# Patient Record
Sex: FEMALE | Race: WHITE | NOT HISPANIC OR LATINO | Employment: OTHER | URBAN - METROPOLITAN AREA
[De-identification: names, ages, dates, MRNs, and addresses within clinical notes are randomized per-mention and may not be internally consistent; named-entity substitution may affect disease eponyms.]

---

## 2017-03-14 ENCOUNTER — HISTORICAL (OUTPATIENT)
Dept: LAB | Facility: HOSPITAL | Age: 82
End: 2017-03-14

## 2017-05-23 ENCOUNTER — HISTORICAL (OUTPATIENT)
Dept: LAB | Facility: HOSPITAL | Age: 82
End: 2017-05-23

## 2017-05-23 LAB
ABS NEUT (OLG): 7.2 X10(3)/MCL (ref 2.1–9.2)
ALBUMIN SERPL-MCNC: 4.2 GM/DL (ref 3.4–5)
ALBUMIN/GLOB SERPL: 1.3 RATIO (ref 1.1–2)
ALP SERPL-CCNC: 60 UNIT/L (ref 46–116)
ALT SERPL-CCNC: 18 UNIT/L (ref 12–78)
APPEARANCE, UA: CLEAR
AST SERPL-CCNC: 13 UNIT/L (ref 15–37)
BACTERIA SPEC CULT: ABNORMAL
BASOPHILS # BLD AUTO: 0.1 X10(3)/MCL
BASOPHILS NFR BLD AUTO: 1 % (ref 0–2)
BILIRUB SERPL-MCNC: 0.6 MG/DL (ref 0.2–1)
BILIRUB UR QL STRIP: NEGATIVE
BILIRUBIN DIRECT+TOT PNL SERPL-MCNC: 0.15 MG/DL (ref 0–0.2)
BILIRUBIN DIRECT+TOT PNL SERPL-MCNC: 0.45 MG/DL (ref 0–0.8)
BUN SERPL-MCNC: 16 MG/DL (ref 7–18)
CALCIUM SERPL-MCNC: 10.3 MG/DL (ref 8.5–10.1)
CHLORIDE SERPL-SCNC: 103 MMOL/L (ref 98–107)
CO2 SERPL-SCNC: 30.7 MMOL/L (ref 21–32)
COLOR UR: YELLOW
CREAT SERPL-MCNC: 0.83 MG/DL (ref 0.6–1.3)
CREAT UR-MCNC: 148.1 MG/DL (ref 30–125)
EOSINOPHIL # BLD AUTO: 0.5 X10(3)/MCL
EOSINOPHIL NFR BLD AUTO: 5 %
ERYTHROCYTE [DISTWIDTH] IN BLOOD BY AUTOMATED COUNT: 13 % (ref 11.5–17)
EST. AVERAGE GLUCOSE BLD GHB EST-MCNC: 111 MG/DL
GLOBULIN SER-MCNC: 3.3 GM/DL (ref 2.4–3.5)
GLUCOSE (UA): NEGATIVE
GLUCOSE SERPL-MCNC: 85 MG/DL (ref 74–106)
HBA1C MFR BLD: 5.5 % (ref 4.5–6.2)
HCT VFR BLD AUTO: 40.6 % (ref 37–47)
HGB BLD-MCNC: 13.4 GM/DL (ref 12–16)
HGB UR QL STRIP: NEGATIVE
KETONES UR QL STRIP: ABNORMAL
LEUKOCYTE ESTERASE UR QL STRIP: NEGATIVE
LYMPHOCYTES # BLD AUTO: 1.7 X10(3)/MCL
LYMPHOCYTES NFR BLD AUTO: 17 % (ref 13–40)
MCH RBC QN AUTO: 31 PG (ref 27–31)
MCHC RBC AUTO-ENTMCNC: 32.9 GM/DL (ref 33–36)
MCV RBC AUTO: 94 FL (ref 80–94)
MICROALBUMIN UR-MCNC: 0.8 MG/DL (ref 0–3)
MICROALBUMIN/CREAT RATIO PNL UR: 5.4 MG/GM CR (ref 0–30)
MONOCYTES # BLD AUTO: 0.6 X10(3)/MCL
MONOCYTES NFR BLD AUTO: 6 % (ref 2–11)
NEUTROPHILS # BLD AUTO: 7.2 X10(3)/MCL (ref 2.1–9.2)
NEUTROPHILS NFR BLD AUTO: 71 % (ref 47–80)
NITRITE UR QL STRIP: NEGATIVE
PH UR STRIP: 7 [PH] (ref 5–9)
PLATELET # BLD AUTO: 261 X10(3)/MCL (ref 130–400)
PMV BLD AUTO: 9.5 FL (ref 7.4–10.4)
POTASSIUM SERPL-SCNC: 4.7 MMOL/L (ref 3.5–5.1)
PROT SERPL-MCNC: 7.5 GM/DL (ref 6.4–8.2)
PROT UR QL STRIP: NEGATIVE
RBC # BLD AUTO: 4.32 X10(6)/MCL (ref 4.2–5.4)
RBC #/AREA URNS HPF: ABNORMAL /[HPF]
SODIUM SERPL-SCNC: 141 MMOL/L (ref 136–145)
SP GR UR STRIP: 1.01 (ref 1–1.03)
SQUAMOUS EPITHELIAL, UA: ABNORMAL
UROBILINOGEN UR STRIP-ACNC: 0.2
WBC # SPEC AUTO: 10.1 X10(3)/MCL (ref 4.5–11.5)
WBC #/AREA URNS HPF: ABNORMAL /HPF

## 2017-05-24 LAB — PTH-INTACT SERPL-MCNC: 14.1 PG/DL (ref 14–72)

## 2017-12-14 ENCOUNTER — HISTORICAL (OUTPATIENT)
Dept: LAB | Facility: HOSPITAL | Age: 82
End: 2017-12-14

## 2017-12-14 LAB
ABS NEUT (OLG): 5.5 X10(3)/MCL (ref 2.1–9.2)
ALBUMIN SERPL-MCNC: 3.9 GM/DL (ref 3.4–5)
ALBUMIN/GLOB SERPL: 1.2 RATIO (ref 1.1–2)
ALP SERPL-CCNC: 59 UNIT/L (ref 46–116)
ALT SERPL-CCNC: 19 UNIT/L (ref 12–78)
AST SERPL-CCNC: 14 UNIT/L (ref 15–37)
BASOPHILS # BLD AUTO: 0.1 X10(3)/MCL
BASOPHILS NFR BLD AUTO: 1 % (ref 0–2)
BILIRUB SERPL-MCNC: 0.5 MG/DL (ref 0.2–1)
BILIRUBIN DIRECT+TOT PNL SERPL-MCNC: 0.14 MG/DL (ref 0–0.2)
BILIRUBIN DIRECT+TOT PNL SERPL-MCNC: 0.34 MG/DL (ref 0–0.8)
BUN SERPL-MCNC: 21.7 MG/DL (ref 7–18)
CALCIUM SERPL-MCNC: 10.4 MG/DL (ref 8.5–10.1)
CHLORIDE SERPL-SCNC: 102 MMOL/L (ref 98–107)
CO2 SERPL-SCNC: 28.9 MMOL/L (ref 21–32)
CREAT SERPL-MCNC: 0.88 MG/DL (ref 0.6–1.3)
EOSINOPHIL # BLD AUTO: 0.4 X10(3)/MCL
EOSINOPHIL NFR BLD AUTO: 4 %
ERYTHROCYTE [DISTWIDTH] IN BLOOD BY AUTOMATED COUNT: 12.8 % (ref 11.5–17)
EST. AVERAGE GLUCOSE BLD GHB EST-MCNC: 117 MG/DL
GLOBULIN SER-MCNC: 3.3 GM/DL (ref 2.4–3.5)
GLUCOSE SERPL-MCNC: 94 MG/DL (ref 74–106)
HBA1C MFR BLD: 5.7 % (ref 4.5–6.2)
HCT VFR BLD AUTO: 40.6 % (ref 37–47)
HGB BLD-MCNC: 13.7 GM/DL (ref 12–16)
LYMPHOCYTES # BLD AUTO: 2.1 X10(3)/MCL
LYMPHOCYTES NFR BLD AUTO: 24 % (ref 13–40)
MCH RBC QN AUTO: 31.7 PG (ref 27–31)
MCHC RBC AUTO-ENTMCNC: 33.8 GM/DL (ref 33–36)
MCV RBC AUTO: 93.9 FL (ref 80–94)
MONOCYTES # BLD AUTO: 0.6 X10(3)/MCL
MONOCYTES NFR BLD AUTO: 7 % (ref 2–11)
NEUTROPHILS # BLD AUTO: 5.5 X10(3)/MCL (ref 2.1–9.2)
NEUTROPHILS NFR BLD AUTO: 64 % (ref 47–80)
PLATELET # BLD AUTO: 356 X10(3)/MCL (ref 130–400)
PMV BLD AUTO: 8.3 FL (ref 7.4–10.4)
POTASSIUM SERPL-SCNC: 4.6 MMOL/L (ref 3.5–5.1)
PROT SERPL-MCNC: 7.2 GM/DL (ref 6.4–8.2)
RBC # BLD AUTO: 4.32 X10(6)/MCL (ref 4.2–5.4)
SODIUM SERPL-SCNC: 140 MMOL/L (ref 136–145)
WBC # SPEC AUTO: 8.7 X10(3)/MCL (ref 4.5–11.5)

## 2018-01-25 ENCOUNTER — HISTORICAL (OUTPATIENT)
Dept: LAB | Facility: HOSPITAL | Age: 83
End: 2018-01-25

## 2018-01-25 LAB
CALCIUM SERPL-MCNC: 10.2 MG/DL (ref 8.5–10.1)
PHOSPHATE SERPL-MCNC: 3.6 MG/DL (ref 2.5–4.9)

## 2018-05-29 ENCOUNTER — HISTORICAL (OUTPATIENT)
Dept: LAB | Facility: HOSPITAL | Age: 83
End: 2018-05-29

## 2018-05-29 LAB
ABS NEUT (OLG): 5.8 X10(3)/MCL (ref 2.1–9.2)
ALBUMIN SERPL-MCNC: 4.1 GM/DL (ref 3.4–5)
ALBUMIN/GLOB SERPL: 1.2 RATIO (ref 1.1–2)
ALP SERPL-CCNC: 72 UNIT/L (ref 46–116)
ALT SERPL-CCNC: 18 UNIT/L (ref 12–78)
AST SERPL-CCNC: 16 UNIT/L (ref 15–37)
BASOPHILS # BLD AUTO: 0.1 X10(3)/MCL
BASOPHILS NFR BLD AUTO: 1 % (ref 0–2)
BILIRUB SERPL-MCNC: 0.5 MG/DL (ref 0.2–1)
BILIRUBIN DIRECT+TOT PNL SERPL-MCNC: 0.15 MG/DL (ref 0–0.2)
BILIRUBIN DIRECT+TOT PNL SERPL-MCNC: 0.35 MG/DL (ref 0–0.8)
BUN SERPL-MCNC: 12.2 MG/DL (ref 7–18)
CALCIUM SERPL-MCNC: 10 MG/DL (ref 8.5–10.1)
CHLORIDE SERPL-SCNC: 102 MMOL/L (ref 98–107)
CHOLEST SERPL-MCNC: 171 MG/DL (ref 0–200)
CHOLEST/HDLC SERPL: 2.2 {RATIO} (ref 0–4)
CO2 SERPL-SCNC: 26 MMOL/L (ref 21–32)
CREAT SERPL-MCNC: 0.89 MG/DL (ref 0.6–1.3)
EOSINOPHIL # BLD AUTO: 0.2 X10(3)/MCL
EOSINOPHIL NFR BLD AUTO: 3 %
ERYTHROCYTE [DISTWIDTH] IN BLOOD BY AUTOMATED COUNT: 13.2 % (ref 11.5–17)
EST. AVERAGE GLUCOSE BLD GHB EST-MCNC: 105 MG/DL
GLOBULIN SER-MCNC: 3.4 GM/DL (ref 2.4–3.5)
GLUCOSE SERPL-MCNC: 116 MG/DL (ref 74–106)
HBA1C MFR BLD: 5.3 % (ref 4.5–6.2)
HCT VFR BLD AUTO: 40.8 % (ref 37–47)
HDLC SERPL-MCNC: 76 MG/DL (ref 40–60)
HGB BLD-MCNC: 13.8 GM/DL (ref 12–16)
LDLC SERPL CALC-MCNC: 79 MG/DL (ref 0–129)
LYMPHOCYTES # BLD AUTO: 2.2 X10(3)/MCL
LYMPHOCYTES NFR BLD AUTO: 25 % (ref 13–40)
MCH RBC QN AUTO: 32 PG (ref 27–31)
MCHC RBC AUTO-ENTMCNC: 33.7 GM/DL (ref 33–36)
MCV RBC AUTO: 94.9 FL (ref 80–94)
MONOCYTES # BLD AUTO: 0.6 X10(3)/MCL
MONOCYTES NFR BLD AUTO: 6 % (ref 2–11)
NEUTROPHILS # BLD AUTO: 5.8 X10(3)/MCL (ref 2.1–9.2)
NEUTROPHILS NFR BLD AUTO: 65 % (ref 47–80)
PLATELET # BLD AUTO: 306 X10(3)/MCL (ref 130–400)
PMV BLD AUTO: 9.1 FL (ref 7.4–10.4)
POTASSIUM SERPL-SCNC: 4.4 MMOL/L (ref 3.5–5.1)
PROT SERPL-MCNC: 7.5 GM/DL (ref 6.4–8.2)
RBC # BLD AUTO: 4.3 X10(6)/MCL (ref 4.2–5.4)
SODIUM SERPL-SCNC: 139 MMOL/L (ref 136–145)
TRIGL SERPL-MCNC: 78 MG/DL
TSH SERPL-ACNC: 1.08 MIU/ML (ref 0.36–3.74)
VLDLC SERPL CALC-MCNC: 16 MG/DL
WBC # SPEC AUTO: 8.9 X10(3)/MCL (ref 4.5–11.5)

## 2019-01-10 ENCOUNTER — HOSPITAL ENCOUNTER (OUTPATIENT)
Dept: MEDSURG UNIT | Facility: HOSPITAL | Age: 84
End: 2019-01-12
Attending: INTERNAL MEDICINE | Admitting: INTERNAL MEDICINE

## 2019-01-10 LAB
MAGNESIUM SERPL-MCNC: 1.8 MG/DL (ref 1.8–2.4)
PHOSPHATE SERPL-MCNC: 3.5 MG/DL (ref 2.5–4.9)

## 2019-01-11 LAB
ABS NEUT (OLG): 5.7 X10(3)/MCL (ref 2.1–9.2)
BASOPHILS # BLD AUTO: 0.05 X10(3)/MCL
BASOPHILS NFR BLD AUTO: 1 %
BUN SERPL-MCNC: 16 MG/DL (ref 7–18)
CALCIUM SERPL-MCNC: 8.2 MG/DL (ref 8.5–10.1)
CHLORIDE SERPL-SCNC: 110 MMOL/L (ref 98–107)
CO2 SERPL-SCNC: 25 MMOL/L (ref 21–32)
CREAT SERPL-MCNC: 0.7 MG/DL (ref 0.6–1.3)
CREAT/UREA NIT SERPL: 23
EOSINOPHIL # BLD AUTO: 0.32 10*3/UL
EOSINOPHIL NFR BLD AUTO: 4 %
ERYTHROCYTE [DISTWIDTH] IN BLOOD BY AUTOMATED COUNT: 12.4 % (ref 11.5–14.5)
GLUCOSE SERPL-MCNC: 74 MG/DL (ref 74–106)
HCT VFR BLD AUTO: 38.3 % (ref 35–46)
HGB BLD-MCNC: 12.5 GM/DL (ref 12–16)
IMM GRANULOCYTES # BLD AUTO: 0.04 10*3/UL
IMM GRANULOCYTES NFR BLD AUTO: 0 %
LYMPHOCYTES # BLD AUTO: 1.38 X10(3)/MCL
LYMPHOCYTES NFR BLD AUTO: 17 % (ref 13–40)
MAGNESIUM SERPL-MCNC: 2 MG/DL (ref 1.8–2.4)
MCH RBC QN AUTO: 31.5 PG (ref 26–34)
MCHC RBC AUTO-ENTMCNC: 32.6 GM/DL (ref 31–37)
MCV RBC AUTO: 96.5 FL (ref 80–100)
MONOCYTES # BLD AUTO: 0.48 X10(3)/MCL
MONOCYTES NFR BLD AUTO: 6 % (ref 4–12)
NEUTROPHILS # BLD AUTO: 5.7 X10(3)/MCL
NEUTROPHILS NFR BLD AUTO: 72 X10(3)/MCL
PHOSPHATE SERPL-MCNC: 3.1 MG/DL (ref 2.5–4.9)
PLATELET # BLD AUTO: 216 X10(3)/MCL (ref 130–400)
PMV BLD AUTO: 10.7 FL (ref 7.4–10.4)
POTASSIUM SERPL-SCNC: 3.7 MMOL/L (ref 3.5–5.1)
RBC # BLD AUTO: 3.97 X10(6)/MCL (ref 4–5.2)
SODIUM SERPL-SCNC: 140 MMOL/L (ref 136–145)
WBC # SPEC AUTO: 8 X10(3)/MCL (ref 4.5–11)

## 2019-01-12 LAB
ABS NEUT (OLG): 3.79 X10(3)/MCL (ref 2.1–9.2)
BASOPHILS # BLD AUTO: 0.05 X10(3)/MCL
BASOPHILS NFR BLD AUTO: 1 %
BUN SERPL-MCNC: 8 MG/DL (ref 7–18)
CALCIUM SERPL-MCNC: 8.2 MG/DL (ref 8.5–10.1)
CHLORIDE SERPL-SCNC: 112 MMOL/L (ref 98–107)
CO2 SERPL-SCNC: 25 MMOL/L (ref 21–32)
CREAT SERPL-MCNC: 0.7 MG/DL (ref 0.6–1.3)
CREAT/UREA NIT SERPL: 11
EOSINOPHIL # BLD AUTO: 0.4 10*3/UL
EOSINOPHIL NFR BLD AUTO: 6 %
ERYTHROCYTE [DISTWIDTH] IN BLOOD BY AUTOMATED COUNT: 12.3 % (ref 11.5–14.5)
GLUCOSE SERPL-MCNC: 85 MG/DL (ref 74–106)
HCT VFR BLD AUTO: 39.2 % (ref 35–46)
HGB BLD-MCNC: 12.9 GM/DL (ref 12–16)
IMM GRANULOCYTES # BLD AUTO: 0.01 10*3/UL
IMM GRANULOCYTES NFR BLD AUTO: 0 %
LYMPHOCYTES # BLD AUTO: 1.58 X10(3)/MCL
LYMPHOCYTES NFR BLD AUTO: 25 % (ref 13–40)
MCH RBC QN AUTO: 31.7 PG (ref 26–34)
MCHC RBC AUTO-ENTMCNC: 32.9 GM/DL (ref 31–37)
MCV RBC AUTO: 96.3 FL (ref 80–100)
MONOCYTES # BLD AUTO: 0.51 X10(3)/MCL
MONOCYTES NFR BLD AUTO: 8 % (ref 4–12)
NEUTROPHILS # BLD AUTO: 3.79 X10(3)/MCL
NEUTROPHILS NFR BLD AUTO: 60 X10(3)/MCL
PLATELET # BLD AUTO: 221 X10(3)/MCL (ref 130–400)
PMV BLD AUTO: 10.5 FL (ref 7.4–10.4)
POTASSIUM SERPL-SCNC: 3 MMOL/L (ref 3.5–5.1)
RBC # BLD AUTO: 4.07 X10(6)/MCL (ref 4–5.2)
SODIUM SERPL-SCNC: 143 MMOL/L (ref 136–145)
WBC # SPEC AUTO: 6.3 X10(3)/MCL (ref 4.5–11)

## 2019-02-12 ENCOUNTER — HISTORICAL (OUTPATIENT)
Dept: LAB | Facility: HOSPITAL | Age: 84
End: 2019-02-12

## 2019-02-12 LAB
APPEARANCE, UA: CLEAR
BACTERIA SPEC CULT: ABNORMAL
BILIRUB UR QL STRIP: NEGATIVE
BUN SERPL-MCNC: 10 MG/DL (ref 7–18)
CALCIUM SERPL-MCNC: 10.3 MG/DL (ref 8.5–10.1)
CHLORIDE SERPL-SCNC: 98 MMOL/L (ref 98–107)
CO2 SERPL-SCNC: 25.8 MMOL/L (ref 21–32)
COLOR UR: YELLOW
CREAT SERPL-MCNC: 0.89 MG/DL (ref 0.6–1.3)
CREAT UR-MCNC: 74.9 MG/DL (ref 30–125)
CREAT/UREA NIT SERPL: 11
EST. AVERAGE GLUCOSE BLD GHB EST-MCNC: 114 MG/DL
GLUCOSE (UA): NEGATIVE
GLUCOSE SERPL-MCNC: 91 MG/DL (ref 74–106)
HBA1C MFR BLD: 5.6 % (ref 4.5–6.2)
HGB UR QL STRIP: ABNORMAL
KETONES UR QL STRIP: ABNORMAL
LEUKOCYTE ESTERASE UR QL STRIP: ABNORMAL
MICROALBUMIN UR-MCNC: 1.6 MG/DL (ref 0–3)
MICROALBUMIN/CREAT RATIO PNL UR: 21.4 MG/GM CR (ref 0–30)
NITRITE UR QL STRIP: NEGATIVE
PH UR STRIP: 6.5 [PH] (ref 5–9)
POTASSIUM SERPL-SCNC: 4.6 MMOL/L (ref 3.5–5.1)
PROT UR QL STRIP: NEGATIVE
RBC #/AREA URNS HPF: ABNORMAL /HPF
SODIUM SERPL-SCNC: 138 MMOL/L (ref 136–145)
SP GR UR STRIP: 1.01 (ref 1–1.03)
SQUAMOUS EPITHELIAL, UA: ABNORMAL
TSH SERPL-ACNC: 1.51 MIU/ML (ref 0.36–3.74)
UROBILINOGEN UR STRIP-ACNC: 0.2
WBC #/AREA URNS HPF: ABNORMAL /HPF

## 2019-07-30 ENCOUNTER — HISTORICAL (OUTPATIENT)
Dept: LAB | Facility: HOSPITAL | Age: 84
End: 2019-07-30

## 2019-07-30 LAB
ALBUMIN SERPL-MCNC: 3.9 GM/DL (ref 3.4–5)
ALBUMIN/GLOB SERPL: 1.1 RATIO (ref 1.1–2)
ALP SERPL-CCNC: 71 UNIT/L (ref 46–116)
ALT SERPL-CCNC: 24 UNIT/L (ref 12–78)
AST SERPL-CCNC: 21 UNIT/L (ref 15–37)
BILIRUB SERPL-MCNC: 0.4 MG/DL (ref 0.2–1)
BILIRUBIN DIRECT+TOT PNL SERPL-MCNC: 0.13 MG/DL (ref 0–0.2)
BILIRUBIN DIRECT+TOT PNL SERPL-MCNC: 0.27 MG/DL (ref 0–0.8)
BUN SERPL-MCNC: 15.3 MG/DL (ref 7–18)
CALCIUM SERPL-MCNC: 10.1 MG/DL (ref 8.5–10.1)
CHLORIDE SERPL-SCNC: 105 MMOL/L (ref 98–107)
CHOLEST SERPL-MCNC: 168 MG/DL (ref 0–200)
CHOLEST/HDLC SERPL: 2.4 {RATIO} (ref 0–4)
CO2 SERPL-SCNC: 20.9 MMOL/L (ref 21–32)
CREAT SERPL-MCNC: 1.06 MG/DL (ref 0.6–1.3)
EST. AVERAGE GLUCOSE BLD GHB EST-MCNC: 105 MG/DL
GLOBULIN SER-MCNC: 3.4 GM/DL (ref 2.4–3.5)
GLUCOSE SERPL-MCNC: 94 MG/DL (ref 74–106)
HBA1C MFR BLD: 5.3 % (ref 4.5–6.2)
HDLC SERPL-MCNC: 70 MG/DL (ref 40–60)
LDLC SERPL CALC-MCNC: 86 MG/DL (ref 0–129)
POTASSIUM SERPL-SCNC: 4.6 MMOL/L (ref 3.5–5.1)
PROT SERPL-MCNC: 7.3 GM/DL (ref 6.4–8.2)
SODIUM SERPL-SCNC: 141 MMOL/L (ref 136–145)
TRIGL SERPL-MCNC: 60 MG/DL
VLDLC SERPL CALC-MCNC: 12 MG/DL

## 2020-03-05 ENCOUNTER — HISTORICAL (OUTPATIENT)
Dept: LAB | Facility: HOSPITAL | Age: 85
End: 2020-03-05

## 2020-03-05 LAB
APPEARANCE, UA: CLEAR
BACTERIA SPEC CULT: NORMAL
BILIRUB UR QL STRIP: NEGATIVE
COLOR UR: YELLOW
GLUCOSE (UA): NEGATIVE
HGB UR QL STRIP: NEGATIVE
KETONES UR QL STRIP: NEGATIVE
LEUKOCYTE ESTERASE UR QL STRIP: NEGATIVE
NITRITE UR QL STRIP: NEGATIVE
PH UR STRIP: 7 [PH] (ref 5–9)
PROT UR QL STRIP: NEGATIVE
RBC #/AREA URNS HPF: NORMAL /[HPF]
SP GR UR STRIP: 1.02 (ref 1–1.03)
SQUAMOUS EPITHELIAL, UA: NORMAL
UROBILINOGEN UR STRIP-ACNC: 1
WBC #/AREA URNS HPF: NORMAL /[HPF]

## 2020-07-08 ENCOUNTER — HISTORICAL (OUTPATIENT)
Dept: ADMINISTRATIVE | Facility: HOSPITAL | Age: 85
End: 2020-07-08

## 2020-07-08 LAB
ABS NEUT (OLG): 5.28 X10(3)/MCL (ref 2.1–9.2)
ALBUMIN SERPL-MCNC: 3.8 GM/DL (ref 3.4–5)
ALBUMIN/GLOB SERPL: 1.2 RATIO (ref 1.1–2)
ALP SERPL-CCNC: 63 UNIT/L (ref 46–116)
ALT SERPL-CCNC: 26 UNIT/L (ref 12–78)
AST SERPL-CCNC: 16 UNIT/L (ref 15–37)
BASOPHILS # BLD AUTO: 0.1 X10(3)/MCL (ref 0–0.2)
BASOPHILS NFR BLD AUTO: 1 %
BILIRUB SERPL-MCNC: 0.4 MG/DL (ref 0.2–1)
BILIRUBIN DIRECT+TOT PNL SERPL-MCNC: 0.17 MG/DL (ref 0–0.2)
BILIRUBIN DIRECT+TOT PNL SERPL-MCNC: 0.23 MG/DL (ref 0–0.8)
BUN SERPL-MCNC: 17.5 MG/DL (ref 7–18)
CALCIUM SERPL-MCNC: 10.2 MG/DL (ref 8.5–10.1)
CHLORIDE SERPL-SCNC: 104 MMOL/L (ref 98–107)
CHOLEST SERPL-MCNC: 162 MG/DL (ref 0–200)
CHOLEST/HDLC SERPL: 2.1 {RATIO} (ref 0–4)
CO2 SERPL-SCNC: 27.6 MMOL/L (ref 21–32)
CREAT SERPL-MCNC: 0.72 MG/DL (ref 0.6–1.3)
DEPRECATED CALCIDIOL+CALCIFEROL SERPL-MC: 26 NG/ML (ref 6.6–49.9)
EOSINOPHIL # BLD AUTO: 0.2 X10(3)/MCL (ref 0–0.9)
EOSINOPHIL NFR BLD AUTO: 3 %
ERYTHROCYTE [DISTWIDTH] IN BLOOD BY AUTOMATED COUNT: 12 % (ref 11.5–17)
EST. AVERAGE GLUCOSE BLD GHB EST-MCNC: 114 MG/DL
GLOBULIN SER-MCNC: 3.2 GM/DL (ref 2.4–3.5)
GLUCOSE SERPL-MCNC: 130 MG/DL (ref 74–106)
HBA1C MFR BLD: 5.6 % (ref 4.5–6.2)
HCT VFR BLD AUTO: 39.5 % (ref 37–47)
HDLC SERPL-MCNC: 76 MG/DL (ref 40–60)
HGB BLD-MCNC: 13.4 GM/DL (ref 12–16)
IMM GRANULOCYTES # BLD AUTO: 0.01 % (ref 0–0.02)
IMM GRANULOCYTES NFR BLD AUTO: 0.1 % (ref 0–0.43)
LDLC SERPL CALC-MCNC: 71 MG/DL (ref 0–129)
LYMPHOCYTES # BLD AUTO: 1.6 X10(3)/MCL (ref 0.6–4.6)
LYMPHOCYTES NFR BLD AUTO: 20 %
MCH RBC QN AUTO: 32.5 PG (ref 27–31)
MCHC RBC AUTO-ENTMCNC: 33.9 GM/DL (ref 33–36)
MCV RBC AUTO: 95.9 FL (ref 80–94)
MONOCYTES # BLD AUTO: 0.6 X10(3)/MCL (ref 0.1–1.3)
MONOCYTES NFR BLD AUTO: 8 %
NEUTROPHILS # BLD AUTO: 5.28 X10(3)/MCL (ref 1.4–7.9)
NEUTROPHILS NFR BLD AUTO: 68 %
PHOSPHATE SERPL-MCNC: 3.8 MG/DL (ref 2.5–4.9)
PLATELET # BLD AUTO: 270 X10(3)/MCL (ref 130–400)
PMV BLD AUTO: 11.1 FL (ref 9.4–12.4)
POTASSIUM SERPL-SCNC: 4.3 MMOL/L (ref 3.5–5.1)
PROT SERPL-MCNC: 7 GM/DL (ref 6.4–8.2)
RBC # BLD AUTO: 4.12 X10(6)/MCL (ref 4.2–5.4)
SODIUM SERPL-SCNC: 142 MMOL/L (ref 136–145)
TRIGL SERPL-MCNC: 77 MG/DL
TSH SERPL-ACNC: 0.85 MIU/ML (ref 0.36–3.74)
VLDLC SERPL CALC-MCNC: 15 MG/DL
WBC # SPEC AUTO: 7.7 X10(3)/MCL (ref 4.5–11.5)

## 2020-11-02 ENCOUNTER — HISTORICAL (OUTPATIENT)
Dept: ADMINISTRATIVE | Facility: HOSPITAL | Age: 85
End: 2020-11-02

## 2020-11-02 LAB
APPEARANCE, UA: ABNORMAL
BACTERIA SPEC CULT: ABNORMAL
BILIRUB UR QL STRIP: ABNORMAL
COLOR UR: YELLOW
CREAT UR-MCNC: 227.8 MG/DL (ref 45–106)
GLUCOSE (UA): NEGATIVE
HGB UR QL STRIP: ABNORMAL
KETONES UR QL STRIP: NEGATIVE
LEUKOCYTE ESTERASE UR QL STRIP: ABNORMAL
MICROALBUMIN UR-MCNC: 31.7 UG/ML
MICROALBUMIN/CREAT RATIO PNL UR: 13.9 MG/GM CR (ref 0–30)
NITRITE UR QL STRIP: NEGATIVE
PH UR STRIP: 5.5 [PH] (ref 5–9)
PROT UR QL STRIP: NEGATIVE
RBC #/AREA URNS HPF: ABNORMAL /[HPF]
SP GR UR STRIP: 1.02 (ref 1–1.03)
SQUAMOUS EPITHELIAL, UA: ABNORMAL
UROBILINOGEN UR STRIP-ACNC: 0.2
WBC #/AREA URNS HPF: ABNORMAL /HPF

## 2021-05-04 ENCOUNTER — HISTORICAL (OUTPATIENT)
Dept: ADMINISTRATIVE | Facility: HOSPITAL | Age: 86
End: 2021-05-04

## 2021-05-04 LAB
ABS NEUT (OLG): 4.25 X10(3)/MCL (ref 2.1–9.2)
ALBUMIN SERPL-MCNC: 4 GM/DL (ref 3.4–4.8)
ALBUMIN/GLOB SERPL: 1.5 RATIO (ref 1.1–2)
ALP SERPL-CCNC: 76 UNIT/L (ref 40–150)
ALT SERPL-CCNC: 11 UNIT/L (ref 0–55)
ANTINUCLEAR ANTIBODY SCREEN (OHS): NEGATIVE
AST SERPL-CCNC: 18 UNIT/L (ref 5–34)
BASOPHILS # BLD AUTO: 0.1 X10(3)/MCL (ref 0–0.2)
BASOPHILS NFR BLD AUTO: 1 %
BILIRUB SERPL-MCNC: 0.3 MG/DL
BILIRUBIN DIRECT+TOT PNL SERPL-MCNC: 0.1 MG/DL (ref 0–0.8)
BILIRUBIN DIRECT+TOT PNL SERPL-MCNC: 0.2 MG/DL (ref 0–0.5)
BUN SERPL-MCNC: 18 MG/DL (ref 9.8–20.1)
CALCIUM SERPL-MCNC: 9.3 MG/DL (ref 8.4–10.2)
CHLORIDE SERPL-SCNC: 103 MMOL/L (ref 98–107)
CO2 SERPL-SCNC: 26 MMOL/L (ref 23–31)
CREAT SERPL-MCNC: 0.94 MG/DL (ref 0.55–1.02)
CRP SERPL-MCNC: 0 MG/DL (ref 0–1)
DSDNA ANTIBODY (OHS): NEGATIVE
EOSINOPHIL # BLD AUTO: 0.3 X10(3)/MCL (ref 0–0.9)
EOSINOPHIL NFR BLD AUTO: 4 %
ERYTHROCYTE [DISTWIDTH] IN BLOOD BY AUTOMATED COUNT: 12.2 % (ref 11.5–17)
ERYTHROCYTE [SEDIMENTATION RATE] IN BLOOD: 16 MM/HR (ref 0–20)
GLOBULIN SER-MCNC: 2.6 GM/DL (ref 2.4–3.5)
GLUCOSE SERPL-MCNC: 83 MG/DL (ref 82–115)
HCT VFR BLD AUTO: 39.6 % (ref 37–47)
HGB BLD-MCNC: 13.1 GM/DL (ref 12–16)
IMM GRANULOCYTES # BLD AUTO: 0.01 % (ref 0–0.02)
IMM GRANULOCYTES NFR BLD AUTO: 0.2 % (ref 0–0.43)
LYMPHOCYTES # BLD AUTO: 1.6 X10(3)/MCL (ref 0.6–4.6)
LYMPHOCYTES NFR BLD AUTO: 23 %
MCH RBC QN AUTO: 32.3 PG (ref 27–31)
MCHC RBC AUTO-ENTMCNC: 33.1 GM/DL (ref 33–36)
MCV RBC AUTO: 97.5 FL (ref 80–94)
MONOCYTES # BLD AUTO: 0.5 X10(3)/MCL (ref 0.1–1.3)
MONOCYTES NFR BLD AUTO: 7 %
NEUTROPHILS # BLD AUTO: 4.25 X10(3)/MCL (ref 1.4–7.9)
NEUTROPHILS NFR BLD AUTO: 64 %
PLATELET # BLD AUTO: 282 X10(3)/MCL (ref 130–400)
PMV BLD AUTO: 10.5 FL (ref 9.4–12.4)
POTASSIUM SERPL-SCNC: 4.3 MMOL/L (ref 3.5–5.1)
PROT SERPL-MCNC: 6.6 GM/DL (ref 5.8–7.6)
RBC # BLD AUTO: 4.06 X10(6)/MCL (ref 4.2–5.4)
SODIUM SERPL-SCNC: 138 MMOL/L (ref 136–145)
TSH SERPL-ACNC: 1.87 UIU/ML (ref 0.35–4.94)
WBC # SPEC AUTO: 6.6 X10(3)/MCL (ref 4.5–11.5)

## 2022-04-10 ENCOUNTER — HISTORICAL (OUTPATIENT)
Dept: ADMINISTRATIVE | Facility: HOSPITAL | Age: 87
End: 2022-04-10
Payer: MEDICARE

## 2022-04-24 VITALS
WEIGHT: 119 LBS | OXYGEN SATURATION: 98 % | DIASTOLIC BLOOD PRESSURE: 62 MMHG | SYSTOLIC BLOOD PRESSURE: 136 MMHG | HEIGHT: 59 IN | BODY MASS INDEX: 23.99 KG/M2

## 2022-04-30 NOTE — ED PROVIDER NOTES
Patient:   Priscila Randhawa            MRN: [id number]            FIN: [id number]               Age:   85 years     Sex:  Female     :  1933   Associated Diagnoses:   Ileus; Vomiting   Author:   Apryl Eng MD      Basic Information   Time seen: Date & time 1/10/2019 14:24:00.   History source: Patient.   Arrival mode: Private vehicle.   History limitation: None.      History of Present Illness   The patient presents with abdominal pain and Abdomainal discomfort.  The onset was 1  days ago.  The course/duration of symptoms is improving.  The character of symptoms is crampy.  The degree at onset was moderate.  The Location of pain at onset was diffuse.  The degree at present is minimal.  The Location of pain at present is diffuse.  Radiating pain: none. The exacerbating factor is none.  There are relieving factors including vomiting and patient reported to have 2 episodes of non bloody vomiting.  Risk factors consist of Abdominal surgey reported it to be Hysterectomy.  Associated symptoms: vomiting, denies fever and denies chills.  Additional history: Patient reported to have abdominal discomfort and 2 episodes of vomiting this morning and presented to [facility] and is transfered here for SBO.Patient denied any constipation and last bowel movement was today. .        Review of Systems   Constitutional symptoms:  No fever, no chills, no sweats, no weakness, no fatigue.    Skin symptoms:  No jaundice, no rash, no pruritus, no abrasions, no breakdown.    Eye symptoms:  No recent vision problems, no pain, no discharge, no icterus, no diplopia, no blurred vision.    ENMT symptoms:  No ear pain, no sore throat, no nasal congestion, no sinus pain.    Respiratory symptoms:  Negative except as documented in HPI, no shortness of breath, no orthopnea, no cough, no hemoptysis, no sputum production.    Cardiovascular symptoms:  No chest pain, no palpitations, no tachycardia, no syncope.     Gastrointestinal symptoms:  Negative except as documented in HPI.   Genitourinary symptoms:  No dysuria, no hematuria, no vaginal bleeding.    Musculoskeletal symptoms:  No back pain, no Muscle pain, no Joint pain.    Neurologic symptoms:  No headache, no dizziness, no altered level of consciousness.    Psychiatric symptoms:  No anxiety, no depression, no sleeping problems.    Endocrine symptoms:  No polyuria, no polydipsia, no polyphagia.       Health Status   Allergies:    Allergic Reactions (Selected)  Severity Not Documented  Codeine- Itching..   Medications:    Medications reviewed..      Past Medical/ Family/ Social History   Medical history:    Resolved  Carotid artery stenosis (173717845):  Resolved.  Cataracts ([id number]):  Resolved.  Vitamin D deficiency ([id number]):  Resolved..   Surgical history:    Hx of total knee replacement (SNOMED CT [id number]).  Comments:  4/22/2015 13:34 - Selena Hou MD A  left  Lumbar epidural steroid injection (SNOMED CT 732246777).  Exploratory laparotomy (SNOMED CT 138394690).  Comments:  4/22/2015 13:35 - Selena Hou MD A  intestinal blockage.   Family history:    Mother  CAD (coronary artery disease)  Acute myocardial infarction.  Brother  CAD (coronary artery disease)  Acute myocardial infarction.  Sister  CAD (coronary artery disease)  Congestive heart disease.  Alzheimer's disease  Brother  CAD (coronary artery disease)  Acute myocardial infarction.  .   Social history: Not significant.      Physical Examination               Vital Signs      Vital Signs (last 24 hrs)_____  Last Charted___________  Temp Oral     36.6 DegC  (EV 10 13:53)  Heart Rate Peripheral   75 bpm  (EV 10 13:53)  Resp Rate         16 br/min  (EV 10 13:53)  SBP      133 mmHg  (EV 10 13:53)  DBP      71 mmHg  (EV 10 13:53)  SpO2      100 %  (EV 10 13:53).   General:  Alert, no acute distress.    Skin:  Warm, pink, intact, moist.     Head:  Normocephalic.   Neck:  Supple, trachea midline, no tenderness.    Eye:  Pupils are equal, round and reactive to light, extraocular movements are intact, normal conjunctiva.    Ears, nose, mouth and throat:  Tympanic membranes clear, oral mucosa moist.    Cardiovascular:  Regular rate and rhythm, No murmur, Normal peripheral perfusion, No edema.    Respiratory:  Lungs are clear to auscultation, respirations are non-labored, breath sounds are equal, Symmetrical chest wall expansion.    Chest wall:  No tenderness.   Back:  Nontender.   Musculoskeletal:  Normal ROM, normal strength, no tenderness.    Gastrointestinal:  Soft, Nontender, Non distended, Bowel sounds: decreased, not silent.    Genitourinary:  No tenderness.   Neurological:  Alert and oriented to person, place, time, and situation, No focal neurological deficit observed, CN II-XII intact, normal sensory observed, normal motor observed, normal speech observed.    Psychiatric:  Cooperative, appropriate mood & affect.       Medical Decision Making   Documents reviewed:  Prior records.   Results review:  Lab results : Lab View.   Abdominal/KUB X-ray  CT abdomen:SBO/Ileus, x ray abdomen ileus likely vs SBO.       Impression and Plan   Diagnosis   Ileus (SSY59-FB K56.7)   Vomiting (EIU76-BQ R11.10)      Calls-Consults   -  1/10/2019 14:30:00 , internal medicine, consult, recommendation to consult general surgery general surgery .    -  1/10/2019 14:30:00 , Surgery, consult.    Plan   Condition: Improved.    Disposition: Admit time  1/10/2019 14:35:00, Place in Observation Unit.    Patient was given the following educational materials: Small Bowel Obstruction, Easy-to-Read.    Follow up with: Return to Emergency Department, Primary Care Physician.    Counseled: Patient, Regarding diagnosis, Regarding diagnostic results, Regarding treatment plan, Regarding prescription, Patient indicated understanding of instructions.       Addendum       Teaching-Supervisory Addendum-Brief   I participated in the following activities of this patients care: the medical history, the physical exam, medical decision making.   I personally performed: supervision of the patient's care, the medical history, the medical decision making.   The case was discussed with: the resident.   Evaluation and management service: I agree with the evaluation and management decisions made in this patient's care.   Results interpretation: I agree with the documentation of the study interpretation.

## 2022-04-30 NOTE — H&P
Patient:   Priscila Randhawa            MRN: [id number]            FIN: [id number]               Age:   85 years     Sex:  Female     :  1933   Associated Diagnoses:   None   Author:   Isaias Chauhan DO      Chief Complaint: Nausea vomiting, abdominal pain      History of Present Illness  85-year-old female with a past medical history of hyperlipidemia, type II diabetes mellitus controlled, hypertension, small bowel obstruction , presenting to the ER today complaining of abdominal pain with persistent nausea and vomiting.  States that yesterday around 4 PM had her last meal, ham sandwich, and states that later that night she began having crampy abdominal pain in umbilical region, with persistent nausea and vomiting.  Nonradiating pain.  States that she threw up 4 times, and that was mainly food contents, the last time she threw up was around 9 AM this morning.  To having abdominal pain, and still nauseous.  Unable to tolerate even clear liquids.  She states that, she still having bowel movements and passing gas.  Denies any fevers, chills, chest pain, palpitations, melena, hematochezia.        Past Medical History  Hypertension, hyperlipidemia, type II diabetes mellitus, small bowel obstruction      Past Surgical History  Total abdominal hysterectomy 30+ years ago, history of total knee replacement    Family History  Mother: Acute MI  Brother: CAD, acute MI  sister: Alzheimer's, CHF    Social History  She is a never smoker, never drinker, denies any substance abuse    Allergies: Codeinepersistent itching    Home Medications:  No qualifying data available    Review of Systems  Constitutional: no fevers, night sweats, chills or fatigue  HEENT: no acute vision changes or photophobia, no hearing loss  CVS: no chest pain, palpitations, or orthopnea  Resp: no SOB, no cough, or wheezing  GI: + abd pain, nausea, vomiting  : no dysuria, urinary incontinence  Musculoskeletal: no joint stiffness,  pain, swelling or weakness  Skin: no rashes, lesions  Neuro: no headaches, seizures, numbness or syncope  Psych: no depression or anxiety    Physical Examination   Vital Signs (last 24 hrs)_____  Last Charted___________  Temp Oral     36.6 DegC  (EV 10 13:53)  Heart Rate Peripheral   76 bpm  (EV 10 17:38)  Resp Rate         16 br/min  (EV 10 17:38)  SBP      119 mmHg  (EV 10 17:38)  DBP      L 53mmHg  (EV 10 17:38)  SpO2      98 %  (EV 10 17:38)      General: AAOx3, NAD, alert and cooperative, pale appearing  HEENT: NG tube in place left nostril, PERRLA, EOMI, pale conjunctiva  Neck: no LAD, no JVD, supple, no masses or thyromegaly  CVS: S1/S2 nml, RRR, systolic murmur auscultated 2nd right intercostal space 3/6, rubs or gallops, no carotid bruits  Resp: CTA b/l, no wheezing  GI: Not distended, TTP umbilical region, BS+, no guarding  Skin: not jaundiced, warm, no rashes, +flaky skin  Musculoskeletal: normal ROM, no joint tenderness, normal muscular development  Extremities: trace peripheral edema, peripheral pulses intact  Neuro: CN II-XII grossly intact, strength and sensation symmetric and intact throughout, no focal neurological deficits    Labs  Labs Last 72 Hours   No qualifying data available.    Radiology  Accession: [id number]  Order: XR Abdomen Flat and Erect  Report Dt/Tm: 01/10/2019 14:42  Report:   ABDOMEN 2 VIEW radiographic series  HISTORY: Abdominal Pain          COMPARISON: 6/11/2018     FINDINGS: There is an enteric catheter distal side hole just caudal to  the gastroesophageal junction and distalmost tip at expected gastric  fundus. There is small bowel distention and small bowel air-fluid  levels. Stool noted in nondilated colon. No obvious pneumoperitoneum.  Excretory contrast noted in partially distended urinary bladder.     IMPRESSION: Small bowel ileus likely versus distal small bowel  obstruction        Assessment and Plan  Small bowel obstruction versus severe ileus  Colonic  diverticulosis  Moderate hiatal hernia  Leukocytosis  Hypertension  Hyperlipidemia,  diabetes mellitus    At this point, she is having 100% on room air and is protecting her airway.  This point, no indication for surgery at this time.  She has an NG tube in place, with minimal output, so we'll continue to follow bowel function.  continue IV fluids.  We'll be checking a magnesium level this time and continue to monitor her potassium as well for any other reversible causes.  We'll continue to monitor, an NG tube was removed tomorrow will start bowel regimen, will be helpful for discharge in the a.m.  Nothing by mouth for now.  continue home medicines.  We have a repeat KUB pending for tomorrow morning.     Nutrition: Nothing by mouth  Analgesia/sedation: Tylenol  DVT prophylaxis: Lovenox  GI prophylaxis:protonix  Oxygenation: Room air  Antibiotics: None  Lines: Peripheral IVs

## 2022-05-03 NOTE — HISTORICAL [facility] CERNER
This is a historical note converted from [employer]. Formatting and pictures may have been removed.  Please reference [employer] for original formatting and attached multimedia. Chief Complaint  transfer from [facility] for small bowel obstruction, NG tube in place, awake alert and oriented, no distress noted  Reason for Consultation  Ileus vs SBO  History of Present Illness  85F w/ PMH of DM, HTN, HLD presents with a 1 day history of nausea and emesis x 2. Denies abdominal pain. Uncertain if passing flatus. Had a bowel movement this am, normal consistency. States that she has bowel movements daily, normal consistency. No blood. States that she had a small bowel ov  ?  She has never had a colonoscopy.  ?  PMH:  HTN  HLD  DM  ?  PSurg:  Hysterectomy- Midline T incision  ?  SH:  Nonsmoker  ?  FH:  Father  of colon cancer  Review of Systems  Denies abdominal pain  Denies chest pain, palpitations, SOB  Physical Exam  Vitals & Measurements  T:?36.6? ?C (Oral)? HR:?71(Peripheral)? HR:?71(Monitored)? RR:?16? BP:?121/56? SpO2:?100%?  RRR  Breathing unlabored on room air  Abdomen soft, nontender, nondistended, normoactive bowel sounds  ?  Labs  WBC 13  BUN 21.5/ Cr 0.9  ?  Imaging  CT abdomen: Distended stomach and duodenum, No obvious transition point, thickened small bowel wall.  Assessment/Plan  85F w/ likely enteritis  - NGT placed per medicine. Minimal output. Will follow bowel function  - No indication for surgery at this time  - Patient will benefit from a bowel regimen after NGT removed  ?  WILIAN Ramirez MD (PGY3)   Problem List/Past Medical History  Ongoing  Background diabetic retinopathy  Combined hyperlipidemia  Diabetes mellitus type II, controlled  HTN - Hypertension  Osteoarthritis, generalized  Polycythemia  Historical  Carotid artery stenosis  Cataracts  Vitamin D deficiency  Procedure/Surgical History  Exploratory laparotomy  Hx of total knee replacement  Lumbar epidural steroid injection   Medications  Inpatient  IVF  Normal Saline NS Bolus 1000ml 1,000 mL, 1000 mL, IV  Home  amLODIPine 5 mg oral tablet, 5 mg= 1 tab(s), Oral, Daily, 1 refills  Fish Oil 1200 mg oral capsule, 1200 mg= 1 cap(s), Oral, Daily  garlic  hydrochlorothiazide-losartan 12.5 mg-100 mg oral tablet, 1 tab(s), Oral, Daily, 1 refills  metformin 500 mg oral tablet, 500 mg= 1 tab(s), Oral, Daily, 1 refills  pravastatin 80 mg oral tablet, 80 mg= 1 tab(s), Oral, Daily, 1 refills  Allergies  codeine?(itching)  Social History  Alcohol - Denies Alcohol Use, 04/22/2015  Never, 05/29/2018  Employment/School  Retired, Previous employment/school: sitter., 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  Alzheimers disease: Sister.  CAD (coronary artery disease): Mother, Sister, Brother and Brother.  Congestive heart disease.: Sister.  Immunizations  Vaccine Date Status   influenza virus vaccine, inactivated 11/25/2017 Given

## 2022-05-03 NOTE — HISTORICAL [facility] CERNER
This is a historical note converted from [employer]. Formatting and pictures may have been removed.  Please reference [employer] for original formatting and attached multimedia. ADMISSION DATE: 1/10/19  DISCHARGE DATE: 1/12/19  ATTENDING PHYSICIAN: Dr. Valencia Reynolds  ?  PRIMARY CARE PHYSICIAN: Dr. Selena Hou  REFERRING PHYSICIAN:  CONSULTING PHYSICIAN(S):  CONDITION ON DISCHARGE: Stable  ?  FINAL DIAGNOSIS:?Gastritis?vs.?ileus, leukocytosis, dehydration,?HTN, HLD  PROCEDURES:  ?   HISTORY OF PRESENT ILLNESS:  85-year-old female with a past medical history of hyperlipidemia, type II diabetes mellitus controlled, hypertension, small bowel obstruction 2017, presenting to the ER today complaining of abdominal pain with persistent nausea and vomiting. ?States that yesterday around 4 PM had her last meal, ham sandwich, and states that later that night she began having crampy abdominal pain in umbilical region, with persistent nausea and vomiting. ?Nonradiating pain. ?States that she threw up 4 times, and that was mainly food contents, the last time she threw up was around 9 AM this morning. ?To having abdominal pain, and still nauseous. ?Unable to tolerate even clear liquids. ?She states that, she still having bowel movements and passing gas. ?Denies any fevers, chills, chest pain, palpitations, melena, hematochezia.  ?  LABORATORY/DATA:  Sodium 143  Potassium 3.0  Chloride 112  CO2 25  Calcium 8.2  Glucose 85  BUN 8  Creatinine 0.7  WBC 6.3  Hgb 12.9  HCT 39.2  ?  HOSPITAL COURSE:  Ms. Randhawa?presented with nausea and vomiting which, with her history of?small bowel obstruction in 2017,?was concerning?for recurrent obstruction.? A CT was done in the ED?which showed?small bowel obstruction vs. severe ileus.? So, the patient was admitted?for treatment and workup of the same.? Fortunately,?the morning?prior to her admission, the patient reported having a BM.? She was kept n.p.o. and an NG tube was placed?and connected to  mild intermittent suction.? HD 1 was uncomplicated. ?On the morning of HD 2, the patient reported feeling significantly better?with no nausea?or abdominal pain.? On HD 2, we decided to clamp the NG tube?to see how the patient would tolerate.? After several hours of NG tube being clamped, she reported no abdominal pain or nausea?and wanted to eat.? Thus,?the NG tube was removed?and the patient was advanced to a clear liquid diet.? Also, her leukocytosis?resolved after IVFs.? On the morning of HD 3,?the patient reported passing flatus but no BM. ?However she was ready to advance her diet.? She tolerated a soft food diet well?then had a large formed?bowel movement?around midday.? Given that she was passing flatus and stool?without nausea or vomiting,?we determined the patient was ready to be discharged home.? She is discharged in clinically and hemodynamically stable condition.  ?   DISCHARGE MEDICATIONS:  Amlodipine 5 mg daily  HCTZ-losartan 12.5 mg - 100 mg daily  Metformin 500 mg daily  Fish oil 1200 mg daily  Pravastatin 80 mg daily  ?  DISCHARGE INSTRUCTIONS:  DIET: Regular  ACTIVITY: As tolerated  DISPOSITION:?Discharge to home  FOLLOW UP APPOINTMENTS:  PCP (Dr. Selena Hou) in 2 weeks  ?   CODE STATUS: Full   I was present with the Resident during discharge day management.  ???  [x ] I discussed the case with the Resident and agree with the discharge plan as above.  [ ] I discussed the case with the Resident and agree with the discharge plan as above except:  ???  Time spent on discharge [30 ] minutes

## 2022-05-04 ENCOUNTER — OFFICE VISIT (OUTPATIENT)
Dept: PRIMARY CARE CLINIC | Facility: CLINIC | Age: 87
End: 2022-05-04
Payer: MEDICARE

## 2022-05-04 VITALS
HEIGHT: 59 IN | RESPIRATION RATE: 16 BRPM | DIASTOLIC BLOOD PRESSURE: 69 MMHG | SYSTOLIC BLOOD PRESSURE: 140 MMHG | BODY MASS INDEX: 24.6 KG/M2 | OXYGEN SATURATION: 97 % | WEIGHT: 122 LBS | HEART RATE: 87 BPM

## 2022-05-04 DIAGNOSIS — I10 PRIMARY HYPERTENSION: ICD-10-CM

## 2022-05-04 DIAGNOSIS — E78.2 MIXED HYPERLIPIDEMIA: ICD-10-CM

## 2022-05-04 DIAGNOSIS — E08.3293: ICD-10-CM

## 2022-05-04 DIAGNOSIS — R54 FRAIL ELDERLY: ICD-10-CM

## 2022-05-04 DIAGNOSIS — Z00.00 MEDICARE ANNUAL WELLNESS VISIT, SUBSEQUENT: Primary | ICD-10-CM

## 2022-05-04 DIAGNOSIS — M15.9 PRIMARY OSTEOARTHRITIS INVOLVING MULTIPLE JOINTS: ICD-10-CM

## 2022-05-04 PROBLEM — D75.0 BENIGN POLYCYTHEMIA: Status: ACTIVE | Noted: 2022-05-04

## 2022-05-04 PROBLEM — E11.9 WELL CONTROLLED TYPE 2 DIABETES MELLITUS: Status: ACTIVE | Noted: 2022-05-04

## 2022-05-04 PROBLEM — E11.3299 NONPROLIFERATIVE DIABETIC RETINOPATHY: Status: ACTIVE | Noted: 2022-05-04

## 2022-05-04 PROCEDURE — G0439 PR MEDICARE ANNUAL WELLNESS SUBSEQUENT VISIT: ICD-10-PCS | Mod: ,,, | Performed by: INTERNAL MEDICINE

## 2022-05-04 PROCEDURE — G0439 PPPS, SUBSEQ VISIT: HCPCS | Mod: ,,, | Performed by: INTERNAL MEDICINE

## 2022-05-04 RX ORDER — METFORMIN HYDROCHLORIDE 500 MG/1
TABLET ORAL
COMMUNITY
Start: 2021-09-09 | End: 2022-05-04 | Stop reason: SDUPTHER

## 2022-05-04 RX ORDER — AMLODIPINE BESYLATE 5 MG/1
5 TABLET ORAL DAILY
Qty: 90 TABLET | Refills: 3 | Status: SHIPPED | OUTPATIENT
Start: 2022-05-04 | End: 2023-05-08 | Stop reason: ALTCHOICE

## 2022-05-04 RX ORDER — AMLODIPINE BESYLATE 5 MG/1
TABLET ORAL
COMMUNITY
Start: 2021-09-09 | End: 2022-05-04 | Stop reason: SDUPTHER

## 2022-05-04 RX ORDER — PRAVASTATIN SODIUM 80 MG/1
80 TABLET ORAL DAILY
Qty: 90 TABLET | Refills: 3 | Status: SHIPPED | OUTPATIENT
Start: 2022-05-04 | End: 2023-05-08 | Stop reason: SDUPTHER

## 2022-05-04 RX ORDER — GARLIC 1000 MG
CAPSULE ORAL
COMMUNITY
End: 2023-12-03

## 2022-05-04 RX ORDER — NAPROXEN SODIUM 220 MG/1
TABLET ORAL
COMMUNITY
End: 2023-12-03

## 2022-05-04 RX ORDER — LOSARTAN POTASSIUM AND HYDROCHLOROTHIAZIDE 12.5; 1 MG/1; MG/1
TABLET ORAL
COMMUNITY
Start: 2021-09-09 | End: 2022-05-04 | Stop reason: SDUPTHER

## 2022-05-04 RX ORDER — LOSARTAN POTASSIUM AND HYDROCHLOROTHIAZIDE 12.5; 1 MG/1; MG/1
1 TABLET ORAL DAILY
Qty: 90 TABLET | Refills: 3 | Status: SHIPPED | OUTPATIENT
Start: 2022-05-04 | End: 2023-05-08 | Stop reason: ALTCHOICE

## 2022-05-04 RX ORDER — METFORMIN HYDROCHLORIDE 500 MG/1
500 TABLET ORAL
Qty: 90 TABLET | Refills: 3 | Status: SHIPPED | OUTPATIENT
Start: 2022-05-04 | End: 2023-05-08 | Stop reason: ALTCHOICE

## 2022-05-04 RX ORDER — PRAVASTATIN SODIUM 80 MG/1
TABLET ORAL
COMMUNITY
Start: 2021-09-09 | End: 2022-05-04 | Stop reason: SDUPTHER

## 2022-05-04 RX ORDER — KETOCONAZOLE 20 MG/G
CREAM TOPICAL DAILY
Qty: 60 G | Refills: 3 | Status: SHIPPED | OUTPATIENT
Start: 2022-05-04 | End: 2023-12-03

## 2022-05-04 NOTE — PROGRESS NOTES
Selena Hou MD   [address] [place] [address]     PATIENT NAME: Priscila Randhawa  : 1933  DATE: 22  MRN: [id number]      Billing Provider: Selena Hou MD  Level of Service: [place] WELCOME MEDICARE ANNUAL WELLNESS SUBSEQUENT VISIT  Patient PCP Information     Provider PCP Type    Selena Hou MD General          Reason for Visit / Chief Complaint: No chief complaint on file.       Update PCP  Update Chief Complaint         History of Present Illness / Problem Focused Workflow     Priscila Randhawa presents to the clinic with No chief complaint on file.     88 year old CF seen for annual visit. She is noting only an issue with redness on her feet. She's picking up pecans but not as much as she used to, she couldn't find anyone to come get them, they are everywhere this year but everyone wants them out of the shell. She hasn't been to any other doctors lately that she can recall. She is doing well, her nephew is main caregiver now. She has not been to her eye doctor for diabetic check but reports she is seeing fine.   She denies changes in her family history although her brother is now passing blood from his stool.   Her home safety is unchanged from last year, she stopped allowing Zuleyma to use her car after she was mishandling things.   She denies falls and reports consistent use of the cane with 4prongs  Her hearing is poor, she does not have the money or desire to get aides.   Her nephew is managing well she does not need referrals to community resources or home health.  Depression screen PHQ2 is negative  Functional status is good, she manages all ADL's except cooking on stove (fire risk) and driving. Her nephew and neighbor help maintain.  TUG test 20 sec but stable once up  She has no living will or advanced directive, Zuleyma no longer has POA her nephew does.      Review of Systems     Review of Systems   Constitutional: Negative.    HENT: Negative.    Eyes: Negative.    Respiratory: Negative.     Cardiovascular: Negative.         Thinks it's been awhile since went to [facility]   Gastrointestinal: Negative.    Genitourinary: Positive for frequency.   Musculoskeletal: Positive for arthralgias and joint swelling.        Left knee pain at times, she notes the ankles swell, L>R they don't really hurt, hands are deformed but not hurting and she can do what she wants.    Skin: Positive for color change.        Feet red on the sides   Allergic/Immunologic: Negative for immunocompromised state.   Neurological:        Forgetful but still does everything but drive and cook on stove   Hematological: Negative.    Psychiatric/Behavioral: Negative.        Medical / Social / Family History     Past Medical History:   Diagnosis Date    Background diabetic retinopathy     Carotid artery stenosis     Cataract extraction status     Combined hyperlipidemia     HTN (hypertension)     Polycythemia     Type 2 diabetes mellitus without complications     Unspecified osteoarthritis, unspecified site     Vitamin D deficiency        Past Surgical History:   Procedure Laterality Date    EPIDURAL STEROID INJECTION INTO LUMBAR SPINE      EXPLORATORY LAPAROTOMY      hx total knee repla         Social History  Ms. Randhawa      reports that she has never smoked. She has never used smokeless tobacco.    Family History  Ms.'s Randhawa   family history includes Alzheimer's disease in her sister; Coronary artery disease in her brother, mother, and sister; Heart attack in her brother and mother; Heart failure in her sister.    Medications and Allergies     Medications  Outpatient Medications Marked as Taking for the 5/4/22 encounter (Office Visit) with Selena Hou MD   Medication Sig Dispense Refill    linaCLOtide (LINZESS) 72 mcg Cap capsule Take 72 mcg by mouth.      omega 3-dha-epa-fish oil 1,200 (144-216) mg Cap Take by mouth.      [DISCONTINUED] amLODIPine (NORVASC) 5 MG tablet   See Instructions, TAKE 1 TABLET  EVERY DAY, # 90 tab(s), 2 Refill(s), Pharmacy: [facility], 150, cm, Height/Length Dosing, 05/04/21 8:29:00 CDT, 60, kg, Weight Dosing, 06/13/21 13:02:00 CDT      [DISCONTINUED] losartan-hydrochlorothiazide 100-12.5 mg (HYZAAR) 100-12.5 mg Tab   See Instructions, TAKE 1 TABLET EVERY DAY, # 90 tab(s), 1 Refill(s), Pharmacy: [facility], 150, cm, Height/Length Dosing, 05/04/21 8:29:00 CDT, 60, kg, Weight Dosing, 06/13/21 13:02:00 CDT      [DISCONTINUED] metFORMIN (GLUCOPHAGE) 500 MG tablet   See Instructions, TAKE 1 TABLET ONE TIME DAILY WITH A MEAL, # 90 tab(s), 1 Refill(s), Pharmacy: [facility], 150, cm, Height/Length Dosing, 05/04/21 8:29:00 CDT, 60, kg, Weight Dosing, 06/13/21 13:02:00 CDT      [DISCONTINUED] pravastatin (PRAVACHOL) 80 MG tablet   See Instructions, TAKE 1 TABLET EVERY DAY, # 90 tab(s), 1 Refill(s), Pharmacy: [facility], 150, cm, Height/Length Dosing, 05/04/21 8:29:00 CDT, 60, kg, Weight Dosing, 06/13/21 13:02:00 CDT         Allergies  Review of patient's allergies indicates:   Allergen Reactions    Codeine Itching       Physical Examination     Vitals:    05/04/22 0910   BP: (!) 140/69   Pulse: 87   Resp: 16     Physical Exam  Vitals reviewed.   Constitutional:       Appearance: She is ill-appearing.   HENT:      Head: Normocephalic and atraumatic.      Right Ear: External ear normal.      Left Ear: External ear normal. There is impacted cerumen.      Nose: Nose normal.      Mouth/Throat:      Mouth: Mucous membranes are moist.   Eyes:      Extraocular Movements: Extraocular movements intact.      Pupils: Pupils are equal, round, and reactive to light.   Neck:      Comments: No thyroimegaly  Cardiovascular:      Rate and Rhythm: Normal rate.      Heart sounds: Murmur heard.     No friction rub. No gallop.   Pulmonary:      Effort: Pulmonary effort is normal.      Breath sounds: Normal breath sounds. No rales.   Chest:       Chest wall: No tenderness.   Abdominal:      General: Bowel sounds are normal. There is no distension.      Palpations: Abdomen is soft.      Tenderness: There is no right CVA tenderness, left CVA tenderness or rebound.   Musculoskeletal:         General: Swelling and deformity present.      Cervical back: No tenderness.      Right foot: Deformity, bunion and prominent metatarsal heads present.      Left foot: Deformity, bunion and prominent metatarsal heads present.      Comments: Ankles with effusions, ROM is fair, knees with crepitance again ROM is fairly good for 88, she has the muscular defect on the right knee from surgery. Her hands show marked DIP changes but they are working.    Feet:      Right foot:      Protective Sensation: 5 sites tested. 5 sites sensed.      Skin integrity: Erythema present.      Toenail Condition: Right toenails are abnormally thick. Fungal disease present.     Left foot:      Protective Sensation: 5 sites tested. 5 sites sensed.      Skin integrity: Skin integrity normal.      Toenail Condition: Left toenails are abnormally thick.   Lymphadenopathy:      Cervical: No cervical adenopathy.   Skin:     General: Skin is warm and dry.      Capillary Refill: Capillary refill takes more than 3 seconds.      Comments: Erythema to soles, and outside of feet as per foot exam   Neurological:      Mental Status: She is alert and oriented to person, place, and time. Mental status is at baseline.      Comments: More forgetful,    Psychiatric:         Mood and Affect: Mood normal.         Behavior: Behavior normal.         Judgment: Judgment normal.           Assessment and Plan (including Health Maintenance)      Problem List  Smart Sets  Document Outside HM   :    Plan:         Health Maintenance Due   Topic Date Due    COVID-19 Vaccine (1) Never done    TETANUS VACCINE  Never done    Shingles Vaccine (1 of 2) Never done    Pneumococcal Vaccines (Age 65+) (2 - PCV) 01/25/2019       Problem  List Items Addressed This Visit        Cardiac/Vascular    Hypertension    Relevant Orders    Lipid Panel    MICROALBUMIN / CREATININE RATIO URINE    Urinalysis, Reflex to Urine Culture Urine, Clean Catch    Mixed hyperlipidemia    Relevant Orders    Lipid Panel      Other Visit Diagnoses     Medicare annual wellness visit, subsequent    -  Primary    Relevant Orders    CBC Auto Differential    Comprehensive Metabolic Panel    Lipid Panel    MICROALBUMIN / CREATININE RATIO URINE    Urinalysis, Reflex to Urine Culture Urine, Clean Catch    Diabetes mellitus due to underlying condition, controlled, with both eyes affected by mild nonproliferative retinopathy without macular edema, without long-term current use of insulin        Do annual lab today, A1C is not visible to me, will handwrite. She did not bring her logs but reports the readings are good and she normally is    Relevant Medications    metFORMIN (GLUCOPHAGE) 500 MG tablet    Other Relevant Orders    Comprehensive Metabolic Panel    Lipid Panel    MICROALBUMIN / CREATININE RATIO URINE    Urinalysis, Reflex to Urine Culture Urine, Clean Catch    Primary osteoarthritis involving multiple joints        Doing well, has not needed Celebrex in some time according to refills on [employer]. I would want to see Cr before any rx again.    Relevant Orders    CBC Auto Differential    Frail elderly              Health Maintenance Topics with due status: Not Due       Topic Last Completion Date    Influenza Vaccine 01/11/2019    Lipid Panel 07/08/2020       Future Appointments   Date Time Provider Department Center   5/8/2023  8:20 AM Selena Hou MD [facility] PCP      Will monitor BP high fall risk if I drop her too much.  A1C not visible to me in Epic, will handwrite.      Signature:  Selena Hou MD  [facility]  [address] [place] [id number]    Date of encounter: 5/4/22

## 2022-05-05 DIAGNOSIS — E78.2 MIXED HYPERLIPIDEMIA: ICD-10-CM

## 2022-05-05 DIAGNOSIS — M15.9 PRIMARY OSTEOARTHRITIS INVOLVING MULTIPLE JOINTS: ICD-10-CM

## 2022-05-05 DIAGNOSIS — E08.3293: ICD-10-CM

## 2022-05-05 DIAGNOSIS — I10 PRIMARY HYPERTENSION: ICD-10-CM

## 2022-05-05 DIAGNOSIS — Z00.00 MEDICARE ANNUAL WELLNESS VISIT, SUBSEQUENT: ICD-10-CM

## 2022-05-05 LAB
ALBUMIN SERPL-MCNC: 4.1 GM/DL (ref 3.4–4.8)
ALBUMIN/GLOB SERPL: 1.4 RATIO (ref 1.1–2)
ALP SERPL-CCNC: 63 UNIT/L (ref 40–150)
ALT SERPL-CCNC: 12 UNIT/L (ref 0–55)
APPEARANCE UR: CLEAR
AST SERPL-CCNC: 16 UNIT/L (ref 5–34)
BASOPHILS # BLD AUTO: 0.09 X10(3)/MCL (ref 0–0.2)
BASOPHILS NFR BLD AUTO: 1.2 %
BILIRUB UR QL STRIP.AUTO: NEGATIVE MG/DL
BILIRUBIN DIRECT+TOT PNL SERPL-MCNC: 0.2 MG/DL (ref 0–0.5)
BILIRUBIN DIRECT+TOT PNL SERPL-MCNC: 0.3 MG/DL (ref 0–0.8)
BILIRUBIN DIRECT+TOT PNL SERPL-MCNC: 0.5 MG/DL
BUN SERPL-MCNC: 18.2 MG/DL (ref 9.8–20.1)
CALCIUM SERPL-MCNC: 10.2 MG/DL (ref 8.4–10.2)
CHLORIDE SERPL-SCNC: 107 MMOL/L (ref 98–107)
CHOLEST SERPL-MCNC: 181 MG/DL
CHOLEST/HDLC SERPL: 2 {RATIO} (ref 0–5)
CO2 SERPL-SCNC: 28 MMOL/L (ref 23–31)
COLOR UR AUTO: YELLOW
CREAT SERPL-MCNC: 0.75 MG/DL (ref 0.55–1.02)
EOSINOPHIL # BLD AUTO: 0.22 X10(3)/MCL (ref 0–0.9)
EOSINOPHIL NFR BLD AUTO: 3 %
ERYTHROCYTE [DISTWIDTH] IN BLOOD BY AUTOMATED COUNT: 12.5 % (ref 11.5–17)
EST. AVERAGE GLUCOSE BLD GHB EST-MCNC: 99.7 MG/DL
GLOBULIN SER-MCNC: 2.9 GM/DL (ref 2.4–3.5)
GLUCOSE SERPL-MCNC: 89 MG/DL (ref 82–115)
GLUCOSE UR QL STRIP.AUTO: NEGATIVE MG/DL
HBA1C MFR BLD: 5.1 %
HCT VFR BLD AUTO: 46.3 % (ref 37–47)
HDLC SERPL-MCNC: 75 MG/DL (ref 35–60)
HGB BLD-MCNC: 14.5 GM/DL (ref 12–16)
IMM GRANULOCYTES # BLD AUTO: 0.01 X10(3)/MCL (ref 0–0.02)
IMM GRANULOCYTES NFR BLD AUTO: 0.1 % (ref 0–0.43)
KETONES UR QL STRIP.AUTO: NEGATIVE MG/DL
LDLC SERPL CALC-MCNC: 90 MG/DL (ref 50–140)
LEUKOCYTE ESTERASE UR QL STRIP.AUTO: ABNORMAL UNIT/L
LYMPHOCYTES # BLD AUTO: 1.25 X10(3)/MCL (ref 0.6–4.6)
LYMPHOCYTES NFR BLD AUTO: 17.3 %
MCH RBC QN AUTO: 32.5 PG (ref 27–31)
MCHC RBC AUTO-ENTMCNC: 31.3 MG/DL (ref 33–36)
MCV RBC AUTO: 103.8 FL (ref 80–94)
MONOCYTES # BLD AUTO: 0.43 X10(3)/MCL (ref 0.1–1.3)
MONOCYTES NFR BLD AUTO: 6 %
NEUTROPHILS # BLD AUTO: 5.2 X10(3)/MCL (ref 2.1–9.2)
NEUTROPHILS NFR BLD AUTO: 72.4 %
NITRITE UR QL STRIP.AUTO: NEGATIVE
PH UR STRIP.AUTO: 6.5 [PH]
PLATELET # BLD AUTO: 255 X10(3)/MCL (ref 130–400)
PMV BLD AUTO: 11.2 FL (ref 9.4–12.4)
POTASSIUM SERPL-SCNC: 4.7 MMOL/L (ref 3.5–5.1)
PROT SERPL-MCNC: 7 GM/DL (ref 5.8–7.6)
PROT UR QL STRIP.AUTO: 30 MG/DL
RBC # BLD AUTO: 4.46 X10(6)/MCL (ref 4.2–5.4)
RBC UR QL AUTO: NEGATIVE UNIT/L
SODIUM SERPL-SCNC: 145 MMOL/L (ref 136–145)
SP GR UR STRIP.AUTO: 1.02
TRIGL SERPL-MCNC: 78 MG/DL (ref 37–140)
UROBILINOGEN UR STRIP-ACNC: 0.2 MG/DL
VLDLC SERPL CALC-MCNC: 16 MG/DL
WBC # SPEC AUTO: 7.2 X10(3)/MCL (ref 4.5–11.5)

## 2022-05-05 PROCEDURE — 85025 COMPLETE CBC W/AUTO DIFF WBC: CPT | Performed by: INTERNAL MEDICINE

## 2022-05-05 PROCEDURE — 80061 LIPID PANEL: ICD-10-PCS | Mod: QW,,, | Performed by: INTERNAL MEDICINE

## 2022-05-05 PROCEDURE — 81003 URINALYSIS AUTO W/O SCOPE: CPT | Performed by: INTERNAL MEDICINE

## 2022-05-05 PROCEDURE — 83036 HEMOGLOBIN GLYCOSYLATED A1C: CPT | Performed by: INTERNAL MEDICINE

## 2022-05-05 PROCEDURE — 36415 COLL VENOUS BLD VENIPUNCTURE: CPT

## 2022-05-05 PROCEDURE — 80061 LIPID PANEL: CPT | Mod: QW,,, | Performed by: INTERNAL MEDICINE

## 2022-05-05 NOTE — ADDENDUM NOTE
Addended by: CARLA GASTON on: 5/5/2022 04:43 PM     Modules accepted: Orders     Transition of Care Plan:  RUR: 20%   Disposition: SNF- ref[facility] to [facility], [facility], and [facility]  Follow up appointments: PCP  DME needed: Pt has a walker, may benefit from therapy evaluation to assess for other DME needs  Transportation at Discharge: Pt anticipates that her neighbor, Raymond Manriquez, will transport her home  [place] SUZETTE left a message with Emily of [facility] to check status of referral.  SUZETTE spoke with Linda Tong of [facility] to check the status of referral.  Ms. Alejandro Villa said she will review with her team and call CM back.      Hannah Conte, MSW  Care Management, [address]

## 2022-05-18 ENCOUNTER — TELEPHONE (OUTPATIENT)
Dept: PRIMARY CARE CLINIC | Facility: CLINIC | Age: 87
End: 2022-05-18
Payer: MEDICARE

## 2022-05-18 NOTE — TELEPHONE ENCOUNTER
Spoke to Shirley in lab states urine culture not done. Also spoke to patient no c/o any bladder symptoms.

## 2022-05-18 NOTE — TELEPHONE ENCOUNTER
----- Message from Selena Hou MD sent at 5/5/2022  6:40 PM CDT -----  Urine looks infected, her blood is back so hole callback for both. I faxed a note to see if the UA is reflexing, I don't see anywhere showing it.

## 2022-05-24 ENCOUNTER — TELEPHONE (OUTPATIENT)
Dept: PRIMARY CARE CLINIC | Facility: CLINIC | Age: 87
End: 2022-05-24
Payer: MEDICARE

## 2022-05-24 NOTE — TELEPHONE ENCOUNTER
----- Message from Selena Hou MD sent at 5/5/2022  6:43 PM CDT -----  See also urine results. Her lab looks good, the sugar is so good I'd be ok with her stopping her metformin. We could send her for a sugar and A1C in 6mo if she wants to try that. Just send a note back so I will order it. I did send refills though so if she's more comfortable staying on it that is ok. Her cholesterol is fine. The blood count is good, cells are large so keep some B complex in vitamins. She was almost out of medications, if she needs I'll send 2 weeks to [facility].

## 2022-05-24 NOTE — TELEPHONE ENCOUNTER
Result given to pt voices understanding.Pateint states she will have care giver call office to go over these result with her.

## 2022-06-09 ENCOUNTER — TELEPHONE (OUTPATIENT)
Dept: PRIMARY CARE CLINIC | Facility: CLINIC | Age: 87
End: 2022-06-09
Payer: MEDICARE

## 2022-06-09 NOTE — TELEPHONE ENCOUNTER
----- Message from Haylee Corbin sent at 6/9/2022  2:55 PM CDT -----  Regarding: Return Call  Type:  Patient Returning Call    Who Called:caregiver  Who Left Message for Patient: Ameya  Does the patient know what this is regarding?: yes  Would the patient rather a call back or a response via [facility]?  call  Best Call Back Number: [phone]  Additional Information:  having some concerns about aunt, would like to speak with Ameya please

## 2022-06-09 NOTE — TELEPHONE ENCOUNTER
Pt's radha called asking if you can or will be willing to write a note stating patient is not capable of making any decisions. She would like it just for protection. Pt refuses to leave her home and she gets confused a lot. She sometimes calls nisoha saying she doesn't know what day it is or what time it is & is just lost. Her family member, particularly one, goes to Mrs. Randhawa trying to convince her to sign forms & documents that she shouldn't sign- so just for protection if you can please write note saying she shoudnt make any decisions.     Abdelrahman states she is POA/Medical POA and if you need the paper work she can bring it in.

## 2022-07-27 ENCOUNTER — TELEPHONE (OUTPATIENT)
Dept: ADMINISTRATIVE | Facility: HOSPITAL | Age: 87
End: 2022-07-27
Payer: MEDICARE

## 2022-07-27 NOTE — TELEPHONE ENCOUNTER
Type:  Needs Medical Advice    Who Called: renella   Symptoms (please be specific): na   How long has patient had these symptoms:  na  Pharmacy name and phone #:  na  Would the patient rather a call back or a response via [facility]? Call back  Best Call Back Number: [phone]  Additional Information: pt niece need to  another letter today with dr. Hou signature on the letter please advise

## 2022-10-11 ENCOUNTER — TELEPHONE (OUTPATIENT)
Dept: PRIMARY CARE CLINIC | Facility: CLINIC | Age: 87
End: 2022-10-11
Payer: MEDICARE

## 2022-10-11 NOTE — TELEPHONE ENCOUNTER
Spoke to Savana informed patient would need appt to discuss with DR Hou.Working on getting her appt here.

## 2022-10-11 NOTE — TELEPHONE ENCOUNTER
----- Message from Radha Arroyo sent at 10/11/2022 12:15 PM CDT -----  Regarding: med adv  Type:  Needs Medical Advice    Who Called: Savana Mancia, patient's niece  Symptoms (please be specific): very anxious   How long has patient had these symptoms:  several weeks  Pharmacy name and phone #: [facility] [place]   Would the patient rather a call back or a response via [facility]?   Best Call Back Number: [phone]  Additional Information: Savana is asking for the doctor to maybe prescribe something to calm the patient a bit. Patient has been a bit anxious. Please call Savana back.

## 2023-05-01 ENCOUNTER — TELEPHONE (OUTPATIENT)
Dept: PRIMARY CARE CLINIC | Facility: CLINIC | Age: 88
End: 2023-05-01
Payer: MEDICARE

## 2023-05-01 NOTE — TELEPHONE ENCOUNTER
Are there any outstanding task in patient chart?  N     2. Do we have outstanding/pending referrals?  N   3. Has the patient been seen in an ER, Urgent Care, or admitted since last visit?  N     4. Has patient seen any other healthcare providers since last visit?  N     5. Has patient had any blood work or xrays done since last visit?  N   6. Is the patient's pneumonia vaccine current?  Prevnar 13: n/a  Pneumonia 20: n/a  Pneumonia 23: 1/25/18    7. When was the patient's colonoscopy?  N/a  8. When was the patient's last mamogram?  N/a  9. When was the patient's last cervical screening/PAP smear?  N/a  10. When was the patient's last bone scan?  N/a  11. When was the patient's last diabetic screening?  A1c:n/a  Micro:n/a  Eye Exam: n/a

## 2023-05-02 ENCOUNTER — TELEPHONE (OUTPATIENT)
Dept: PRIMARY CARE CLINIC | Facility: CLINIC | Age: 88
End: 2023-05-02
Payer: MEDICARE

## 2023-05-02 NOTE — TELEPHONE ENCOUNTER
----- Message from Nadine Arnett sent at 5/2/2023  4:06 PM CDT -----  Regarding: call  .Type:  Patient Returning Call    Who Called:pt  Would the patient rather a call back or a response via [facility]? eric  Best Call Back Number:[phone]  Additional Information: Pt ask for a call back

## 2023-05-04 NOTE — PROGRESS NOTES
Patient ID: [id number]     Chief Complaint: Medicare AWV      HPI:     Priscila Randhawa is a 89 y.o. female here today for a Medicare Wellness.   She is doing well although she fell yesterday, her nephew had the dunn and came and picked her up. She bruised her knees but they are working fine. She's able to walk and kick them. She has no pain. She isn't going to the heart doctor at her age, she feels fine and he's all the way in [place].     Opioid Screening: Patient medication list reviewed, patient is not taking prescription opioids. Patient is not using additional opioids than prescribed. Patient is at low risk of substance abuse based on this opioid use history.       Past Medical History:   Diagnosis Date    Background diabetic retinopathy     Carotid artery stenosis     Cataract extraction status     Combined hyperlipidemia     HTN (hypertension)     Polycythemia     Type 2 diabetes mellitus without complications     Unspecified osteoarthritis, unspecified site     Vitamin D deficiency         Past Surgical History:   Procedure Laterality Date    EPIDURAL STEROID INJECTION INTO LUMBAR SPINE      EXPLORATORY LAPAROTOMY      hx total knee repla         Review of patient's allergies indicates:   Allergen Reactions    Codeine Itching       No outpatient medications have been marked as taking for the 5/8/23 encounter (Office Visit) with Selena Hou MD.   Anthony knows she's on 3 medications, I believe it is Amlodpine 5, Losartan 100-12.5 and Pravastatin 80, he will call. Last year I stopped her metformin as she was not eating as well and her A1C was normal.     Social History     Socioeconomic History    Marital status: Single   Occupational History    Occupation: caregiver   Tobacco Use    Smoking status: Never    Smokeless tobacco: Never     Social Determinants of Health     Financial Resource Strain: Low Risk     Difficulty of Paying Living Expenses: Not hard at all   Food Insecurity: No Food Insecurity     Worried About Running Out of Food in the Last Year: Never true    Ran Out of Food in the Last Year: Never true   Transportation Needs: No Transportation Needs    Lack of Transportation (Medical): No    Lack of Transportation (Non-Medical): No   Physical Activity: Insufficiently Active    Days of Exercise per Week: 3 days    Minutes of Exercise per Session: 30 min   Stress: No Stress Concern Present    Feeling of Stress : Not at all   Social Connections: Moderately Isolated    Frequency of Communication with Friends and Family: More than three times a week    Frequency of Social Gatherings with Friends and Family: Three times a week    Attends Islam Services: More than 4 times per year    Active Member of Clubs or Organizations: No    Attends Club or Organization Meetings: Never    Marital Status: Never    Housing Stability: Low Risk     Unable to Pay for Housing in the Last Year: No    Number of Places Lived in the Last Year: 1    Unstable Housing in the Last Year: No   Still lives alone, not driving, her nephew Anthony does her shopping still.      Family History   Problem Relation Age of Onset    Heart attack Mother     Coronary artery disease Mother     Heart failure Sister     Coronary artery disease Sister     Alzheimer's disease Sister     Coronary artery disease Brother     Heart attack Brother         Patient Care Team:  Selena Hou MD as PCP - General (Internal Medicine)  Anupam Thapa MD as Consulting Physician (Cardiology)       Subjective:     Review of Systems   Constitutional:  Positive for malaise/fatigue.        She admits she's tired, she doesn't even sit out on the porch anymore.    HENT:  Positive for hearing loss.    Eyes: Negative.    Respiratory: Negative.     Cardiovascular:         No visit to cardiologist, he said her heart was fine the last time she went.   Gastrointestinal: Negative.    Genitourinary: Negative.         Denies incontinence   Musculoskeletal:  Negative for  joint pain and myalgias.        She was afraid she would have broken the artificial knee in her fall but it's working fine.    Skin:         She scraped and bruised her knees with the fall this week, she says yesterday.   Neurological:         Family notes confusion at times still.    Psychiatric/Behavioral: Negative.         Patient Reported Health Risk Assessment  What is your age?: 80 or older  Are you male or female?: Female  During the past four weeks, how much have you been bothered by emotional problems such as feeling anxious, depressed, irritable, sad, or downhearted and blue?: Not at all  During the past five weeks, has your physical and/or emotional health limited your social activities with family, friends, neighbors, or groups?: Not at all  During the past four weeks, how much bodily pain have you generally had?: Moderate pain  During the past four weeks, was someone available to help if you needed and wanted help?: Yes, as much as I wanted  During the past four weeks, what was the hardest physical activity you could do for at least two minutes?: Moderate  Can you get to places out of walking distance without help?  (For example, can you travel alone on buses or taxis, or drive your own car?): No  Can you go shopping for groceries or clothes without someone's help?: No  Can you prepare your own meals?: Yes  Can you do your own housework without help?: Yes  Because of any health problems, do you need the help of another person with your personal care needs such as eating, bathing, dressing, or getting around the house?: No  Can you handle your own money without help?: Yes  During the past four weeks, how would you rate your health in general?: Good  How have things been going for you during the past four weeks?: Pretty well  Are you having difficulties driving your car?: Not applicable, I do not use a car  Do you always fasten your seat belt when you are in a car?: No  How often in the past four weeks  "have you been bothered by falling or dizzy when standing up?: Never  How often in the past four weeks have you been bothered by sexual problems?: Never  How often in the past four weeks have you been bothered by trouble eating well?: Never  How often in the past four weeks have you been bothered by teeth or denture problems?: Never  How often in the past four weeks have you been bothered with problems using the telephone?: Never  How often in the past four weeks have you been bothered by tiredness or fatigue?: Never  Have you fallen two or more times in the past year?: Yes  Are you afraid of falling?: No  Are you a smoker?: No  During the past four weeks, how many drinks of wine, beer, or other alcoholic beverages did you have?: No alcohol at all  Do you exercise for about 20 minutes three or more days a week?: Yes, some of the time  Have you been given any information to help you with hazards in your house that might hurt you?: Yes  Have you been given any information to help you with keeping track of your medications?: Yes  How often do you have trouble taking medicines the way you've been told to take them?: I always take them as prescribed  How confident are you that you can control and manage most of your health problems?: Very confident  What is your race? (Check all that apply.):     Objective:     BP (!) 98/59 (BP Location: Left arm, Patient Position: Sitting, BP Method: Medium (Automatic))   Pulse 98   Temp 98.3 °F (36.8 °C) (Oral)   Resp 16   Ht 4' 11" (1.499 m)   Wt 55.3 kg (122 lb)   SpO2 97%   BMI 24.64 kg/m²     Physical Exam  Vitals reviewed.   Constitutional:       General: She is not in acute distress.     Comments: Stooped posture with head down   HENT:      Head: Normocephalic.      Ears:      Comments: Bilateral wax     Mouth/Throat:      Mouth: Mucous membranes are moist.      Pharynx: No posterior oropharyngeal erythema.   Eyes:      General: No scleral icterus.     Extraocular " Movements: Extraocular movements intact.      Conjunctiva/sclera: Conjunctivae normal.   Neck:      Vascular: No carotid bruit.      Comments: Head is partly flexed with kyphosis  Cardiovascular:      Rate and Rhythm: Normal rate and regular rhythm.      Pulses:           Dorsalis pedis pulses are 3+ on the right side and 3+ on the left side.        Posterior tibial pulses are 2+ on the right side and 2+ on the left side.   Pulmonary:      Effort: Pulmonary effort is normal.      Breath sounds: Normal breath sounds.   Abdominal:      General: Abdomen is flat.      Palpations: Abdomen is soft.      Tenderness: There is no abdominal tenderness.   Musculoskeletal:         General: Deformity present.      Cervical back: No rigidity or tenderness.      Right lower leg: No edema.      Left lower leg: No edema.      Right foot: Decreased range of motion.      Left foot: Decreased range of motion.      Comments: Muscle loss medial lower left thigh along scar, knees both moving fully, gait is slow but stable with her four prong cane.    Feet:      Right foot:      Protective Sensation: 10 sites tested.  0 sites sensed.      Skin integrity: Skin integrity normal.      Toenail Condition: Right toenails are long.      Left foot:      Protective Sensation: 10 sites tested.  0 sites sensed.      Skin integrity: Skin integrity normal.      Toenail Condition: Left toenails are long.   Skin:     General: Skin is warm and dry.      Findings: No erythema.      Comments: Both knees are bruised with abrasions, they are brownish in color now.    Neurological:      Mental Status: She is alert and oriented to person, place, and time. Mental status is at baseline.      Sensory: Sensory deficit present.      Gait: Gait abnormal.   Psychiatric:         Mood and Affect: Mood normal.         Behavior: Behavior normal.         Thought Content: Thought content normal.         Judgment: Judgment normal.         No flowsheet data found.  Fall Risk  Assessment - Outpatient 5/8/2023 5/4/2022   Mobility Status Ambulatory w/ assistance -   Number of falls 2+ 0   Identified as fall risk 1 1           Depression Screening  Over the past two weeks, has the patient felt down, depressed, or hopeless?: No  Over the past two weeks, has the patient felt little interest or pleasure in doing things?: No  Functional Ability/Safety Screening  Was the patient's timed Up & Go test unsteady or longer than 30 seconds?: No  Does the patient need help with phone, transportation, shopping, preparing meals, housework, laundry, meds, or managing money?: Yes  Does the patient's home have rugs in the hallway, lack grab bars in the bathroom, lack handrails on the stairs or have poor lighting?: No  Have you noticed any hearing difficulties?: Yes  Cognitive Function (Assessed through direct observation with due consideration of information obtained by way of patient reports and/or concerns raised by family, friends, caretakers, or others)    Does the patient repeat questions/statements in the same day?: No  Does the patient have trouble remembering the date, year, and time?: No  Does the patient have difficulty managing finances?: No  Does the patient have a decreased sense of direction?: No  Assessment/Plan:     1. Medicare annual wellness visit, subsequent  -     CBC Auto Differential  -     Comprehensive Metabolic Panel  -     Hemoglobin A1C  -     TSH  -     Lipid Panel    2. Essential hypertension  Comments:  Low, will get name to be certain of medication and lower to plain losartan 50mg if her meds are amlodipine and losartan-hct for BP  Orders:  -     Comprehensive Metabolic Panel  -     Lipid Panel    3. Mixed hyperlipidemia  Comments:  Good on Pravastatin will not change to high potency statin.   Orders:  -     Comprehensive Metabolic Panel    4. Benign polycythemia  Comments:  Last levels looked more like macrocytosis will resolve if this lab is the same.   Orders:  -     CBC  Auto Differential    5. Well controlled type 2 diabetes mellitus  Comments:  List shows metfromin but I believe it was stopped last year with her A1C of 5.1, her nephew will call with meds.   Orders:  -     Comprehensive Metabolic Panel  -     Hemoglobin A1C  -     Lipid Panel    6. Generalized osteoarthritis  Comments:  Stable, no pain to report    7. Nonproliferative diabetic retinopathy    8. Other fatigue  Comments:  She believes age related but low BP today suggests that could play a role, need TSH at her age as well  Orders:  -     CBC Auto Differential  -     TSH    9. Accidental fall, initial encounter  Comments:  No apparant damage beyond the knee contrusion.     10. Presbycusis of both ears           Medicare Annual Wellness and Personalized Prevention Plan:   Fall Risk + Home Safety + Hearing Impairment + Depression Screen + Opioid and Substance Abuse Screening + Cognitive Impairment Screen + Health Risk Assessment all reviewed.     Health Maintenance Topics with due status: Not Due       Topic Last Completion Date    Influenza Vaccine 01/11/2019    Lipid Panel 05/05/2022      The patient's Health Maintenance was reviewed and the following appears to be due at this time:   Health Maintenance Due   Topic Date Due    COVID-19 Vaccine (1) Never done    TETANUS VACCINE  Never done    Shingles Vaccine (1 of 2) Never done    Pneumococcal Vaccines (Age 65+) (2 - PCV) 01/25/2019       Advance Care Planning   I attest to discussing Advance Care Planning with patient and/or family member.  Education was provided including the importance of the Health Care Power of , Advance Directives, and/or [place] documentation.  The patient expressed understanding to the importance of this information and discussion.   Advance Care Planning     Date: 05/08/2023  No living will but has nephew is aware of her desires. She has a medical POA and regular POA with Abdelrahman her niece and her number is in the chart. Anthony  is her  caretaker now, she doesn't let Zuleyma in, she was taking advantage of her. Discussion 5 min.            Follow up in about 6 months (around 11/8/2023) for Hypotension. In addition to their scheduled follow up, the patient has also been instructed to follow up on as needed basis. Also schedule wellness in a year in case she can't make the six month visit, her nephew's wife is dealing with cancer so he has his hands full.

## 2023-05-08 ENCOUNTER — TELEPHONE (OUTPATIENT)
Dept: PRIMARY CARE CLINIC | Facility: CLINIC | Age: 88
End: 2023-05-08

## 2023-05-08 ENCOUNTER — OFFICE VISIT (OUTPATIENT)
Dept: PRIMARY CARE CLINIC | Facility: CLINIC | Age: 88
End: 2023-05-08
Payer: MEDICARE

## 2023-05-08 VITALS
SYSTOLIC BLOOD PRESSURE: 98 MMHG | BODY MASS INDEX: 24.6 KG/M2 | RESPIRATION RATE: 16 BRPM | OXYGEN SATURATION: 97 % | HEART RATE: 98 BPM | HEIGHT: 59 IN | WEIGHT: 122 LBS | TEMPERATURE: 98 F | DIASTOLIC BLOOD PRESSURE: 59 MMHG

## 2023-05-08 DIAGNOSIS — H91.13 PRESBYCUSIS OF BOTH EARS: ICD-10-CM
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
## 2023-05-08 DIAGNOSIS — Z00.00 MEDICARE ANNUAL WELLNESS VISIT, SUBSEQUENT: Primary | ICD-10-CM

## 2023-05-08 DIAGNOSIS — E11.3299 NONPROLIFERATIVE DIABETIC RETINOPATHY: ICD-10-CM

## 2023-05-08 DIAGNOSIS — E78.2 MIXED HYPERLIPIDEMIA: ICD-10-CM

## 2023-05-08 DIAGNOSIS — D75.0 BENIGN POLYCYTHEMIA: ICD-10-CM

## 2023-05-08 DIAGNOSIS — E11.9 WELL CONTROLLED TYPE 2 DIABETES MELLITUS: Primary | ICD-10-CM

## 2023-05-08 DIAGNOSIS — M15.9 GENERALIZED OSTEOARTHRITIS: ICD-10-CM

## 2023-05-08 DIAGNOSIS — I10 ESSENTIAL HYPERTENSION: ICD-10-CM

## 2023-05-08 DIAGNOSIS — R53.83 OTHER FATIGUE: ICD-10-CM

## 2023-05-08 DIAGNOSIS — E11.9 WELL CONTROLLED TYPE 2 DIABETES MELLITUS: ICD-10-CM

## 2023-05-08 PROBLEM — D45 POLYCYTHEMIA VERA: Status: ACTIVE | Noted: 2023-05-08

## 2023-05-08 LAB
ALBUMIN SERPL-MCNC: 4.6 G/DL (ref 3.4–4.8)
ALBUMIN/GLOB SERPL: 1.4 RATIO (ref 1.1–2)
ALP SERPL-CCNC: 75 UNIT/L (ref 40–150)
ALT SERPL-CCNC: 11 UNIT/L (ref 0–55)
AST SERPL-CCNC: 24 UNIT/L (ref 5–34)
BASOPHILS # BLD AUTO: 0.06 X10(3)/MCL
BASOPHILS NFR BLD AUTO: 0.6 %
BILIRUBIN DIRECT+TOT PNL SERPL-MCNC: 1.3 MG/DL
BUN SERPL-MCNC: 20.4 MG/DL (ref 9.8–20.1)
CALCIUM SERPL-MCNC: 10.2 MG/DL (ref 8.4–10.2)
CHLORIDE SERPL-SCNC: 102 MMOL/L (ref 98–107)
CHOLEST SERPL-MCNC: 180 MG/DL
CHOLEST/HDLC SERPL: 2 {RATIO} (ref 0–5)
CO2 SERPL-SCNC: 25 MMOL/L (ref 23–31)
CREAT SERPL-MCNC: 0.99 MG/DL (ref 0.55–1.02)
EOSINOPHIL # BLD AUTO: 0.14 X10(3)/MCL (ref 0–0.9)
EOSINOPHIL NFR BLD AUTO: 1.4 %
ERYTHROCYTE [DISTWIDTH] IN BLOOD BY AUTOMATED COUNT: 12.1 % (ref 11.5–17)
EST. AVERAGE GLUCOSE BLD GHB EST-MCNC: 96.8 MG/DL
GFR SERPLBLD CREATININE-BSD FMLA CKD-EPI: 55 MLS/MIN/1.73/M2
GLOBULIN SER-MCNC: 3.4 GM/DL (ref 2.4–3.5)
GLUCOSE SERPL-MCNC: 89 MG/DL (ref 82–115)
HBA1C MFR BLD: 5 %
HCT VFR BLD AUTO: 47.8 % (ref 37–47)
HDLC SERPL-MCNC: 73 MG/DL (ref 35–60)
HGB BLD-MCNC: 15.2 G/DL (ref 12–16)
IMM GRANULOCYTES # BLD AUTO: 0.02 X10(3)/MCL (ref 0–0.04)
IMM GRANULOCYTES NFR BLD AUTO: 0.2 %
LDLC SERPL CALC-MCNC: 87 MG/DL (ref 50–140)
LYMPHOCYTES # BLD AUTO: 1.33 X10(3)/MCL (ref 0.6–4.6)
LYMPHOCYTES NFR BLD AUTO: 13.6 %
MCH RBC QN AUTO: 31.4 PG (ref 27–31)
MCHC RBC AUTO-ENTMCNC: 31.8 G/DL (ref 33–36)
MCV RBC AUTO: 98.8 FL (ref 80–94)
MONOCYTES # BLD AUTO: 0.54 X10(3)/MCL (ref 0.1–1.3)
MONOCYTES NFR BLD AUTO: 5.5 %
NEUTROPHILS # BLD AUTO: 7.69 X10(3)/MCL (ref 2.1–9.2)
NEUTROPHILS NFR BLD AUTO: 78.7 %
PLATELET # BLD AUTO: 323 X10(3)/MCL (ref 130–400)
PMV BLD AUTO: 11.1 FL (ref 7.4–10.4)
POTASSIUM SERPL-SCNC: 3.8 MMOL/L (ref 3.5–5.1)
PROT SERPL-MCNC: 8 GM/DL (ref 5.8–7.6)
RBC # BLD AUTO: 4.84 X10(6)/MCL (ref 4.2–5.4)
SODIUM SERPL-SCNC: 140 MMOL/L (ref 136–145)
TRIGL SERPL-MCNC: 101 MG/DL (ref 37–140)
TSH SERPL-ACNC: 1.24 UIU/ML (ref 0.35–4.94)
VLDLC SERPL CALC-MCNC: 20 MG/DL
WBC # SPEC AUTO: 9.78 X10(3)/MCL (ref 4.5–11.5)

## 2023-05-08 PROCEDURE — 36415 PR COLLECTION VENOUS BLOOD,VENIPUNCTURE: ICD-10-PCS | Mod: ,,, | Performed by: INTERNAL MEDICINE

## 2023-05-08 PROCEDURE — 80061 LIPID PANEL: CPT | Performed by: INTERNAL MEDICINE

## 2023-05-08 PROCEDURE — 80053 COMPREHEN METABOLIC PANEL: CPT | Performed by: INTERNAL MEDICINE

## 2023-05-08 PROCEDURE — 83036 HEMOGLOBIN GLYCOSYLATED A1C: CPT | Performed by: INTERNAL MEDICINE

## 2023-05-08 PROCEDURE — 36415 COLL VENOUS BLD VENIPUNCTURE: CPT | Mod: ,,, | Performed by: INTERNAL MEDICINE

## 2023-05-08 PROCEDURE — G0439 PPPS, SUBSEQ VISIT: HCPCS | Mod: ,,, | Performed by: INTERNAL MEDICINE

## 2023-05-08 PROCEDURE — G0439 PR MEDICARE ANNUAL WELLNESS SUBSEQUENT VISIT: ICD-10-PCS | Mod: ,,, | Performed by: INTERNAL MEDICINE

## 2023-05-08 PROCEDURE — 85025 COMPLETE CBC W/AUTO DIFF WBC: CPT | Performed by: INTERNAL MEDICINE

## 2023-05-08 PROCEDURE — 84443 ASSAY THYROID STIM HORMONE: CPT | Performed by: INTERNAL MEDICINE

## 2023-05-08 PROCEDURE — 36415 COLL VENOUS BLD VENIPUNCTURE: CPT | Performed by: INTERNAL MEDICINE

## 2023-05-08 RX ORDER — LOSARTAN POTASSIUM 50 MG/1
50 TABLET ORAL DAILY
Qty: 90 TABLET | Refills: 3 | Status: SHIPPED | OUTPATIENT
Start: 2023-05-08 | End: 2024-05-07

## 2023-05-08 RX ORDER — PRAVASTATIN SODIUM 80 MG/1
80 TABLET ORAL DAILY
Qty: 90 TABLET | Refills: 3 | Status: SHIPPED | OUTPATIENT
Start: 2023-05-08 | End: 2024-05-07

## 2023-05-08 NOTE — TELEPHONE ENCOUNTER
Does he want refills sent to [facility] or she was on [facility] with [employer] last year (mail order)

## 2023-05-08 NOTE — TELEPHONE ENCOUNTER
Patient's nephew called back. Patient is taking Losartan-HCTS 100-12.5mg , Pravastatin 10mg, and metformin 500mg

## 2023-05-08 NOTE — TELEPHONE ENCOUNTER
Ideally if he could bring her back in a few weeks once she's started on the new strength we could do nurse visit to check her blood pressure and do her urine.

## 2023-05-09 ENCOUNTER — TELEPHONE (OUTPATIENT)
Dept: PRIMARY CARE CLINIC | Facility: CLINIC | Age: 88
End: 2023-05-09

## 2023-05-09 DIAGNOSIS — R55 SYNCOPE AND COLLAPSE: Primary | ICD-10-CM

## 2023-05-09 DIAGNOSIS — I10 ESSENTIAL HYPERTENSION: ICD-10-CM

## 2023-05-09 DIAGNOSIS — R29.6 RECURRENT FALLS: ICD-10-CM

## 2023-05-09 NOTE — TELEPHONE ENCOUNTER
Spoke with the patient's nephew and he stated that Ms. Francois  has fallen several times, recently being this past Sunday. He wanted to know if we are able to help assist them in getting her a hover round chair? She wants to be more mobile and independent but she is falling too much. He was also interested in getting her an emergency chair to wear because when she falls she is not able to reach her phone. The last time she fell one of the cousins were nearby and was able to help her. He also wanted to know if we can schedule home health to come in to help her with bathing and hygiene assistance.

## 2023-05-10 NOTE — TELEPHONE ENCOUNTER
Spoke with the patient's power of  Ms. Quick and she has a few concerns for Ms. Francois. She stated the first time she fell she said she was going throw something away in the dumpster and fell. Ms Quick said there wasn't any trash can in sight and she think she blacked which may have caused her to fall. She also fell this past Sunday and Ms Francois said she was going get a bottle of water and fell flat on the floor. She may think she may be having mini strokes when blacking out because she is not remembering falling. She wanted to know can she be tested?  Ms. Francois has also been thinking and imagining that her family is breaking in and stealing out of her home, Ms. Quick wanted to know if you can prescribe her something for her anxiety?  She also got told by her cousin that her BP medicine got changed. She wanted to know why it got changed?

## 2023-05-10 NOTE — TELEPHONE ENCOUNTER
Spoke with Ms. Quick and she wanted you to call her when you have some free time to discuss Ms. Priscila.

## 2023-05-16 NOTE — TELEPHONE ENCOUNTER
Spoke with Ms. Quick and she said she will call the office back when she makes a decision. The other facility she was looking at was [facility].

## 2023-05-17 ENCOUNTER — TELEPHONE (OUTPATIENT)
Dept: PRIMARY CARE CLINIC | Facility: CLINIC | Age: 88
End: 2023-05-17
Payer: MEDICARE

## 2023-05-17 DIAGNOSIS — E11.9 DIET-CONTROLLED DIABETES MELLITUS: ICD-10-CM

## 2023-05-17 DIAGNOSIS — R29.6 RECURRENT FALLS: Primary | ICD-10-CM

## 2023-05-17 DIAGNOSIS — I95.2 HYPOTENSION DUE TO DRUGS: ICD-10-CM

## 2023-05-17 NOTE — TELEPHONE ENCOUNTER
Spoke with Ms. Quick and let her know a referral will be sent to [facility]. Ms. Quick verbalized understanding.

## 2023-05-17 NOTE — TELEPHONE ENCOUNTER
Spoke with the  and she stated they do home health . They have PCA's(personal care assistants)  go out and help in the home.

## 2023-05-17 NOTE — TELEPHONE ENCOUNTER
Spoke with the patient's power of  MsAlcides Abdelrahman and she stated she chose [facility]. It is located off of [facility] in [place].   She also wanted us to let [facility] know that when they are calling to set everything up, they need to call her and not Ms Francois because she does not know what is going on.

## 2023-05-25 ENCOUNTER — TELEPHONE (OUTPATIENT)
Dept: PRIMARY CARE CLINIC | Facility: CLINIC | Age: 88
End: 2023-05-25
Payer: MEDICARE

## 2023-06-05 ENCOUNTER — CLINICAL SUPPORT (OUTPATIENT)
Dept: PRIMARY CARE CLINIC | Facility: CLINIC | Age: 88
End: 2023-06-05
Payer: MEDICARE

## 2023-06-05 VITALS — HEART RATE: 72 BPM | DIASTOLIC BLOOD PRESSURE: 78 MMHG | SYSTOLIC BLOOD PRESSURE: 136 MMHG

## 2023-06-05 PROCEDURE — 81001 URINALYSIS AUTO W/SCOPE: CPT | Performed by: INTERNAL MEDICINE

## 2023-06-05 PROCEDURE — 82043 UR ALBUMIN QUANTITATIVE: CPT | Performed by: INTERNAL MEDICINE

## 2023-06-05 NOTE — PROGRESS NOTES
Patient ID: [id number]     Chief Complaint: BP Check      HPI:     Priscila Randhawa is a 89 y.o. female here today for a nurse visit BP check. Compliant with current medication regimen. Tolerating all medications well without adverse effects.         ----------------------------  Background diabetic retinopathy  Carotid artery stenosis  Cataract extraction status  Combined hyperlipidemia  HTN (hypertension)  Polycythemia  Type 2 diabetes mellitus without complications  Unspecified osteoarthritis, unspecified site  Vitamin D deficiency     Past Surgical History:   Procedure Laterality Date    EPIDURAL STEROID INJECTION INTO LUMBAR SPINE      EXPLORATORY LAPAROTOMY      hx total knee repla         Review of patient's allergies indicates:   Allergen Reactions    Codeine Itching       Outpatient Medications Marked as Taking for the 6/5/23 encounter (Clinical Support) with NURSE, [facility]   Medication Sig Dispense Refill    losartan (COZAAR) 50 MG tablet Take 1 tablet (50 mg total) by mouth once daily. 90 tablet 3    pravastatin (PRAVACHOL) 80 MG tablet Take 1 tablet (80 mg total) by mouth once daily. 90 tablet 3       Social History     Socioeconomic History    Marital status: Single   Occupational History    Occupation: caregiver   Tobacco Use    Smoking status: Never    Smokeless tobacco: Never     Social Determinants of Health     Financial Resource Strain: Low Risk     Difficulty of Paying Living Expenses: Not hard at all   Food Insecurity: No Food Insecurity    Worried About Running Out of Food in the Last Year: Never true    Ran Out of Food in the Last Year: Never true   Transportation Needs: No Transportation Needs    Lack of Transportation (Medical): No    Lack of Transportation (Non-Medical): No   Physical Activity: Insufficiently Active    Days of Exercise per Week: 3 days    Minutes of Exercise per Session: 30 min   Stress: No Stress Concern Present    Feeling of Stress : Not at all   Social  Connections: Moderately Isolated    Frequency of Communication with Friends and Family: More than three times a week    Frequency of Social Gatherings with Friends and Family: Three times a week    Attends Druze Services: More than 4 times per year    Active Member of Clubs or Organizations: No    Attends Club or Organization Meetings: Never    Marital Status: Never    Housing Stability: Low Risk     Unable to Pay for Housing in the Last Year: No    Number of Places Lived in the Last Year: 1    Unstable Housing in the Last Year: No        Family History   Problem Relation Age of Onset    Heart attack Mother     Coronary artery disease Mother     Heart failure Sister     Coronary artery disease Sister     Alzheimer's disease Sister     Coronary artery disease Brother     Heart attack Brother         Patient Care Team:  Selena Hou MD as PCP - General (Internal Medicine)  Anupam Thapa MD as Consulting Physician (Cardiology)       Subjective:       See HPI for details    Constitutional: Denies Change in appetite. Denies Chills. Denies Fever. Denies Night sweats.  Eye: Denies Blurred vision. Denies Eye pain.  Respiratory: Denies Cough. Denies Shortness of breath. Denies Shortness of breath with exertion. Denies Wheezing.  Cardiovascular: Denies Chest pain.  Denies Irregular heartbeat. Denies Palpitations. Denies Edema.   Genitourinary: Denies Dysuria. Denies Urinary frequency. Denies Urinary urgency.   Integumentary: Denies Rash. Denies Itching.   Neurologic: Denies Dizziness. Denies Fainting. Denies Headache.  Psychiatric: Denies Depression. Denies Anxiety. Denies Suicidal/Homicidal ideations.    All Other ROS: Negative except as stated in HPI.       Objective:     /78 (BP Location: Left arm, Patient Position: Sitting, BP Method: Medium (Manual))   Pulse 72         Assessment:     No diagnosis found.     Plan:     There are no diagnoses linked to this encounter.         Medication List with  Changes/Refills   Current Medications    GARLIC 1,000 MG CAP    Take by mouth.       Start Date: --        End Date: --    KETOCONAZOLE (NIZORAL) 2 % CREAM    Apply topically once daily. To both feet       Start Date: 5/4/2022  End Date: --    LOSARTAN (COZAAR) 50 MG TABLET    Take 1 tablet (50 mg total) by mouth once daily.       Start Date: 5/8/2023  End Date: 5/7/2024    OMEGA 3-DHA-EPA-FISH OIL 1,200 (144-216) MG CAP    Take by mouth.       Start Date: --        End Date: --    PRAVASTATIN (PRAVACHOL) 80 MG TABLET    Take 1 tablet (80 mg total) by mouth once daily.       Start Date: 5/8/2023  End Date: 5/7/2024          No follow-ups on file. In addition to their scheduled follow up, the patient has also been instructed to follow up on as needed basis.

## 2023-08-10 ENCOUNTER — TELEPHONE (OUTPATIENT)
Dept: PRIMARY CARE CLINIC | Facility: CLINIC | Age: 88
End: 2023-08-10
Payer: MEDICARE

## 2023-08-10 NOTE — TELEPHONE ENCOUNTER
----- Message from Zohreh Cervantes sent at 8/10/2023 11:59 AM CDT -----  .Type:  Needs Medical Advice    Who Called: pt niece   Symptoms (please be specific):  congestion and runny nose redness in face   How long has patient had these symptoms:    Pharmacy name and phone #:    Would the patient rather a call back or a response via [facility]? Call back   Best Call Back Number: [phone]  Additional Information: pt's niece poa states that the pt isn't feeling well and wants to know what to do.

## 2023-11-01 ENCOUNTER — TELEPHONE (OUTPATIENT)
Dept: PRIMARY CARE CLINIC | Facility: CLINIC | Age: 88
End: 2023-11-01
Payer: MEDICARE

## 2023-11-01 NOTE — TELEPHONE ENCOUNTER
PT CONFIRMED APPT     1. Are there any outstanding tasks in the patient's chart? (Ex. Labs, MMG)?-    2. Do we have any outstanding/Pending referrals?-    3. Has the patient been seen in an ER, Urgent Care or been admitted to the hospital since last office visit with our office?-    4. Has the patient seen any other health care provider (doctors) since last visit?-    5. Has the patient had any blood work or x-rays done since last visit?-    QUALITY-DO NOT ASK PATIENT    -PNEUMONIA  PREVANR 13:-  PREVNAR 20:-  PNEUMOVAX 23:1/25/18    -COLONOSCOPY:-    -MAMMOGRAM:-    -CERVICAL SCREEN/PAP SMEAR:-    -BONE DENSITY:-    -DIABETES SCREEN  A1C:5/8/23  MICROALBUMIN UA:6/5/23  EYE EXAM:-  FOOT EXAM:-

## 2023-12-03 ENCOUNTER — HOSPITAL ENCOUNTER (INPATIENT)
Facility: HOSPITAL | Age: 88
LOS: 3 days | Discharge: NURSING, REHAB OR LONG-TERM CARE | DRG: 690 | End: 2023-12-07
Attending: STUDENT IN AN ORGANIZED HEALTH CARE EDUCATION/TRAINING PROGRAM | Admitting: STUDENT IN AN ORGANIZED HEALTH CARE EDUCATION/TRAINING PROGRAM
Payer: MEDICARE

## 2023-12-03 DIAGNOSIS — N30.00 ACUTE CYSTITIS WITHOUT HEMATURIA: Primary | ICD-10-CM

## 2023-12-03 DIAGNOSIS — D72.829 LEUKOCYTOSIS, UNSPECIFIED TYPE: ICD-10-CM
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
## 2023-12-03 DIAGNOSIS — S20.229A CONTUSION OF BACK, UNSPECIFIED LATERALITY, INITIAL ENCOUNTER: ICD-10-CM

## 2023-12-03 DIAGNOSIS — S50.02XA CONTUSION OF LEFT ELBOW, INITIAL ENCOUNTER: ICD-10-CM

## 2023-12-03 LAB
ANION GAP SERPL CALC-SCNC: 12 MEQ/L
APPEARANCE UR: ABNORMAL
BACTERIA #/AREA URNS AUTO: ABNORMAL /HPF
BASOPHILS # BLD AUTO: 0.02 X10(3)/MCL
BASOPHILS NFR BLD AUTO: 0.1 %
BILIRUB UR QL STRIP.AUTO: NEGATIVE
BUN SERPL-MCNC: 15 MG/DL (ref 9.8–20.1)
CALCIUM SERPL-MCNC: 10 MG/DL (ref 8.4–10.2)
CHLORIDE SERPL-SCNC: 104 MMOL/L (ref 98–111)
CO2 SERPL-SCNC: 22 MMOL/L (ref 23–31)
COLOR UR AUTO: YELLOW
CREAT SERPL-MCNC: 0.76 MG/DL (ref 0.55–1.02)
CREAT/UREA NIT SERPL: 20
EOSINOPHIL # BLD AUTO: 0 X10(3)/MCL (ref 0–0.9)
EOSINOPHIL NFR BLD AUTO: 0 %
ERYTHROCYTE [DISTWIDTH] IN BLOOD BY AUTOMATED COUNT: 13.1 % (ref 11.5–17)
GFR SERPLBLD CREATININE-BSD FMLA CKD-EPI: >60 MLS/MIN/1.73/M2
GLUCOSE SERPL-MCNC: 108 MG/DL (ref 75–121)
GLUCOSE UR QL STRIP.AUTO: NEGATIVE
HCT VFR BLD AUTO: 48.1 % (ref 37–47)
HGB BLD-MCNC: 15.3 G/DL (ref 12–16)
IMM GRANULOCYTES # BLD AUTO: 0.07 X10(3)/MCL (ref 0–0.04)
IMM GRANULOCYTES NFR BLD AUTO: 0.4 %
KETONES UR QL STRIP.AUTO: 40
LEUKOCYTE ESTERASE UR QL STRIP.AUTO: ABNORMAL
LYMPHOCYTES # BLD AUTO: 0.93 X10(3)/MCL (ref 0.6–4.6)
LYMPHOCYTES NFR BLD AUTO: 5.2 %
MCH RBC QN AUTO: 30.7 PG (ref 27–31)
MCHC RBC AUTO-ENTMCNC: 31.8 G/DL (ref 33–36)
MCV RBC AUTO: 96.4 FL (ref 80–94)
MONOCYTES # BLD AUTO: 0.81 X10(3)/MCL (ref 0.1–1.3)
MONOCYTES NFR BLD AUTO: 4.6 %
NEUTROPHILS # BLD AUTO: 15.96 X10(3)/MCL (ref 2.1–9.2)
NEUTROPHILS NFR BLD AUTO: 89.7 %
NITRITE UR QL STRIP.AUTO: POSITIVE
PH UR STRIP.AUTO: 5.5 [PH]
PLATELET # BLD AUTO: 212 X10(3)/MCL (ref 130–400)
PMV BLD AUTO: 9.5 FL (ref 7.4–10.4)
POTASSIUM SERPL-SCNC: 3.9 MMOL/L (ref 3.5–5.1)
PROT UR QL STRIP.AUTO: 100
RBC # BLD AUTO: 4.99 X10(6)/MCL (ref 4.2–5.4)
RBC #/AREA URNS AUTO: ABNORMAL /HPF
RBC UR QL AUTO: ABNORMAL
SODIUM SERPL-SCNC: 138 MMOL/L (ref 132–146)
SP GR UR STRIP.AUTO: 1.02 (ref 1–1.03)
SQUAMOUS #/AREA URNS AUTO: ABNORMAL /HPF
UROBILINOGEN UR STRIP-ACNC: 0.2
WBC # SPEC AUTO: 17.79 X10(3)/MCL (ref 4.5–11.5)
WBC #/AREA URNS AUTO: ABNORMAL /HPF

## 2023-12-03 PROCEDURE — 87077 CULTURE AEROBIC IDENTIFY: CPT | Performed by: STUDENT IN AN ORGANIZED HEALTH CARE EDUCATION/TRAINING PROGRAM

## 2023-12-03 PROCEDURE — 63600175 PHARM REV CODE 636 W HCPCS: Performed by: STUDENT IN AN ORGANIZED HEALTH CARE EDUCATION/TRAINING PROGRAM

## 2023-12-03 PROCEDURE — G0378 HOSPITAL OBSERVATION PER HR: HCPCS

## 2023-12-03 PROCEDURE — 81001 URINALYSIS AUTO W/SCOPE: CPT | Performed by: STUDENT IN AN ORGANIZED HEALTH CARE EDUCATION/TRAINING PROGRAM

## 2023-12-03 PROCEDURE — 99285 EMERGENCY DEPT VISIT HI MDM: CPT | Mod: 25

## 2023-12-03 PROCEDURE — 25000003 PHARM REV CODE 250: Performed by: STUDENT IN AN ORGANIZED HEALTH CARE EDUCATION/TRAINING PROGRAM

## 2023-12-03 PROCEDURE — 85025 COMPLETE CBC W/AUTO DIFF WBC: CPT | Performed by: STUDENT IN AN ORGANIZED HEALTH CARE EDUCATION/TRAINING PROGRAM

## 2023-12-03 PROCEDURE — 80048 BASIC METABOLIC PNL TOTAL CA: CPT | Performed by: STUDENT IN AN ORGANIZED HEALTH CARE EDUCATION/TRAINING PROGRAM

## 2023-12-03 PROCEDURE — 87086 URINE CULTURE/COLONY COUNT: CPT | Performed by: STUDENT IN AN ORGANIZED HEALTH CARE EDUCATION/TRAINING PROGRAM

## 2023-12-03 PROCEDURE — 96374 THER/PROPH/DIAG INJ IV PUSH: CPT

## 2023-12-03 PROCEDURE — 87040 BLOOD CULTURE FOR BACTERIA: CPT | Performed by: STUDENT IN AN ORGANIZED HEALTH CARE EDUCATION/TRAINING PROGRAM

## 2023-12-03 RX ORDER — ENOXAPARIN SODIUM 100 MG/ML
40 INJECTION SUBCUTANEOUS EVERY 24 HOURS
Status: DISCONTINUED | OUTPATIENT
Start: 2023-12-04 | End: 2023-12-07 | Stop reason: HOSPADM

## 2023-12-03 RX ORDER — LOSARTAN POTASSIUM 50 MG/1
50 TABLET ORAL DAILY
Status: DISCONTINUED | OUTPATIENT
Start: 2023-12-04 | End: 2023-12-07 | Stop reason: HOSPADM

## 2023-12-03 RX ORDER — PRAVASTATIN SODIUM 40 MG/1
80 TABLET ORAL DAILY
Status: DISCONTINUED | OUTPATIENT
Start: 2023-12-04 | End: 2023-12-07 | Stop reason: HOSPADM

## 2023-12-03 RX ORDER — ACETAMINOPHEN 500 MG
1000 TABLET ORAL EVERY 8 HOURS PRN
Status: DISCONTINUED | OUTPATIENT
Start: 2023-12-04 | End: 2023-12-07 | Stop reason: HOSPADM

## 2023-12-03 RX ORDER — SODIUM CHLORIDE 0.9 % (FLUSH) 0.9 %
10 SYRINGE (ML) INJECTION
Status: DISCONTINUED | OUTPATIENT
Start: 2023-12-03 | End: 2023-12-07 | Stop reason: HOSPADM

## 2023-12-03 RX ORDER — HYDRALAZINE HYDROCHLORIDE 20 MG/ML
10 INJECTION INTRAMUSCULAR; INTRAVENOUS EVERY 6 HOURS PRN
Status: DISCONTINUED | OUTPATIENT
Start: 2023-12-04 | End: 2023-12-07 | Stop reason: HOSPADM

## 2023-12-03 RX ADMIN — CEFTRIAXONE SODIUM 1 G: 1 INJECTION, POWDER, FOR SOLUTION INTRAMUSCULAR; INTRAVENOUS at 10:12

## 2023-12-04 ENCOUNTER — TELEPHONE (OUTPATIENT)
Dept: PRIMARY CARE CLINIC | Facility: CLINIC | Age: 88
End: 2023-12-04
Payer: MEDICARE

## 2023-12-04 PROBLEM — N30.00 ACUTE CYSTITIS WITHOUT HEMATURIA: Status: ACTIVE | Noted: 2023-12-04

## 2023-12-04 LAB
ANION GAP SERPL CALC-SCNC: 11 MEQ/L
BASOPHILS # BLD AUTO: 0.02 X10(3)/MCL
BASOPHILS NFR BLD AUTO: 0.2 %
BUN SERPL-MCNC: 14 MG/DL (ref 9.8–20.1)
CALCIUM SERPL-MCNC: 9.2 MG/DL (ref 8.4–10.2)
CHLORIDE SERPL-SCNC: 104 MMOL/L (ref 98–111)
CO2 SERPL-SCNC: 24 MMOL/L (ref 23–31)
CREAT SERPL-MCNC: 0.67 MG/DL (ref 0.55–1.02)
CREAT/UREA NIT SERPL: 21
EOSINOPHIL # BLD AUTO: 0.04 X10(3)/MCL (ref 0–0.9)
EOSINOPHIL NFR BLD AUTO: 0.4 %
ERYTHROCYTE [DISTWIDTH] IN BLOOD BY AUTOMATED COUNT: 13 % (ref 11.5–17)
GFR SERPLBLD CREATININE-BSD FMLA CKD-EPI: >60 MLS/MIN/1.73/M2
GLUCOSE SERPL-MCNC: 82 MG/DL (ref 75–121)
HCT VFR BLD AUTO: 41.2 % (ref 37–47)
HGB BLD-MCNC: 13.5 G/DL (ref 12–16)
IMM GRANULOCYTES # BLD AUTO: 0.02 X10(3)/MCL (ref 0–0.04)
IMM GRANULOCYTES NFR BLD AUTO: 0.2 %
LYMPHOCYTES # BLD AUTO: 1.26 X10(3)/MCL (ref 0.6–4.6)
LYMPHOCYTES NFR BLD AUTO: 12.5 %
MCH RBC QN AUTO: 31.2 PG (ref 27–31)
MCHC RBC AUTO-ENTMCNC: 32.8 G/DL (ref 33–36)
MCV RBC AUTO: 95.2 FL (ref 80–94)
MONOCYTES # BLD AUTO: 0.81 X10(3)/MCL (ref 0.1–1.3)
MONOCYTES NFR BLD AUTO: 8.1 %
NEUTROPHILS # BLD AUTO: 7.91 X10(3)/MCL (ref 2.1–9.2)
NEUTROPHILS NFR BLD AUTO: 78.6 %
PLATELET # BLD AUTO: 200 X10(3)/MCL (ref 130–400)
PMV BLD AUTO: 10.4 FL (ref 7.4–10.4)
POTASSIUM SERPL-SCNC: 3.6 MMOL/L (ref 3.5–5.1)
RBC # BLD AUTO: 4.33 X10(6)/MCL (ref 4.2–5.4)
SODIUM SERPL-SCNC: 139 MMOL/L (ref 132–146)
WBC # SPEC AUTO: 10.06 X10(3)/MCL (ref 4.5–11.5)

## 2023-12-04 PROCEDURE — 11000001 HC ACUTE MED/SURG PRIVATE ROOM

## 2023-12-04 PROCEDURE — 63600175 PHARM REV CODE 636 W HCPCS: Performed by: INTERNAL MEDICINE

## 2023-12-04 PROCEDURE — 25000003 PHARM REV CODE 250: Performed by: INTERNAL MEDICINE

## 2023-12-04 PROCEDURE — 80048 BASIC METABOLIC PNL TOTAL CA: CPT | Performed by: STUDENT IN AN ORGANIZED HEALTH CARE EDUCATION/TRAINING PROGRAM

## 2023-12-04 PROCEDURE — 97165 OT EVAL LOW COMPLEX 30 MIN: CPT

## 2023-12-04 PROCEDURE — 97161 PT EVAL LOW COMPLEX 20 MIN: CPT

## 2023-12-04 PROCEDURE — 85025 COMPLETE CBC W/AUTO DIFF WBC: CPT | Performed by: STUDENT IN AN ORGANIZED HEALTH CARE EDUCATION/TRAINING PROGRAM

## 2023-12-04 RX ORDER — SODIUM CHLORIDE 9 MG/ML
INJECTION, SOLUTION INTRAVENOUS
Status: DISCONTINUED | OUTPATIENT
Start: 2023-12-04 | End: 2023-12-07 | Stop reason: HOSPADM

## 2023-12-04 RX ADMIN — CEFTRIAXONE SODIUM 1 G: 1 INJECTION, POWDER, FOR SOLUTION INTRAMUSCULAR; INTRAVENOUS at 11:12

## 2023-12-04 RX ADMIN — LOSARTAN POTASSIUM 50 MG: 50 TABLET, FILM COATED ORAL at 09:12

## 2023-12-04 RX ADMIN — PRAVASTATIN SODIUM 80 MG: 40 TABLET ORAL at 09:12

## 2023-12-04 NOTE — PROGRESS NOTES
Name: Priscila Randhawa    : 1933 (89 y.o.)  MRN: [id number]           Patient Unavailable      Patient unable to be seen at this time secondary to: Pt reports fatigue and wanting to rest this afternoon despite encouragement. Will continue POC tomorrow as able.

## 2023-12-04 NOTE — PLAN OF CARE
Problem: Physical Therapy  Goal: Physical Therapy Goal  Description: Goals to be met by: Discharge     Patient will increase functional independence with mobility by performin. Sit to stand transfer with Modified Hall  2. Bed to chair transfer with Modified Hall using Single-point Cane   3. Gait  x 50 feet with Modified Hall using Single-point Cane .     Outcome: Ongoing, Progressing

## 2023-12-04 NOTE — TELEPHONE ENCOUNTER
----- Message from Kendra Senegal sent at 12/4/2023 10:29 AM CST -----  .Type:  Needs Medical Advice    Who Called:  Abdelrahman (pt's niece)    Symptoms (please be specific):  no     How long has patient had these symptoms:   no    Pharmacy name and phone #:   no    Would the patient rather a call back? Yes    Best Call Back Number:  [phone]    Additional Information:  pt is in the hospital they want Dr. Walters to know and help her be placed in some type of nursing home to better assist patient she keeps falling please advise thanks

## 2023-12-04 NOTE — PT/OT/SLP EVAL
Physical Therapy Obs Evaluation and Treatment    Patient Name: Priscila Randhawa   MRN: [id number]  Recent Surgery: * No surgery found *      Recommendations:     Discharge Recommendations: Low Intensity Therapy   Discharge Equipment Recommendations: none   Barriers to discharge: Increased level of assist and Decreased caregiver support    Assessment:     Priscila Randhawa is a 89 y.o. female admitted with a medical diagnosis of Acute cystitis without hematuria. She presents with the following impairments/functional limitations: weakness, gait instability, pain. Fall at home, leading to back pain.     Rehab Prognosis: Good; patient would benefit from acute PT services to address these deficits and reach maximum level of function.    Plan:     During this hospitalization, patient to be seen 6 x/week to address the above listed problems via gait training, therapeutic activities, therapeutic exercises    Plan of Care Expires: 12/18/23    Subjective     Chief Complaint: Soreness from fall  Patient Comments/Goals: go back home alone  Pain/Comfort:  Location 1:  (Soreness at mid back, L scapula and L elbow)    Social History:  Living Environment: Patient lives alone in a single story home w  Prior Level of Function: Prior to admission, patient was modified independent, family would help with cooking and groceries.   Equipment Used at Home: cane, straight, walker, rolling  DME owned (not currently used): none  Assistance Upon Discharge:  TBD    Objective:     Communicated with Nursing and OT prior to session. Patient found HOB elevated with peripheral IV upon PT entry to room.    General Precautions: Standard,     Orthopedic Precautions:     Braces:      Respiratory Status: Room air    Exams:  Cognition: Patient is oriented to Person, Place, Time, Situation  RLE ROM: WFL  RLE Strength: WFL  LLE ROM: WFL  LLE Strength: WFL      Functional Mobility:  Gait belt applied - Yes  Bed Mobility  Rolling Left: contact guard  assistance  Rolling Right: contact guard assistance  Supine to Sit: contact guard assistance and minimum assistance for trunk management  Sit to Supine: contact guard assistance and minimum assistance for LE management  Transfers  Sit to Stand: contact guard assistance with no AD  Bed to Chair: contact guard assistance with straight cane using Stand Pivot  Gait  Patient ambulated 40 feet in room with straight cane and contact guard assistance. Patient demonstrates occasional unsteady gait, flexed posture, and antalgic gait. .  Balance  Sitting: stand by assistance  Standing: contact guard assistance      Therapeutic Activities and Exercises:   Patient educated on role of acute care PT and PT POC, safety while in hospital including calling nurse for mobility, and call light usage  Nursing educated on functional mobility and ability to ambulate to bathroom with staff.     AM-PAC 6 CLICK MOBILITY  Total Score:16    Patient left up in chair with all lines intact, call button in reach, RN notified, and chair alarm on.    GOALS:   Multidisciplinary Problems       Physical Therapy Goals          Problem: Physical Therapy    Goal Priority Disciplines Outcome Goal Variances Interventions   Physical Therapy Goal     PT, PT/OT Ongoing, Progressing     Description: Goals to be met by: Discharge     Patient will increase functional independence with mobility by performin. Sit to stand transfer with Modified [place]  2. Bed to chair transfer with Modified [place] using Single-point Cane   3. Gait  x 50 feet with Modified [place] using Single-point Cane .                          History:     Past Medical History:   Diagnosis Date    Background diabetic retinopathy     Carotid artery stenosis     Cataract extraction status     Combined hyperlipidemia     HTN (hypertension)     Polycythemia     Type 2 diabetes mellitus without complications     Unspecified osteoarthritis, unspecified site     Vitamin D  deficiency        Past Surgical History:   Procedure Laterality Date    EPIDURAL STEROID INJECTION INTO LUMBAR SPINE      EXPLORATORY LAPAROTOMY      hx total knee repla         Time Tracking:     PT Received On: 12/04/23  PT Start Time: 0920  PT Stop Time: 0939  PT Total Time (min): 19 min     Billable Minutes: Evaluation low complexity     12/4/2023

## 2023-12-04 NOTE — H&P
[facility]  [facility] MEDICINE - H&P ADMISSION NOTE      Patient Name: Priscila Randhawa  MRN: [id number]  Patient Class: OP- Observation   Admission Date: 12/3/2023   Admitting Physician: VINCENT Service   Attending Physician: Hoa Zamora MD  Primary Care Provider: Selena Hou MD  Face-to-Face encounter date: 12/04/2023        CHIEF COMPLAINT     Chief Complaint   Patient presents with    Fall     Pt fell today at home. She states her lower back hurts a little bit.  She stated she didn't really want to come. Per ems she was ambulatory on scene with cane. She does have a history of dementia and frequent falls. Per EMS family wanted to have her evaluated.        HISTORY OF PRESENTING ILLNESS     This is a 9-year-old white female with a history of dementia hypertension diabetes who presented to the ER yesterday because she fell.  She lives alone and has her niece at the bedside while she was in the ER however today she is alone.  She does have frequent falls.  Her PCP did recommend her to be placed in assisted living versus nursing home.  Patient was has been reluctant.  She does have a UTI on admission.    Will have admitting her for UTI, PT OT for the weakness and possible nursing home placement if patient and family are amenable.    She does co of left shoulder pain today.       PAST MEDICAL HISTORY     Past Medical History:   Diagnosis Date    Background diabetic retinopathy     Carotid artery stenosis     Cataract extraction status     Combined hyperlipidemia     HTN (hypertension)     Polycythemia     Type 2 diabetes mellitus without complications     Unspecified osteoarthritis, unspecified site     Vitamin D deficiency        PAST SURGICAL HISTORY     Past Surgical History:   Procedure Laterality Date    EPIDURAL STEROID INJECTION INTO LUMBAR SPINE      EXPLORATORY LAPAROTOMY      hx total knee repla         FAMILY HISTORY   Reviewed and noncontributory to this case    SOCIAL  HISTORY     Social History     Socioeconomic History    Marital status: Single   Occupational History    Occupation: caregiver   Tobacco Use    Smoking status: Never    Smokeless tobacco: Never     Social Determinants of Health     Financial Resource Strain: Low Risk  (5/8/2023)    Overall Financial Resource Strain (CARDIA)     Difficulty of Paying Living Expenses: Not hard at all   Food Insecurity: No Food Insecurity (5/8/2023)    Hunger Vital Sign     Worried About Running Out of Food in the Last Year: Never true     Ran Out of Food in the Last Year: Never true   Transportation Needs: No Transportation Needs (5/8/2023)    PRAPARE - Transportation     Lack of Transportation (Medical): No     Lack of Transportation (Non-Medical): No   Physical Activity: Insufficiently Active (5/8/2023)    Exercise Vital Sign     Days of Exercise per Week: 3 days     Minutes of Exercise per Session: 30 min   Stress: No Stress Concern Present (5/8/2023)    [employer] [place] [employer] - Occupational Stress Questionnaire     Feeling of Stress : Not at all   Social Connections: Moderately Isolated (5/8/2023)    Social Connection and Isolation Panel [NHANES]     Frequency of Communication with Friends and Family: More than three times a week     Frequency of Social Gatherings with Friends and Family: Three times a week     Attends Confucianist Services: More than 4 times per year     Active Member of Clubs or Organizations: No     Attends Club or Organization Meetings: Never     Marital Status: Never    Housing Stability: Low Risk  (5/8/2023)    Housing Stability Vital Sign     Unable to Pay for Housing in the Last Year: No     Number of Places Lived in the Last Year: 1     Unstable Housing in the Last Year: No       HOME MEDICATIONS     Prior to Admission medications    Medication Sig Start Date End Date Taking? Authorizing Provider   losartan (COZAAR) 50 MG tablet Take 1 tablet (50 mg total) by mouth once daily. 5/8/23  5/7/24 Yes Selena Hou MD   pravastatin (PRAVACHOL) 80 MG tablet Take 1 tablet (80 mg total) by mouth once daily. 5/8/23 5/7/24 Yes Selena Hou MD       ALLERGIES   Codeine          REVIEW OF SYSTEMS   Except as documented above, all other systems reviewed and negative    PHYSICAL EXAM     Vitals:    12/04/23 0830   BP: 119/71   Pulse: 89   Resp:    Temp: 98.4 °F (36.9 °C)      General:  In no acute distress, resting comfortably  Head and neck:  Atraumatic, normocephalic, moist mucous membranes, supple neck  Chest:  Clear to auscultation bilaterally  Heart:  S1, S2, no appreciable murmur  Abdomen:  Soft, nontender, BS +  MSK:  Warm, no lower extremity edema, no clubbing or cyanosis  Neuro:  weak  Integumentary:  No obvious skin rash  Psychiatry:  Appropriate mood and affect          ASSESSMENT AND PLAN     Active Hospital Problems    Diagnosis  POA    *Acute cystitis without hematuria [N30.00]  Yes      Resolved Hospital Problems   No resolved problems to display.      UTI  -Rocephin 12/3 started    HTN  -cont meds    Left shoulder pain  -xray left shoulder    Weakness and frequent falls  -PT/O    DVT prophylaxis:  Lovenox  __________________________________________________________________  LABS/MICRO/MEDS/DIAGNOSTICS       LABS  Recent Labs     12/04/23  0310      K 3.6   CHLORIDE 104   CO2 24   BUN 14.0   CREATININE 0.67   GLUCOSE 82   CALCIUM 9.2     Recent Labs     12/04/23  0310   WBC 10.06   RBC 4.33   HCT 41.2   MCV 95.2*          MICROBIOLOGY  Microbiology Results (last 7 days)       Procedure Component Value Units Date/Time    Blood Culture [[id number]] Collected: 12/03/23 2229    Order Status: Sent Specimen: Blood Updated: 12/03/23 2234    Blood Culture [[id number]] Collected: 12/03/23 2229    Order Status: Sent Specimen: Blood Updated: 12/03/23 2234    Urine culture [[id number]] Collected: 12/03/23 2005    Order Status: Sent Specimen: Urine Updated: 12/03/23 2046             MEDICATIONS   cefTRIAXone (ROCEPHIN) IVPB  1 g Intravenous Q24H    enoxparin  40 mg Subcutaneous Daily    losartan  50 mg Oral Daily    pravastatin  80 mg Oral Daily      INFUSIONS      DIAGNOSTIC TESTS  CT Lumbar Spine Without Contrast   Final Result      No CT evidence of acute lumbar spine injury.      Findings are compatible with the preliminary report.         Electronically signed by: Dejon Zimmerman MD   Date:    12/04/2023   Time:    07:55      CT Thoracic Spine Without Contrast   Final Result      No CT evidence of acute thoracic spine injury.      Findings are compatible with the preliminary report.         Electronically signed by: Dejon Zimmerman MD   Date:    12/04/2023   Time:    07:56      CT Head Without Contrast   Final Result      No acute intracranial findings.      Findings are compatible with the preliminary report.         Electronically signed by: Dejon Zimmerman MD   Date:    12/04/2023   Time:    07:37      X-Ray Elbow Complete Left   Final Result      No acute abnormalities are visualized.         Electronically signed by: Adithya Nguyen MD   Date:    12/03/2023   Time:    21:34      RADIOLOGY REPORT   Final Result      X-Ray Shoulder Left 1 View    (Results Pending)          Patient information was obtained from patient, patient's family, past medical records and ER records.   All diagnosis and differential diagnosis have been reviewed; assessment and plan has been documented. I have personally reviewed the labs and test results that are presently available; I have reviewed the patients medication list. I have reviewed the consulting providers response and recommendations. I have reviewed or attempted to review medical records based upon their availability.  All of the patient's questions have been addressed and answered. Patient's is agreeable to the above stated plan. I will continue to monitor closely and make adjustments to medical management as needed.  This note was created using  MModal voice recognition software that occasionally misinterpreted phrases or words.  Please contact me if any questions may rise regarding documentation to clarify verbiage.        Hoa Zamora MD   Internal Medicine  Department of Hospital Medicine [facility]

## 2023-12-04 NOTE — PLAN OF CARE
Problem: Adult Inpatient Plan of Care  Goal: Plan of Care Review  Outcome: Ongoing, Progressing  Flowsheets (Taken 12/4/2023 1026)  Plan of Care Reviewed With: patient  Goal: Patient-Specific Goal (Individualized)  Outcome: Ongoing, Progressing  Flowsheets (Taken 12/4/2023 1026)  Anxieties, Fears or Concerns: None  Individualized Care Needs: IV antibiotics, safety  Goal: Absence of Hospital-Acquired Illness or Injury  Outcome: Ongoing, Progressing  Intervention: Identify and Manage Fall Risk  Flowsheets (Taken 12/4/2023 1026)  Safety Promotion/Fall Prevention:   assistive device/personal item within reach   bed alarm set   instructed to call staff for mobility   side rails raised x 3   medications reviewed   muscle strengthening facilitated   room near unit station   Fall Risk reviewed with patient/family  Intervention: Prevent Skin Injury  Flowsheets (Taken 12/4/2023 1026)  Body Position:   position changed independently   supine   side-lying   sitting up in bed   weight shifting   log-rolled  Skin Protection:   adhesive use limited   incontinence pads utilized   transparent dressing maintained   tubing/devices free from skin contact  Intervention: Prevent and Manage VTE (Venous Thromboembolism) Risk  Flowsheets (Taken 12/4/2023 1026)  Activity Management:   Ambulated to bathroom - L4   Ambulated in room - L4   Rolling - L1  VTE Prevention/Management:   ambulation promoted   fluids promoted  Intervention: Prevent Infection  Flowsheets (Taken 12/4/2023 1026)  Infection Prevention:   cohorting utilized   environmental surveillance performed   equipment surfaces disinfected   hand hygiene promoted   personal protective equipment utilized   rest/sleep promoted   single patient room provided  Goal: Optimal Comfort and Wellbeing  Outcome: Ongoing, Progressing  Intervention: Monitor Pain and Promote Comfort  Flowsheets (Taken 12/4/2023 1026)  Pain Management Interventions:   care clustered   pillow support  provided  Intervention: Provide Person-Centered Care  Flowsheets (Taken 12/4/2023 1026)  Trust Relationship/Rapport:   care explained   questions answered   questions encouraged  Goal: Readiness for Transition of Care  Outcome: Ongoing, Progressing     Problem: Fall Injury Risk  Goal: Absence of Fall and Fall-Related Injury  Outcome: Ongoing, Progressing  Intervention: Identify and Manage Contributors  Flowsheets (Taken 12/4/2023 1026)  Self-Care Promotion:   independence encouraged   BADL personal objects within reach   safe use of adaptive equipment encouraged   meal set-up provided     Problem: UTI (Urinary Tract Infection)  Goal: Improved Infection Symptoms  Outcome: Ongoing, Progressing  Intervention: Prevent Infection Progression  Flowsheets (Taken 12/4/2023 1026)  Infection Management: aseptic technique maintained     Problem: Mobility Impairment  Goal: Optimal Mobility  Outcome: Ongoing, Progressing  Intervention: Optimize Mobility  Flowsheets (Taken 12/4/2023 1026)  Activity Management:   Ambulated to bathroom - L4   Ambulated in room - L4   Rolling - L1  Positioning/Transfer Devices:   in use   pillows     Problem: Infection  Goal: Absence of Infection Signs and Symptoms  Outcome: Ongoing, Progressing  Intervention: Prevent or Manage Infection  Flowsheets (Taken 12/4/2023 1026)  Infection Management: aseptic technique maintained

## 2023-12-04 NOTE — ED NOTES
Discovered bruising to mid back and left elbow when assisting patient to bedside commode and placing her in a gown. Informed Dr Guaman, and he came do visual inspection

## 2023-12-04 NOTE — PLAN OF CARE
Problem: Adult Inpatient Plan of Care  Goal: Plan of Care Review  Outcome: Ongoing, Progressing  Flowsheets (Taken 12/4/2023 0007)  Plan of Care Reviewed With:   patient   family  Goal: Patient-Specific Goal (Individualized)  Outcome: Ongoing, Progressing  Flowsheets (Taken 12/4/2023 0007)  Individualized Care Needs: IV abx, safety  Goal: Absence of Hospital-Acquired Illness or Injury  Outcome: Ongoing, Progressing  Intervention: Identify and Manage Fall Risk  Flowsheets (Taken 12/4/2023 0007)  Safety Promotion/Fall Prevention:   assistive device/personal item within reach   bed alarm set   Fall Risk reviewed with patient/family   gait belt with ambulation   muscle strengthening facilitated   nonskid shoes/socks when out of bed   side rails raised x 3   supervised activity   Supervised toileting - stay within arms reach   instructed to call staff for mobility  Intervention: Prevent Skin Injury  Flowsheets (Taken 12/4/2023 0007)  Body Position: position changed independently  Skin Protection: incontinence pads utilized  Intervention: Prevent and Manage VTE (Venous Thromboembolism) Risk  Flowsheets (Taken 12/4/2023 0007)  Activity Management: Walk with assistive devise and /or staff member - L3  VTE Prevention/Management: ambulation promoted  Intervention: Prevent Infection  Flowsheets (Taken 12/4/2023 0007)  Infection Prevention:   cohorting utilized   environmental surveillance performed   hand hygiene promoted   single patient room provided   rest/sleep promoted  Goal: Optimal Comfort and Wellbeing  Outcome: Ongoing, Progressing  Intervention: Monitor Pain and Promote Comfort  Flowsheets (Taken 12/4/2023 0007)  Pain Management Interventions:   care clustered   medication offered   pain management plan reviewed with patient/caregiver   position adjusted   pillow support provided   quiet environment facilitated  Intervention: Provide Person-Centered Care  Flowsheets (Taken 12/4/2023 0007)  Trust  Relationship/Rapport:   care explained   emotional support provided   empathic listening provided   questions answered   reassurance provided   questions encouraged   thoughts/feelings acknowledged     Problem: Fall Injury Risk  Goal: Absence of Fall and Fall-Related Injury  Outcome: Ongoing, Progressing  Intervention: Identify and Manage Contributors  Flowsheets (Taken 12/4/2023 0007)  Self-Care Promotion:   independence encouraged   BADL personal objects within reach   BADL personal routines maintained   safe use of adaptive equipment encouraged   meal set-up provided  Intervention: Promote Injury-Free Environment  Flowsheets (Taken 12/4/2023 0007)  Safety Promotion/Fall Prevention:   assistive device/personal item within reach   bed alarm set   Fall Risk reviewed with patient/family   gait belt with ambulation   muscle strengthening facilitated   nonskid shoes/socks when out of bed   side rails raised x 3   supervised activity   Supervised toileting - stay within arms reach   instructed to call staff for mobility     Problem: UTI (Urinary Tract Infection)  Goal: Improved Infection Symptoms  Outcome: Ongoing, Progressing  Intervention: Prevent Infection Progression  Flowsheets (Taken 12/4/2023 0007)  Fever Reduction/Comfort Measures:   lightweight bedding   lightweight clothing  Infection Management: aseptic technique maintained  Intervention: Facilitate Optimal Urinary Elimination  Flowsheets (Taken 12/4/2023 0007)  Urinary Elimination Promotion:   frequent voiding encouraged   toileting offered     Problem: Mobility Impairment  Goal: Optimal Mobility  Outcome: Ongoing, Progressing  Intervention: Optimize Mobility  Flowsheets (Taken 12/4/2023 0007)  Assistive Device Utilized:   gait belt   walker  Activity Management: Walk with assistive devise and /or staff member - L3     Problem: Infection  Goal: Absence of Infection Signs and Symptoms  Outcome: Ongoing, Progressing  Intervention: Prevent or Manage  Infection  Flowsheets (Taken 12/4/2023 0007)  Fever Reduction/Comfort Measures:   lightweight bedding   lightweight clothing  Infection Management: aseptic technique maintained

## 2023-12-04 NOTE — ED PROVIDER NOTES
Encounter Date: 12/3/2023       History     Chief Complaint   Patient presents with    Fall     Pt fell today at home. She states her lower back hurts a little bit.  She stated she didn't really want to come. Per ems she was ambulatory on scene with cane. She does have a history of dementia and frequent falls. Per EMS family wanted to have her evaluated.      Patient is a 89-year-old white female with a history of dementia, hypertension, diabetes who presented to the ER today due to a fall this morning.  Patient lives alone and has her niece at bedside assisting with most of the history.  Patient was often ambulatory but does have frequent falls.  It has been recommended by the PCP for patient to be placed likely in a assisted living/nursing home.  Patient has been reluctant to do so.  Patient had a mechanical fall that is reported by the patient this morning.  She was seen at 8:00 a.m. and was found to be normal but was found down in her home at 4:00 p.m..  Patient was able to rise from a seated position and ambulate after the injury.  She voiced no complaints.  Patient denies any head trauma or loss of consciousness.  When niece arrived to see patient she had concerned and wanted her evaluated.  Patient denies any confusion, headaches, neck pain, chest pain, abdominal pain, recent dysuria, urgency or frequency.  Patient denies any back pain at all.      Review of patient's allergies indicates:   Allergen Reactions    Codeine Itching     Past Medical History:   Diagnosis Date    Background diabetic retinopathy     Carotid artery stenosis     Cataract extraction status     Combined hyperlipidemia     HTN (hypertension)     Polycythemia     Type 2 diabetes mellitus without complications     Unspecified osteoarthritis, unspecified site     Vitamin D deficiency      Past Surgical History:   Procedure Laterality Date    EPIDURAL STEROID INJECTION INTO LUMBAR SPINE      EXPLORATORY LAPAROTOMY      hx total knee repla        Family History   Problem Relation Age of Onset    Heart attack Mother     Coronary artery disease Mother     Heart failure Sister     Coronary artery disease Sister     Alzheimer's disease Sister     Coronary artery disease Brother     Heart attack Brother      Social History     Tobacco Use    Smoking status: Never    Smokeless tobacco: Never     Review of Systems   Constitutional:  Negative for chills, fatigue and fever.   HENT:  Negative for congestion, sore throat and trouble swallowing.    Eyes:  Negative for pain and visual disturbance.   Respiratory:  Negative for cough, shortness of breath and wheezing.    Cardiovascular:  Negative for chest pain and palpitations.   Gastrointestinal:  Negative for abdominal pain, blood in stool, constipation, diarrhea, nausea and vomiting.   Genitourinary:  Negative for dysuria and hematuria.   Musculoskeletal:  Negative for back pain and myalgias.   Skin:  Negative for rash and wound.   Neurological:  Negative for dizziness, tremors, seizures, syncope, facial asymmetry, speech difficulty, weakness, light-headedness, numbness and headaches.   Psychiatric/Behavioral:  Negative for confusion. The patient is not nervous/anxious.        Physical Exam     Initial Vitals [12/03/23 1912]   BP Pulse Resp Temp SpO2   (!) 170/81 99 18 98.2 °F (36.8 °C) 98 %      MAP       --         Physical Exam    Nursing note and vitals reviewed.  Constitutional: She appears well-developed and well-nourished. She is not diaphoretic. No distress.   HENT:   Head: Normocephalic.   Right Ear: External ear normal.   Left Ear: External ear normal.   Nose: Nose normal.   Eyes: Conjunctivae and EOM are normal. Right eye exhibits no discharge. Left eye exhibits no discharge. No scleral icterus.   Neck:   Normal range of motion.  Cardiovascular:  Normal rate, regular rhythm and normal heart sounds.     Exam reveals no gallop and no friction rub.       No murmur heard.  Pulmonary/Chest: Breath sounds  normal. No stridor. No respiratory distress. She has no wheezes. She has no rhonchi. She has no rales.   Abdominal: Abdomen is soft. She exhibits no distension. There is no abdominal tenderness. There is no rebound and no guarding.   Musculoskeletal:         General: Normal range of motion.      Cervical back: Normal range of motion.      Comments: There is no C, T spine tenderness or step-off lesions.  Head normocephalic and atraumatic.  PERRLA.  Extraocular muscles intact.  There is some L-spine tenderness at approximately L2-L3 with no signs of trauma on exam.  There is no tenderness to palpation no leg-length discrepancy noted to the lower extremities.  There is no tenderness to palpation decreased range motion of the upper extremities.     Neurological: She is alert and oriented to person, place, and time. She has normal strength. No cranial nerve deficit or sensory deficit. GCS score is 15. GCS eye subscore is 4. GCS verbal subscore is 5. GCS motor subscore is 6.   CN II-XII intact bilaterally, PERRLA, EOMI, AO, no facial asymmetry noted, no abnormalities of vision loss or peripheral vision loss, strength 5/5 x 4 extremities, sensation intact throughout, no focal neurological deficits noted on exam.  Gait not assessed due to fall risk. Negative Pronator drift bilaterally.       Skin: Skin is warm. No rash noted. No erythema.   Psychiatric: She has a normal mood and affect. Her behavior is normal.         ED Course   Procedures  Labs Reviewed   BASIC METABOLIC PANEL - Abnormal; Notable for the following components:       Result Value    Carbon Dioxide 22 (*)     All other components within normal limits   URINALYSIS, REFLEX TO URINE CULTURE - Abnormal; Notable for the following components:    Appearance, UA Slightly Cloudy (*)     Protein,  (*)     Ketones, UA 40 (*)     Blood, UA Trace-Intact (*)     Nitrites, UA Positive (*)     Leukocyte Esterase, UA Trace (*)     All other components within normal  limits   CBC WITH DIFFERENTIAL - Abnormal; Notable for the following components:    WBC 17.79 (*)     Hct 48.1 (*)     MCV 96.4 (*)     MCHC 31.8 (*)     Neut # 15.96 (*)     IG# 0.07 (*)     All other components within normal limits   URINALYSIS, MICROSCOPIC - Abnormal; Notable for the following components:    Bacteria, UA Many (*)     WBC, UA 11-20 (*)     Squamous Epithelial Cells, UA Few (*)     All other components within normal limits   CULTURE, URINE   BLOOD CULTURE [facility]   BLOOD CULTURE [facility]   CBC W/ AUTO DIFFERENTIAL    Narrative:     The following orders were created for panel order CBC Auto Differential.  Procedure                               Abnormality         Status                     ---------                               -----------         ------                     CBC with Differential[[id number]]        Abnormal            Final result                 Please view results for these tests on the individual orders.          Imaging Results              CT Lumbar Spine Without Contrast (Preliminary result)  Result time 12/03/23 21:26:57      Preliminary result by Hiram Bolanos MD (12/03/23 21:26:57)                   Narrative:    START OF REPORT:  Technique: CT of the lumbar spine was performed without intravenous contrast with direct axial as well as sagittal and coronal reconstruction images.    Comparison: None.    Clinical history: Fall.    Findings:  Anatomy: Unremarkable.  Mineralization: Moderate osteopenia is seen of the visualized bony structures.  Congenital: None.  Bone alignment: Unremarkable with no listhesis.  Curvature: Mild levoscoliosis of the lumbar spine is seen, centered at L3.  Bone and bone marrow: The vertebral body heights are maintained.  Intervertebral disc spaces: Multilevel loss of intervertebral disc height is seen.  Osteophytes: Multilevel mild anterior and posterior marginal osteophytes are seen.  Endplate Sclerosis: Mild multilevel endplate sclerosis is  seen.  Facet degenerative changes: Moderate multilevel facet degenerative changes are seen.  Spinal canal: Multilevel mild spinal canal stenosis is seen secondary to disc pathology and bony degenerative changes.  Fractures: No acute lumbar spine fracture dislocation or subluxation is seen.  Visualized sacrum: Normal.      Impression:  1. No acute lumbar spine fracture dislocation or subluxation is seen.  2. Degenerative changes and other findings as above.                                         CT Thoracic Spine Without Contrast (Preliminary result)  Result time 12/03/23 21:23:47      Preliminary result by Hiram Bolanos MD (12/03/23 21:23:47)                   Narrative:    START OF REPORT:  Technique: CT of the thoracic spine without contrast with direct axial as well as sagittal and coronal reconstruction images.    Comparison: None.    Dosage Information: Automated Exposure Control was utilized.    Clinical History: No priors - fall ecchymosis t spine noted.    Findings:  Mineralization of the bony structures: Moderate osteopenia is seen in the visualized bony structures.  Bone marrow:  Vertebral Fusion: None.  Curvature: Normal thoracic kyphosis.  Fractures: No acute thoracic spine fracture dislocation or subluxation is identified.  Degenerative changes:  Intervertebral disc spaces: Multilevel loss of disc height is seen.  Osteophytes: Mild to moderate multilevel anterior and posterior osteophytes are seen.  Endplates: Mild endplate sclerosis is seen at T5-T6 and T7-T8.  Facet degenerative changes: No significant facet degenerative changes are seen.  Spinal canal: Multiple moderate-to-severe spinal canal stenosis is seen secondary to disc pathology and bony degenerative changes.    Miscellaneous: Pleuroparenchymal scarring is seen is seen at the left lung apex. There is a calcified nodule in the right upper lobe (series 2 image 46).      Impression:  1. No acute thoracic spine fracture dislocation or  subluxation is identified.  2. Degenerative changes and other findings as above.                                         CT Head Without Contrast (Preliminary result)  Result time 12/03/23 20:59:06      Preliminary result by Hiram Bolanos MD (12/03/23 20:59:06)                   Narrative:    START OF REPORT:  Technique: CT of the head was performed without intravenous contrast with axial as well as coronal and sagittal images.    Comparison: None.    Dosage Information: Automated Exposure Control was utilized 1243.24 mGy.cm.    Clinical history: Dimentia.    Findings:  Hemorrhage: No acute intracranial hemorrhage is seen.  CSF spaces: The ventricles, sulci and basal cisterns all appear moderately prominent consistent with global cerebral atrophy.  Brain parenchyma: No acute infarct. Mild to moderate microvascular change is seen in portions of the periventricular and deep white matter tracts.  Cerebellum: Unremarkable.  Vascular: Unremarkable venous sinuses. Mild to moderate atheromatous calcification of the intracranial arteries is seen.  Sella and skull base: The sella appears to be within normal limits for age.  Cerebellopontine angles: Within normal limits.  Intracranial calcifications: Incidental note is made of bilateral choroid plexus calcification. Incidental note is made of some pineal region calcification. Incidental note is made of some calcification of the falx.  Calvarium: No acute linear or depressed skull fracture is seen.    Maxillofacial Structures:  Paranasal sinuses: The visualized paranasal sinuses appear clear with no mucoperiosteal thickening or air fluid levels identified.  Orbits: The orbits appear unremarkable.  Temporal bones and mastoids: The temporal bones and mastoids appear unremarkable.  TMJ: The mandibular condyles appear normally placed with respect to the mandibular fossa.  Nasal Bones: The nasal septum is midline.    Visualized upper cervical spine: Mild cervical spondylosis is  seen in the the visualized spine.      Impression:  1. No acute intracranial process identified. Details and other findings as noted above.                                         X-Ray Elbow Complete Left (Final result)  Result time 12/03/23 21:34:59      Final result by Adithya Nguyen MD (12/03/23 21:34:59)                   Impression:      No acute abnormalities are visualized.      Electronically signed by: Adithya Nguyen MD  Date:    12/03/2023  Time:    21:34               Narrative:    EXAMINATION:  XR ELBOW COMPLETE 3 VIEW LEFT    CLINICAL HISTORY:  Unspecified fall, initial encounter    TECHNIQUE:  AP, lateral, and oblique views of the left elbow were performed.    COMPARISON:  None    FINDINGS:  There are no fractures seen.  There is no dislocation.  There is no intra-articular effusion.                                       Medications   cefTRIAXone (ROCEPHIN) 1 g in dextrose 5 % in water (D5W) 100 mL IVPB (MB+) (1 g Intravenous New Bag 12/3/23 2233)     Medical Decision Making  Differentials:  Intracranial bleed, skull fracture, C-spine injury, contusion, secondary cause of falls   89-year-old well-appearing female GCS of 15 with a normal neurological exam presents to the ER today due to 2 falls this week.  Patient lives alone and family lives out of town.  Patient reports they were mechanical falls.  Patient able to bear weight.  CT head, T-spine and L-spine showed no acute osseous abnormalities.  X-ray left elbow showed no acute osseous abnormalities either.  Basic lab work significant for an 18,000 white count and a dirty UA.  I discussed and had shared decision-making with both the patient and the niece who report that there is no one to take care of patient and they have a high concern for repeat falls.  Patient does have dementia but is otherwise able to perform ADLs, ambulate on her own and live on her own.  Discussed the case with Dr. Zamora who accepted for further management.    Amount  and/or Complexity of Data Reviewed  Labs: ordered. Decision-making details documented in ED Course.  Radiology: ordered. Decision-making details documented in ED Course.    Risk  Decision regarding hospitalization.                                      Clinical Impression:  Final diagnoses:  [W19.XXXA] Fall  [W19.XXXA] Fall - ecchymosis noted to left elbow olecranon region  [N30.00] Acute cystitis without hematuria (Primary)  [D72.829] Leukocytosis, unspecified type  [S50.02XA] Contusion of left elbow, initial encounter  [S20.229A] Contusion of back, unspecified laterality, initial encounter          ED Disposition Condition    Observation Stable                Moises Guaman MD  12/03/23 6110

## 2023-12-04 NOTE — PLAN OF CARE
Problem: Occupational Therapy  Goal: Occupational Therapy Goal  Description: Goals to be met by: 12/11/23     Patient will increase functional independence with ADLs by performing:    LE Dressing with Stand-by Assistance.  Grooming while standing at sink with Stand-by Assistance.  Toileting from toilet with Stand-by Assistance for hygiene and clothing management.   Toilet transfer to toilet with Stand-by Assistance.    Outcome: Ongoing, Progressing

## 2023-12-04 NOTE — PT/OT/SLP PROGRESS
Name: Priscila Randhawa    : 1933 (89 y.o.)  MRN: [id number]           Patient Unavailable      Patient unable to be seen at this time secondary to: Pt reporting she is 89, wanted to rest and did not want to get back up. Pt would like coffee and reports she will go with therapy in the morning.

## 2023-12-04 NOTE — NURSING
Nurses Note -- 4 Eyes      12/4/2023   1:38 AM      Skin assessed during: Admit      [x] No Altered Skin Integrity Present    []Prevention Measures Documented      [] Yes- Altered Skin Integrity Present or Discovered   [] LDA Added if Not in Epic (Describe Wound)   [] New Altered Skin Integrity was Present on Admit and Documented in LDA   [] Wound Image Taken    Wound Care Consulted? No    Attending Nurse:  Clarisa Dickey RN/Staff Member: Ana Louie RN

## 2023-12-04 NOTE — PT/OT/SLP EVAL
Occupational Therapy   Evaluation    Name: Priscila Randhawa  MRN: [id number]  Admitting Diagnosis: Acute cystitis without hematuria  Recent Surgery: * No surgery found *      Recommendations:     Discharge Recommendations: Low Intensity Therapy  Discharge Equipment Recommendations:  none  Barriers to discharge:  Decreased caregiver support    Assessment:     Priscila Randhawa is a 89 y.o. female with a medical diagnosis of Acute cystitis without hematuria.  She presents with decreased strength and endurance required for ADL tasks and safe functional mobility. Performance deficits affecting function: weakness, decreased safety awareness, impaired endurance, pain, impaired self care skills, impaired functional mobility.      Rehab Prognosis: Good; patient would benefit from acute skilled OT services to address these deficits and reach maximum level of function.       Plan:     Patient to be seen 6 x/week (QD/BID as appropriate) to address the above listed problems via self-care/home management, therapeutic activities, therapeutic exercises  Plan of Care Expires: 12/11/23  Plan of Care Reviewed with: patient    Subjective     Chief Complaint: pain in low back from fall  Patient/Family Comments/goals: Pt is adamant about returning home despite safety concerns     Occupational Profile:  Living Environment: lives alone in SS home  Previous level of function: Pt reports she is able to perform ADL tasks but with increased time and difficulty; family performs IADLs and gets groceries for her. Has a nephew that lives next door.   Equipment Used at Home: cane, quad  Assistance upon Discharge: family as needed    Pain/Comfort:  Pain Rating 1: 5/10  Location 1: back  Pain Addressed 1: Pre-medicate for activity, Distraction    Patients cultural, spiritual, Rastafarian conflicts given the current situation: no    Objective:     Communicated with: RN prior to session.  Patient found supine with bed alarm upon OT entry to  room.    General Precautions: Standard, fall  Orthopedic Precautions: N/A  Braces: N/A  Respiratory Status: Room air    Occupational Performance:    Bed Mobility:    Patient completed Supine to Sit with minimum assistance    Functional Mobility/Transfers:  Patient completed Sit <> Stand Transfer with supervision  with  quad cane   Patient completed Bed <> Chair Transfer using Stand Pivot technique with contact guard assistance with quad cane  Functional Mobility: x20 feet with hurry cane in room    Activities of Daily Living:  Feeding:  modified independence    Grooming: supervision seated at sink   Bathing: minimum assistance    Upper Body Dressing: supervision    Lower Body Dressing: minimum assistance    Toileting: contact guard assistance      Cognitive/Visual Perceptual:  Cognitive/Psychosocial Skills:     -       Oriented to: Person, Place, Time, and Situation     Physical Exam:  Upper Extremity Range of Motion:     -       Right Upper Extremity: WFL  -       Left Upper Extremity: WFL  Upper Extremity Strength: -       Right Upper Extremity: WFL  -       Left Upper Extremity: WFL    AMPAC 6 Click ADL:  AMPAC Total Score: 21    Treatment & Education:  Discussed safety in home environment; pt reports she knows it is not safe being home alone but she does not want placement and states sitters only sit and do not assist. Pt is not safe to live home alone d/t increased falls and safety concerns with cognition.     Patient left up in chair with call button in reach and chair alarm on    GOALS:   Multidisciplinary Problems       Occupational Therapy Goals          Problem: Occupational Therapy    Goal Priority Disciplines Outcome Interventions   Occupational Therapy Goal     OT, PT/OT Ongoing, Progressing    Description: Goals to be met by: 12/11/23     Patient will increase functional independence with ADLs by performing:    LE Dressing with Stand-by Assistance.  Grooming while standing at sink with Stand-by  Assistance.  Toileting from toilet with Stand-by Assistance for hygiene and clothing management.   Toilet transfer to toilet with Stand-by Assistance.                         History:     Past Medical History:   Diagnosis Date    Background diabetic retinopathy     Carotid artery stenosis     Cataract extraction status     Combined hyperlipidemia     HTN (hypertension)     Polycythemia     Type 2 diabetes mellitus without complications     Unspecified osteoarthritis, unspecified site     Vitamin D deficiency          Past Surgical History:   Procedure Laterality Date    EPIDURAL STEROID INJECTION INTO LUMBAR SPINE      EXPLORATORY LAPAROTOMY      hx total knee repla         Time Tracking:     OT Date of Treatment: 12/04/23  OT Start Time: 0920  OT Stop Time: 0940  OT Total Time (min): 20 min    Billable Minutes:Evaluation 20 min    12/4/2023

## 2023-12-05 LAB
ALBUMIN SERPL-MCNC: 3 G/DL (ref 3.4–4.8)
ALBUMIN/GLOB SERPL: 1.1 RATIO (ref 1.1–2)
ALP SERPL-CCNC: 60 UNIT/L (ref 40–150)
ALT SERPL-CCNC: 9 UNIT/L (ref 0–55)
AST SERPL-CCNC: 19 UNIT/L (ref 5–34)
BASOPHILS # BLD AUTO: 0.04 X10(3)/MCL
BASOPHILS NFR BLD AUTO: 0.5 %
BILIRUB SERPL-MCNC: 0.5 MG/DL
BUN SERPL-MCNC: 24.9 MG/DL (ref 9.8–20.1)
CALCIUM SERPL-MCNC: 8.7 MG/DL (ref 8.4–10.2)
CHLORIDE SERPL-SCNC: 103 MMOL/L (ref 98–111)
CO2 SERPL-SCNC: 24 MMOL/L (ref 23–31)
CREAT SERPL-MCNC: 0.9 MG/DL (ref 0.55–1.02)
EOSINOPHIL # BLD AUTO: 0.29 X10(3)/MCL (ref 0–0.9)
EOSINOPHIL NFR BLD AUTO: 3.3 %
ERYTHROCYTE [DISTWIDTH] IN BLOOD BY AUTOMATED COUNT: 13.2 % (ref 11.5–17)
GFR SERPLBLD CREATININE-BSD FMLA CKD-EPI: >60 MLS/MIN/1.73/M2
GLOBULIN SER-MCNC: 2.8 GM/DL (ref 2.4–3.5)
GLUCOSE SERPL-MCNC: 95 MG/DL (ref 75–121)
HCT VFR BLD AUTO: 40.7 % (ref 37–47)
HGB BLD-MCNC: 13.2 G/DL (ref 12–16)
IMM GRANULOCYTES # BLD AUTO: 0.01 X10(3)/MCL (ref 0–0.04)
IMM GRANULOCYTES NFR BLD AUTO: 0.1 %
LYMPHOCYTES # BLD AUTO: 1.52 X10(3)/MCL (ref 0.6–4.6)
LYMPHOCYTES NFR BLD AUTO: 17.3 %
MAGNESIUM SERPL-MCNC: 1.9 MG/DL (ref 1.6–2.6)
MCH RBC QN AUTO: 31.4 PG (ref 27–31)
MCHC RBC AUTO-ENTMCNC: 32.4 G/DL (ref 33–36)
MCV RBC AUTO: 96.7 FL (ref 80–94)
MONOCYTES # BLD AUTO: 0.81 X10(3)/MCL (ref 0.1–1.3)
MONOCYTES NFR BLD AUTO: 9.2 %
NEUTROPHILS # BLD AUTO: 6.13 X10(3)/MCL (ref 2.1–9.2)
NEUTROPHILS NFR BLD AUTO: 69.6 %
PHOSPHATE SERPL-MCNC: 3.7 MG/DL (ref 2.3–4.7)
PLATELET # BLD AUTO: 199 X10(3)/MCL (ref 130–400)
PMV BLD AUTO: 10.1 FL (ref 7.4–10.4)
POTASSIUM SERPL-SCNC: 3.7 MMOL/L (ref 3.5–5.1)
PROT SERPL-MCNC: 5.8 GM/DL (ref 5.8–7.6)
RBC # BLD AUTO: 4.21 X10(6)/MCL (ref 4.2–5.4)
SODIUM SERPL-SCNC: 136 MMOL/L (ref 132–146)
WBC # SPEC AUTO: 8.8 X10(3)/MCL (ref 4.5–11.5)

## 2023-12-05 PROCEDURE — 85025 COMPLETE CBC W/AUTO DIFF WBC: CPT | Performed by: INTERNAL MEDICINE

## 2023-12-05 PROCEDURE — 83735 ASSAY OF MAGNESIUM: CPT | Performed by: INTERNAL MEDICINE

## 2023-12-05 PROCEDURE — 63600175 PHARM REV CODE 636 W HCPCS: Performed by: INTERNAL MEDICINE

## 2023-12-05 PROCEDURE — 97110 THERAPEUTIC EXERCISES: CPT

## 2023-12-05 PROCEDURE — 97530 THERAPEUTIC ACTIVITIES: CPT

## 2023-12-05 PROCEDURE — 84100 ASSAY OF PHOSPHORUS: CPT | Performed by: INTERNAL MEDICINE

## 2023-12-05 PROCEDURE — 25000003 PHARM REV CODE 250: Performed by: INTERNAL MEDICINE

## 2023-12-05 PROCEDURE — 80053 COMPREHEN METABOLIC PANEL: CPT | Performed by: INTERNAL MEDICINE

## 2023-12-05 PROCEDURE — 11000001 HC ACUTE MED/SURG PRIVATE ROOM

## 2023-12-05 PROCEDURE — 97116 GAIT TRAINING THERAPY: CPT

## 2023-12-05 RX ORDER — TRAMADOL HYDROCHLORIDE 50 MG/1
50 TABLET ORAL EVERY 6 HOURS PRN
Status: DISCONTINUED | OUTPATIENT
Start: 2023-12-05 | End: 2023-12-07 | Stop reason: HOSPADM

## 2023-12-05 RX ADMIN — CEFTRIAXONE SODIUM 1 G: 1 INJECTION, POWDER, FOR SOLUTION INTRAMUSCULAR; INTRAVENOUS at 10:12

## 2023-12-05 RX ADMIN — ENOXAPARIN SODIUM 40 MG: 40 INJECTION SUBCUTANEOUS at 09:12

## 2023-12-05 RX ADMIN — TRAMADOL HYDROCHLORIDE 50 MG: 50 TABLET, COATED ORAL at 05:12

## 2023-12-05 RX ADMIN — LOSARTAN POTASSIUM 50 MG: 50 TABLET, FILM COATED ORAL at 09:12

## 2023-12-05 RX ADMIN — PRAVASTATIN SODIUM 80 MG: 40 TABLET ORAL at 09:12

## 2023-12-05 RX ADMIN — SODIUM CHLORIDE: 9 INJECTION, SOLUTION INTRAVENOUS at 10:12

## 2023-12-05 NOTE — PT/OT/SLP PROGRESS
Physical Therapy Treatment Note           Name: Priscila Randhawa    : 1933 (89 y.o.)  MRN: [id number]           TREATMENT SUMMARY AND RECOMMENDATIONS:    PT Received On: 23  PT Start Time: 945     PT Stop Time: 1008  PT Total Time (min): 23 min     Subjective Assessment:   No complaints  Lethargic   x Awake, alert, cooperative  Uncooperative    Agitated  c/o pain    Appropriate  c/o fatigue    Confused x Treated at bedside     Emotionally labile  Treated in gym/dept.    Impulsive  Other:    Flat affect       Therapy Precautions:    Cognitive deficits  Spinal precautions    Collar - hard  Sternal precautions    Collar - soft   TLSO   x Fall risk  LSO    Hip precautions - posterior  Knee immobilizer    Hip precautions - anterior  WBAT    Impaired communication  Partial weightbearing    Oxygen  TTWB    PEG tube  NWB    Visual deficits  Other:    Hearing deficits          Treatment Objectives:     Mobility Training:   Assist level Comments    Bed mobility MOD A Supine>Sit   Transfer CGA Stand pivot using SC   Gait CGA X 100 feet with WC following   Sit to stand transitions CGA From EOB and WC   Sitting balance     Standing balance      Wheelchair mobility     Car transfer     Other:          Therapeutic Exercise:   Exercise Sets Reps Comments   Seated B LE exercises  20 All functional planes                         Additional Comments:  Fatigue demonstrated, pt still needing CGA for safety. Progressing as able.     Assessment: Patient tolerated session well.    PT Plan: continue per POC  Revisions made to plan of care: No    GOALS:   Multidisciplinary Problems       Physical Therapy Goals          Problem: Physical Therapy    Goal Priority Disciplines Outcome Goal Variances Interventions   Physical Therapy Goal     PT, PT/OT Ongoing, Progressing     Description: Goals to be met by: Discharge     Patient will increase functional independence with mobility by performin. Sit to stand transfer  with Modified [place]  2. Bed to chair transfer with Modified [place] using Single-point Cane   3. Gait  x 50 feet with Modified Traill using Single-point Cane .                          Skilled PT Minutes Provided: 23   Communication with Treatment Team:     Equipment recommendations:       At end of treatment, patient remained:  x Up in chair     Up in wheelchair in room    In bed   x With alarm activated    Bed rails up   x Call bell in reach    x Family/friends present    Restraints secured properly    In bathroom with CNA/RN notified    Nurse aware    In gym with therapist/tech    Other:

## 2023-12-05 NOTE — PT/OT/SLP PROGRESS
Physical Therapy Treatment Note           Name: Priscila Randhawa    : 1933 (89 y.o.)  MRN: [id number]           TREATMENT SUMMARY AND RECOMMENDATIONS:    PT Received On: 23  PT Start Time: 1401     PT Stop Time: 1410  PT Total Time (min): 9 min     Subjective Assessment:   No complaints  Lethargic   x Awake, alert, cooperative  Uncooperative    Agitated  c/o pain    Appropriate  c/o fatigue    Confused  Treated at bedside     Emotionally labile  Treated in gym/dept.    Impulsive  Other:    Flat affect       Therapy Precautions:    Cognitive deficits  Spinal precautions    Collar - hard  Sternal precautions    Collar - soft   TLSO   x Fall risk  LSO    Hip precautions - posterior  Knee immobilizer    Hip precautions - anterior  WBAT    Impaired communication  Partial weightbearing    Oxygen  TTWB    PEG tube  NWB    Visual deficits  Other:    Hearing deficits          Treatment Objectives:     Mobility Training:   Assist level Comments    Bed mobility MOD A Sit>supine   Transfer CGA Stand pivot using RW   Gait CGA X 125 feet using RW with WC following   Sit to stand transitions CGA From standard chair   Sitting balance     Standing balance      Wheelchair mobility     Car transfer     Other:          Therapeutic Exercise:   Exercise Sets Reps Comments                               Additional Comments:  Fatigue following OT session, asking PT to please walk back to her room and get in bed.     Assessment: Patient tolerated session fair.    PT Plan: continue per POC  Revisions made to plan of care: No    GOALS:   Multidisciplinary Problems       Physical Therapy Goals          Problem: Physical Therapy    Goal Priority Disciplines Outcome Goal Variances Interventions   Physical Therapy Goal     PT, PT/OT Ongoing, Progressing     Description: Goals to be met by: Discharge     Patient will increase functional independence with mobility by performin. Sit to stand transfer with Modified  McGregor  2. Bed to chair transfer with Modified McGregor using Single-point Cane   3. Gait  x 50 feet with Modified McGregor using Single-point Cane .                          Skilled PT Minutes Provided: 9   Communication with Treatment Team:     Equipment recommendations:       At end of treatment, patient remained:   Up in chair     Up in wheelchair in room   x In bed    With alarm activated   x Bed rails up   x Call bell in reach    x Family/friends present    Restraints secured properly    In bathroom with CNA/RN notified    Nurse aware    In gym with therapist/tech    Other:

## 2023-12-05 NOTE — PROGRESS NOTES
Name: Priscila Randhawa    : 1933 (89 y.o.)  MRN: [id number]           Patient Unavailable      Patient unable to be seen at this time secondary to: Pt refused AM session d/t fatigue. Participated in PT session and completed shower with CNA. Will attempt PM session as able.

## 2023-12-05 NOTE — PT/OT/SLP PROGRESS
Occupational Therapy  Treatment    Name: Priscila Randhawa    : 1933 (89 y.o.)  MRN: [id number]           TREATMENT SUMMARY AND RECOMMENDATIONS:      OT Date of Treatment: 23  OT Start Time: 1330  OT Stop Time: 1400  OT Total Time (min): 30 min      Subjective Assessment:   No complaints  Lethargic   x Awake, alert, cooperative  Impulsive    Uncooperative   Flat affect    Agitated  c/o pain    Appropriate  c/o fatigue    Confused  Treated at bedside     Emotionally labile x Treated in gym/dept.      Other:        Therapy Precautions:    Cognitive deficits  Spinal precautions    Collar - hard  Sternal precautions    Collar - soft   TLSO   x Fall risk  LSO    Hip precautions - posterior  Knee immobilizer    Hip precautions - anterior  WBAT    Impaired communication  Partial weightbearing    Oxygen  TTWB    PEG tube  NWB    Visual deficits      Hearing deficits   Other:        Treatment Objectives:     Mobility Training:    Mobility task Assist level Comments    Bed mobility Mod A Supine>EOB   Transfer CGA Stand/pivot t/f with hurry cane to w/c    Sit to stands transitions CGA/SBA  From w/c level with cues for hand placement    Functional mobility CGA/Min A X40 feet with hurry cane in R hand; HHA with L hand for safety min A  X10 feet with RW CGA - pt with better balance and stability with RW use    Sitting balance     Standing balance  CGA/SBA Pt stood with RW anterior with and without UE support; participated in functional reaching ax to toss/place squigz onto mirror anterior. Pt able to tolerate standing x2-3 min per round with seated rest break between sets.    Other:          Therapeutic Exercise:   Exercise Sets Reps Comments   1# 1 15 chest press, straight arm raises, bicep curls                         Additional Comments:  Rest breaks as needed d/t age and decreased endurance.    Assessment: Patient tolerated session well.    GOALS:   Multidisciplinary Problems       Occupational Therapy Goals           Problem: Occupational Therapy    Goal Priority Disciplines Outcome Interventions   Occupational Therapy Goal     OT, PT/OT Ongoing, Progressing    Description: Goals to be met by: 12/11/23     Patient will increase functional independence with ADLs by performing:    LE Dressing with Stand-by Assistance.  Grooming while standing at sink with Stand-by Assistance.  Toileting from toilet with Stand-by Assistance for hygiene and clothing management.   Toilet transfer to toilet with Stand-by Assistance.                         Recommendations:     Discharge Recommendations: home with HH; 24 hr sitters  Discharge Equipment Recommendations:  none  Barriers to discharge:  Decreased caregiver support     Plan:     Patient to be seen 6 x/week (QD/BID as appropriate) to address the above listed problems via self-care/home management, therapeutic activities, therapeutic exercises  Plan of Care Expires: 12/11/23  Plan of Care Reviewed with: patient  Revisions made to plan of care: No      Skilled OT Minutes Provided: 30  Communication with Treatment Team:     Equipment recommendations:       At end of treatment, patient remained:   Up in chair     Up in wheelchair in room    In bed    With alarm activated    Bed rails up    Call bell in reach     Family/friends present    Restraints secured properly    In bathroom with CNA/RN notified   x In gym with PT/PTA/tech    Nurse aware    Other:

## 2023-12-05 NOTE — PLAN OF CARE
[facility]  Initial Discharge Assessment       Primary Care Provider: Selena Hou MD    Admission Diagnosis: Fall [W19.XXXA]  Acute cystitis without hematuria [N30.00]  Contusion of left elbow, initial encounter [S50.02XA]  Contusion of back, unspecified laterality, initial encounter [S20.229A]  Leukocytosis, unspecified type [D72.829]    Admission Date: 12/3/2023  Expected Discharge Date:     Transition of Care Barriers: None    Payor: MEDICARE / Plan: MEDICARE PART A & B / Product Type: Government /     Extended Emergency Contact Information  Primary Emergency Contact: Michel Randhawa  Mobile Phone: [phone]  Relation: Other  Secondary Emergency Contact: ALYSSA KEEN  Mobile Phone: [phone]  Relation: Other   needed? No    Discharge Plan A: New Nursing Home placement - senior care care facility  Discharge Plan B: Home with family, Home Health      [id number] [place]note 2[facility] [address]  AT [facility] [address]  [address] [facility] [address]  Phone: [phone] Fax: [phone]    [facility] - [facility] [address]  [address] [facility] [address]  Phone: [phone] Fax: [phone]      Initial Assessment (most recent)       Adult Discharge Assessment - 12/04/23 1910          Discharge Assessment    Assessment Type Discharge Planning Assessment     Confirmed/corrected address, phone number and insurance Yes     Confirmed Demographics Correct on Facesheet     Source of Information family     If unable to respond/provide information was family/caregiver contacted? Yes     Contact Name/Number Michel      Communicated HALI with patient/caregiver Date not available/Unable to determine     Reason For Admission UTI     People in Home alone     Facility Arrived From: home     Do you expect to return to your current living situation? Yes     Do you have help at home or  someone to help you manage your care at home? Yes     Who are your caregiver(s) and their phone number(s)? Michel Li      Prior to hospitilization cognitive status: Not Oriented to Place     Current cognitive status: Not Oriented to Place     Walking or Climbing Stairs ambulation difficulty, requires equipment     Mobility Management walker     Home Accessibility wheelchair accessible     Home Layout Able to live on 1st floor     Equipment Currently Used at Home bedside commode;walker, standard;wheelchair     Readmission within 30 days? No     Patient currently being followed by outpatient case management? No     Do you currently have service(s) that help you manage your care at home? No     Do you take prescription medications? Yes     Do you have prescription coverage? Yes     Do you have any problems affording any of your prescribed medications? TBD     Is the patient taking medications as prescribed? yes     Who is going to help you get home at discharge? Michel Li      How do you get to doctors appointments? family or friend will provide     Are you on dialysis? No     Do you take coumadin? No     DME Needed Upon Discharge  none     Discharge Plan discussed with: Adult children     Transition of Care Barriers None     Discharge Plan A New Nursing Home placement - longterm care facility     Discharge Plan B Home with family;Home Health        Physical Activity    On average, how many days per week do you engage in moderate to strenuous exercise (like a brisk walk)? 0 days     On average, how many minutes do you engage in exercise at this level? 0 min        Financial Resource Strain    How hard is it for you to pay for the very basics like food, housing, medical care, and heating? Not hard at all        Housing Stability    In the last 12 months, was there a time when you were not able to pay the mortgage or rent on time? No     In the last 12 months, how many places have you lived? 1     In the  last 12 months, was there a time when you did not have a steady place to sleep or slept in a shelter (including now)? No        Transportation Needs    In the past 12 months, has lack of transportation kept you from medical appointments or from getting medications? No     In the past 12 months, has lack of transportation kept you from meetings, work, or from getting things needed for daily living? No        Food Insecurity    Within the past 12 months, you worried that your food would run out before you got the money to buy more. Never true     Within the past 12 months, the food you bought just didn't last and you didn't have money to get more. Never true        Stress    Do you feel stress - tense, restless, nervous, or anxious, or unable to sleep at night because your mind is troubled all the time - these days? Only a little        Social Connections    In a typical week, how many times do you talk on the phone with family, friends, or neighbors? More than three times a week     How often do you get together with friends or relatives? More than three times a week     How often do you attend Alevism or Christian services? 1 to 4 times per year     Do you belong to any clubs or organizations such as Alevism groups, unions, fraternal or athletic groups, or school groups? No     How often do you attend meetings of the clubs or organizations you belong to? Never     Are you , , , , never , or living with a partner?         Alcohol Use    Q1: How often do you have a drink containing alcohol? Never     Q2: How many drinks containing alcohol do you have on a typical day when you are drinking? Patient does not drink     Q3: How often do you have six or more drinks on one occasion? Never

## 2023-12-05 NOTE — PLAN OF CARE
Problem: Adult Inpatient Plan of Care  Goal: Plan of Care Review  Outcome: Ongoing, Progressing  Flowsheets (Taken 12/4/2023 2231)  Plan of Care Reviewed With:   patient   family  Goal: Patient-Specific Goal (Individualized)  Outcome: Ongoing, Progressing  Flowsheets (Taken 12/4/2023 2231)  Individualized Care Needs: IV abx, safety  Goal: Absence of Hospital-Acquired Illness or Injury  Outcome: Ongoing, Progressing  Intervention: Identify and Manage Fall Risk  Flowsheets (Taken 12/4/2023 2231)  Safety Promotion/Fall Prevention:   assistive device/personal item within reach   bed alarm set   Fall Risk reviewed with patient/family   gait belt with ambulation   muscle strengthening facilitated   nonskid shoes/socks when out of bed   side rails raised x 3   supervised activity   Supervised toileting - stay within arms reach   instructed to call staff for mobility  Intervention: Prevent Skin Injury  Flowsheets (Taken 12/4/2023 2231)  Body Position: position changed independently  Skin Protection: incontinence pads utilized  Intervention: Prevent and Manage VTE (Venous Thromboembolism) Risk  Flowsheets (Taken 12/4/2023 2231)  Activity Management: Walk with assistive devise and /or staff member - L3  VTE Prevention/Management: ambulation promoted  Intervention: Prevent Infection  Flowsheets (Taken 12/4/2023 2231)  Infection Prevention:   cohorting utilized   environmental surveillance performed   hand hygiene promoted   single patient room provided   rest/sleep promoted  Goal: Optimal Comfort and Wellbeing  Outcome: Ongoing, Progressing  Intervention: Monitor Pain and Promote Comfort  Flowsheets (Taken 12/4/2023 2231)  Pain Management Interventions:   care clustered   medication offered   pain management plan reviewed with patient/caregiver   position adjusted   pillow support provided   quiet environment facilitated  Intervention: Provide Person-Centered Care  Flowsheets (Taken 12/4/2023 2231)  Trust  Relationship/Rapport:   care explained   emotional support provided   empathic listening provided   questions answered   reassurance provided   questions encouraged   thoughts/feelings acknowledged     Problem: Fall Injury Risk  Goal: Absence of Fall and Fall-Related Injury  Outcome: Ongoing, Progressing  Intervention: Identify and Manage Contributors  Flowsheets (Taken 12/4/2023 2231)  Self-Care Promotion:   independence encouraged   BADL personal objects within reach   BADL personal routines maintained   safe use of adaptive equipment encouraged   meal set-up provided  Intervention: Promote Injury-Free Environment  Flowsheets (Taken 12/4/2023 2231)  Safety Promotion/Fall Prevention:   assistive device/personal item within reach   bed alarm set   Fall Risk reviewed with patient/family   gait belt with ambulation   muscle strengthening facilitated   nonskid shoes/socks when out of bed   side rails raised x 3   supervised activity   Supervised toileting - stay within arms reach   instructed to call staff for mobility     Problem: UTI (Urinary Tract Infection)  Goal: Improved Infection Symptoms  Outcome: Ongoing, Progressing  Intervention: Prevent Infection Progression  Flowsheets (Taken 12/4/2023 2231)  Fever Reduction/Comfort Measures:   lightweight bedding   lightweight clothing  Infection Management: aseptic technique maintained  Intervention: Facilitate Optimal Urinary Elimination  Flowsheets (Taken 12/4/2023 2231)  Urinary Elimination Promotion:   frequent voiding encouraged   toileting offered     Problem: Mobility Impairment  Goal: Optimal Mobility  Outcome: Ongoing, Progressing  Intervention: Optimize Mobility  Flowsheets (Taken 12/4/2023 2231)  Assistive Device Utilized:   gait belt   walker  Activity Management: Walk with assistive devise and /or staff member - L3     Problem: Infection  Goal: Absence of Infection Signs and Symptoms  Outcome: Ongoing, Progressing  Intervention: Prevent or Manage  Infection  Flowsheets (Taken 12/4/2023 2231)  Fever Reduction/Comfort Measures:   lightweight bedding   lightweight clothing  Infection Management: aseptic technique maintained

## 2023-12-05 NOTE — PLAN OF CARE
Problem: Adult Inpatient Plan of Care  Goal: Plan of Care Review  Outcome: Ongoing, Progressing  Flowsheets (Taken 12/5/2023 1157)  Plan of Care Reviewed With: patient  Goal: Patient-Specific Goal (Individualized)  Outcome: Ongoing, Progressing  Flowsheets (Taken 12/5/2023 1157)  Anxieties, Fears or Concerns: None  Individualized Care Needs: safety, DTE precaution  Goal: Absence of Hospital-Acquired Illness or Injury  Outcome: Ongoing, Progressing  Intervention: Identify and Manage Fall Risk  Flowsheets (Taken 12/5/2023 1157)  Safety Promotion/Fall Prevention:   assistive device/personal item within reach   bed alarm set   nonskid shoes/socks when out of bed  Intervention: Prevent Skin Injury  Flowsheets (Taken 12/5/2023 1157)  Body Position: sitting up in bed  Skin Protection: adhesive use limited  Intervention: Prevent and Manage VTE (Venous Thromboembolism) Risk  Flowsheets (Taken 12/5/2023 1157)  Activity Management: Rolling - L1  VTE Prevention/Management: ambulation promoted  Range of Motion: active ROM (range of motion) encouraged  Intervention: Prevent Infection  Flowsheets (Taken 12/5/2023 1157)  Infection Prevention:   cohorting utilized   environmental surveillance performed  Goal: Optimal Comfort and Wellbeing  Outcome: Ongoing, Progressing  Intervention: Monitor Pain and Promote Comfort  Flowsheets (Taken 12/5/2023 1157)  Pain Management Interventions:   care clustered   pillow support provided  Intervention: Provide Person-Centered Care  Flowsheets (Taken 12/5/2023 1157)  Trust Relationship/Rapport:   care explained   choices provided   emotional support provided  Goal: Readiness for Transition of Care  Outcome: Ongoing, Progressing  Intervention: Mutually Develop Transition Plan  Flowsheets (Taken 12/5/2023 1157)  Equipment Currently Used at Home:   bedside commode   walker, standard  Transportation Anticipated: family or friend will provide  Who are your caregiver(s) and their phone number(s)?:  Michel [phone]  Communicated HALI with patient/caregiver: Date not available/Unable to determine  Do you expect to return to your current living situation?: Yes  Do you have help at home or someone to help you manage your care at home?: Yes  Readmission within 30 days?: No  Do you currently have service(s) that help you manage your care at home?: No  Is the pt/caregiver preference to resume services with current agency: No     Problem: Fall Injury Risk  Goal: Absence of Fall and Fall-Related Injury  Outcome: Ongoing, Progressing  Intervention: Identify and Manage Contributors  Flowsheets (Taken 12/5/2023 1157)  Self-Care Promotion:   independence encouraged   BADL personal routines maintained   safe use of adaptive equipment encouraged  Medication Review/Management: medications reviewed  Intervention: Promote Injury-Free Environment  Flowsheets (Taken 12/5/2023 1157)  Safety Promotion/Fall Prevention:   assistive device/personal item within reach   bed alarm set   nonskid shoes/socks when out of bed     Problem: UTI (Urinary Tract Infection)  Goal: Improved Infection Symptoms  Outcome: Ongoing, Progressing  Intervention: Prevent Infection Progression  Flowsheets (Taken 12/5/2023 1157)  Fever Reduction/Comfort Measures:   lightweight bedding   lightweight clothing  Infection Management: aseptic technique maintained  Intervention: Facilitate Optimal Urinary Elimination  Flowsheets (Taken 12/5/2023 1157)  Urinary Elimination Promotion: frequent voiding encouraged     Problem: Mobility Impairment  Goal: Optimal Mobility  Outcome: Ongoing, Progressing  Intervention: Optimize Mobility  Flowsheets (Taken 12/5/2023 1157)  Assistive Device Utilized:   gait belt   walker  Activity Management: Rolling - L1  Positioning/Transfer Devices:   pillows   in use     Problem: Infection  Goal: Absence of Infection Signs and Symptoms  Outcome: Ongoing, Progressing  Intervention: Prevent or Manage Infection  Flowsheets (Taken 12/5/2023  1157)  Fever Reduction/Comfort Measures:   lightweight bedding   lightweight clothing  Infection Management: aseptic technique maintained  Isolation Precautions: precautions maintained

## 2023-12-05 NOTE — PROGRESS NOTES
[facility] - Medical Surgical Unit  [facility] MEDICINE - Progress Note      Patient Name: Priscila Randhawa  MRN: [id number]  Patient Class: IP- Inpatient   Admission Date: 12/3/2023   Admitting Physician:  Service   Attending Physician: Hoa Zamora MD  Primary Care Provider: Selena Hou MD  Face-to-Face encounter date: 12/05/2023        CHIEF COMPLAINT     Chief Complaint   Patient presents with    Fall     Pt fell today at home. She states her lower back hurts a little bit.  She stated she didn't really want to come. Per ems she was ambulatory on scene with cane. She does have a history of dementia and frequent falls. Per EMS family wanted to have her evaluated.        HISTORY OF PRESENTING ILLNESS     This is a 9-year-old white female with a history of dementia hypertension diabetes who presented to the ER yesterday because she fell.  She lives alone and has her niece at the bedside while she was in the ER however today she is alone.  She does have frequent falls.  Her PCP did recommend her to be placed in assisted living versus nursing home.  Patient was has been reluctant.  She does have a UTI on admission.    Will have admitting her for UTI, PT OT for the weakness and possible nursing home placement if patient and family are amenable.    12/5  C/O aches and pains. We discussed tramadol       PAST MEDICAL HISTORY     Past Medical History:   Diagnosis Date    Background diabetic retinopathy     Carotid artery stenosis     Cataract extraction status     Combined hyperlipidemia     HTN (hypertension)     Polycythemia     Type 2 diabetes mellitus without complications     Unspecified osteoarthritis, unspecified site     Vitamin D deficiency        PAST SURGICAL HISTORY     Past Surgical History:   Procedure Laterality Date    EPIDURAL STEROID INJECTION INTO LUMBAR SPINE      EXPLORATORY LAPAROTOMY      hx total knee repla         FAMILY HISTORY   Reviewed and noncontributory to this case    SOCIAL  HISTORY     Social History     Socioeconomic History    Marital status: Single   Occupational History    Occupation: caregiver   Tobacco Use    Smoking status: Never    Smokeless tobacco: Never     Social Determinants of Health     Financial Resource Strain: Low Risk  (12/4/2023)    Overall Financial Resource Strain (CARDIA)     Difficulty of Paying Living Expenses: Not hard at all   Food Insecurity: No Food Insecurity (12/4/2023)    Hunger Vital Sign     Worried About Running Out of Food in the Last Year: Never true     Ran Out of Food in the Last Year: Never true   Transportation Needs: No Transportation Needs (12/4/2023)    PRAPARE - Transportation     Lack of Transportation (Medical): No     Lack of Transportation (Non-Medical): No   Physical Activity: Inactive (12/4/2023)    Exercise Vital Sign     Days of Exercise per Week: 0 days     Minutes of Exercise per Session: 0 min   Stress: No Stress Concern Present (12/4/2023)    [employer] [place] [employer] - Occupational Stress Questionnaire     Feeling of Stress : Only a little   Social Connections: Moderately Isolated (12/4/2023)    Social Connection and Isolation Panel [NHANES]     Frequency of Communication with Friends and Family: More than three times a week     Frequency of Social Gatherings with Friends and Family: More than three times a week     Attends Zoroastrian Services: 1 to 4 times per year     Active Member of Clubs or Organizations: No     Attends Club or Organization Meetings: Never     Marital Status:    Housing Stability: Low Risk  (12/4/2023)    Housing Stability Vital Sign     Unable to Pay for Housing in the Last Year: No     Number of Places Lived in the Last Year: 1     Unstable Housing in the Last Year: No       HOME MEDICATIONS     Prior to Admission medications    Medication Sig Start Date End Date Taking? Authorizing Provider   losartan (COZAAR) 50 MG tablet Take 1 tablet (50 mg total) by mouth once daily. 5/8/23  5/7/24 Yes Selena Hou MD   pravastatin (PRAVACHOL) 80 MG tablet Take 1 tablet (80 mg total) by mouth once daily. 5/8/23 5/7/24 Yes Selena Hou MD       ALLERGIES   Codeine          REVIEW OF SYSTEMS   Except as documented above, all other systems reviewed and negative    PHYSICAL EXAM     Vitals:    12/05/23 0743   BP: 120/63   Pulse: 87   Resp:    Temp: 97.5 °F (36.4 °C)      General:  In no acute distress, resting comfortably  Head and neck:  Atraumatic, normocephalic, moist mucous membranes, supple neck  Chest:  Clear to auscultation bilaterally  Heart:  S1, S2, no appreciable murmur  Abdomen:  Soft, nontender, BS +  MSK:  Warm, no lower extremity edema, no clubbing or cyanosis  Neuro:  weak  Integumentary:  No obvious skin rash  Psychiatry:  Appropriate mood and affect          ASSESSMENT AND PLAN     Active Hospital Problems    Diagnosis  POA    *Acute cystitis without hematuria [N30.00]  Yes      Resolved Hospital Problems   No resolved problems to display.      UTI  -Rocephin 12/3 started    HTN  -cont meds    Left shoulder pain  -xray left shoulder    Weakness and frequent falls  -PT/O    DVT prophylaxis:  Lovenox  __________________________________________________________________  LABS/MICRO/MEDS/DIAGNOSTICS       LABS  Recent Labs     12/05/23  0331      K 3.7   CHLORIDE 103   CO2 24   BUN 24.9*   CREATININE 0.90   GLUCOSE 95   CALCIUM 8.7   ALKPHOS 60   AST 19   ALT 9   ALBUMIN 3.0*     Recent Labs     12/05/23  0331   WBC 8.80   RBC 4.21   HCT 40.7   MCV 96.7*          MICROBIOLOGY  Microbiology Results (last 7 days)       Procedure Component Value Units Date/Time    Urine culture [[id number]]  (Abnormal) Collected: 12/03/23 2005    Order Status: Completed Specimen: Urine Updated: 12/05/23 0714     Urine Culture >/= 100,000 colonies/ml Gram-negative Rods    Blood Culture [[id number]] Collected: 12/03/23 2229    Order Status: Resulted Specimen: Blood Updated: 12/03/23 2234    Blood  Culture [[id number]] Collected: 12/03/23 2229    Order Status: Resulted Specimen: Blood Updated: 12/03/23 2234            MEDICATIONS   cefTRIAXone (ROCEPHIN) IVPB  1 g Intravenous Q24H    enoxparin  40 mg Subcutaneous Daily    losartan  50 mg Oral Daily    pravastatin  80 mg Oral Daily      INFUSIONS      DIAGNOSTIC TESTS  X-Ray Shoulder 2 or More Views Left   Final Result      An acute abnormality is not seen, please see the above comments         Electronically signed by: Adithya Nguyen MD   Date:    12/04/2023   Time:    09:56      CT Lumbar Spine Without Contrast   Final Result      No CT evidence of acute lumbar spine injury.      Findings are compatible with the preliminary report.         Electronically signed by: Dejon Zimmerman MD   Date:    12/04/2023   Time:    07:55      CT Thoracic Spine Without Contrast   Final Result      No CT evidence of acute thoracic spine injury.      Findings are compatible with the preliminary report.         Electronically signed by: Dejon Zimmerman MD   Date:    12/04/2023   Time:    07:56      CT Head Without Contrast   Final Result      No acute intracranial findings.      Findings are compatible with the preliminary report.         Electronically signed by: Dejon Zimmerman MD   Date:    12/04/2023   Time:    07:37      X-Ray Elbow Complete Left   Final Result      No acute abnormalities are visualized.         Electronically signed by: Adithya Nguyen MD   Date:    12/03/2023   Time:    21:34      RADIOLOGY REPORT   Final Result             Patient information was obtained from patient, patient's family, past medical records and ER records.   All diagnosis and differential diagnosis have been reviewed; assessment and plan has been documented. I have personally reviewed the labs and test results that are presently available; I have reviewed the patients medication list. I have reviewed the consulting providers response and recommendations. I have reviewed or attempted to review  medical records based upon their availability.  All of the patient's questions have been addressed and answered. Patient's is agreeable to the above stated plan. I will continue to monitor closely and make adjustments to medical management as needed.  This note was created using [email or web address] voice recognition software that occasionally misinterpreted phrases or words.  Please contact me if any questions may rise regarding documentation to clarify verbiage.        Hoa Zamora MD   Internal Medicine  Department of Hospital Medicine [facility] - [facility]

## 2023-12-06 LAB — BACTERIA UR CULT: ABNORMAL

## 2023-12-06 PROCEDURE — 25000003 PHARM REV CODE 250: Performed by: INTERNAL MEDICINE

## 2023-12-06 PROCEDURE — 97535 SELF CARE MNGMENT TRAINING: CPT

## 2023-12-06 PROCEDURE — 97110 THERAPEUTIC EXERCISES: CPT

## 2023-12-06 PROCEDURE — 11000001 HC ACUTE MED/SURG PRIVATE ROOM

## 2023-12-06 PROCEDURE — 97530 THERAPEUTIC ACTIVITIES: CPT

## 2023-12-06 PROCEDURE — 63600175 PHARM REV CODE 636 W HCPCS: Performed by: INTERNAL MEDICINE

## 2023-12-06 PROCEDURE — 97116 GAIT TRAINING THERAPY: CPT

## 2023-12-06 RX ADMIN — SODIUM CHLORIDE: 9 INJECTION, SOLUTION INTRAVENOUS at 09:12

## 2023-12-06 RX ADMIN — CEFTRIAXONE SODIUM 1 G: 1 INJECTION, POWDER, FOR SOLUTION INTRAMUSCULAR; INTRAVENOUS at 09:12

## 2023-12-06 RX ADMIN — PRAVASTATIN SODIUM 80 MG: 40 TABLET ORAL at 09:12

## 2023-12-06 RX ADMIN — LOSARTAN POTASSIUM 50 MG: 50 TABLET, FILM COATED ORAL at 09:12

## 2023-12-06 RX ADMIN — ENOXAPARIN SODIUM 40 MG: 40 INJECTION SUBCUTANEOUS at 09:12

## 2023-12-06 RX ADMIN — TRAMADOL HYDROCHLORIDE 50 MG: 50 TABLET, COATED ORAL at 09:12

## 2023-12-06 NOTE — PT/OT/SLP PROGRESS
Physical Therapy Treatment Note           Name: Priscila Randhawa    : 1933 (89 y.o.)  MRN: [id number]           TREATMENT SUMMARY AND RECOMMENDATIONS:    PT Received On: 23  PT Start Time: 1000     PT Stop Time: 1015  PT Total Time (min): 15 min     Subjective Assessment:   No complaints  Lethargic   x Awake, alert, cooperative  Uncooperative    Agitated  c/o pain    Appropriate  c/o fatigue    Confused  Treated at bedside     Emotionally labile x Treated in gym/dept.    Impulsive  Other:    Flat affect       Therapy Precautions:    Cognitive deficits  Spinal precautions    Collar - hard  Sternal precautions    Collar - soft   TLSO   x Fall risk  LSO    Hip precautions - posterior  Knee immobilizer    Hip precautions - anterior  WBAT    Impaired communication  Partial weightbearing    Oxygen  TTWB    PEG tube  NWB    Visual deficits  Other:    Hearing deficits          Treatment Objectives:     Mobility Training:   Assist level Comments    Bed mobility     Transfer CGA Stand pivot without AD WC>Chair   Gait CGA X 100 feet using RW with WC following   Sit to stand transitions CGA From standard chair and WC   Sitting balance     Standing balance      Wheelchair mobility     Car transfer     Other:          Therapeutic Exercise:   Exercise Sets Reps Comments   Seated B LE exercises  2 10 2# ankle wts, hip flexion, knee ext, hip abd/add and ankle PF/DF                         Additional Comments:  Fatigue following OT session, asking PT to get back to her room. Overall good tolerance being demonstrated, still needing CGA for safety.     Assessment: Patient tolerated session well.     PT Plan: continue per POC  Revisions made to plan of care: No    GOALS:   Multidisciplinary Problems       Physical Therapy Goals          Problem: Physical Therapy    Goal Priority Disciplines Outcome Goal Variances Interventions   Physical Therapy Goal     PT, PT/OT Ongoing, Progressing     Description: Goals to be  met by: Discharge     Patient will increase functional independence with mobility by performin. Sit to stand transfer with Modified [place]  2. Bed to chair transfer with Modified [place] using Single-point Cane   3. Gait  x 50 feet with Modified [place] using Single-point Cane .                          Skilled PT Minutes Provided: 15   Communication with Treatment Team:     Equipment recommendations:       At end of treatment, patient remained:  x Up in chair     Up in wheelchair in room    In bed   x With alarm activated    Bed rails up   x Call bell in reach     Family/friends present    Restraints secured properly    In bathroom with CNA/RN notified   x Nurse aware    In gym with therapist/tech    Other:

## 2023-12-06 NOTE — CONSULTS
Inpatient Nutrition Evaluation    Admit Date: 12/3/2023   Total duration of encounter: 3 days    Nutrition Recommendation/Prescription     Continue cardiac diet.     Nutrition Assessment     Chart Review    Reason Seen: continuous nutrition monitoring and length of stay    Malnutrition Screening Tool Results   Have you recently lost weight without trying?: No  Have you been eating poorly because of a decreased appetite?: No   MST Score: 0     Diagnosis:  Acute cystitis without hematuria, UTI, HTN, left shoulder pain, Weakness and frequent falls.     Relevant Medical History:    Background diabetic retinopathy      Carotid artery stenosis      Cataract extraction status      Combined hyperlipidemia      HTN (hypertension)      Polycythemia      Type 2 diabetes mellitus without complications      Unspecified osteoarthritis, unspecified site      Vitamin D deficiency        Nutrition-Related Medications: ceftriaxone, pravastatin,     Nutrition-Related Labs:   Latest Reference Range & Units 12/05/23 03:31   WBC 4.50 - 11.50 x10(3)/mcL 8.80   RBC 4.20 - 5.40 x10(6)/mcL 4.21   Hemoglobin 12.0 - 16.0 g/dL 13.2   Hematocrit 37.0 - 47.0 % 40.7   MCV 80.0 - 94.0 fL 96.7 (H)   MCH 27.0 - 31.0 pg 31.4 (H)   MCHC 33.0 - 36.0 g/dL 32.4 (L)   RDW 11.5 - 17.0 % 13.2   Platelet Count 130 - 400 x10(3)/mcL 199   MPV 7.4 - 10.4 fL 10.1   Neut % % 69.6   LYMPH % % 17.3   Mono % % 9.2   Eosinophil % % 3.3   Basophil % % 0.5   Immature Granulocytes % 0.1   Neut # 2.1 - 9.2 x10(3)/mcL 6.13   Lymph # 0.6 - 4.6 x10(3)/mcL 1.52   Mono # 0.1 - 1.3 x10(3)/mcL 0.81   Eos # 0 - 0.9 x10(3)/mcL 0.29   Baso # <=0.2 x10(3)/mcL 0.04   Immature Grans (Abs) 0 - 0.04 x10(3)/mcL 0.01   Sodium 132 - 146 mmol/L 136   Potassium 3.5 - 5.1 mmol/L 3.7   Chloride 98 - 111 mmol/L 103   CO2 23 - 31 mmol/L 24   BUN 9.8 - 20.1 mg/dL 24.9 (H)   Creatinine 0.55 - 1.02 mg/dL 0.90   eGFR mls/min/1.73/m2 >60   Glucose 75 - 121 mg/dL 95   Calcium 8.4 - 10.2 mg/dL 8.7  "  Phosphorus Level 2.3 - 4.7 mg/dL 3.7   Magnesium  1.60 - 2.60 mg/dL 1.90   ALP 40 - 150 unit/L 60   PROTEIN TOTAL 5.8 - 7.6 gm/dL 5.8   Albumin 3.4 - 4.8 g/dL 3.0 (L)   Albumin/Globulin Ratio 1.1 - 2.0 ratio 1.1   BILIRUBIN TOTAL <=1.5 mg/dL 0.5   AST 5 - 34 unit/L 19   ALT 0 - 55 unit/L 9   Globulin, Total 2.4 - 3.5 gm/dL 2.8   (H): Data is abnormally high  (L): Data is abnormally low  Diet Order: Diet Cardiac  Oral Supplement Order: none  Appetite/Oral Intake: good/% of meals  Factors Affecting Nutritional Intake: none identified  Food/[employer]aritan/Cultural Preferences: none reported  Food Allergies: none reported    Skin Integrity: bruised (ecchymotic)  Wound(s):       Comments    Pt intake is good. Pt eating lunch during rounds. Family present. Pt's family reports pt having some difficulty with meal. Pt feeding herself during rounds. Pt family reports will be here for lunch and supper, difficulty with rice. Pt's family stated would try using the spoon, instead of fork.     Anthropometrics    Height: 4' 11" (149.9 cm)    Last Weight: 54.1 kg (119 lb 4.3 oz) (12/04/23 0500) Weight Method: Bed Scale  BMI (Calculated): 24.1  BMI Classification: normal (BMI 18.5-24.9)     Ideal Body Weight (IBW), Female: 95 lb     % Ideal Body Weight, Female (lb): 125.55 %                             Usual Weight Provided By: unable to obtain usual weight    Wt Readings from Last 5 Encounters:   12/04/23 54.1 kg (119 lb 4.3 oz)   05/08/23 55.3 kg (122 lb)   05/04/22 55.3 kg (122 lb)   05/04/21 54 kg (118 lb 15.7 oz)     Weight Change(s) Since Admission:  Admit Weight: 49.9 kg (110 lb) (12/03/23 1912)      Patient Education    Not applicable.    Monitoring & Evaluation     Dietitian will monitor food and beverage intake, weight, weight change, electrolyte/renal panel, glucose/endocrine profile, and gastrointestinal profile.  Nutrition Risk/Follow-Up: low (follow-up in 5-7 days)  Patients assigned 'low nutrition risk' status do " not qualify for a full nutritional assessment but will be monitored and re-evaluated in a 5-7 day time period. Please consult if re-evaluation needed sooner.

## 2023-12-06 NOTE — PLAN OF CARE
Problem: Adult Inpatient Plan of Care  Goal: Plan of Care Review  Outcome: Ongoing, Progressing  Flowsheets (Taken 12/5/2023 1935)  Plan of Care Reviewed With:   patient   family  Goal: Patient-Specific Goal (Individualized)  Outcome: Ongoing, Progressing  Flowsheets (Taken 12/5/2023 1935)  Individualized Care Needs: IV abx, fall prevention, pain management  Goal: Absence of Hospital-Acquired Illness or Injury  Outcome: Ongoing, Progressing  Intervention: Identify and Manage Fall Risk  Flowsheets (Taken 12/5/2023 1935)  Safety Promotion/Fall Prevention:   assistive device/personal item within reach   bed alarm set   Fall Risk reviewed with patient/family   gait belt with ambulation   muscle strengthening facilitated   nonskid shoes/socks when out of bed   side rails raised x 3   supervised activity   Supervised toileting - stay within arms reach   instructed to call staff for mobility  Intervention: Prevent Skin Injury  Flowsheets (Taken 12/5/2023 1935)  Body Position: position changed independently  Skin Protection: incontinence pads utilized  Intervention: Prevent and Manage VTE (Venous Thromboembolism) Risk  Flowsheets (Taken 12/5/2023 1935)  Activity Management: Walk with assistive devise and /or staff member - L3  VTE Prevention/Management: ambulation promoted  Intervention: Prevent Infection  Flowsheets (Taken 12/5/2023 1935)  Infection Prevention:   cohorting utilized   environmental surveillance performed   hand hygiene promoted   single patient room provided   rest/sleep promoted  Goal: Optimal Comfort and Wellbeing  Outcome: Ongoing, Progressing  Intervention: Monitor Pain and Promote Comfort  Flowsheets (Taken 12/5/2023 1935)  Pain Management Interventions:   care clustered   medication offered   pain management plan reviewed with patient/caregiver   position adjusted   pillow support provided   quiet environment facilitated  Intervention: Provide Person-Centered Care  Flowsheets (Taken 12/5/2023  1935)  Trust Relationship/Rapport:   care explained   emotional support provided   empathic listening provided   questions answered   reassurance provided   questions encouraged   thoughts/feelings acknowledged     Problem: Fall Injury Risk  Goal: Absence of Fall and Fall-Related Injury  Outcome: Ongoing, Progressing  Intervention: Identify and Manage Contributors  Flowsheets (Taken 12/5/2023 1935)  Self-Care Promotion:   independence encouraged   BADL personal objects within reach   BADL personal routines maintained   safe use of adaptive equipment encouraged   meal set-up provided  Intervention: Promote Injury-Free Environment  Flowsheets (Taken 12/5/2023 1935)  Safety Promotion/Fall Prevention:   assistive device/personal item within reach   bed alarm set   Fall Risk reviewed with patient/family   gait belt with ambulation   muscle strengthening facilitated   nonskid shoes/socks when out of bed   side rails raised x 3   supervised activity   Supervised toileting - stay within arms reach   instructed to call staff for mobility     Problem: UTI (Urinary Tract Infection)  Goal: Improved Infection Symptoms  Outcome: Ongoing, Progressing  Intervention: Prevent Infection Progression  Flowsheets (Taken 12/5/2023 1935)  Fever Reduction/Comfort Measures:   lightweight bedding   lightweight clothing  Infection Management: aseptic technique maintained  Intervention: Facilitate Optimal Urinary Elimination  Flowsheets (Taken 12/5/2023 1935)  Urinary Elimination Promotion:   frequent voiding encouraged   toileting offered     Problem: Mobility Impairment  Goal: Optimal Mobility  Outcome: Ongoing, Progressing  Intervention: Optimize Mobility  Flowsheets (Taken 12/5/2023 1935)  Assistive Device Utilized:   gait belt   walker  Activity Management: Walk with assistive devise and /or staff member - L3     Problem: Infection  Goal: Absence of Infection Signs and Symptoms  Outcome: Ongoing, Progressing  Intervention: Prevent or  Manage Infection  Flowsheets (Taken 12/5/1935)  Fever Reduction/Comfort Measures:   lightweight bedding   lightweight clothing  Infection Management: aseptic technique maintained

## 2023-12-06 NOTE — PT/OT/SLP PROGRESS
Physical Therapy Treatment Note           Name: Priscila Randhawa    : 1933 (89 y.o.)  MRN: [id number]           TREATMENT SUMMARY AND RECOMMENDATIONS:    PT Received On: 23  PT Start Time: 1330     PT Stop Time: 1348  PT Total Time (min): 18 min     Subjective Assessment:   No complaints  Lethargic   x Awake, alert, cooperative  Uncooperative    Agitated  c/o pain    Appropriate  c/o fatigue    Confused  Treated at bedside     Emotionally labile x Treated in gym/dept.    Impulsive  Other:    Flat affect       Therapy Precautions:    Cognitive deficits  Spinal precautions    Collar - hard  Sternal precautions    Collar - soft   TLSO   x Fall risk  LSO    Hip precautions - posterior  Knee immobilizer    Hip precautions - anterior  WBAT    Impaired communication  Partial weightbearing    Oxygen  TTWB    PEG tube  NWB    Visual deficits  Other:    Hearing deficits          Treatment Objectives:     Mobility Training:   Assist level Comments    Bed mobility     Transfer CGA Stand pivot without AD WC>Chair   Gait CGA X 150 feet using RW with WC following   Sit to stand transitions CGA From standard chair and WC   Sitting balance     Standing balance      Wheelchair mobility     Car transfer     Other:          Therapeutic Exercise:   Exercise Sets Reps Comments   Seated B LE bike using UBE   5' 2.5' F/2.5'B                         Additional Comments:  Fatigue following OT session, but improved gait distances demonstrated. Pt progressing well.     Assessment: Patient tolerated session well.     PT Plan: continue per POC  Revisions made to plan of care: No    GOALS:   Multidisciplinary Problems       Physical Therapy Goals          Problem: Physical Therapy    Goal Priority Disciplines Outcome Goal Variances Interventions   Physical Therapy Goal     PT, PT/OT Ongoing, Progressing     Description: Goals to be met by: Discharge     Patient will increase functional independence with mobility by  performin. Sit to stand transfer with Modified [place]  2. Bed to chair transfer with Modified [place] using Single-point Cane   3. Gait  x 50 feet with Modified [place] using Single-point Cane .                          Skilled PT Minutes Provided: 15   Communication with Treatment Team:     Equipment recommendations:       At end of treatment, patient remained:  x Up in chair     Up in wheelchair in room    In bed   x With alarm activated    Bed rails up   x Call bell in reach    x Family/friends present    Restraints secured properly    In bathroom with CNA/RN notified   x Nurse aware    In gym with therapist/tech    Other:

## 2023-12-06 NOTE — PT/OT/SLP PROGRESS
Occupational Therapy  Treatment    Name: Priscila Randhawa    : 1933 (89 y.o.)  MRN: [id number]           TREATMENT SUMMARY AND RECOMMENDATIONS:      OT Date of Treatment: 23  OT Start Time: 926  OT Stop Time: 956  OT Total Time (min): 30 min      Subjective Assessment:   No complaints  Lethargic   x Awake, alert, cooperative  Impulsive    Uncooperative   Flat affect    Agitated x c/o pain    Appropriate  c/o fatigue    Confused x Treated at bedside     Emotionally labile x Treated in gym/dept.      Other:        Therapy Precautions:    Cognitive deficits  Spinal precautions    Collar - hard  Sternal precautions    Collar - soft   TLSO   x Fall risk  LSO    Hip precautions - posterior  Knee immobilizer    Hip precautions - anterior  WBAT    Impaired communication  Partial weightbearing    Oxygen  TTWB    PEG tube  NWB    Visual deficits      Hearing deficits   Other:        Treatment Objectives:     Mobility Training:    Mobility task Assist level Comments    Bed mobility Min A Supine>EOB   Transfer CGA Stand/pivot t/f with RW EOB>w/c   Sit to stands transitions     Functional mobility CGA X50 feet with RW   Sitting balance     Standing balance  CGA Pt stood with RW and 1UE supported; participated in [employer] volleyball ax hitting back and forth with tech. No LOB noted; able to tolerate standing for 1-2 min before rest break.    Other:        ADL Training:    ADL Assist level Comments    Feeding     Grooming/hygiene SBA Pt sat EOB and brushed teeth    Bathing     Upper body dressing Min A Pt required assist to maryse house gown; assist for buttons    Lower body dressing [place] A Pt able to thread LLE; required assist to thread RLE into shorts. Able to manage over hips in standing with CGA   Toileting     Toilet transfer     Adaptive equipment training     Other:         Additional Comments:      Assessment: Patient tolerated session well.    GOALS:   Multidisciplinary Problems       Occupational Therapy  Goals          Problem: Occupational Therapy    Goal Priority Disciplines Outcome Interventions   Occupational Therapy Goal     OT, PT/OT Ongoing, Progressing    Description: Goals to be met by: 12/11/23     Patient will increase functional independence with ADLs by performing:    LE Dressing with Stand-by Assistance.  Grooming while standing at sink with Stand-by Assistance.  Toileting from toilet with Stand-by Assistance for hygiene and clothing management.   Toilet transfer to toilet with Stand-by Assistance.                         Recommendations:     Discharge Recommendations: home with  and 24 hour supervision   Discharge Equipment Recommendations:  none  Barriers to discharge:  Decreased caregiver support     Plan:     Patient to be seen 6 x/week (QD/BID as appropriate) to address the above listed problems via self-care/home management, therapeutic activities, therapeutic exercises  Plan of Care Expires: 12/11/23  Plan of Care Reviewed with: patient  Revisions made to plan of care: No      Skilled OT Minutes Provided: 30  Communication with Treatment Team:     Equipment recommendations:       At end of treatment, patient remained:   Up in chair     Up in wheelchair in room    In bed    With alarm activated    Bed rails up    Call bell in reach     Family/friends present    Restraints secured properly    In bathroom with CNA/RN notified   x In gym with PT/PTA/tech    Nurse aware    Other:

## 2023-12-06 NOTE — PLAN OF CARE
Problem: Adult Inpatient Plan of Care  Goal: Plan of Care Review  Outcome: Ongoing, Progressing  Flowsheets (Taken 12/6/2023 1016)  Plan of Care Reviewed With: patient  Goal: Patient-Specific Goal (Individualized)  Outcome: Ongoing, Progressing  Flowsheets (Taken 12/6/2023 1016)  Anxieties, Fears or Concerns: Nne  Individualized Care Needs: safety, DTE precaution  Goal: Absence of Hospital-Acquired Illness or Injury  Outcome: Ongoing, Progressing  Intervention: Identify and Manage Fall Risk  Flowsheets (Taken 12/6/2023 1016)  Safety Promotion/Fall Prevention:   assistive device/personal item within reach   side rails raised x 3   nonskid shoes/socks when out of bed  Intervention: Prevent Skin Injury  Flowsheets (Taken 12/6/2023 1016)  Body Position: position changed independently  Skin Protection: adhesive use limited  Intervention: Prevent and Manage VTE (Venous Thromboembolism) Risk  Flowsheets (Taken 12/6/2023 1016)  Activity Management:   Ambulated to bathroom - L4   Rolling - L1  VTE Prevention/Management:   ambulation promoted   fluids promoted   bleeding precations maintained  Range of Motion: active ROM (range of motion) encouraged  Intervention: Prevent Infection  Flowsheets (Taken 12/6/2023 1016)  Infection Prevention:   cohorting utilized   single patient room provided  Goal: Optimal Comfort and Wellbeing  Outcome: Ongoing, Progressing  Intervention: Monitor Pain and Promote Comfort  Flowsheets (Taken 12/6/2023 1016)  Pain Management Interventions: care clustered  Intervention: Provide Person-Centered Care  Flowsheets (Taken 12/6/2023 1016)  Trust Relationship/Rapport:   care explained   choices provided   emotional support provided   reassurance provided   questions encouraged  Goal: Readiness for Transition of Care  Outcome: Ongoing, Progressing  Intervention: Mutually Develop Transition Plan  Flowsheets (Taken 12/6/2023 1016)  Equipment Currently Used at Home:   bedside commode   walker,  standard  Transportation Anticipated: family or friend will provide  Who are your caregiver(s) and their phone number(s)?: Michel [phone]  Communicated HALI with patient/caregiver: Date not available/Unable to determine  Do you expect to return to your current living situation?: Yes  Do you have help at home or someone to help you manage your care at home?: Yes  Readmission within 30 days?: No  Do you currently have service(s) that help you manage your care at home?: No  Is the pt/caregiver preference to resume services with current agency: No     Problem: Fall Injury Risk  Goal: Absence of Fall and Fall-Related Injury  Outcome: Ongoing, Progressing  Intervention: Identify and Manage Contributors  Flowsheets (Taken 12/6/2023 1016)  Self-Care Promotion: independence encouraged  Medication Review/Management: medications reviewed  Intervention: Promote Injury-Free Environment  Flowsheets (Taken 12/6/2023 1016)  Safety Promotion/Fall Prevention:   assistive device/personal item within reach   side rails raised x 3   nonskid shoes/socks when out of bed     Problem: UTI (Urinary Tract Infection)  Goal: Improved Infection Symptoms  Outcome: Ongoing, Progressing  Intervention: Prevent Infection Progression  Flowsheets (Taken 12/6/2023 1016)  Fever Reduction/Comfort Measures:   lightweight bedding   lightweight clothing  Infection Management: aseptic technique maintained  Intervention: Facilitate Optimal Urinary Elimination  Flowsheets (Taken 12/6/2023 1016)  Urinary Elimination Promotion:   toileting offered   frequent voiding encouraged     Problem: Mobility Impairment  Goal: Optimal Mobility  Outcome: Ongoing, Progressing  Intervention: Optimize Mobility  Flowsheets (Taken 12/6/2023 1016)  Assistive Device Utilized:   gait belt   walker  Activity Management:   Ambulated to bathroom - L4   Rolling - L1  Positioning/Transfer Devices:   pillows   in use     Problem: Infection  Goal: Absence of Infection Signs and  Symptoms  Outcome: Ongoing, Progressing  Intervention: Prevent or Manage Infection  Flowsheets (Taken 12/6/2023 1016)  Fever Reduction/Comfort Measures:   lightweight bedding   lightweight clothing  Infection Management: aseptic technique maintained  Isolation Precautions:   precautions maintained   protective

## 2023-12-06 NOTE — PROGRESS NOTES
[facility]  [facility] MEDICINE - Progress Note      Patient Name: Priscila Randhawa  MRN: [id number]  Patient Class: IP- Inpatient   Admission Date: 12/3/2023   Admitting Physician:  Service   Attending Physician: Hoa Zamora MD  Primary Care Provider: Selena Hou MD  Face-to-Face encounter date: 12/06/2023        CHIEF COMPLAINT     Chief Complaint   Patient presents with    Fall     Pt fell today at home. She states her lower back hurts a little bit.  She stated she didn't really want to come. Per ems she was ambulatory on scene with cane. She does have a history of dementia and frequent falls. Per EMS family wanted to have her evaluated.        HISTORY OF PRESENTING ILLNESS     This is a 9-year-old white female with a history of dementia hypertension diabetes who presented to the ER yesterday because she fell.  She lives alone and has her niece at the bedside while she was in the ER however today she is alone.  She does have frequent falls.  Her PCP did recommend her to be placed in assisted living versus nursing home.  Patient was has been reluctant.  She does have a UTI on admission.    Will have admitting her for UTI, PT OT for the weakness and possible nursing home placement if patient and family are amenable.    12/5  C/O aches and pains. We discussed tramadol     12/6  Doing well      PAST MEDICAL HISTORY     Past Medical History:   Diagnosis Date    Background diabetic retinopathy     Carotid artery stenosis     Cataract extraction status     Combined hyperlipidemia     HTN (hypertension)     Polycythemia     Type 2 diabetes mellitus without complications     Unspecified osteoarthritis, unspecified site     Vitamin D deficiency        PAST SURGICAL HISTORY     Past Surgical History:   Procedure Laterality Date    EPIDURAL STEROID INJECTION INTO LUMBAR SPINE      EXPLORATORY LAPAROTOMY      hx total knee repla         FAMILY HISTORY   Reviewed and noncontributory to  this case    SOCIAL HISTORY     Social History     Socioeconomic History    Marital status: Single   Occupational History    Occupation: caregiver   Tobacco Use    Smoking status: Never    Smokeless tobacco: Never     Social Determinants of Health     Financial Resource Strain: Low Risk  (12/4/2023)    Overall Financial Resource Strain (CARDIA)     Difficulty of Paying Living Expenses: Not hard at all   Food Insecurity: No Food Insecurity (12/4/2023)    Hunger Vital Sign     Worried About Running Out of Food in the Last Year: Never true     Ran Out of Food in the Last Year: Never true   Transportation Needs: No Transportation Needs (12/4/2023)    PRAPARE - Transportation     Lack of Transportation (Medical): No     Lack of Transportation (Non-Medical): No   Physical Activity: Inactive (12/4/2023)    Exercise Vital Sign     Days of Exercise per Week: 0 days     Minutes of Exercise per Session: 0 min   Stress: No Stress Concern Present (12/4/2023)    [employer] [employer] - Occupational Stress Questionnaire     Feeling of Stress : Only a little   Social Connections: Moderately Isolated (12/4/2023)    Social Connection and Isolation Panel [NHANES]     Frequency of Communication with Friends and Family: More than three times a week     Frequency of Social Gatherings with Friends and Family: More than three times a week     Attends Judaism Services: 1 to 4 times per year     Active Member of Clubs or Organizations: No     Attends Club or Organization Meetings: Never     Marital Status:    Housing Stability: Low Risk  (12/4/2023)    Housing Stability Vital Sign     Unable to Pay for Housing in the Last Year: No     Number of Places Lived in the Last Year: 1     Unstable Housing in the Last Year: No       HOME MEDICATIONS     Prior to Admission medications    Medication Sig Start Date End Date Taking? Authorizing Provider   losartan (COZAAR) 50 MG tablet Take 1 tablet (50 mg total) by mouth once  daily. 5/8/23 5/7/24 Yes Selena Hou MD   pravastatin (PRAVACHOL) 80 MG tablet Take 1 tablet (80 mg total) by mouth once daily. 5/8/23 5/7/24 Yes Selena Hou MD       ALLERGIES   Codeine          REVIEW OF SYSTEMS   Except as documented above, all other systems reviewed and negative    PHYSICAL EXAM     Vitals:    12/06/23 0921   BP: (!) 120/57   Pulse:    Resp:    Temp:       General:  In no acute distress, resting comfortably  Head and neck:  Atraumatic, normocephalic, moist mucous membranes, supple neck  Chest:  Clear to auscultation bilaterally  Heart:  S1, S2, no appreciable murmur  Abdomen:  Soft, nontender, BS +  MSK:  Warm, no lower extremity edema, no clubbing or cyanosis  Neuro:  weak  Integumentary:  No obvious skin rash  Psychiatry:  Appropriate mood and affect          ASSESSMENT AND PLAN     Active Hospital Problems    Diagnosis  POA    *Acute cystitis without hematuria [N30.00]  Yes      Resolved Hospital Problems   No resolved problems to display.      UTI  -Rocephin 12/3 started    HTN  -cont meds    Left shoulder pain  -xray left shoulder    Weakness and frequent falls  -PT/OT    DVT prophylaxis:  Lovenox  __________________________________________________________________  LABS/MICRO/MEDS/DIAGNOSTICS       LABS  Recent Labs     12/05/23  0331      K 3.7   CHLORIDE 103   CO2 24   BUN 24.9*   CREATININE 0.90   GLUCOSE 95   CALCIUM 8.7   ALKPHOS 60   AST 19   ALT 9   ALBUMIN 3.0*     Recent Labs     12/05/23  0331   WBC 8.80   RBC 4.21   HCT 40.7   MCV 96.7*          MICROBIOLOGY  Microbiology Results (last 7 days)       Procedure Component Value Units Date/Time    Blood Culture [[id number]]  (Normal) Collected: 12/03/23 2229    Order Status: Completed Specimen: Blood Updated: 12/05/23 1301     CULTURE, BLOOD ([facility]) No Growth At 24 Hours    Blood Culture [[id number]]  (Normal) Collected: 12/03/23 2229    Order Status: Completed Specimen: Blood Updated: 12/05/23 1301     CULTURE,  BLOOD ([facility]) No Growth At 24 Hours    Urine culture [[id number]]  (Abnormal) Collected: 12/03/23 2005    Order Status: Completed Specimen: Urine Updated: 12/05/23 0714     Urine Culture >/= 100,000 colonies/ml Gram-negative Rods            MEDICATIONS   cefTRIAXone (ROCEPHIN) IVPB  1 g Intravenous Q24H    enoxparin  40 mg Subcutaneous Daily    losartan  50 mg Oral Daily    pravastatin  80 mg Oral Daily      INFUSIONS      DIAGNOSTIC TESTS  X-Ray Shoulder 2 or More Views Left   Final Result      An acute abnormality is not seen, please see the above comments         Electronically signed by: Adithya Nguyen MD   Date:    12/04/2023   Time:    09:56      CT Lumbar Spine Without Contrast   Final Result      No CT evidence of acute lumbar spine injury.      Findings are compatible with the preliminary report.         Electronically signed by: Dejon Zimmerman MD   Date:    12/04/2023   Time:    07:55      CT Thoracic Spine Without Contrast   Final Result      No CT evidence of acute thoracic spine injury.      Findings are compatible with the preliminary report.         Electronically signed by: Dejon Zimmerman MD   Date:    12/04/2023   Time:    07:56      CT Head Without Contrast   Final Result      No acute intracranial findings.      Findings are compatible with the preliminary report.         Electronically signed by: Dejon Zimmerman MD   Date:    12/04/2023   Time:    07:37      X-Ray Elbow Complete Left   Final Result      No acute abnormalities are visualized.         Electronically signed by: Adithya Nguyen MD   Date:    12/03/2023   Time:    21:34      RADIOLOGY REPORT   Final Result             Patient information was obtained from patient, patient's family, past medical records and ER records.   All diagnosis and differential diagnosis have been reviewed; assessment and plan has been documented. I have personally reviewed the labs and test results that are presently available; I have reviewed the patients medication  list. I have reviewed the consulting providers response and recommendations. I have reviewed or attempted to review medical records based upon their availability.  All of the patient's questions have been addressed and answered. Patient's is agreeable to the above stated plan. I will continue to monitor closely and make adjustments to medical management as needed.  This note was created using [employer] voice recognition software that occasionally misinterpreted phrases or words.  Please contact me if any questions may rise regarding documentation to clarify verbiage.        Hoa Zamora MD   Internal Medicine  Department of Hospital Medicine [facility]

## 2023-12-06 NOTE — PT/OT/SLP PROGRESS
Occupational Therapy  Treatment    Name: Priscila Randhawa    : 1933 (89 y.o.)  MRN: [id number]           TREATMENT SUMMARY AND RECOMMENDATIONS:      OT Date of Treatment: 23  OT Start Time: 1300  OT Stop Time: 1329  OT Total Time (min): 29 min      Subjective Assessment:   No complaints  Lethargic   x Awake, alert, cooperative  Impulsive    Uncooperative   Flat affect    Agitated  c/o pain    Appropriate  c/o fatigue    Confused  Treated at bedside     Emotionally labile x Treated in gym/dept.      Other:        Therapy Precautions:    Cognitive deficits  Spinal precautions    Collar - hard  Sternal precautions    Collar - soft   TLSO   x Fall risk  LSO    Hip precautions - posterior  Knee immobilizer    Hip precautions - anterior  WBAT    Impaired communication  Partial weightbearing    Oxygen  TTWB    PEG tube  NWB    Visual deficits      Hearing deficits   Other:        Treatment Objectives:     Mobility Training:    Mobility task Assist level Comments    Bed mobility     Transfer CGA Stand/pivot t/f with RW to chair   Sit to stands transitions     Functional mobility CGA X150 feet with RW    Sitting balance     Standing balance      Other:          Therapeutic Exercise:   Exercise Sets Reps Comments   Arm bike 2 2 min Level 1; forwards/backwards                         Additional Comments:  Long discussion with pt radha regarding DC plans, safety concerns, and options for DC/home environment. Will discuss with CM.     Assessment: Patient tolerated session well.    GOALS:   Multidisciplinary Problems       Occupational Therapy Goals          Problem: Occupational Therapy    Goal Priority Disciplines Outcome Interventions   Occupational Therapy Goal     OT, PT/OT Ongoing, Progressing    Description: Goals to be met by: 23     Patient will increase functional independence with ADLs by performing:    LE Dressing with Stand-by Assistance.  Grooming while standing at sink with Stand-by  Assistance.  Toileting from toilet with Stand-by Assistance for hygiene and clothing management.   Toilet transfer to toilet with Stand-by Assistance.                         Recommendations:     Discharge Recommendations: home with HH or placement   Discharge Equipment Recommendations:  none  Barriers to discharge:  Decreased caregiver support     Plan:     Patient to be seen 6 x/week (QD/BID as appropriate) to address the above listed problems via self-care/home management, therapeutic activities, therapeutic exercises  Plan of Care Expires: 12/11/23  Plan of Care Reviewed with: patient  Revisions made to plan of care: No      Skilled OT Minutes Provided: 29  Communication with Treatment Team:     Equipment recommendations:       At end of treatment, patient remained:   Up in chair     Up in wheelchair in room    In bed    With alarm activated    Bed rails up    Call bell in reach     Family/friends present    Restraints secured properly    In bathroom with CNA/RN notified   x In gym with PT/PTA/tech    Nurse aware    Other:

## 2023-12-07 ENCOUNTER — HOSPITAL ENCOUNTER (INPATIENT)
Facility: HOSPITAL | Age: 88
LOS: 14 days | Discharge: OTHER INSTITUTION | DRG: 690 | End: 2023-12-21
Attending: STUDENT IN AN ORGANIZED HEALTH CARE EDUCATION/TRAINING PROGRAM | Admitting: STUDENT IN AN ORGANIZED HEALTH CARE EDUCATION/TRAINING PROGRAM
Payer: MEDICARE

## 2023-12-07 VITALS
TEMPERATURE: 99 F | OXYGEN SATURATION: 97 % | BODY MASS INDEX: 24.04 KG/M2 | HEIGHT: 59 IN | RESPIRATION RATE: 16 BRPM | DIASTOLIC BLOOD PRESSURE: 64 MMHG | SYSTOLIC BLOOD PRESSURE: 132 MMHG | WEIGHT: 119.25 LBS | HEART RATE: 69 BPM

## 2023-12-07 DIAGNOSIS — R53.81 DEBILITY: ICD-10-CM

## 2023-12-07 PROCEDURE — 97110 THERAPEUTIC EXERCISES: CPT

## 2023-12-07 PROCEDURE — 63600175 PHARM REV CODE 636 W HCPCS: Performed by: INTERNAL MEDICINE

## 2023-12-07 PROCEDURE — 63600175 PHARM REV CODE 636 W HCPCS: Performed by: STUDENT IN AN ORGANIZED HEALTH CARE EDUCATION/TRAINING PROGRAM

## 2023-12-07 PROCEDURE — 97535 SELF CARE MNGMENT TRAINING: CPT

## 2023-12-07 PROCEDURE — 25000003 PHARM REV CODE 250: Performed by: STUDENT IN AN ORGANIZED HEALTH CARE EDUCATION/TRAINING PROGRAM

## 2023-12-07 PROCEDURE — 97530 THERAPEUTIC ACTIVITIES: CPT

## 2023-12-07 PROCEDURE — 25000003 PHARM REV CODE 250: Performed by: INTERNAL MEDICINE

## 2023-12-07 PROCEDURE — 97116 GAIT TRAINING THERAPY: CPT

## 2023-12-07 PROCEDURE — 11000004 HC SNF PRIVATE

## 2023-12-07 RX ORDER — PRAVASTATIN SODIUM 40 MG/1
80 TABLET ORAL DAILY
Status: CANCELLED | OUTPATIENT
Start: 2023-12-08

## 2023-12-07 RX ORDER — TRAMADOL HYDROCHLORIDE 50 MG/1
50 TABLET ORAL EVERY 6 HOURS PRN
Status: DISCONTINUED | OUTPATIENT
Start: 2023-12-07 | End: 2023-12-21 | Stop reason: HOSPADM

## 2023-12-07 RX ORDER — TRAMADOL HYDROCHLORIDE 50 MG/1
50 TABLET ORAL EVERY 6 HOURS PRN
Status: CANCELLED | OUTPATIENT
Start: 2023-12-07

## 2023-12-07 RX ORDER — HYDRALAZINE HYDROCHLORIDE 20 MG/ML
10 INJECTION INTRAMUSCULAR; INTRAVENOUS EVERY 6 HOURS PRN
Status: DISCONTINUED | OUTPATIENT
Start: 2023-12-07 | End: 2023-12-21 | Stop reason: HOSPADM

## 2023-12-07 RX ORDER — SODIUM CHLORIDE 0.9 % (FLUSH) 0.9 %
10 SYRINGE (ML) INJECTION
Status: DISCONTINUED | OUTPATIENT
Start: 2023-12-07 | End: 2023-12-21 | Stop reason: HOSPADM

## 2023-12-07 RX ORDER — ACETAMINOPHEN 500 MG
1000 TABLET ORAL EVERY 8 HOURS PRN
Status: CANCELLED | OUTPATIENT
Start: 2023-12-07

## 2023-12-07 RX ORDER — SODIUM CHLORIDE 9 MG/ML
INJECTION, SOLUTION INTRAVENOUS
Status: DISCONTINUED | OUTPATIENT
Start: 2023-12-07 | End: 2023-12-21 | Stop reason: HOSPADM

## 2023-12-07 RX ORDER — SODIUM CHLORIDE 9 MG/ML
INJECTION, SOLUTION INTRAVENOUS
Status: CANCELLED | OUTPATIENT
Start: 2023-12-07

## 2023-12-07 RX ORDER — LOSARTAN POTASSIUM 50 MG/1
50 TABLET ORAL DAILY
Status: DISCONTINUED | OUTPATIENT
Start: 2023-12-08 | End: 2023-12-21 | Stop reason: HOSPADM

## 2023-12-07 RX ORDER — LOSARTAN POTASSIUM 50 MG/1
50 TABLET ORAL DAILY
Status: CANCELLED | OUTPATIENT
Start: 2023-12-08

## 2023-12-07 RX ORDER — PRAVASTATIN SODIUM 40 MG/1
80 TABLET ORAL DAILY
Status: DISCONTINUED | OUTPATIENT
Start: 2023-12-08 | End: 2023-12-21 | Stop reason: HOSPADM

## 2023-12-07 RX ORDER — ACETAMINOPHEN 500 MG
1000 TABLET ORAL EVERY 8 HOURS PRN
Status: DISCONTINUED | OUTPATIENT
Start: 2023-12-07 | End: 2023-12-21 | Stop reason: HOSPADM

## 2023-12-07 RX ORDER — ENOXAPARIN SODIUM 100 MG/ML
40 INJECTION SUBCUTANEOUS EVERY 24 HOURS
Status: DISCONTINUED | OUTPATIENT
Start: 2023-12-08 | End: 2023-12-21 | Stop reason: HOSPADM

## 2023-12-07 RX ORDER — ENOXAPARIN SODIUM 100 MG/ML
40 INJECTION SUBCUTANEOUS EVERY 24 HOURS
Status: CANCELLED | OUTPATIENT
Start: 2023-12-08

## 2023-12-07 RX ORDER — SODIUM CHLORIDE 0.9 % (FLUSH) 0.9 %
10 SYRINGE (ML) INJECTION
Status: CANCELLED | OUTPATIENT
Start: 2023-12-07

## 2023-12-07 RX ORDER — HYDRALAZINE HYDROCHLORIDE 20 MG/ML
10 INJECTION INTRAMUSCULAR; INTRAVENOUS EVERY 6 HOURS PRN
Status: CANCELLED | OUTPATIENT
Start: 2023-12-07

## 2023-12-07 RX ADMIN — SODIUM CHLORIDE: 9 INJECTION, SOLUTION INTRAVENOUS at 09:12

## 2023-12-07 RX ADMIN — PRAVASTATIN SODIUM 80 MG: 40 TABLET ORAL at 08:12

## 2023-12-07 RX ADMIN — CEFTRIAXONE 1 G: 1 INJECTION, POWDER, FOR SOLUTION INTRAMUSCULAR; INTRAVENOUS at 09:12

## 2023-12-07 RX ADMIN — LOSARTAN POTASSIUM 50 MG: 50 TABLET, FILM COATED ORAL at 08:12

## 2023-12-07 RX ADMIN — ENOXAPARIN SODIUM 40 MG: 40 INJECTION SUBCUTANEOUS at 08:12

## 2023-12-07 NOTE — DISCHARGE SUMMARY
[facility]  HOSPITAL MEDICINE - DISCHARGE SUMMARY    Patient Name: Priscila Randhawa  MRN: [id number]  Admission Date: 12/3/2023  Discharge Date: 12/07/2023  Hospital Length of Stay: 3 days  Discharge Provider: Thairy G Reyes, MD  Primary Care Provider: Selena Hou MD    HOSPITAL COURSE   89 year-old white female with a history of dementia, hypertension, diabetes who presented to the ER after a fall at home. She lives alone and has her niece at the bedside while she was in the ER however today she is alone. She does have frequent falls. Her PCP did recommend her to be placed in assisted living versus nursing home.  Patient was has been reluctant. Admitted for management of UTI, PT OT for the weakness and possible nursing home placement if patient and family are amenable. Admitted to swing bed today for continued PT  PHYSICAL EXAM     Most Recent Vital Signs:  Temp: 98.4 °F (36.9 °C) (12/07/23 1502)  Pulse: 74 (12/07/23 1502)  Resp: 16 (12/07/23 0800)  BP: 121/60 (12/07/23 1502)  SpO2: (!) 93 % (12/07/23 1502)   General:  In no acute distress, resting comfortably  Head and neck:  Atraumatic, normocephalic, moist mucous membranes, supple neck  Chest:  Clear to auscultation bilaterally  Heart:  S1, S2, no appreciable murmur  Abdomen:  Soft, nontender, BS +  MSK:  Warm, no lower extremity edema, no clubbing or cyanosis  Neuro:  weak  Integumentary:  No obvious skin rash  Psychiatry:  Appropriate mood and affect    DISCHARGE DIAGNOSIS   E. Coli UTI  -Rocephin 12/4-12/8   -Cultures show sensitivity      Essential HTN  -losartan, well controlled      Left shoulder pain  -xray left shoulder showed no acute abnormality      Weakness and frequent falls  -PT/OT  -possible placement      DVT prophylaxis:  Lovenox  _____________________________________________________________________________      DISCHARGE MED REC     Current Discharge Medication List        CONTINUE these medications which  have NOT CHANGED    Details   losartan (COZAAR) 50 MG tablet Take 1 tablet (50 mg total) by mouth once daily.  Qty: 90 tablet, Refills: 3    Comments: .Medication change, also D/C metformin      pravastatin (PRAVACHOL) 80 MG tablet Take 1 tablet (80 mg total) by mouth once daily.  Qty: 90 tablet, Refills: 3                CONSULTS     Consults (From admission, onward)          Status Ordering Provider     Inpatient consult to Registered Dietitian/Nutritionist  Once        Provider:  (Not yet assigned)    Completed JUANA SAUER     Inpatient consult to Social Work/Case Management  Once        Provider:  (Not yet assigned)    Acknowledged JUANA SAUER          FOLLOW UP     DISCHARGE INSTRUCTIONS     Explained in detail to the patient about the discharge plan, medications, and follow-up visits. Pt understands and agrees with the treatment plan.  Discharged Condition: stable  Diet as tolerated  Activities as tolerated  Discharge to: Skilled Nursing Facility    TIME SPENT ON DISCHARGE   35 minutes        Thairy G Reyes, MD  Internal Medicine  Department of Hospital Medicine [facility]      This document was created using electronic dictation services.  Please excuse any errors that may have been made.  Contact me if any questions regarding documentation to clarify verbiage.

## 2023-12-07 NOTE — PT/OT/SLP PROGRESS
Physical Therapy Treatment Note           Name: Priscila Randhawa    : 1933 (89 y.o.)  MRN: [id number]           TREATMENT SUMMARY AND RECOMMENDATIONS:    PT Received On: 23  PT Start Time: 935     PT Stop Time: 1000  PT Total Time (min): 25 min     Subjective Assessment:   No complaints  Lethargic   x Awake, alert, cooperative  Uncooperative    Agitated  c/o pain    Appropriate  c/o fatigue    Confused x Treated at bedside     Emotionally labile  Treated in gym/dept.    Impulsive  Other:    Flat affect       Therapy Precautions:    Cognitive deficits  Spinal precautions    Collar - hard  Sternal precautions    Collar - soft   TLSO   x Fall risk  LSO    Hip precautions - posterior  Knee immobilizer    Hip precautions - anterior  WBAT    Impaired communication  Partial weightbearing    Oxygen  TTWB    PEG tube  NWB    Visual deficits  Other:    Hearing deficits          Treatment Objectives:     Mobility Training:   Assist level Comments    Bed mobility     Transfer SBA Stand pivot using RW, WC<>Chair   Gait CGA./SBA X 100 feet using RW with WC following   Sit to stand transitions CGA/SBA From recliner   Sitting balance     Standing balance      Wheelchair mobility     Car transfer     Other:          Therapeutic Exercise:   Exercise Sets Reps Comments   STM to L knee with inclusion of PROM  10' Most pain medially, reduced pain following completion allowing for ambulation.                          Additional Comments:  Pt did not want to go to the gym today. Tx completed in room. PT to progress as able.     Assessment: Patient tolerated session well.     PT Plan: continue per POC  Revisions made to plan of care: No    GOALS:   Multidisciplinary Problems       Physical Therapy Goals          Problem: Physical Therapy    Goal Priority Disciplines Outcome Goal Variances Interventions   Physical Therapy Goal     PT, PT/OT Ongoing, Progressing     Description: Goals to be met by: Discharge      Patient will increase functional independence with mobility by performin. Sit to stand transfer with Modified [place]  2. Bed to chair transfer with Modified [place] using Single-point Cane   3. Gait  x 50 feet with Modified [place] using Single-point Cane .                          Skilled PT Minutes Provided: 25   Communication with Treatment Team:     Equipment recommendations:       At end of treatment, patient remained:  x Up in chair     Up in wheelchair in room    In bed   x With alarm activated    Bed rails up   x Call bell in reach    x Family/friends present    Restraints secured properly    In bathroom with CNA/RN notified   x Nurse aware    In gym with therapist/tech    Other:

## 2023-12-07 NOTE — PROGRESS NOTES
[facility]  \[facility] [place] [facility]\"" MEDICINE - Progress Note    Patient Name: Priscila Randhawa  MRN: [id number]  Patient Class: IP- Inpatient   Admission Date: 12/3/2023   Admitting Physician: VINCENT Service   Attending Physician: Thairy G Reyes, DO  Primary Care Provider: Selena Huo MD  Face-to-Face encounter date: 12/07/2023      CHIEF COMPLAINT     Chief Complaint   Patient presents with    Fall     Pt fell today at home. She states her lower back hurts a little bit.  She stated she didn't really want to come. Per ems she was ambulatory on scene with cane. She does have a history of dementia and frequent falls. Per EMS family wanted to have her evaluated.        HISTORY OF PRESENTING ILLNESS   89 year-old white female with a history of dementia, hypertension, diabetes who presented to the ER after a fall at home. She lives alone and has her niece at the bedside while she was in the ER however today she is alone. She does have frequent falls. Her PCP did recommend her to be placed in assisted living versus nursing home.  Patient was has been reluctant. Admitted for management of UTI, PT OT for the weakness and possible nursing home placement if patient and family are amenable.    Today  No complaints.  PAST MEDICAL HISTORY     Past Medical History:   Diagnosis Date    Background diabetic retinopathy     Carotid artery stenosis     Cataract extraction status     Combined hyperlipidemia     HTN (hypertension)     Polycythemia     Type 2 diabetes mellitus without complications     Unspecified osteoarthritis, unspecified site     Vitamin D deficiency        PAST SURGICAL HISTORY     Past Surgical History:   Procedure Laterality Date    EPIDURAL STEROID INJECTION INTO LUMBAR SPINE      EXPLORATORY LAPAROTOMY      hx total knee repla         FAMILY HISTORY   Reviewed and noncontributory to this case    SOCIAL HISTORY     Social History     Socioeconomic History    Marital status: Single   Occupational  History    Occupation: caregiver   Tobacco Use    Smoking status: Never    Smokeless tobacco: Never     Social Determinants of Health     Financial Resource Strain: Low Risk  (12/4/2023)    Overall Financial Resource Strain (CARDIA)     Difficulty of Paying Living Expenses: Not hard at all   Food Insecurity: No Food Insecurity (12/4/2023)    Hunger Vital Sign     Worried About Running Out of Food in the Last Year: Never true     Ran Out of Food in the Last Year: Never true   Transportation Needs: No Transportation Needs (12/4/2023)    PRAPARE - Transportation     Lack of Transportation (Medical): No     Lack of Transportation (Non-Medical): No   Physical Activity: Inactive (12/4/2023)    Exercise Vital Sign     Days of Exercise per Week: 0 days     Minutes of Exercise per Session: 0 min   Stress: No Stress Concern Present (12/4/2023)    [employer] - Occupational Stress Questionnaire     Feeling of Stress : Only a little   Social Connections: Moderately Isolated (12/4/2023)    Social Connection and Isolation Panel [NHANES]     Frequency of Communication with Friends and Family: More than three times a week     Frequency of Social Gatherings with Friends and Family: More than three times a week     Attends [employer] Services: 1 to 4 times per year     Active Member of Clubs or Organizations: No     Attends Club or Organization Meetings: Never     Marital Status:    Housing Stability: Low Risk  (12/4/2023)    Housing Stability Vital Sign     Unable to Pay for Housing in the Last Year: No     Number of Places Lived in the Last Year: 1     Unstable Housing in the Last Year: No       HOME MEDICATIONS     Prior to Admission medications    Medication Sig Start Date End Date Taking? Authorizing Provider   losartan (COZAAR) 50 MG tablet Take 1 tablet (50 mg total) by mouth once daily. 5/8/23 5/7/24 Yes Selena Hou MD   pravastatin (PRAVACHOL) 80 MG tablet Take 1 tablet (80 mg total)  by mouth once daily. 5/8/23 5/7/24 Yes Selena Hou MD       ALLERGIES   Codeine    REVIEW OF SYSTEMS   Except as documented above, all other systems reviewed and negative    PHYSICAL EXAM     Vitals:    12/07/23 1502   BP: 121/60   Pulse: 74   Resp:    Temp: 98.4 °F (36.9 °C)      General:  In no acute distress, resting comfortably  Head and neck:  Atraumatic, normocephalic, moist mucous membranes, supple neck  Chest:  Clear to auscultation bilaterally  Heart:  S1, S2, no appreciable murmur  Abdomen:  Soft, nontender, BS +  MSK:  Warm, no lower extremity edema, no clubbing or cyanosis  Neuro:  weak  Integumentary:  No obvious skin rash  Psychiatry:  Appropriate mood and affect    ASSESSMENT AND PLAN   E. Coli UTI  -Rocephin 12/4-12/8   -Cultures show sensitivity     Essential HTN  -losartan, well controlled     Left shoulder pain  -xray left shoulder showed no acute abnormality     Weakness and frequent falls  -PT/OT  -possible placement     DVT prophylaxis:  Lovenox  __________________________________________________________________  LABS/MICRO/MEDS/DIAGNOSTICS       LABS  Recent Labs     12/05/23  0331      K 3.7   CHLORIDE 103   CO2 24   BUN 24.9*   CREATININE 0.90   GLUCOSE 95   CALCIUM 8.7   ALKPHOS 60   AST 19   ALT 9   ALBUMIN 3.0*     Recent Labs     12/05/23  0331   WBC 8.80   RBC 4.21   HCT 40.7   MCV 96.7*          MICROBIOLOGY  Microbiology Results (last 7 days)       Procedure Component Value Units Date/Time    Blood Culture [[id number]]  (Normal) Collected: 12/03/23 2229    Order Status: Completed Specimen: Blood Updated: 12/07/23 1300     CULTURE, BLOOD ([facility]) No Growth At 72 Hours    Blood Culture [[id number]]  (Normal) Collected: 12/03/23 2229    Order Status: Completed Specimen: Blood Updated: 12/07/23 1300     CULTURE, BLOOD ([facility]) No Growth At 72 Hours    Urine culture [[id number]]  (Abnormal)  (Susceptibility) Collected: 12/03/23 2005    Order Status: Completed Specimen: Urine  Updated: 12/06/23 1112     Urine Culture >/= 100,000 colonies/ml Escherichia coli            MEDICATIONS   cefTRIAXone (ROCEPHIN) IVPB  1 g Intravenous Q24H    enoxparin  40 mg Subcutaneous Daily    losartan  50 mg Oral Daily    pravastatin  80 mg Oral Daily      INFUSIONS      DIAGNOSTIC TESTS  X-Ray Shoulder 2 or More Views Left   Final Result      An acute abnormality is not seen, please see the above comments         Electronically signed by: Adithya Nguyen MD   Date:    12/04/2023   Time:    09:56      CT Lumbar Spine Without Contrast   Final Result      No CT evidence of acute lumbar spine injury.      Findings are compatible with the preliminary report.         Electronically signed by: Dejon Zimmerman MD   Date:    12/04/2023   Time:    07:55      CT Thoracic Spine Without Contrast   Final Result      No CT evidence of acute thoracic spine injury.      Findings are compatible with the preliminary report.         Electronically signed by: Dejon Zimmerman MD   Date:    12/04/2023   Time:    07:56      CT Head Without Contrast   Final Result      No acute intracranial findings.      Findings are compatible with the preliminary report.         Electronically signed by: Dejon Zimmerman MD   Date:    12/04/2023   Time:    07:37      X-Ray Elbow Complete Left   Final Result      No acute abnormalities are visualized.         Electronically signed by: Adithya Nguyen MD   Date:    12/03/2023   Time:    21:34      RADIOLOGY REPORT   Final Result      RADIOLOGY REPORT   Final Result             Patient information was obtained from patient, patient's family, past medical records and ER records.   All diagnosis and differential diagnosis have been reviewed; assessment and plan has been documented. I have personally reviewed the labs and test results that are presently available; I have reviewed the patients medication list. I have reviewed the consulting providers response and recommendations. I have reviewed or attempted to  review medical records based upon their availability.  All of the patient's questions have been addressed and answered. Patient's is agreeable to the above stated plan. I will continue to monitor closely and make adjustments to medical management as needed.  This note was created using [employer] voice recognition software that occasionally misinterpreted phrases or words.  Please contact me if any questions may rise regarding documentation to clarify verbiage.        Thairy G Reyes, DO   Internal Medicine  Department of Hospital Medicine [facility]

## 2023-12-07 NOTE — H&P
[facility]  [facility] MEDICINE - H&P ADMISSION NOTE      Patient Name: Priscila Randhawa  MRN: [id number]  Patient Class: IP- Swing   Admission Date: 12/7/2023   Admitting Physician: VINCENT Service   Attending Physician: Thairy G Reyes, DO  Primary Care Provider: Selena Hou MD  Face-to-Face encounter date: 12/07/2023      CHIEF COMPLAINT   Freq falls    HISTORY OF PRESENTING ILLNESS   89 year-old white female with a history of dementia, hypertension, diabetes who presented to the ER after a fall at home. She lives alone and has her niece at the bedside while she was in the ER however today she is alone. She does have frequent falls. Her PCP did recommend her to be placed in assisted living versus nursing home.  Patient was has been reluctant. Admitted for management of UTI, PT OT for the weakness and possible nursing home placement if patient and family are amenable. Admitted to swing bed today for continued PT     PAST MEDICAL HISTORY     Past Medical History:   Diagnosis Date    Background diabetic retinopathy     Carotid artery stenosis     Cataract extraction status     Combined hyperlipidemia     HTN (hypertension)     Polycythemia     Type 2 diabetes mellitus without complications     Unspecified osteoarthritis, unspecified site     Vitamin D deficiency        PAST SURGICAL HISTORY     Past Surgical History:   Procedure Laterality Date    EPIDURAL STEROID INJECTION INTO LUMBAR SPINE      EXPLORATORY LAPAROTOMY      hx total knee repla         FAMILY HISTORY   Reviewed and noncontributory to this case    SOCIAL HISTORY     Social History     Socioeconomic History    Marital status: Single   Occupational History    Occupation: caregiver   Tobacco Use    Smoking status: Never    Smokeless tobacco: Never     Social Determinants of Health     Financial Resource Strain: Low Risk  (12/4/2023)    Overall Financial Resource Strain (CARDIA)     Difficulty of Paying Living Expenses: Not  hard at all   Food Insecurity: No Food Insecurity (12/4/2023)    Hunger Vital Sign     Worried About Running Out of Food in the Last Year: Never true     Ran Out of Food in the Last Year: Never true   Transportation Needs: No Transportation Needs (12/4/2023)    PRAPARE - Transportation     Lack of Transportation (Medical): No     Lack of Transportation (Non-Medical): No   Physical Activity: Inactive (12/4/2023)    Exercise Vital Sign     Days of Exercise per Week: 0 days     Minutes of Exercise per Session: 0 min   Stress: No Stress Concern Present (12/4/2023)    [employer] - Occupational Stress Questionnaire     Feeling of Stress : Only a little   Social Connections: Moderately Isolated (12/4/2023)    Social Connection and Isolation Panel [NHANES]     Frequency of Communication with Friends and Family: More than three times a week     Frequency of Social Gatherings with Friends and Family: More than three times a week     Attends [employer] Services: 1 to 4 times per year     Active Member of Clubs or Organizations: No     Attends Club or Organization Meetings: Never     Marital Status:    Housing Stability: Low Risk  (12/4/2023)    Housing Stability Vital Sign     Unable to Pay for Housing in the Last Year: No     Number of Places Lived in the Last Year: 1     Unstable Housing in the Last Year: No       HOME MEDICATIONS     Prior to Admission medications    Medication Sig Start Date End Date Taking? Authorizing Provider   losartan (COZAAR) 50 MG tablet Take 1 tablet (50 mg total) by mouth once daily. 5/8/23 5/7/24  Selena Hou MD   pravastatin (PRAVACHOL) 80 MG tablet Take 1 tablet (80 mg total) by mouth once daily. 5/8/23 5/7/24  Selena Hou MD       ALLERGIES   Codeine  REVIEW OF SYSTEMS   Except as documented above, all other systems reviewed and negative    PHYSICAL EXAM     Vitals:    12/07/23 1540   BP: 121/60   Pulse: 74   Resp: 18   Temp: 98.4 °F (36.9 °C)       General:  In no acute distress, resting comfortably  Head and neck:  Atraumatic, normocephalic, moist mucous membranes, supple neck  Chest:  Clear to auscultation bilaterally  Heart:  S1, S2, no appreciable murmur  Abdomen:  Soft, nontender, BS +  MSK:  Warm, no lower extremity edema, no clubbing or cyanosis  Neuro:  Alert and oriented x4, moving all extremities with good strength  Integumentary:  No obvious skin rash  Psychiatry:  Appropriate mood and affect    ASSESSMENT AND PLAN   E. Coli UTI  -Rocephin 12/4-12/8   -Cultures show sensitivity      Essential HTN  -losartan, well controlled      Left shoulder pain  -xray left shoulder showed no acute abnormality      Weakness and frequent falls  -PT/OT  -possible placement      DVT prophylaxis:  Lovenox  __________________________________________________________________  LABS/MICRO/MEDS/DIAGNOSTICS       LABS  Recent Labs     12/05/23  0331      K 3.7   CHLORIDE 103   CO2 24   BUN 24.9*   CREATININE 0.90   GLUCOSE 95   CALCIUM 8.7   ALKPHOS 60   AST 19   ALT 9   ALBUMIN 3.0*     Recent Labs     12/05/23  0331   WBC 8.80   RBC 4.21   HCT 40.7   MCV 96.7*          MICROBIOLOGY  Microbiology Results (last 7 days)       ** No results found for the last 168 hours. **            MEDICATIONS   cefTRIAXone (ROCEPHIN) IVPB  1 g Intravenous Q24H    [START ON 12/8/2023] enoxparin  40 mg Subcutaneous Daily    [START ON 12/8/2023] losartan  50 mg Oral Daily    [START ON 12/8/2023] pravastatin  80 mg Oral Daily      INFUSIONS      DIAGNOSTIC TESTS  No orders to display          Patient information was obtained from patient, patient's family, past medical records and ER records.   All diagnosis and differential diagnosis have been reviewed; assessment and plan has been documented. I have personally reviewed the labs and test results that are presently available; I have reviewed the patients medication list. I have reviewed the consulting providers response and  recommendations. I have reviewed or attempted to review medical records based upon their availability.  All of the patient's questions have been addressed and answered. Patient's is agreeable to the above stated plan. I will continue to monitor closely and make adjustments to medical management as needed.  This note was created using [employer] voice recognition software that occasionally misinterpreted phrases or words.  Please contact me if any questions may rise regarding documentation to clarify verbiage.        Thairy G Reyes, DO   Internal Medicine  Department of Hospital Medicine [facility]

## 2023-12-07 NOTE — PLAN OF CARE
Problem: Adult Inpatient Plan of Care  Goal: Plan of Care Review  Outcome: Ongoing, Progressing  Flowsheets (Taken 12/7/2023 0502)  Plan of Care Reviewed With: patient  Goal: Patient-Specific Goal (Individualized)  Outcome: Ongoing, Progressing  Flowsheets (Taken 12/7/2023 0502)  Anxieties, Fears or Concerns: none at this time  Individualized Care Needs: Monitor for safety, IV Abx, Pain management  Goal: Absence of Hospital-Acquired Illness or Injury  Outcome: Ongoing, Progressing  Goal: Optimal Comfort and Wellbeing  Outcome: Ongoing, Progressing  Intervention: Monitor Pain and Promote Comfort  Flowsheets (Taken 12/6/2023 2000)  Pain Management Interventions:   care clustered   diversional activity provided   pain management plan reviewed with patient/caregiver   pillow support provided   position adjusted   quiet environment facilitated   relaxation techniques promoted     Problem: Fall Injury Risk  Goal: Absence of Fall and Fall-Related Injury  Outcome: Ongoing, Progressing

## 2023-12-07 NOTE — PROGRESS NOTES
Name: Priscila Randhawa    : 1933 (89 y.o.)  MRN: [id number]           Patient Unavailable      Patient unable to be seen at this time secondary to: Attempted session twice; pt not ready to get OOB at 9am and when checked again at 11:15 she reported fatigue and was comfortable in the chair. Will attempt PM session as able.

## 2023-12-07 NOTE — PLAN OF CARE
Problem: Adult Inpatient Plan of Care  Goal: Plan of Care Review  Outcome: Ongoing, Progressing  Flowsheets (Taken 12/7/2023 1051)  Plan of Care Reviewed With: patient  Goal: Patient-Specific Goal (Individualized)  Outcome: Ongoing, Progressing  Flowsheets (Taken 12/7/2023 1051)  Anxieties, Fears or Concerns: none  Individualized Care Needs: Safety, Pain management. DTV precaution  Goal: Absence of Hospital-Acquired Illness or Injury  Outcome: Ongoing, Progressing  Intervention: Identify and Manage Fall Risk  Flowsheets (Taken 12/7/2023 1051)  Safety Promotion/Fall Prevention:   assistive device/personal item within reach   family to remain at bedside   lighting adjusted   medications reviewed   gait belt with ambulation  Intervention: Prevent Skin Injury  Flowsheets (Taken 12/7/2023 1051)  Body Position:   other (see comments)   heels elevated  Skin Protection: adhesive use limited  Intervention: Prevent and Manage VTE (Venous Thromboembolism) Risk  Flowsheets (Taken 12/7/2023 1051)  Activity Management:   Ambulated to bathroom - L4   Ambulated in room - L4   Up in stretcher chair - L1  VTE Prevention/Management:   ambulation promoted   fluids promoted  Range of Motion: active ROM (range of motion) encouraged  Intervention: Prevent Infection  Flowsheets (Taken 12/7/2023 1051)  Infection Prevention:   cohorting utilized   single patient room provided   rest/sleep promoted  Goal: Optimal Comfort and Wellbeing  Outcome: Ongoing, Progressing  Intervention: Monitor Pain and Promote Comfort  Flowsheets (Taken 12/7/2023 1051)  Pain Management Interventions:   care clustered   diversional activity provided   quiet environment facilitated   position adjusted  Intervention: Provide Person-Centered Care  Flowsheets (Taken 12/7/2023 1051)  Trust Relationship/Rapport:   care explained   choices provided   emotional support provided   empathic listening provided   reassurance provided   questions answered  Goal: Readiness for  Transition of Care  Outcome: Ongoing, Progressing  Intervention: Mutually Develop Transition Plan  Flowsheets (Taken 12/7/2023 1051)  Equipment Currently Used at Home:   bedside commode   walker, standard  Transportation Anticipated: family or friend will provide  Who are your caregiver(s) and their phone number(s)?: Michel Li   Communicated HALI with patient/caregiver: Date not available/Unable to determine  Do you expect to return to your current living situation?: Yes  Do you have help at home or someone to help you manage your care at home?: Yes  Readmission within 30 days?: No  Do you currently have service(s) that help you manage your care at home?: No  Is the pt/caregiver preference to resume services with current agency: No     Problem: Fall Injury Risk  Goal: Absence of Fall and Fall-Related Injury  Outcome: Ongoing, Progressing  Intervention: Identify and Manage Contributors  Flowsheets (Taken 12/7/2023 1051)  Self-Care Promotion:   independence encouraged   safe use of adaptive equipment encouraged   BADL personal routines maintained  Medication Review/Management: medications reviewed  Intervention: Promote Injury-Free Environment  Flowsheets (Taken 12/7/2023 1051)  Safety Promotion/Fall Prevention:   assistive device/personal item within reach   family to remain at bedside   lighting adjusted   medications reviewed   gait belt with ambulation     Problem: UTI (Urinary Tract Infection)  Goal: Improved Infection Symptoms  Outcome: Ongoing, Progressing  Intervention: Prevent Infection Progression  Flowsheets (Taken 12/7/2023 1051)  Fever Reduction/Comfort Measures:   lightweight bedding   lightweight clothing  Infection Management: aseptic technique maintained  Intervention: Facilitate Optimal Urinary Elimination  Flowsheets (Taken 12/7/2023 1051)  Urinary Elimination Promotion:   frequent voiding encouraged   toileting offered     Problem: Mobility Impairment  Goal: Optimal Mobility  Outcome:  Ongoing, Progressing  Intervention: Optimize Mobility  Flowsheets (Taken 12/7/2023 1051)  Assistive Device Utilized:   gait belt   walker  Activity Management:   Ambulated to bathroom - L4   Ambulated in room - L4   Up in stretcher chair - L1  Positioning/Transfer Devices:   pillows   applied     Problem: Infection  Goal: Absence of Infection Signs and Symptoms  Outcome: Ongoing, Progressing  Intervention: Prevent or Manage Infection  Flowsheets (Taken 12/7/2023 1051)  Fever Reduction/Comfort Measures:   lightweight bedding   lightweight clothing  Infection Management: aseptic technique maintained  Isolation Precautions: precautions maintained

## 2023-12-07 NOTE — PT/OT/SLP PROGRESS
Physical Therapy Treatment Note           Name: Priscila Randhawa    : 1933 (89 y.o.)  MRN: [id number]           TREATMENT SUMMARY AND RECOMMENDATIONS:    PT Received On: 23  PT Start Time: 1300     PT Stop Time: 1330  PT Total Time (min): 30 min     Subjective Assessment:   No complaints  Lethargic   x Awake, alert, cooperative  Uncooperative    Agitated  c/o pain    Appropriate  c/o fatigue    Confused x Treated at bedside     Emotionally labile  Treated in gym/dept.    Impulsive  Other:    Flat affect       Therapy Precautions:    Cognitive deficits  Spinal precautions    Collar - hard  Sternal precautions    Collar - soft   TLSO   x Fall risk  LSO    Hip precautions - posterior  Knee immobilizer    Hip precautions - anterior  WBAT    Impaired communication  Partial weightbearing    Oxygen  TTWB    PEG tube  NWB    Visual deficits  Other:    Hearing deficits          Treatment Objectives:     Mobility Training:   Assist level Comments    Bed mobility     Transfer SBA Stand pivot using RW, WC<>Chair   Gait CGA./SBA X 100 feet using RW with WC following   Sit to stand transitions CGA/SBA From recliner   Sitting balance SBA Unsupported sitting challenges for improved wt shifting.    Standing balance  CGA One UE support on RW with functional reaching.    Wheelchair mobility     Car transfer     Other:          Therapeutic Exercise:   Exercise Sets Reps Comments                               Additional Comments:  Pt more confused today, family reporting she was hallucinating in her room. Nursing made aware and PT brought pt outside.      Assessment: Patient tolerated session well.     PT Plan: continue per POC  Revisions made to plan of care: No    GOALS:   Multidisciplinary Problems       Physical Therapy Goals          Problem: Physical Therapy    Goal Priority Disciplines Outcome Goal Variances Interventions   Physical Therapy Goal     PT, PT/OT Ongoing, Progressing     Description: Goals to  be met by: Discharge     Patient will increase functional independence with mobility by performin. Sit to stand transfer with Modified [place]  2. Bed to chair transfer with Modified [place] using Single-point Cane   3. Gait  x 50 feet with Modified [place] using Single-point Cane .                          Skilled PT Minutes Provided: 25   Communication with Treatment Team:     Equipment recommendations:       At end of treatment, patient remained:   Up in chair     Up in wheelchair in room    In bed    With alarm activated    Bed rails up    Call bell in reach     Family/friends present    Restraints secured properly   x In bathroom with CNA/RN notified    Nurse aware   x In gym with therapist/tech    Other:

## 2023-12-07 NOTE — NURSING
Nurses Note -- 4 Eyes      12/7/2023   2:50 AM      Skin assessed during: Daily Assessment      [x] No Altered Skin Integrity Present    [x]Prevention Measures Documented      [] Yes- Altered Skin Integrity Present or Discovered   [] LDA Added if Not in Epic (Describe Wound)   [] New Altered Skin Integrity was Present on Admit and Documented in LDA   [] Wound Image Taken    Wound Care Consulted? No    Attending Nurse:  Lesley Soler LPN      Second RN/Staff Member:   Hang Abdullahi RN

## 2023-12-07 NOTE — PT/OT/SLP PROGRESS
Occupational Therapy  Treatment    Name: Priscila Randhawa    : 1933 (89 y.o.)  MRN: [id number]           TREATMENT SUMMARY AND RECOMMENDATIONS:      OT Date of Treatment: 23  OT Start Time: 1330  OT Stop Time: 1345  OT Total Time (min): 15 min      Subjective Assessment:   No complaints  Lethargic   x Awake, alert, cooperative  Impulsive    Uncooperative   Flat affect    Agitated  c/o pain    Appropriate  c/o fatigue   x Confused x Treated at bedside     Emotionally labile  Treated in gym/dept.      Other:        Therapy Precautions:   x Cognitive deficits  Spinal precautions    Collar - hard  Sternal precautions    Collar - soft   TLSO   x Fall risk  LSO    Hip precautions - posterior  Knee immobilizer    Hip precautions - anterior  WBAT    Impaired communication  Partial weightbearing    Oxygen  TTWB    PEG tube  NWB    Visual deficits      Hearing deficits   Other:        Treatment Objectives:     Mobility Training:    Mobility task Assist level Comments    Bed mobility Mod A EOB>supine mod A for BLE lifting   Transfer CGA Stand/pivot t/f with RW to EOB   Sit to stands transitions     Functional mobility CGA Short bout in room bathroom<>EOB   Sitting balance     Standing balance      Other:        ADL Training:    ADL Assist level Comments    Feeding     Grooming/hygiene     Bathing     Upper body dressing     Lower body dressing     Toileting Min A (+) void; pt able to wipe in sitting. Required assist for some clothing management   Toilet transfer CGA Stand/pivot t/f with RW to toilet    Adaptive equipment training     Other:         Additional Comments:  Pt with increased confusion this afternoon; attempted to re-orient. Discussed with RN regarding cognition.     Assessment: Patient tolerated session well.    GOALS:   Multidisciplinary Problems       Occupational Therapy Goals          Problem: Occupational Therapy    Goal Priority Disciplines Outcome Interventions   Occupational Therapy Goal      OT, PT/OT Ongoing, Progressing    Description: Goals to be met by: 12/11/23     Patient will increase functional independence with ADLs by performing:    LE Dressing with Stand-by Assistance.  Grooming while standing at sink with Stand-by Assistance.  Toileting from toilet with Stand-by Assistance for hygiene and clothing management.   Toilet transfer to toilet with Stand-by Assistance.                         Recommendations:     Discharge Recommendations: SNF  Discharge Equipment Recommendations:  none  Barriers to discharge:  Decreased caregiver support     Plan:     Patient to be seen 6 x/week (QD/BID as appropriate) to address the above listed problems via self-care/home management, therapeutic activities, therapeutic exercises  Plan of Care Expires: 12/11/23  Plan of Care Reviewed with: patient  Revisions made to plan of care: No      Skilled OT Minutes Provided: 15  Communication with Treatment Team:     Equipment recommendations:       At end of treatment, patient remained:   Up in chair     Up in wheelchair in room   x In bed   x With alarm activated   x Bed rails up   x Call bell in reach     Family/friends present    Restraints secured properly    In bathroom with CNA/RN notified    In gym with PT/PTA/tech    Nurse aware    Other:

## 2023-12-08 LAB
ANION GAP SERPL CALC-SCNC: 9 MEQ/L
BASOPHILS # BLD AUTO: 0.03 X10(3)/MCL
BASOPHILS NFR BLD AUTO: 0.4 %
BUN SERPL-MCNC: 21.8 MG/DL (ref 9.8–20.1)
CALCIUM SERPL-MCNC: 8.6 MG/DL (ref 8.4–10.2)
CHLORIDE SERPL-SCNC: 106 MMOL/L (ref 98–111)
CO2 SERPL-SCNC: 25 MMOL/L (ref 23–31)
CREAT SERPL-MCNC: 0.84 MG/DL (ref 0.55–1.02)
CREAT/UREA NIT SERPL: 26
EOSINOPHIL # BLD AUTO: 0.47 X10(3)/MCL (ref 0–0.9)
EOSINOPHIL NFR BLD AUTO: 5.8 %
ERYTHROCYTE [DISTWIDTH] IN BLOOD BY AUTOMATED COUNT: 13.2 % (ref 11.5–17)
GFR SERPLBLD CREATININE-BSD FMLA CKD-EPI: >60 MLS/MIN/1.73/M2
GLUCOSE SERPL-MCNC: 94 MG/DL (ref 75–121)
HCT VFR BLD AUTO: 38.2 % (ref 37–47)
HGB BLD-MCNC: 12 G/DL (ref 12–16)
IMM GRANULOCYTES # BLD AUTO: 0.01 X10(3)/MCL (ref 0–0.04)
IMM GRANULOCYTES NFR BLD AUTO: 0.1 %
LYMPHOCYTES # BLD AUTO: 1.67 X10(3)/MCL (ref 0.6–4.6)
LYMPHOCYTES NFR BLD AUTO: 20.6 %
MCH RBC QN AUTO: 31.2 PG (ref 27–31)
MCHC RBC AUTO-ENTMCNC: 31.4 G/DL (ref 33–36)
MCV RBC AUTO: 99.2 FL (ref 80–94)
MONOCYTES # BLD AUTO: 0.75 X10(3)/MCL (ref 0.1–1.3)
MONOCYTES NFR BLD AUTO: 9.3 %
NEUTROPHILS # BLD AUTO: 5.16 X10(3)/MCL (ref 2.1–9.2)
NEUTROPHILS NFR BLD AUTO: 63.8 %
PLATELET # BLD AUTO: 228 X10(3)/MCL (ref 130–400)
PMV BLD AUTO: 10.3 FL (ref 7.4–10.4)
POTASSIUM SERPL-SCNC: 4.7 MMOL/L (ref 3.5–5.1)
RBC # BLD AUTO: 3.85 X10(6)/MCL (ref 4.2–5.4)
SODIUM SERPL-SCNC: 140 MMOL/L (ref 132–146)
WBC # SPEC AUTO: 8.09 X10(3)/MCL (ref 4.5–11.5)

## 2023-12-08 PROCEDURE — 63600175 PHARM REV CODE 636 W HCPCS: Performed by: STUDENT IN AN ORGANIZED HEALTH CARE EDUCATION/TRAINING PROGRAM

## 2023-12-08 PROCEDURE — 25000003 PHARM REV CODE 250: Performed by: STUDENT IN AN ORGANIZED HEALTH CARE EDUCATION/TRAINING PROGRAM

## 2023-12-08 PROCEDURE — 97161 PT EVAL LOW COMPLEX 20 MIN: CPT

## 2023-12-08 PROCEDURE — 11000004 HC SNF PRIVATE

## 2023-12-08 PROCEDURE — 97116 GAIT TRAINING THERAPY: CPT

## 2023-12-08 PROCEDURE — 97530 THERAPEUTIC ACTIVITIES: CPT

## 2023-12-08 PROCEDURE — 97165 OT EVAL LOW COMPLEX 30 MIN: CPT

## 2023-12-08 RX ADMIN — TRAMADOL HYDROCHLORIDE 50 MG: 50 TABLET, COATED ORAL at 09:12

## 2023-12-08 RX ADMIN — PRAVASTATIN SODIUM 80 MG: 40 TABLET ORAL at 08:12

## 2023-12-08 RX ADMIN — SODIUM CHLORIDE: 9 INJECTION, SOLUTION INTRAVENOUS at 09:12

## 2023-12-08 RX ADMIN — ENOXAPARIN SODIUM 40 MG: 40 INJECTION SUBCUTANEOUS at 08:12

## 2023-12-08 RX ADMIN — LOSARTAN POTASSIUM 50 MG: 50 TABLET, FILM COATED ORAL at 08:12

## 2023-12-08 RX ADMIN — CEFTRIAXONE 1 G: 1 INJECTION, POWDER, FOR SOLUTION INTRAMUSCULAR; INTRAVENOUS at 09:12

## 2023-12-08 NOTE — PLAN OF CARE
Problem: Occupational Therapy  Goal: Occupational Therapy Goal  Description: Goals to be met by: 12/22/23     Patient will increase functional independence with ADLs by performing:    UE Dressing with Set-up Assistance.  LE Dressing with Minimal Assistance.  Toileting from toilet with Contact Guard Assistance for hygiene and clothing management.   Bathing from  shower chair/bench with Minimal Assistance.  Toilet transfer to toilet with Stand-by Assistance.  Increased functional strength to 4/5 for BUEs.    Outcome: Ongoing, Progressing

## 2023-12-08 NOTE — PLAN OF CARE
[facility] - Medical Surgical Unit  Initial Discharge Assessment       Primary Care Provider: Selena Hou MD    Admission Diagnosis: Fall [W19.XXXA]  Acute cystitis without hematuria [N30.00]  Contusion of left elbow, initial encounter [S50.02XA]  Contusion of back, unspecified laterality, initial encounter [S20.229A]  Leukocytosis, unspecified type [D72.829]  Debility [R53.81]    Admission Date: 12/7/2023  Expected Discharge Date:     Transition of Care Barriers: None    Payor: MEDICARE / Plan: MEDICARE PART A & B / Product Type: Government /     Extended Emergency Contact Information  Primary Emergency Contact: Michel Randhawa  Mobile Phone: [phone]  Relation: Other  Secondary Emergency Contact: ALYSSA KEEN  Mobile Phone: [phone]  Relation: Other   needed? No    Discharge Plan A: New Nursing Home placement - [facility] care facility  Discharge Plan B: Home with family, Home Health      [id number] [address]  [address] [facility] [address]  [address] [facility] [address]  Phone: [phone] Fax: [phone]    [facility] - [facility] [address] [facility]  [address] [facility] [address]  Phone: [phone] Fax: [phone]      Initial Assessment (most recent)       Adult Discharge Assessment - 12/07/23 1834          Discharge Assessment    Assessment Type Discharge Planning Assessment     Confirmed/corrected address, phone number and insurance Yes     Confirmed Demographics Correct on Facesheet     Source of Information family     If unable to respond/provide information was family/caregiver contacted? Yes     Contact Name/Number Michel [phone]     Communicated HALI with patient/caregiver Date not available/Unable to determine     Reason For Admission weakness     People in Home alone     Facility Arrived From: home     Do you expect to return to your current living situation? Yes     Do you have  help at home or someone to help you manage your care at home? Yes     Who are your caregiver(s) and their phone number(s)? Michel Perez [phone]     Prior to hospitilization cognitive status: Alert/Oriented     Current cognitive status: Alert/Oriented     Walking or Climbing Stairs Difficulty no     Walking or Climbing Stairs ambulation difficulty, requires equipment     Mobility Management walker     Home Accessibility wheelchair accessible     Home Layout Able to live on 1st floor     Equipment Currently Used at Home bedside commode;walker, rolling     Readmission within 30 days? No     Patient currently being followed by outpatient case management? No     Do you currently have service(s) that help you manage your care at home? No     Is the pt/caregiver preference to resume services with current agency No     Do you take prescription medications? Yes     Do you have prescription coverage? Yes     Do you have any problems affording any of your prescribed medications? TBD     Is the patient taking medications as prescribed? yes     Who is going to help you get home at discharge? Michel Perez [phone]     How do you get to doctors appointments? family or friend will provide     Are you on dialysis? No     Do you take coumadin? No     Discharge Plan A New Nursing Home placement - penitentiary care facility     Discharge Plan B Home with family;Home Health     DME Needed Upon Discharge  none     Discharge Plan discussed with: Adult children     Transition of Care Barriers None        Physical Activity    On average, how many days per week do you engage in moderate to strenuous exercise (like a brisk walk)? 0 days     On average, how many minutes do you engage in exercise at this level? 0 min        Financial Resource Strain    How hard is it for you to pay for the very basics like food, housing, medical care, and heating? Not hard at all        Housing Stability    In the last 12 months, was there a time when you were not able to pay  the mortgage or rent on time? No     In the last 12 months, how many places have you lived? 1     In the last 12 months, was there a time when you did not have a steady place to sleep or slept in a shelter (including now)? No        Transportation Needs    In the past 12 months, has lack of transportation kept you from medical appointments or from getting medications? No     In the past 12 months, has lack of transportation kept you from meetings, work, or from getting things needed for daily living? No        Food Insecurity    Within the past 12 months, you worried that your food would run out before you got the money to buy more. Never true        Stress    Do you feel stress - tense, restless, nervous, or anxious, or unable to sleep at night because your mind is troubled all the time - these days? Only a little        Social Connections    In a typical week, how many times do you talk on the phone with family, friends, or neighbors? More than three times a week     How often do you get together with friends or relatives? More than three times a week     How often do you attend Cheondoism or Holiness services? 1 to 4 times per year     Do you belong to any clubs or organizations such as Cheondoism groups, unions, fraternal or athletic groups, or school groups? No     How often do you attend meetings of the clubs or organizations you belong to? 1 to 4 times per year     Are you , , , , never , or living with a partner?         Alcohol Use    Q1: How often do you have a drink containing alcohol? Never     Q2: How many drinks containing alcohol do you have on a typical day when you are drinking? Patient does not drink     Q3: How often do you have six or more drinks on one occasion? Never

## 2023-12-08 NOTE — PROGRESS NOTES
Inpatient Nutrition Evaluation    Admit Date: 12/7/2023   Total duration of encounter: 1 day    Nutrition Recommendation/Prescription     Continue cardiac diet.     Nutrition Assessment     Chart Review    Reason Seen: continuous nutrition monitoring, length of stay, and follow-up    Malnutrition Screening Tool Results   Have you recently lost weight without trying?: No  Have you been eating poorly because of a decreased appetite?: No   MST Score: 0     Diagnosis:  Acute cystitis without hematuria, UTI, HTN, left shoulder pain, Weakness and frequent falls.      Relevant Medical History: Background diabetic retinopathy  Date Unknown  Carotid artery stenosis  Date Unknown  Cataract extraction status  Date Unknown  Combined hyperlipidemia  Date Unknown  HTN (hypertension)  Date Unknown  Polycythemia  Date Unknown  Type 2 diabetes mellitus without complications  Date Unknown  Unspecified osteoarthritis, unspecified site  Date Unknown  Vitamin D deficiency    Nutrition-Related Medications: ceftriaxone, pravastatin,        Nutrition-Related Labs:   Latest Reference Range & Units 12/08/23 03:10   WBC 4.50 - 11.50 x10(3)/mcL 8.09   RBC 4.20 - 5.40 x10(6)/mcL 3.85 (L)   Hemoglobin 12.0 - 16.0 g/dL 12.0   Hematocrit 37.0 - 47.0 % 38.2   MCV 80.0 - 94.0 fL 99.2 (H)   MCH 27.0 - 31.0 pg 31.2 (H)   MCHC 33.0 - 36.0 g/dL 31.4 (L)   RDW 11.5 - 17.0 % 13.2   Platelet Count 130 - 400 x10(3)/mcL 228   MPV 7.4 - 10.4 fL 10.3   Neut % % 63.8   LYMPH % % 20.6   Mono % % 9.3   Eosinophil % % 5.8   Basophil % % 0.4   Immature Granulocytes % 0.1   Neut # 2.1 - 9.2 x10(3)/mcL 5.16   Lymph # 0.6 - 4.6 x10(3)/mcL 1.67   Mono # 0.1 - 1.3 x10(3)/mcL 0.75   Eos # 0 - 0.9 x10(3)/mcL 0.47   Baso # <=0.2 x10(3)/mcL 0.03   Immature Grans (Abs) 0 - 0.04 x10(3)/mcL 0.01   Sodium 132 - 146 mmol/L 140   Potassium 3.5 - 5.1 mmol/L 4.7   Chloride 98 - 111 mmol/L 106   CO2 23 - 31 mmol/L 25   Anion Gap mEq/L 9.0   BUN 9.8 - 20.1 mg/dL 21.8 (H)   Creatinine  "0.55 - 1.02 mg/dL 0.84   BUN/CREAT RATIO  26   eGFR mls/min/1.73/m2 >60   Glucose 75 - 121 mg/dL 94   Calcium 8.4 - 10.2 mg/dL 8.6   (L): Data is abnormally low  (H): Data is abnormally high    Diet Order: Diet Cardiac  Oral Supplement Order: none  Appetite/Oral Intake: good/% of meals  Factors Affecting Nutritional Intake: none identified  Food/Christian/Cultural Preferences: none reported  Food Allergies: none reported    Skin Integrity: bruised (ecchymotic), scab  Wound(s):       Comments    Pt eating lunch during rounds. Intake is good. Discussed food preferences. Adjust diet as need. PMHx DM. Glucose WNL.    Anthropometrics    Height: 4' 11" (149.9 cm)    Last Weight: 54.1 kg (119 lb 4.3 oz) (12/07/23 1540) Weight Method: Bed Scale  BMI (Calculated): 24.1  BMI Classification: normal (BMI 18.5-24.9)     Ideal Body Weight (IBW), Female: 95 lb     % Ideal Body Weight, Female (lb): 125.55 %                             Usual Weight Provided By: unable to obtain usual weight    Wt Readings from Last 5 Encounters:   12/07/23 54.1 kg (119 lb 4.3 oz)   12/04/23 54.1 kg (119 lb 4.3 oz)   05/08/23 55.3 kg (122 lb)   05/04/22 55.3 kg (122 lb)   05/04/21 54 kg (118 lb 15.7 oz)     Weight Change(s) Since Admission:  Admit Weight: 54.1 kg (119 lb 4.3 oz) (12/07/23 1540)  Wt stable.     Patient Education    Not applicable.    Monitoring & Evaluation     Dietitian will monitor food and beverage intake, weight, weight change, electrolyte/renal panel, glucose/endocrine profile, and gastrointestinal profile.  Nutrition Risk/Follow-Up: low (follow-up in 5-7 days)  Patients assigned 'low nutrition risk' status do not qualify for a full nutritional assessment but will be monitored and re-evaluated in a 5-7 day time period. Please consult if re-evaluation needed sooner.  "

## 2023-12-08 NOTE — PT/OT/SLP EVAL
Occupational Therapy   Evaluation    Name: Priscila Randhawa  MRN: [id number]  Admitting Diagnosis: fall at home  Recent Surgery: * No surgery found *      Recommendations:     Discharge Recommendations: Low Intensity Therapy  Discharge Equipment Recommendations:  none  Barriers to discharge:  Decreased caregiver support, Other (Comment) (Decreased cognition/safety awareness)    Assessment:     Priscila Randhawa is a 89 y.o. female with a medical diagnosis of fall at home.  She presents with decreased strength and endurance required for ADL tasks and safe functional mobility. Performance deficits affecting function: weakness, impaired endurance, impaired self care skills, decreased upper extremity function, impaired cognition, decreased safety awareness.      Rehab Prognosis: Good; patient would benefit from acute skilled OT services to address these deficits and reach maximum level of function.       Plan:     Patient to be seen 6 x/week (QD/BID as appropriate) to address the above listed problems via self-care/home management, therapeutic activities, therapeutic exercises  Plan of Care Expires: 12/22/23  Plan of Care Reviewed with: patient    Subjective     Chief Complaint: no complaints this morning   Patient/Family Comments/goals: Pt is adamant about returning home despite safety concerns     Occupational Profile:  Living Environment: lives alone in SS home  Previous level of function: Pt reports she is able to perform ADL tasks but with increased time and difficulty; family performs IADLs and gets groceries for her. Has a nephew that lives next door.   Equipment Used at Home: walker, rolling, cane, straight  Assistance upon Discharge: family as needed    Pain/Comfort:  Pain Rating 1: 0/10    Patients cultural, spiritual, Hoahaoism conflicts given the current situation: no    Objective:     Communicated with: RN prior to session.  Patient in gym with PT at beginning of session.   General Precautions: Standard,  fall  Orthopedic Precautions: N/A  Braces: N/A  Respiratory Status: Room air    Occupational Performance:    Bed Mobility:    Patient completed Supine to Sit with minimum assistance    Functional Mobility/Transfers:  Patient completed Sit <> Stand Transfer with supervision/CGA with RW  Patient completed Bed <> Chair Transfer using Stand Pivot technique with contact guard assistance with RW  Functional Mobility: short bouts with RW in room to complete toileting     Activities of Daily Living:  Feeding:  setup assistance   Grooming: setup assistance seated at sink   Bathing: minimum assistance   Upper Body Dressing: supervision   Lower Body Dressing: moderate assistance   Toileting: minimal assistance     Cognitive/Visual Perceptual:  Cognitive/Psychosocial Skills:     -       Oriented to: Person, Place, Time, and Situation     Physical Exam:  Upper Extremity Range of Motion:     -       Right Upper Extremity: WFL  -       Left Upper Extremity: WFL  Upper Extremity Strength: -       Right Upper Extremity: WFL  -       Left Upper Extremity: WFL    AMPAC 6 Click ADL:  AMPAC Total Score: 18    Treatment & Education:  Discussed safety in home environment; pt reports she knows it is not safe being home alone but she does not want placement and states sitters only sit and do not assist. Pt is not safe to live home alone d/t increased falls and safety concerns with cognition.     Patient left up in chair with call button in reach and chair alarm on    GOALS:   Multidisciplinary Problems       Occupational Therapy Goals          Problem: Occupational Therapy    Goal Priority Disciplines Outcome Interventions   Occupational Therapy Goal     OT, PT/OT Ongoing, Progressing    Description: Goals to be met by: 12/22/23     Patient will increase functional independence with ADLs by performing:    UE Dressing with Set-up Assistance.  LE Dressing with Minimal Assistance.  Toileting from toilet with Contact Guard Assistance for  hygiene and clothing management.   Bathing from  shower chair/bench with Minimal Assistance.  Toilet transfer to toilet with Stand-by Assistance.  Increased functional strength to 4/5 for BUEs.                         History:     Past Medical History:   Diagnosis Date    Background diabetic retinopathy     Carotid artery stenosis     Cataract extraction status     Combined hyperlipidemia     HTN (hypertension)     Polycythemia     Type 2 diabetes mellitus without complications     Unspecified osteoarthritis, unspecified site     Vitamin D deficiency          Past Surgical History:   Procedure Laterality Date    EPIDURAL STEROID INJECTION INTO LUMBAR SPINE      EXPLORATORY LAPAROTOMY      hx total knee repla         Time Tracking:     OT Date of Treatment: 12/08/23  OT Start Time: 0930  OT Stop Time: 0955  OT Total Time (min): 25 min    Billable Minutes:Evaluation 20 min    12/8/2023

## 2023-12-08 NOTE — PT/OT/SLP PROGRESS
"Occupational Therapy  Treatment    Name: Priscila Randhawa    : 1933 (89 y.o.)  MRN: [id number]           TREATMENT SUMMARY AND RECOMMENDATIONS:      OT Date of Treatment: 23  OT Start Time: 1330  OT Stop Time: 1342  OT Total Time (min): 12 min      Subjective Assessment:   No complaints  Lethargic   x Awake, alert, cooperative  Impulsive    Uncooperative   Flat affect    Agitated  c/o pain    Appropriate x c/o fatigue    Confused x Treated at bedside     Emotionally labile  Treated in gym/dept.      Other:        Therapy Precautions:   x Cognitive deficits  Spinal precautions    Collar - hard  Sternal precautions    Collar - soft   TLSO   x Fall risk  LSO    Hip precautions - posterior  Knee immobilizer    Hip precautions - anterior  WBAT    Impaired communication  Partial weightbearing    Oxygen  TTWB    PEG tube  NWB    Visual deficits      Hearing deficits   Other:        Treatment Objectives:     Mobility Training:    Mobility task Assist level Comments    Bed mobility     Transfer CGA Stand/pivot t/f with RW to BSchair   Sit to stands transitions SBA From w/c level   Functional mobility     Sitting balance     Standing balance      Other:            Additional Comments:  Pt reports fatigue following PT session; requesting to return to room. Reports, "I have no spunk today" - niece reports was up all night with confusion/hallucinations.     Assessment: Patient tolerated session fair.    GOALS:   Multidisciplinary Problems       Occupational Therapy Goals          Problem: Occupational Therapy    Goal Priority Disciplines Outcome Interventions   Occupational Therapy Goal     OT, PT/OT Ongoing, Progressing    Description: Goals to be met by: 23     Patient will increase functional independence with ADLs by performing:    UE Dressing with Set-up Assistance.  LE Dressing with Minimal Assistance.  Toileting from toilet with Contact Guard Assistance for hygiene and clothing management.   Bathing " from  shower chair/bench with Minimal Assistance.  Toilet transfer to toilet with Stand-by Assistance.  Increased functional strength to 4/5 for BUEs.                         Recommendations:     Discharge Equipment Recommendations:  none     Plan:     Patient to be seen 6 x/week (QD/BID as appropriate) to address the above listed problems via self-care/home management, therapeutic activities, therapeutic exercises  Plan of Care Expires: 12/22/23  Plan of Care Reviewed with: patient  Revisions made to plan of care: No      Skilled OT Minutes Provided: 12  Communication with Treatment Team: notified CM of Pt niece having questions     Equipment recommendations:       At end of treatment, patient remained:  x Up in chair     Up in wheelchair in room    In bed   x With alarm activated    Bed rails up   x Call bell in reach    x Family/friends present    Restraints secured properly    In bathroom with CNA/RN notified    In gym with PT/PTA/tech    Nurse aware    Other:

## 2023-12-08 NOTE — PLAN OF CARE
Spoke with patient's niece. Would like ref[facility] to [facility] in [place]. Referrals faxed

## 2023-12-08 NOTE — PLAN OF CARE
Problem: Adult Inpatient Plan of Care  Goal: Plan of Care Review  Outcome: Ongoing, Progressing  Flowsheets (Taken 12/8/2023 1252)  Plan of Care Reviewed With:   patient   family  Goal: Patient-Specific Goal (Individualized)  Outcome: Ongoing, Progressing  Flowsheets (Taken 12/8/2023 1252)  Anxieties, Fears or Concerns: none voiced  Individualized Care Needs: promote safety with mobility, pain management, IV antibiotics  Goal: Absence of Hospital-Acquired Illness or Injury  Outcome: Ongoing, Progressing  Intervention: Identify and Manage Fall Risk  Flowsheets (Taken 12/8/2023 1252)  Safety Promotion/Fall Prevention:   assistive device/personal item within reach   chair alarm set   in recliner, wheels locked   nonskid shoes/socks when out of bed   family to remain at bedside   instructed to call staff for mobility  Intervention: Prevent Skin Injury  Flowsheets (Taken 12/8/2023 1252)  Body Position: position maintained  Skin Protection:   adhesive use limited   incontinence pads utilized   transparent dressing maintained   tubing/devices free from skin contact  Intervention: Prevent and Manage VTE (Venous Thromboembolism) Risk  Flowsheets (Taken 12/8/2023 1255)  Activity Management: Up in chair - L3  VTE Prevention/Management:   ambulation promoted   bleeding precations maintained   fluids promoted  Range of Motion: active ROM (range of motion) encouraged  Intervention: Prevent Infection  Flowsheets (Taken 12/8/2023 1255)  Infection Prevention:   equipment surfaces disinfected   hand hygiene promoted  Goal: Optimal Comfort and Wellbeing  Outcome: Ongoing, Progressing  Intervention: Monitor Pain and Promote Comfort  Flowsheets (Taken 12/8/2023 1255)  Pain Management Interventions:   care clustered   relaxation techniques promoted   quiet environment facilitated  Intervention: Provide Person-Centered Care  Flowsheets (Taken 12/8/2023 1255)  Trust Relationship/Rapport:   care explained   choices provided   questions  encouraged  Goal: Readiness for Transition of Care  Outcome: Ongoing, Progressing  Intervention: Mutually Develop Transition Plan  Flowsheets (Taken 12/8/2023 1255)  Communicated HALI with patient/caregiver: Date not available/Unable to determine     Problem: Diabetes Comorbidity  Goal: Blood Glucose Level Within Targeted Range  Outcome: Ongoing, Progressing  Intervention: Monitor and Manage Glycemia  Flowsheets (Taken 12/8/2023 1255)  Glycemic Management: oral hydration promoted     Problem: Fall Injury Risk  Goal: Absence of Fall and Fall-Related Injury  Outcome: Ongoing, Progressing  Intervention: Identify and Manage Contributors  Flowsheets (Taken 12/8/2023 1255)  Self-Care Promotion:   independence encouraged   BADL personal objects within reach  Medication Review/Management: medications reviewed  Intervention: Promote Injury-Free Environment  Flowsheets (Taken 12/8/2023 1255)  Safety Promotion/Fall Prevention:   assistive device/personal item within reach   instructed to call staff for mobility   side rails raised x 3   room near unit station   chair alarm set   in recliner, wheels locked   nonskid shoes/socks when out of bed

## 2023-12-08 NOTE — PROGRESS NOTES
[facility] - Medical Surgical Unit  [facility] MEDICINE - Progress Note    Patient Name: Priscila Randhawa  MRN: [id number]  Patient Class: IP- Swing   Admission Date: 12/7/2023   Admitting Physician: HM Service   Attending Physician: Thairy G Reyes, DO  Primary Care Provider: Selena Hou MD  Face-to-Face encounter date: 12/08/2023      CHIEF COMPLAINT   Frequent falls  HISTORY OF PRESENTING ILLNESS   89 year-old white female with a history of dementia, hypertension, diabetes who presented to the ER after a fall at home. She lives alone and has her niece at the bedside while she was in the ER however today she is alone. She does have frequent falls. Her PCP did recommend her to be placed in assisted living versus nursing home.  Patient was has been reluctant. Admitted for management of UTI, PT OT for the weakness and possible nursing home placement if patient and family are amenable.    Today  Doing well, ambulating 100 ft with a rolling walker and standby assistance.  Patient needs placement.  Can discontinue IV once antibiotics are complete  PAST MEDICAL HISTORY     Past Medical History:   Diagnosis Date    Background diabetic retinopathy     Carotid artery stenosis     Cataract extraction status     Combined hyperlipidemia     HTN (hypertension)     Polycythemia     Type 2 diabetes mellitus without complications     Unspecified osteoarthritis, unspecified site     Vitamin D deficiency        PAST SURGICAL HISTORY     Past Surgical History:   Procedure Laterality Date    EPIDURAL STEROID INJECTION INTO LUMBAR SPINE      EXPLORATORY LAPAROTOMY      hx total knee repla         FAMILY HISTORY   Reviewed and noncontributory to this case    SOCIAL HISTORY     Social History     Socioeconomic History    Marital status: Single   Occupational History    Occupation: caregiver   Tobacco Use    Smoking status: Never    Smokeless tobacco: Never     Social Determinants of Health     Financial Resource Strain: Low Risk   (12/7/2023)    Overall Financial Resource Strain (CARDIA)     Difficulty of Paying Living Expenses: Not hard at all   Food Insecurity: No Food Insecurity (12/7/2023)    Hunger Vital Sign     Worried About Running Out of Food in the Last Year: Never true     Ran Out of Food in the Last Year: Never true   Transportation Needs: No Transportation Needs (12/7/2023)    PRAPARE - Transportation     Lack of Transportation (Medical): No     Lack of Transportation (Non-Medical): No   Physical Activity: Inactive (12/7/2023)    Exercise Vital Sign     Days of Exercise per Week: 0 days     Minutes of Exercise per Session: 0 min   Stress: No Stress Concern Present (12/7/2023)    [employer] [employer] - Occupational Stress Questionnaire     Feeling of Stress : Only a little   Social Connections: Moderately Integrated (12/7/2023)    Social Connection and Isolation Panel [NHANES]     Frequency of Communication with Friends and Family: More than three times a week     Frequency of Social Gatherings with Friends and Family: More than three times a week     Attends Shinto Services: 1 to 4 times per year     Active Member of Clubs or Organizations: No     Attends Club or Organization Meetings: 1 to 4 times per year     Marital Status:    Recent Concern: Social Connections - Moderately Isolated (12/4/2023)    Social Connection and Isolation Panel [NHANES]     Frequency of Communication with Friends and Family: More than three times a week     Frequency of Social Gatherings with Friends and Family: More than three times a week     Attends Shinto Services: 1 to 4 times per year     Active Member of Clubs or Organizations: No     Attends Club or Organization Meetings: Never     Marital Status:    Housing Stability: Low Risk  (12/7/2023)    Housing Stability Vital Sign     Unable to Pay for Housing in the Last Year: No     Number of Places Lived in the Last Year: 1     Unstable Housing in the Last  Year: No       HOME MEDICATIONS     Prior to Admission medications    Medication Sig Start Date End Date Taking? Authorizing Provider   losartan (COZAAR) 50 MG tablet Take 1 tablet (50 mg total) by mouth once daily. 5/8/23 5/7/24 Yes Selena Hou MD   pravastatin (PRAVACHOL) 80 MG tablet Take 1 tablet (80 mg total) by mouth once daily. 5/8/23 5/7/24 Yes Selena Hou MD       ALLERGIES   Codeine    REVIEW OF SYSTEMS   Except as documented above, all other systems reviewed and negative    PHYSICAL EXAM     Vitals:    12/07/23 2141   BP:    Pulse:    Resp: 18   Temp:       General:  In no acute distress, resting comfortably  Head and neck:  Atraumatic, normocephalic, moist mucous membranes, supple neck  Chest:  Clear to auscultation bilaterally  Heart:  S1, S2, grade 3/6 systolic murmur  Abdomen:  Soft, nontender, BS +  MSK:  Warm, no lower extremity edema, no clubbing or cyanosis  Neuro:  weak  Integumentary:  No obvious skin rash  Psychiatry:  Appropriate mood and affect    ASSESSMENT AND PLAN   E. Coli UTI  -Rocephin 12/4-12/8   -Cultures show sensitivity     Essential HTN  -losartan, well controlled     Left shoulder pain  -xray left shoulder showed no acute abnormality     Weakness and frequent falls  -PT/OT  -possible placement     DVT prophylaxis:  Lovenox  __________________________________________________________________  LABS/MICRO/MEDS/DIAGNOSTICS       LABS  Recent Labs     12/08/23  0310      K 4.7   CHLORIDE 106   CO2 25   BUN 21.8*   CREATININE 0.84   GLUCOSE 94   CALCIUM 8.6     Recent Labs     12/08/23  0310   WBC 8.09   RBC 3.85*   HCT 38.2   MCV 99.2*          MICROBIOLOGY  Microbiology Results (last 7 days)       ** No results found for the last 168 hours. **            MEDICATIONS   cefTRIAXone (ROCEPHIN) IVPB  1 g Intravenous Q24H    enoxparin  40 mg Subcutaneous Daily    losartan  50 mg Oral Daily    pravastatin  80 mg Oral Daily      INFUSIONS      DIAGNOSTIC TESTS  No  orders to display          Patient information was obtained from patient, patient's family, past medical records and ER records.   All diagnosis and differential diagnosis have been reviewed; assessment and plan has been documented. I have personally reviewed the labs and test results that are presently available; I have reviewed the patients medication list. I have reviewed the consulting providers response and recommendations. I have reviewed or attempted to review medical records based upon their availability.  All of the patient's questions have been addressed and answered. Patient's is agreeable to the above stated plan. I will continue to monitor closely and make adjustments to medical management as needed.  This note was created using [employer] voice recognition software that occasionally misinterpreted phrases or words.  Please contact me if any questions may rise regarding documentation to clarify verbiage.        Thairy G Reyes, DO   Internal Medicine  Department of [facility] Medicine  [facility] - [facility] Surgical Unit

## 2023-12-08 NOTE — PLAN OF CARE
Problem: Adult Inpatient Plan of Care  Goal: Plan of Care Review  Outcome: Ongoing, Progressing  Flowsheets (Taken 12/8/2023 0309)  Plan of Care Reviewed With: patient  Goal: Patient-Specific Goal (Individualized)  Outcome: Ongoing, Progressing  Flowsheets (Taken 12/8/2023 0309)  Anxieties, Fears or Concerns: none at this time  Individualized Care Needs: Promote/Maintain safety with mobility, Pain management, IV Abx  Goal: Absence of Hospital-Acquired Illness or Injury  Outcome: Ongoing, Progressing  Intervention: Prevent and Manage VTE (Venous Thromboembolism) Risk  Flowsheets (Taken 12/7/2023 2000)  VTE Prevention/Management:   ambulation promoted   bleeding precations maintained   bleeding risk assessed   dorsiflexion/plantar flexion performed   fluids promoted   ROM (active) performed  Goal: Optimal Comfort and Wellbeing  Outcome: Ongoing, Progressing  Intervention: Monitor Pain and Promote Comfort  Flowsheets (Taken 12/7/2023 2000)  Pain Management Interventions:   pain management plan reviewed with patient/caregiver   care clustered   diversional activity provided   pillow support provided   position adjusted   quiet environment facilitated   relaxation techniques promoted     Problem: Fall Injury Risk  Goal: Absence of Fall and Fall-Related Injury  Outcome: Ongoing, Progressing

## 2023-12-08 NOTE — PLAN OF CARE
Problem: Physical Therapy  Goal: Physical Therapy Goal  Description: Description: Goals to be met by: Discharge      Patient will increase functional independence with mobility by performin. Sit to stand transfer with Modified [place]  2. Bed to chair transfer with Modified [place] using Single-point Cane   3. Gait  x 50 feet with Modified [place] using Single-point Cane .     Outcome: Ongoing, Not Progressing

## 2023-12-08 NOTE — PT/OT/SLP EVAL
Physical Therapy Obs Evaluation and Treatment    Patient Name: Priscila Randhawa   MRN: [id number]  Recent Surgery: * No surgery found *      Recommendations:     Discharge Recommendations: Low Intensity Therapy   Discharge Equipment Recommendations: none   Barriers to discharge: Increased level of assist and Decreased caregiver support    Assessment:     Priscila Randhawa is a 89 y.o. female admitted with a medical diagnosis of <principal problem not specified>. She presents with the following impairments/functional limitations: impaired endurance, gait instability, decreased safety awareness. Fall at home, leading to back pain.     Rehab Prognosis: Good; patient would benefit from acute PT services to address these deficits and reach maximum level of function.    Plan:     During this hospitalization, patient to be seen 6 x/week to address the above listed problems via gait training, therapeutic activities, therapeutic exercises    Plan of Care Expires: 01/05/24    Subjective     Chief Complaint: Soreness from fall  Patient Comments/Goals: go back home alone  Pain/Comfort:  Pain Rating 1: 0/10  Pain Rating Post-Intervention 1: 0/10    Social History:  Living Environment: Patient lives alone in a single story home with 8 bed rooms.   Prior Level of Function: Prior to admission, patient was modified independent, family would help with cooking and groceries.   Equipment Used at Home: walker, rolling, cane, straight  DME owned (not currently used): none  Assistance Upon Discharge:  sitters or facility    Objective:     Communicated with Nursing and OT prior to session. Patient found up in chair  upon PT entry to room.    General Precautions: Standard,     Orthopedic Precautions:     Braces:      Respiratory Status: Room air    Exams:  Cognition: Patient is oriented to Person, Place, Time, Situation  RLE ROM: WFL  RLE Strength: WFL  LLE ROM: WFL  LLE Strength: WFL      Functional Mobility:  Gait belt applied - Yes  Bed  Mobility (Note, pt sleeps in recliner at home)  Rolling Left: contact guard assistance  Rolling Right: contact guard assistance  Supine to Sit: moderate/minimum assistance for trunk management  Sit to Supine: moderate assistance for LE management  Transfers  Sit to Stand: contact guard/stand by assistance with no AD  Bed to Chair: contact guard/stand by assistance with straight cane using Stand Pivot  Gait  Patient ambulated 125 feet using RW and contact guard assistance. Patient demonstrates occasional unsteady gait, flexed posture, and antalgic gait. .  Balance  Sitting: stand by assistance  Standing: contact guard assistance      Therapeutic Activities and Exercises:   Patient educated on role of acute care PT and PT POC, safety while in hospital including calling nurse for mobility, and call light usage  Nursing educated on functional mobility and ability to ambulate to bathroom with staff.   MD in room doing assessment - recommending placement vs home alone.     AM-PAC 6 CLICK MOBILITY  Total Score:15    Patient left up in chair in gym for OT session.   GOALS:   Multidisciplinary Problems       Physical Therapy Goals          Problem: Physical Therapy    Goal Priority Disciplines Outcome Goal Variances Interventions   Physical Therapy Goal     PT, PT/OT Ongoing, Not Progressing     Description: Description: Goals to be met by: Discharge      Patient will increase functional independence with mobility by performin. Sit to stand transfer with Modified [place]  2. Bed to chair transfer with Modified [place] using Single-point Cane   3. Gait  x 50 feet with Modified [place] using Single-point Cane .                          History:     Past Medical History:   Diagnosis Date    Background diabetic retinopathy     Carotid artery stenosis     Cataract extraction status     Combined hyperlipidemia     HTN (hypertension)     Polycythemia     Type 2 diabetes mellitus without complications      Unspecified osteoarthritis, unspecified site     Vitamin D deficiency        Past Surgical History:   Procedure Laterality Date    EPIDURAL STEROID INJECTION INTO LUMBAR SPINE      EXPLORATORY LAPAROTOMY      hx total knee repla         Time Tracking:     PT Received On: 12/08/23  PT Start Time: 0900  PT Stop Time: 0930  PT Total Time (min): 30 min     Billable Minutes: Evaluation low complexity     12/8/2023

## 2023-12-08 NOTE — PT/OT/SLP PROGRESS
Physical Therapy Treatment Note           Name: Priscila Randhawa    : 1933 (89 y.o.)  MRN: [id number]           TREATMENT SUMMARY AND RECOMMENDATIONS:    PT Received On: 23  PT Start Time: 1315     PT Stop Time: 1328  PT Total Time (min): 13 min     Subjective Assessment:   No complaints  Lethargic   x Awake, alert, cooperative  Uncooperative    Agitated  c/o pain    Appropriate  c/o fatigue    Confused  Treated at bedside     Emotionally labile x Treated in gym/dept.    Impulsive  Other:    Flat affect       Therapy Precautions:    Cognitive deficits  Spinal precautions    Collar - hard  Sternal precautions    Collar - soft   TLSO   x Fall risk  LSO    Hip precautions - posterior  Knee immobilizer    Hip precautions - anterior  WBAT    Impaired communication  Partial weightbearing    Oxygen  TTWB    PEG tube  NWB    Visual deficits  Other:    Hearing deficits          Treatment Objectives:     Mobility Training:   Assist level Comments    Bed mobility     Transfer CGA Stand pivot without AD WC>Chair   Gait CGA X 150 feet using RW with WC following   Sit to stand transitions CGA From standard chair and WC   Sitting balance     Standing balance      Wheelchair mobility     Car transfer     Other:          Therapeutic Exercise:   Exercise Sets Reps Comments                               Additional Comments:  Pt agreed to work, but reporting she can only do a little bit because she is tired.     Assessment: Patient tolerated session well.     PT Plan: continue per POC  Revisions made to plan of care: No    GOALS:   Multidisciplinary Problems       Physical Therapy Goals          Problem: Physical Therapy    Goal Priority Disciplines Outcome Goal Variances Interventions   Physical Therapy Goal     PT, PT/OT Ongoing, Not Progressing     Description: Description: Goals to be met by: Discharge      Patient will increase functional independence with mobility by performin. Sit to stand transfer  with Modified [place]  2. Bed to chair transfer with Modified [place] using Single-point Cane   3. Gait  x 50 feet with Modified [place] using Single-point Cane .                          Skilled PT Minutes Provided: 10   Communication with Treatment Team:     Equipment recommendations:       At end of treatment, patient remained:  x Up in chair     Up in wheelchair in room    In bed    With alarm activated    Bed rails up    Call bell in reach     Family/friends present    Restraints secured properly    In bathroom with CNA/RN notified    Nurse aware   x In gym with therapist/tech    Other:

## 2023-12-09 LAB
BACTERIA BLD CULT: NORMAL
BACTERIA BLD CULT: NORMAL

## 2023-12-09 PROCEDURE — 25000003 PHARM REV CODE 250: Performed by: STUDENT IN AN ORGANIZED HEALTH CARE EDUCATION/TRAINING PROGRAM

## 2023-12-09 PROCEDURE — 97530 THERAPEUTIC ACTIVITIES: CPT

## 2023-12-09 PROCEDURE — 63600175 PHARM REV CODE 636 W HCPCS: Performed by: STUDENT IN AN ORGANIZED HEALTH CARE EDUCATION/TRAINING PROGRAM

## 2023-12-09 PROCEDURE — 11000004 HC SNF PRIVATE

## 2023-12-09 RX ADMIN — ENOXAPARIN SODIUM 40 MG: 40 INJECTION SUBCUTANEOUS at 08:12

## 2023-12-09 RX ADMIN — LOSARTAN POTASSIUM 50 MG: 50 TABLET, FILM COATED ORAL at 08:12

## 2023-12-09 RX ADMIN — TRAMADOL HYDROCHLORIDE 50 MG: 50 TABLET, COATED ORAL at 08:12

## 2023-12-09 RX ADMIN — PRAVASTATIN SODIUM 80 MG: 40 TABLET ORAL at 08:12

## 2023-12-09 NOTE — PLAN OF CARE
Problem: Adult Inpatient Plan of Care  Goal: Plan of Care Review  Outcome: Ongoing, Progressing  Flowsheets (Taken 12/9/2023 0046)  Plan of Care Reviewed With: patient  Goal: Patient-Specific Goal (Individualized)  Outcome: Ongoing, Progressing  Flowsheets (Taken 12/9/2023 0046)  Anxieties, Fears or Concerns: none at this time  Individualized Care Needs: Pain management, IV abx, promote safety with mobility  Goal: Absence of Hospital-Acquired Illness or Injury  Outcome: Ongoing, Progressing  Intervention: Prevent and Manage VTE (Venous Thromboembolism) Risk  Flowsheets (Taken 12/8/2023 2000)  VTE Prevention/Management:   ambulation promoted   bleeding risk assessed   bleeding precations maintained   dorsiflexion/plantar flexion performed   ROM (active) performed  Goal: Optimal Comfort and Wellbeing  Outcome: Ongoing, Progressing  Intervention: Monitor Pain and Promote Comfort  Flowsheets (Taken 12/8/2023 2000)  Pain Management Interventions:   pain management plan reviewed with patient/caregiver   care clustered   diversional activity provided   pillow support provided   position adjusted   quiet environment facilitated   relaxation techniques promoted     Problem: Fall Injury Risk  Goal: Absence of Fall and Fall-Related Injury  Outcome: Ongoing, Progressing

## 2023-12-09 NOTE — PLAN OF CARE
Problem: Adult Inpatient Plan of Care  Goal: Plan of Care Review  Outcome: Ongoing, Progressing  Flowsheets (Taken 12/9/2023 1748)  Plan of Care Reviewed With: patient  Goal: Patient-Specific Goal (Individualized)  Outcome: Ongoing, Progressing  Flowsheets (Taken 12/9/2023 1748)  Anxieties, Fears or Concerns: none voiced  Individualized Care Needs: pain management, promote safety with mobility, encourage getting OOB, placement on discharge  Goal: Absence of Hospital-Acquired Illness or Injury  Outcome: Ongoing, Progressing  Intervention: Identify and Manage Fall Risk  Flowsheets (Taken 12/9/2023 1748)  Safety Promotion/Fall Prevention:   assistive device/personal item within reach   bed alarm set   nonskid shoes/socks when out of bed   instructed to call staff for mobility   side rails raised x 3   family to remain at bedside  Intervention: Prevent Skin Injury  Flowsheets (Taken 12/9/2023 1748)  Body Position: position changed independently  Skin Protection:   adhesive use limited   incontinence pads utilized   tubing/devices free from skin contact   transparent dressing maintained  Intervention: Prevent and Manage VTE (Venous Thromboembolism) Risk  Flowsheets (Taken 12/9/2023 1748)  Activity Management: Rolling - L1  VTE Prevention/Management:   ambulation promoted   bleeding precations maintained   fluids promoted  Range of Motion: active ROM (range of motion) encouraged  Intervention: Prevent Infection  Flowsheets (Taken 12/9/2023 1748)  Infection Prevention:   equipment surfaces disinfected   hand hygiene promoted   cohorting utilized  Goal: Optimal Comfort and Wellbeing  Outcome: Ongoing, Progressing  Intervention: Monitor Pain and Promote Comfort  Flowsheets (Taken 12/9/2023 1748)  Pain Management Interventions:   care clustered   quiet environment facilitated   relaxation techniques promoted  Intervention: Provide Person-Centered Care  Flowsheets (Taken 12/9/2023 1748)  Trust Relationship/Rapport:   care  explained   choices provided   questions encouraged  Goal: Readiness for Transition of Care  Outcome: Ongoing, Progressing  Intervention: Mutually Develop Transition Plan  Flowsheets (Taken 12/9/2023 1748)  Communicated HALI with patient/caregiver: Date not available/Unable to determine     Problem: Diabetes Comorbidity  Goal: Blood Glucose Level Within Targeted Range  Outcome: Ongoing, Progressing  Intervention: Monitor and Manage Glycemia  Flowsheets (Taken 12/9/2023 1748)  Glycemic Management: oral hydration promoted     Problem: Fall Injury Risk  Goal: Absence of Fall and Fall-Related Injury  Outcome: Ongoing, Progressing  Intervention: Identify and Manage Contributors  Flowsheets (Taken 12/9/2023 1748)  Self-Care Promotion:   independence encouraged   BADL personal objects within reach   safe use of adaptive equipment encouraged  Medication Review/Management: medications reviewed  Intervention: Promote Injury-Free Environment  Flowsheets (Taken 12/9/2023 1748)  Safety Promotion/Fall Prevention:   assistive device/personal item within reach   bed alarm set   nonskid shoes/socks when out of bed   instructed to call staff for mobility   side rails raised x 3   family to remain at bedside

## 2023-12-09 NOTE — PT/OT/SLP PROGRESS
Occupational Therapy  Treatment    Name: Priscila Randhawa    : 1933 (89 y.o.)  MRN: [id number]           TREATMENT SUMMARY AND RECOMMENDATIONS:      OT Date of Treatment: 23  OT Start Time: 1100  OT Stop Time: 1115  OT Total Time (min): 15 min      Subjective Assessment:   No complaints  Lethargic   x Awake, alert, cooperative  Impulsive    Uncooperative   Flat affect    Agitated x c/o pain    Appropriate  c/o fatigue    Confused x Treated at bedside     Emotionally labile  Treated in gym/dept.      Other:        Therapy Precautions:   x Cognitive deficits  Spinal precautions    Collar - hard  Sternal precautions    Collar - soft   TLSO   x Fall risk  LSO    Hip precautions - posterior  Knee immobilizer    Hip precautions - anterior  WBAT    Impaired communication  Partial weightbearing    Oxygen  TTWB    PEG tube  NWB    Visual deficits      Hearing deficits   Other:        Treatment Objectives:     Mobility Training:    Mobility task Assist level Comments    Bed mobility     Transfer CGA Stand/pivot t/f with RW to<>from BSChair   Sit to stands transitions SBA From BSchair   Functional mobility CGA/SBA X135 feet with RW; x3 standing rest breaks    Sitting balance     Standing balance      Other:          Additional Comments:  Pt reports just finished getting bath from CNA and reports fatigue. Agreed to walk only. Pt stated L knee pain but able to perform gait without issues.     Assessment: Patient tolerated session well.    GOALS:   Multidisciplinary Problems       Occupational Therapy Goals          Problem: Occupational Therapy    Goal Priority Disciplines Outcome Interventions   Occupational Therapy Goal     OT, PT/OT Ongoing, Progressing    Description: Goals to be met by: 23     Patient will increase functional independence with ADLs by performing:    UE Dressing with Set-up Assistance.  LE Dressing with Minimal Assistance.  Toileting from toilet with Contact Guard Assistance for hygiene  and clothing management.   Bathing from  shower chair/bench with Minimal Assistance.  Toilet transfer to toilet with Stand-by Assistance.  Increased functional strength to 4/5 for BUEs.                         Recommendations:     Discharge Equipment Recommendations:  none     Plan:     Patient to be seen 6 x/week (QD/BID as appropriate) to address the above listed problems via self-care/home management, therapeutic activities, therapeutic exercises  Plan of Care Expires: 12/22/23  Plan of Care Reviewed with: patient  Revisions made to plan of care: No      Skilled OT Minutes Provided: 15  Communication with Treatment Team:     Equipment recommendations:       At end of treatment, patient remained:  x Up in chair     Up in wheelchair in room    In bed   x With alarm activated    Bed rails up   x Call bell in reach    x Family/friends present    Restraints secured properly    In bathroom with CNA/RN notified    In gym with PT/PTA/tech    Nurse aware    Other:

## 2023-12-09 NOTE — PROGRESS NOTES
[facility] - Medical Surgical Unit  [facility] MEDICINE - Progress Note    Patient Name: Priscila Randhawa  MRN: [id number]  Patient Class: IP- Swing   Admission Date: 12/7/2023   Admitting Physician: VINCENT Service   Attending Physician: Thairy G Reyes, DO  Primary Care Provider: Selena Hou MD  Face-to-Face encounter date: 12/09/2023      CHIEF COMPLAINT   Frequent falls  HISTORY OF PRESENTING ILLNESS   89 year-old white female with a history of dementia, hypertension, diabetes who presented to the ER after a fall at home. She lives alone and has her niece at the bedside while she was in the ER however today she is alone. She does have frequent falls. Her PCP did recommend her to be placed in assisted living versus nursing home.  Patient was has been reluctant. Admitted for management of UTI, PT OT for the weakness and possible nursing home placement if patient and family are amenable.    Today  No complaints, ambulating 150 ft with contact guard assistance since standby assistance with a rolling walker.  PAST MEDICAL HISTORY     Past Medical History:   Diagnosis Date    Background diabetic retinopathy     Carotid artery stenosis     Cataract extraction status     Combined hyperlipidemia     HTN (hypertension)     Polycythemia     Type 2 diabetes mellitus without complications     Unspecified osteoarthritis, unspecified site     Vitamin D deficiency        PAST SURGICAL HISTORY     Past Surgical History:   Procedure Laterality Date    EPIDURAL STEROID INJECTION INTO LUMBAR SPINE      EXPLORATORY LAPAROTOMY      hx total knee repla         FAMILY HISTORY   Reviewed and noncontributory to this case    SOCIAL HISTORY     Social History     Socioeconomic History    Marital status: Single   Occupational History    Occupation: caregiver   Tobacco Use    Smoking status: Never    Smokeless tobacco: Never     Social Determinants of Health     Financial Resource Strain: Low Risk  (12/7/2023)    Overall Financial  Resource Strain (CARDIA)     Difficulty of Paying Living Expenses: Not hard at all   Food Insecurity: No Food Insecurity (12/7/2023)    Hunger Vital Sign     Worried About Running Out of Food in the Last Year: Never true     Ran Out of Food in the Last Year: Never true   Transportation Needs: No Transportation Needs (12/7/2023)    PRAPARE - Transportation     Lack of Transportation (Medical): No     Lack of Transportation (Non-Medical): No   Physical Activity: Inactive (12/7/2023)    Exercise Vital Sign     Days of Exercise per Week: 0 days     Minutes of Exercise per Session: 0 min   Stress: No Stress Concern Present (12/7/2023)    [employer] [place] [employer] - Occupational Stress Questionnaire     Feeling of Stress : Only a little   Social Connections: Moderately Integrated (12/7/2023)    Social Connection and Isolation Panel [NHANES]     Frequency of Communication with Friends and Family: More than three times a week     Frequency of Social Gatherings with Friends and Family: More than three times a week     Attends Moravian Services: 1 to 4 times per year     Active Member of Clubs or Organizations: No     Attends Club or Organization Meetings: 1 to 4 times per year     Marital Status:    Recent Concern: Social Connections - Moderately Isolated (12/4/2023)    Social Connection and Isolation Panel [NHANES]     Frequency of Communication with Friends and Family: More than three times a week     Frequency of Social Gatherings with Friends and Family: More than three times a week     Attends Moravian Services: 1 to 4 times per year     Active Member of Clubs or Organizations: No     Attends Club or Organization Meetings: Never     Marital Status:    Housing Stability: Low Risk  (12/7/2023)    Housing Stability Vital Sign     Unable to Pay for Housing in the Last Year: No     Number of Places Lived in the Last Year: 1     Unstable Housing in the Last Year: No       HOME MEDICATIONS      Prior to Admission medications    Medication Sig Start Date End Date Taking? Authorizing Provider   losartan (COZAAR) 50 MG tablet Take 1 tablet (50 mg total) by mouth once daily. 5/8/23 5/7/24 Yes Selena Hou MD   pravastatin (PRAVACHOL) 80 MG tablet Take 1 tablet (80 mg total) by mouth once daily. 5/8/23 5/7/24 Yes Selena Hou MD       ALLERGIES   Codeine    REVIEW OF SYSTEMS   Except as documented above, all other systems reviewed and negative    PHYSICAL EXAM     Vitals:    12/09/23 0844   BP: 117/68   Pulse:    Resp:    Temp:       General:  In no acute distress, resting comfortably  Head and neck:  Atraumatic, normocephalic, moist mucous membranes, supple neck  Chest:  Clear to auscultation bilaterally  Heart:  S1, S2, grade 3/6 systolic murmur  Abdomen:  Soft, nontender, BS +  MSK:  Warm, no lower extremity edema, no clubbing or cyanosis  Neuro:  weak  Integumentary:  No obvious skin rash  Psychiatry:  Appropriate mood and affect    ASSESSMENT AND PLAN   E. Coli UTI  -Rocephin 12/4-12/8   -Cultures show sensitivity     Essential HTN  -losartan, well controlled     Left shoulder pain  -xray left shoulder showed no acute abnormality     Weakness and frequent falls  -PT/OT  -possible placement     DVT prophylaxis:  Lovenox  Disposition:  Pending placement  __________________________________________________________________  LABS/MICRO/MEDS/DIAGNOSTICS       LABS  Recent Labs     12/08/23  0310      K 4.7   CHLORIDE 106   CO2 25   BUN 21.8*   CREATININE 0.84   GLUCOSE 94   CALCIUM 8.6     Recent Labs     12/08/23  0310   WBC 8.09   RBC 3.85*   HCT 38.2   MCV 99.2*          MICROBIOLOGY  Microbiology Results (last 7 days)       ** No results found for the last 168 hours. **            MEDICATIONS   enoxparin  40 mg Subcutaneous Daily    losartan  50 mg Oral Daily    pravastatin  80 mg Oral Daily      INFUSIONS      DIAGNOSTIC TESTS  No orders to display          Patient information was  obtained from patient, patient's family, past medical records and ER records.   All diagnosis and differential diagnosis have been reviewed; assessment and plan has been documented. I have personally reviewed the labs and test results that are presently available; I have reviewed the patients medication list. I have reviewed the consulting providers response and recommendations. I have reviewed or attempted to review medical records based upon their availability.  All of the patient's questions have been addressed and answered. Patient's is agreeable to the above stated plan. I will continue to monitor closely and make adjustments to medical management as needed.  This note was created using [employer] voice recognition software that occasionally misinterpreted phrases or words.  Please contact me if any questions may rise regarding documentation to clarify verbiage.        Thairy G Reyes,    Internal Medicine  Department of Hospital Medicine  [facility] Surgical Unit

## 2023-12-10 PROCEDURE — 63600175 PHARM REV CODE 636 W HCPCS: Performed by: STUDENT IN AN ORGANIZED HEALTH CARE EDUCATION/TRAINING PROGRAM

## 2023-12-10 PROCEDURE — 11000004 HC SNF PRIVATE

## 2023-12-10 PROCEDURE — 25000003 PHARM REV CODE 250: Performed by: STUDENT IN AN ORGANIZED HEALTH CARE EDUCATION/TRAINING PROGRAM

## 2023-12-10 RX ADMIN — LOSARTAN POTASSIUM 50 MG: 50 TABLET, FILM COATED ORAL at 09:12

## 2023-12-10 RX ADMIN — ENOXAPARIN SODIUM 40 MG: 40 INJECTION SUBCUTANEOUS at 09:12

## 2023-12-10 RX ADMIN — PRAVASTATIN SODIUM 80 MG: 40 TABLET ORAL at 09:12

## 2023-12-10 RX ADMIN — TRAMADOL HYDROCHLORIDE 50 MG: 50 TABLET, COATED ORAL at 11:12

## 2023-12-10 NOTE — PROGRESS NOTES
[facility]  [facility] MEDICINE - Progress Note    Patient Name: Priscila Randhawa  MRN: [id number]  Patient Class: IP- Swing   Admission Date: 12/7/2023   Admitting Physician: VINCENT Service   Attending Physician: Thairy G Reyes, DO  Primary Care Provider: Selena Hou MD  Face-to-Face encounter date: 12/10/2023      CHIEF COMPLAINT   Frequent falls  HISTORY OF PRESENTING ILLNESS   89 year-old white female with a history of dementia, hypertension, diabetes who presented to the ER after a fall at home. She lives alone and has her niece at the bedside while she was in the ER however today she is alone. She does have frequent falls. Her PCP did recommend her to be placed in assisted living versus nursing home.  Patient was has been reluctant. Admitted for management of UTI, PT OT for the weakness and possible nursing home placement if patient and family are amenable.    Today  Doing well  PAST MEDICAL HISTORY     Past Medical History:   Diagnosis Date    Background diabetic retinopathy     Carotid artery stenosis     Cataract extraction status     Combined hyperlipidemia     HTN (hypertension)     Polycythemia     Type 2 diabetes mellitus without complications     Unspecified osteoarthritis, unspecified site     Vitamin D deficiency        PAST SURGICAL HISTORY     Past Surgical History:   Procedure Laterality Date    EPIDURAL STEROID INJECTION INTO LUMBAR SPINE      EXPLORATORY LAPAROTOMY      hx total knee repla         FAMILY HISTORY   Reviewed and noncontributory to this case    SOCIAL HISTORY     Social History     Socioeconomic History    Marital status: Single   Occupational History    Occupation: caregiver   Tobacco Use    Smoking status: Never    Smokeless tobacco: Never     Social Determinants of Health     Financial Resource Strain: Low Risk  (12/7/2023)    Overall Financial Resource Strain (CARDIA)     Difficulty of Paying Living Expenses: Not hard at all   Food Insecurity: No  Food Insecurity (12/7/2023)    Hunger Vital Sign     Worried About Running Out of Food in the Last Year: Never true     Ran Out of Food in the Last Year: Never true   Transportation Needs: No Transportation Needs (12/7/2023)    PRAPARE - Transportation     Lack of Transportation (Medical): No     Lack of Transportation (Non-Medical): No   Physical Activity: Inactive (12/7/2023)    Exercise Vital Sign     Days of Exercise per Week: 0 days     Minutes of Exercise per Session: 0 min   Stress: No Stress Concern Present (12/7/2023)    [facility] [place] of Occupational Health - Occupational Stress Questionnaire     Feeling of Stress : Only a little   Social Connections: Moderately Integrated (12/7/2023)    Social Connection and Isolation Panel [NHANES]     Frequency of Communication with Friends and Family: More than three times a week     Frequency of Social Gatherings with Friends and Family: More than three times a week     Attends Alevism Services: 1 to 4 times per year     Active Member of Clubs or Organizations: No     Attends Club or Organization Meetings: 1 to 4 times per year     Marital Status:    Recent Concern: Social Connections - Moderately Isolated (12/4/2023)    Social Connection and Isolation Panel [NHANES]     Frequency of Communication with Friends and Family: More than three times a week     Frequency of Social Gatherings with Friends and Family: More than three times a week     Attends Alevism Services: 1 to 4 times per year     Active Member of Clubs or Organizations: No     Attends Club or Organization Meetings: Never     Marital Status:    Housing Stability: Low Risk  (12/7/2023)    Housing Stability Vital Sign     Unable to Pay for Housing in the Last Year: No     Number of Places Lived in the Last Year: 1     Unstable Housing in the Last Year: No       HOME MEDICATIONS     Prior to Admission medications    Medication Sig Start Date End Date Taking? Authorizing Provider    losartan (COZAAR) 50 MG tablet Take 1 tablet (50 mg total) by mouth once daily. 5/8/23 5/7/24 Yes Selena Hou MD   pravastatin (PRAVACHOL) 80 MG tablet Take 1 tablet (80 mg total) by mouth once daily. 5/8/23 5/7/24 Yes Selena Hou MD       ALLERGIES   Codeine    REVIEW OF SYSTEMS   Except as documented above, all other systems reviewed and negative    PHYSICAL EXAM     Vitals:    12/10/23 0902   BP: (!) 143/76   Pulse:    Resp:    Temp:       General:  In no acute distress, resting comfortably  Head and neck:  Atraumatic, normocephalic, moist mucous membranes, supple neck  Chest:  Clear to auscultation bilaterally  Heart:  S1, S2, grade 3/6 systolic murmur  Abdomen:  Soft, nontender, BS +  MSK:  Warm, no lower extremity edema, no clubbing or cyanosis  Neuro:  weak  Integumentary:  No obvious skin rash  Psychiatry:  Appropriate mood and affect    ASSESSMENT AND PLAN   E. Coli UTI  -Rocephin 12/4-12/8   -Cultures show sensitivity     Essential HTN  -losartan, well controlled     Left shoulder pain  -xray left shoulder showed no acute abnormality     Weakness and frequent falls  -PT/OT  -possible placement     DVT prophylaxis:  Lovenox  Disposition:  Pending placement  __________________________________________________________________  LABS/MICRO/MEDS/DIAGNOSTICS       LABS  Recent Labs     12/08/23  0310      K 4.7   CHLORIDE 106   CO2 25   BUN 21.8*   CREATININE 0.84   GLUCOSE 94   CALCIUM 8.6     Recent Labs     12/08/23  0310   WBC 8.09   RBC 3.85*   HCT 38.2   MCV 99.2*          MICROBIOLOGY  Microbiology Results (last 7 days)       ** No results found for the last 168 hours. **            MEDICATIONS   enoxparin  40 mg Subcutaneous Daily    losartan  50 mg Oral Daily    pravastatin  80 mg Oral Daily      INFUSIONS      DIAGNOSTIC TESTS  No orders to display          Patient information was obtained from patient, patient's family, past medical records and ER records.   All diagnosis and  differential diagnosis have been reviewed; assessment and plan has been documented. I have personally reviewed the labs and test results that are presently available; I have reviewed the patients medication list. I have reviewed the consulting providers response and recommendations. I have reviewed or attempted to review medical records based upon their availability.  All of the patient's questions have been addressed and answered. Patient's is agreeable to the above stated plan. I will continue to monitor closely and make adjustments to medical management as needed.  This note was created using [employer] voice recognition software that occasionally misinterpreted phrases or words.  Please contact me if any questions may rise regarding documentation to clarify verbiage.        Thairy G Reyes,    Internal Medicine  Department of Hospital Medicine  [facility]

## 2023-12-10 NOTE — PLAN OF CARE
Problem: Adult Inpatient Plan of Care  Goal: Plan of Care Review  Outcome: Ongoing, Progressing  Flowsheets (Taken 12/10/2023 1126)  Plan of Care Reviewed With: patient  Goal: Patient-Specific Goal (Individualized)  Outcome: Ongoing, Progressing  Flowsheets (Taken 12/10/2023 1126)  Anxieties, Fears or Concerns: none voiced  Individualized Care Needs: pain management, promote safety with mobility, encourage getting OOB, placement on discharge  Goal: Absence of Hospital-Acquired Illness or Injury  Outcome: Ongoing, Progressing  Intervention: Identify and Manage Fall Risk  Flowsheets (Taken 12/10/2023 1126)  Safety Promotion/Fall Prevention:   assistive device/personal item within reach   bed alarm set   family to remain at bedside   room near unit station   instructed to call staff for mobility   nonskid shoes/socks when out of bed  Intervention: Prevent Skin Injury  Flowsheets (Taken 12/10/2023 1126)  Body Position: position changed independently  Skin Protection:   adhesive use limited   incontinence pads utilized   tubing/devices free from skin contact   transparent dressing maintained  Intervention: Prevent and Manage VTE (Venous Thromboembolism) Risk  Flowsheets (Taken 12/10/2023 1126)  Activity Management: Rolling - L1  VTE Prevention/Management:   ambulation promoted   bleeding precations maintained   fluids promoted  Range of Motion: active ROM (range of motion) encouraged  Intervention: Prevent Infection  Flowsheets (Taken 12/10/2023 1126)  Infection Prevention:   equipment surfaces disinfected   hand hygiene promoted  Goal: Optimal Comfort and Wellbeing  Outcome: Ongoing, Progressing  Intervention: Monitor Pain and Promote Comfort  Flowsheets (Taken 12/10/2023 1126)  Pain Management Interventions:   care clustered   quiet environment facilitated   relaxation techniques promoted  Intervention: Provide Person-Centered Care  Flowsheets (Taken 12/10/2023 1126)  Trust Relationship/Rapport:   care explained    choices provided   questions encouraged  Goal: Readiness for Transition of Care  Outcome: Ongoing, Progressing  Intervention: Mutually Develop Transition Plan  Flowsheets (Taken 12/10/2023 1126)  Communicated HALI with patient/caregiver: Date not available/Unable to determine     Problem: Diabetes Comorbidity  Goal: Blood Glucose Level Within Targeted Range  Outcome: Ongoing, Progressing  Intervention: Monitor and Manage Glycemia  Flowsheets (Taken 12/10/2023 1126)  Glycemic Management: oral hydration promoted     Problem: Fall Injury Risk  Goal: Absence of Fall and Fall-Related Injury  Outcome: Ongoing, Progressing  Intervention: Identify and Manage Contributors  Flowsheets (Taken 12/10/2023 1126)  Self-Care Promotion:   safe use of adaptive equipment encouraged   BADL personal objects within reach   independence encouraged  Medication Review/Management: medications reviewed  Intervention: Promote Injury-Free Environment  Flowsheets (Taken 12/10/2023 1126)  Safety Promotion/Fall Prevention:   assistive device/personal item within reach   bed alarm set   family to remain at bedside   room near unit station   instructed to call staff for mobility   nonskid shoes/socks when out of bed

## 2023-12-10 NOTE — PLAN OF CARE
[facility]  Discharge Reassessment    Primary Care Provider: Selena Hou MD    Expected Discharge Date:     Reassessment (most recent)       Discharge Reassessment - 12/10/23 1738          Discharge Reassessment    Assessment Type Discharge Planning Reassessment     Did the patient's condition or plan change since previous assessment? No     Discharge Plan discussed with: Spouse/sig other     Name(s) and Number(s) Michel Li      Communicated HALI with patient/caregiver Date not available/Unable to determine     Discharge Plan A Home with family;Home Health     DME Needed Upon Discharge  none     Transition of Care Barriers None     Why the patient remains in the hospital Requires continued medical care        Post-Acute Status    Post-Acute Authorization Home Health     Home Health Status Pending medical clearance/testing     Hospital Resources/Appts/Education Provided Provided patient/caregiver with written discharge plan information     Discharge Delays None known at this time

## 2023-12-10 NOTE — PLAN OF CARE
Problem: Adult Inpatient Plan of Care  Goal: Plan of Care Review  Outcome: Ongoing, Progressing  Flowsheets (Taken 12/9/2023 2000)  Plan of Care Reviewed With: patient  Goal: Patient-Specific Goal (Individualized)  Outcome: Ongoing, Progressing     Problem: Fall Injury Risk  Goal: Absence of Fall and Fall-Related Injury  Outcome: Ongoing, Progressing  Intervention: Identify and Manage Contributors  Flowsheets (Taken 12/9/2023 2000)  Self-Care Promotion:   independence encouraged   BADL personal objects within reach   BADL personal routines maintained  Medication Review/Management: medications reviewed  Intervention: Promote Injury-Free Environment  Flowsheets (Taken 12/9/2023 2000)  Safety Promotion/Fall Prevention:   assistive device/personal item within reach   bed alarm set   lighting adjusted   medications reviewed   nonskid shoes/socks when out of bed   side rails raised x 3

## 2023-12-11 PROCEDURE — 97110 THERAPEUTIC EXERCISES: CPT

## 2023-12-11 PROCEDURE — 25000003 PHARM REV CODE 250: Performed by: STUDENT IN AN ORGANIZED HEALTH CARE EDUCATION/TRAINING PROGRAM

## 2023-12-11 PROCEDURE — 97116 GAIT TRAINING THERAPY: CPT

## 2023-12-11 PROCEDURE — 63600175 PHARM REV CODE 636 W HCPCS: Performed by: STUDENT IN AN ORGANIZED HEALTH CARE EDUCATION/TRAINING PROGRAM

## 2023-12-11 PROCEDURE — 97530 THERAPEUTIC ACTIVITIES: CPT

## 2023-12-11 PROCEDURE — 11000004 HC SNF PRIVATE

## 2023-12-11 RX ADMIN — ENOXAPARIN SODIUM 40 MG: 40 INJECTION SUBCUTANEOUS at 09:12

## 2023-12-11 RX ADMIN — PRAVASTATIN SODIUM 80 MG: 40 TABLET ORAL at 09:12

## 2023-12-11 RX ADMIN — TRAMADOL HYDROCHLORIDE 50 MG: 50 TABLET, COATED ORAL at 03:12

## 2023-12-11 RX ADMIN — LOSARTAN POTASSIUM 50 MG: 50 TABLET, FILM COATED ORAL at 09:12

## 2023-12-11 NOTE — PT/OT/SLP PROGRESS
Physical Therapy Treatment Note           Name: Priscila Randhawa    : 1933 (89 y.o.)  MRN: [id number]           TREATMENT SUMMARY AND RECOMMENDATIONS:    PT Received On: 23  PT Start Time: 152     PT Stop Time: 1545  PT Total Time (min): 20 min     Subjective Assessment:   No complaints  Lethargic   x Awake, alert, cooperative  Uncooperative    Agitated  c/o pain    Appropriate  c/o fatigue    Confused  Treated at bedside     Emotionally labile x Treated in gym/dept.    Impulsive  Other:    Flat affect       Therapy Precautions:    Cognitive deficits  Spinal precautions    Collar - hard  Sternal precautions    Collar - soft   TLSO   x Fall risk  LSO    Hip precautions - posterior  Knee immobilizer    Hip precautions - anterior  WBAT    Impaired communication  Partial weightbearing    Oxygen  TTWB    PEG tube  NWB    Visual deficits  Other:    Hearing deficits          Treatment Objectives:     Mobility Training:   Assist level Comments    Bed mobility MOD A Sit>supine   Transfer CGA Stand pivot using RW   Gait CGA X 150 feet using RW with WC following   Sit to stand transitions CGA From standard chair and WC   Sitting balance     Standing balance      Wheelchair mobility     Car transfer     Other:          Therapeutic Exercise:   Exercise Sets Reps Comments   B LE bike using UBE  5' Forward   Seated B LE AROM  20 All functional planes                   Additional Comments:  NO changes or complications.     Assessment: Patient tolerated session well. Recommending pt not live alone    PT Plan: continue per POC  Revisions made to plan of care: No    GOALS:   Multidisciplinary Problems       Physical Therapy Goals          Problem: Physical Therapy    Goal Priority Disciplines Outcome Goal Variances Interventions   Physical Therapy Goal     PT, PT/OT Ongoing, Not Progressing     Description: Description: Goals to be met by: Discharge      Patient will increase functional independence with  mobility by performin. Sit to stand transfer with Modified [place]  2. Bed to chair transfer with Modified [place] using Single-point Cane   3. Gait  x 50 feet with Modified [place] using Single-point Cane .                          Skilled PT Minutes Provided: 20   Communication with Treatment Team:     Equipment recommendations:       At end of treatment, patient remained:   Up in chair     Up in wheelchair in room   x In bed    With alarm activated   x Bed rails up   x Call bell in reach    x Family/friends present    Restraints secured properly    In bathroom with CNA/RN notified    Nurse aware    In gym with therapist/tech    Other:

## 2023-12-11 NOTE — PROGRESS NOTES
[facility]  [facility] MEDICINE - Progress Note    Patient Name: Priscila Randhawa  MRN: [id number]  Patient Class: IP- Swing   Admission Date: 12/7/2023   Admitting Physician: HM Service   Attending Physician: Thairy G Reyes, DO  Primary Care Provider: Selena Hou MD  Face-to-Face encounter date: 12/11/2023      CHIEF COMPLAINT   Frequent falls  HISTORY OF PRESENTING ILLNESS   89 year-old white female with a history of dementia, hypertension, diabetes who presented to the ER after a fall at home. She lives alone and has her niece at the bedside while she was in the ER however today she is alone. She does have frequent falls. Her PCP did recommend her to be placed in assisted living versus nursing home.  Patient was has been reluctant. Admitted for management of UTI, PT OT for the weakness and possible nursing home placement if patient and family are amenable.    Today  Doing well, pending placement  PAST MEDICAL HISTORY     Past Medical History:   Diagnosis Date    Background diabetic retinopathy     Carotid artery stenosis     Cataract extraction status     Combined hyperlipidemia     HTN (hypertension)     Polycythemia     Type 2 diabetes mellitus without complications     Unspecified osteoarthritis, unspecified site     Vitamin D deficiency        PAST SURGICAL HISTORY     Past Surgical History:   Procedure Laterality Date    EPIDURAL STEROID INJECTION INTO LUMBAR SPINE      EXPLORATORY LAPAROTOMY      hx total knee repla         FAMILY HISTORY   Reviewed and noncontributory to this case    SOCIAL HISTORY     Social History     Socioeconomic History    Marital status: Single   Occupational History    Occupation: caregiver   Tobacco Use    Smoking status: Never    Smokeless tobacco: Never     Social Determinants of Health     Financial Resource Strain: Low Risk  (12/7/2023)    Overall Financial Resource Strain (CARDIA)     Difficulty of Paying Living Expenses: Not hard at all    Food Insecurity: No Food Insecurity (12/7/2023)    Hunger Vital Sign     Worried About Running Out of Food in the Last Year: Never true     Ran Out of Food in the Last Year: Never true   Transportation Needs: No Transportation Needs (12/7/2023)    PRAPARE - Transportation     Lack of Transportation (Medical): No     Lack of Transportation (Non-Medical): No   Physical Activity: Inactive (12/7/2023)    Exercise Vital Sign     Days of Exercise per Week: 0 days     Minutes of Exercise per Session: 0 min   Stress: No Stress Concern Present (12/7/2023)    [facility] of Occupational Health - Occupational Stress Questionnaire     Feeling of Stress : Only a little   Social Connections: Moderately Integrated (12/7/2023)    Social Connection and Isolation Panel [NHANES]     Frequency of Communication with Friends and Family: More than three times a week     Frequency of Social Gatherings with Friends and Family: More than three times a week     Attends Rastafarian Services: 1 to 4 times per year     Active Member of Clubs or Organizations: No     Attends Club or Organization Meetings: 1 to 4 times per year     Marital Status:    Recent Concern: Social Connections - Moderately Isolated (12/4/2023)    Social Connection and Isolation Panel [NHANES]     Frequency of Communication with Friends and Family: More than three times a week     Frequency of Social Gatherings with Friends and Family: More than three times a week     Attends Rastafarian Services: 1 to 4 times per year     Active Member of Clubs or Organizations: No     Attends Club or Organization Meetings: Never     Marital Status:    Housing Stability: Low Risk  (12/7/2023)    Housing Stability Vital Sign     Unable to Pay for Housing in the Last Year: No     Number of Places Lived in the Last Year: 1     Unstable Housing in the Last Year: No       HOME MEDICATIONS     Prior to Admission medications    Medication Sig Start Date End Date Taking?  "Authorizing Provider   losartan (COZAAR) 50 MG tablet Take 1 tablet (50 mg total) by mouth once daily. 5/8/23 5/7/24 Yes Selena Hou MD   pravastatin (PRAVACHOL) 80 MG tablet Take 1 tablet (80 mg total) by mouth once daily. 5/8/23 5/7/24 Yes Selena Hou MD       ALLERGIES   Codeine    REVIEW OF SYSTEMS   Except as documented above, all other systems reviewed and negative    PHYSICAL EXAM     Vitals:    12/11/23 0917   BP: 126/69   Pulse:    Resp:    Temp:       General:  In no acute distress, resting comfortably  Head and neck:  Atraumatic, normocephalic, moist mucous membranes, supple neck  Chest:  Clear to auscultation bilaterally  Heart:  S1, S2, grade 3/6 systolic murmur  Abdomen:  Soft, nontender, BS +  MSK:  Warm, no lower extremity edema, no clubbing or cyanosis  Neuro:  weak  Integumentary:  No obvious skin rash  Psychiatry:  Appropriate mood and affect    ASSESSMENT AND PLAN   E. Coli UTI  -Rocephin 12/4-12/8   -Cultures show sensitivity     Essential HTN  -losartan, well controlled     Left shoulder pain  -xray left shoulder showed no acute abnormality     Weakness and frequent falls  -PT/OT  -possible placement     DVT prophylaxis:  Lovenox  Disposition:  Pending placement  __________________________________________________________________  LABS/MICRO/MEDS/DIAGNOSTICS       LABS  No results for input(s): "NA", "K", "CHLORIDE", "CO2", "BUN", "CREATININE", "GLUCOSE", "CALCIUM", "ALKPHOS", "AST", "ALT", "ALBUMIN" in the last 72 hours.    No results for input(s): "WBC", "RBC", "HCT", "MCV", "PLT" in the last 72 hours.    Invalid input(s): "HG"      MICROBIOLOGY  Microbiology Results (last 7 days)       ** No results found for the last 168 hours. **            MEDICATIONS   enoxparin  40 mg Subcutaneous Daily    losartan  50 mg Oral Daily    pravastatin  80 mg Oral Daily      INFUSIONS      DIAGNOSTIC TESTS  No orders to display          Patient information was obtained from patient, patient's " family, past medical records and ER records.   All diagnosis and differential diagnosis have been reviewed; assessment and plan has been documented. I have personally reviewed the labs and test results that are presently available; I have reviewed the patients medication list. I have reviewed the consulting providers response and recommendations. I have reviewed or attempted to review medical records based upon their availability.  All of the patient's questions have been addressed and answered. Patient's is agreeable to the above stated plan. I will continue to monitor closely and make adjustments to medical management as needed.  This note was created using [employer] voice recognition software that occasionally misinterpreted phrases or words.  Please contact me if any questions may rise regarding documentation to clarify verbiage.        Thairy G Reyes,    Internal Medicine  Department of Hospital Medicine [facility]

## 2023-12-11 NOTE — PT/OT/SLP PROGRESS
Physical Therapy Treatment Note           Name: Priscila Randhawa    : 1933 (89 y.o.)  MRN: [id number]           TREATMENT SUMMARY AND RECOMMENDATIONS:    PT Received On: 23  PT Start Time: 1037     PT Stop Time: 1100  PT Total Time (min): 23 min     Subjective Assessment:   No complaints  Lethargic   x Awake, alert, cooperative  Uncooperative    Agitated  c/o pain    Appropriate  c/o fatigue    Confused  Treated at bedside     Emotionally labile x Treated in gym/dept.    Impulsive  Other:    Flat affect       Therapy Precautions:    Cognitive deficits  Spinal precautions    Collar - hard  Sternal precautions    Collar - soft   TLSO   x Fall risk  LSO    Hip precautions - posterior  Knee immobilizer    Hip precautions - anterior  WBAT    Impaired communication  Partial weightbearing    Oxygen  TTWB    PEG tube  NWB    Visual deficits  Other:    Hearing deficits          Treatment Objectives:     Mobility Training:   Assist level Comments    Bed mobility     Transfer CGA Stand pivot without AD WC>Chair   Gait CGA X 150 feet using RW with WC following   Sit to stand transitions CGA From standard chair and WC   Sitting balance     Standing balance      Wheelchair mobility     Car transfer     Other:          Therapeutic Exercise:   Exercise Sets Reps Comments   B LE bike using UBE  10' 5'F/5'B                         Additional Comments:  NO changes or complications.     Assessment: Patient tolerated session well. Recommending pt not live alone    PT Plan: continue per POC  Revisions made to plan of care: No    GOALS:   Multidisciplinary Problems       Physical Therapy Goals          Problem: Physical Therapy    Goal Priority Disciplines Outcome Goal Variances Interventions   Physical Therapy Goal     PT, PT/OT Ongoing, Not Progressing     Description: Description: Goals to be met by: Discharge      Patient will increase functional independence with mobility by performin. Sit to stand  transfer with Modified [place]  2. Bed to chair transfer with Modified [place] using Single-point Cane   3. Gait  x 50 feet with Modified [place] using Single-point Cane .                          Skilled PT Minutes Provided: 23   Communication with Treatment Team:     Equipment recommendations:       At end of treatment, patient remained:  x Up in chair     Up in wheelchair in room    In bed   x With alarm activated    Bed rails up   x Call bell in reach    x Family/friends present    Restraints secured properly    In bathroom with CNA/RN notified    Nurse aware    In gym with therapist/tech    Other:

## 2023-12-11 NOTE — PLAN OF CARE
Problem: Adult Inpatient Plan of Care  Goal: Plan of Care Review  Outcome: Ongoing, Progressing  Flowsheets (Taken 12/10/2023 2002)  Plan of Care Reviewed With: patient  Goal: Patient-Specific Goal (Individualized)  Outcome: Ongoing, Progressing  Goal: Absence of Hospital-Acquired Illness or Injury  Outcome: Ongoing, Progressing  Intervention: Identify and Manage Fall Risk  Flowsheets (Taken 12/10/2023 2002)  Safety Promotion/Fall Prevention:   assistive device/personal item within reach   bed alarm set  Intervention: Prevent Skin Injury  Flowsheets (Taken 12/10/2023 2002)  Body Position: position changed independently  Skin Protection:   adhesive use limited   incontinence pads utilized   tubing/devices free from skin contact   skin sealant/moisture barrier applied  Intervention: Prevent and Manage VTE (Venous Thromboembolism) Risk  Flowsheets (Taken 12/10/2023 2002)  Activity Management: Rolling - L1  VTE Prevention/Management:   ambulation promoted   bleeding precations maintained   fluids promoted  Range of Motion: active ROM (range of motion) encouraged     Problem: Fall Injury Risk  Goal: Absence of Fall and Fall-Related Injury  Outcome: Ongoing, Progressing  Intervention: Identify and Manage Contributors  Flowsheets (Taken 12/10/2023 2002)  Self-Care Promotion:   BADL personal objects within reach   BADL personal routines maintained   safe use of adaptive equipment encouraged  Medication Review/Management: medications reviewed

## 2023-12-11 NOTE — PT/OT/SLP PROGRESS
Occupational Therapy  Treatment    Name: Priscila Randhawa    : 1933 (89 y.o.)  MRN: [id number]           TREATMENT SUMMARY AND RECOMMENDATIONS:      OT Date of Treatment: 23  OT Start Time: 1505  OT Stop Time: 1530  OT Total Time (min): 25 min      Subjective Assessment:   No complaints  Lethargic   x Awake, alert, cooperative  Impulsive    Uncooperative   Flat affect    Agitated  c/o pain    Appropriate x c/o fatigue    Confused  Treated at bedside     Emotionally labile x Treated in gym/dept.      Other:        Therapy Precautions:    Cognitive deficits  Spinal precautions    Collar - hard  Sternal precautions    Collar - soft   TLSO   x Fall risk  LSO    Hip precautions - posterior  Knee immobilizer    Hip precautions - anterior  WBAT    Impaired communication  Partial weightbearing    Oxygen  TTWB    PEG tube  NWB    Visual deficits      Hearing deficits   Other:        Treatment Objectives:     Mobility Training:    Mobility task Assist level Comments    Bed mobility     Transfer CGA/SBA Stand/pivot t/f with RW [place]>w/c   Sit to stands transitions SBA From bedside chair    Functional mobility CGA/SBA X100 feet with RW    Sitting balance     Standing balance      Other:          Therapeutic Exercise:   Exercise Sets Reps Comments   Arm bike 1 5 min Level 1; forwards                         Additional Comments:      Assessment: Patient tolerated session well.    GOALS:   Multidisciplinary Problems       Occupational Therapy Goals          Problem: Occupational Therapy    Goal Priority Disciplines Outcome Interventions   Occupational Therapy Goal     OT, PT/OT Ongoing, Progressing    Description: Goals to be met by: 23     Patient will increase functional independence with ADLs by performing:    UE Dressing with Set-up Assistance.  LE Dressing with Minimal Assistance.  Toileting from toilet with Contact Guard Assistance for hygiene and clothing management.   Bathing from  shower chair/bench  with Minimal Assistance.  Toilet transfer to toilet with Stand-by Assistance.  Increased functional strength to 4/5 for BUEs.                         Recommendations:     Discharge Equipment Recommendations:  none     Plan:     Patient to be seen 6 x/week (QD/BID as appropriate) to address the above listed problems via self-care/home management, therapeutic activities, therapeutic exercises  Plan of Care Expires: 12/22/23  Plan of Care Reviewed with: patient  Revisions made to plan of care: No      Skilled OT Minutes Provided: 25  Communication with Treatment Team:     Equipment recommendations:       At end of treatment, patient remained:   Up in chair     Up in wheelchair in room    In bed    With alarm activated    Bed rails up    Call bell in reach     Family/friends present    Restraints secured properly    In bathroom with CNA/RN notified   x In gym with PT/PTA/tech    Nurse aware    Other:

## 2023-12-11 NOTE — PT/OT/SLP PROGRESS
Occupational Therapy  Treatment    Name: Priscila Randhawa    : 1933 (89 y.o.)  MRN: [id number]           TREATMENT SUMMARY AND RECOMMENDATIONS:      OT Date of Treatment: 23  OT Start Time: 1000  OT Stop Time: 1030  OT Total Time (min): 30 min      Subjective Assessment:   No complaints  Lethargic   x Awake, alert, cooperative  Impulsive    Uncooperative   Flat affect    Agitated  c/o pain    Appropriate  c/o fatigue    Confused  Treated at bedside     Emotionally labile x Treated in gym/dept.      Other:        Therapy Precautions:   x Cognitive deficits  Spinal precautions    Collar - hard  Sternal precautions    Collar - soft   TLSO   x Fall risk  LSO    Hip precautions - posterior  Knee immobilizer    Hip precautions - anterior  WBAT    Impaired communication  Partial weightbearing    Oxygen  TTWB    PEG tube  NWB    Visual deficits      Hearing deficits   Other:        Treatment Objectives:     Mobility Training:    Mobility task Assist level Comments    Bed mobility Mod A Supine>EOB   Transfer CGA Stand/pivot t/f with RW EOB>w/c    Sit to stands transitions SBA    Functional mobility CGA X80 feet with RW    Sitting balance     Standing balance  CGA Pt stood with RW anterior and participated in balloon volleyball ax to hit back and forth with RUE f/b LUE. Pt able to tolerate standing over 3 min with no LOB or c/o pain/fatigue.    Other:          Therapeutic Exercise:   Exercise Sets Reps Comments   1# dowel 2 10 Chest press, straight arm raises                         Additional Comments:      Assessment: Patient tolerated session well.    GOALS:   Multidisciplinary Problems       Occupational Therapy Goals          Problem: Occupational Therapy    Goal Priority Disciplines Outcome Interventions   Occupational Therapy Goal     OT, PT/OT Ongoing, Progressing    Description: Goals to be met by: 23     Patient will increase functional independence with ADLs by performing:    UE Dressing with  Set-up Assistance.  LE Dressing with Minimal Assistance.  Toileting from toilet with Contact Guard Assistance for hygiene and clothing management.   Bathing from  shower chair/bench with Minimal Assistance.  Toilet transfer to toilet with Stand-by Assistance.  Increased functional strength to 4/5 for BUEs.                         Recommendations:     Discharge Equipment Recommendations:  none     Plan:     Patient to be seen 6 x/week (QD/BID as appropriate) to address the above listed problems via self-care/home management, therapeutic activities, therapeutic exercises  Plan of Care Expires: 12/22/23  Plan of Care Reviewed with: patient  Revisions made to plan of care: No      Skilled OT Minutes Provided: 30  Communication with Treatment Team:     Equipment recommendations:       At end of treatment, patient remained:   Up in chair     Up in wheelchair in room    In bed    With alarm activated    Bed rails up    Call bell in reach     Family/friends present    Restraints secured properly    In bathroom with CNA/RN notified   x In gym with PT/PTA/tech    Nurse aware    Other:

## 2023-12-12 PROCEDURE — 11000004 HC SNF PRIVATE

## 2023-12-12 PROCEDURE — 63600175 PHARM REV CODE 636 W HCPCS: Performed by: STUDENT IN AN ORGANIZED HEALTH CARE EDUCATION/TRAINING PROGRAM

## 2023-12-12 PROCEDURE — 97530 THERAPEUTIC ACTIVITIES: CPT

## 2023-12-12 PROCEDURE — 97116 GAIT TRAINING THERAPY: CPT | Mod: CQ

## 2023-12-12 PROCEDURE — 25000003 PHARM REV CODE 250: Performed by: STUDENT IN AN ORGANIZED HEALTH CARE EDUCATION/TRAINING PROGRAM

## 2023-12-12 RX ORDER — DIPHENHYDRAMINE HCL 25 MG
25 CAPSULE ORAL ONCE
Status: COMPLETED | OUTPATIENT
Start: 2023-12-12 | End: 2023-12-12

## 2023-12-12 RX ADMIN — Medication: at 12:12

## 2023-12-12 RX ADMIN — PRAVASTATIN SODIUM 80 MG: 40 TABLET ORAL at 08:12

## 2023-12-12 RX ADMIN — DIPHENHYDRAMINE HYDROCHLORIDE 25 MG: 25 CAPSULE ORAL at 12:12

## 2023-12-12 RX ADMIN — ACETAMINOPHEN 1000 MG: 500 TABLET ORAL at 05:12

## 2023-12-12 RX ADMIN — ENOXAPARIN SODIUM 40 MG: 40 INJECTION SUBCUTANEOUS at 08:12

## 2023-12-12 RX ADMIN — LOSARTAN POTASSIUM 50 MG: 50 TABLET, FILM COATED ORAL at 08:12

## 2023-12-12 NOTE — PT/OT/SLP PROGRESS
Name: Priscila Randhawa    : 1933 (89 y.o.)  MRN: [id number]           Patient Unavailable      Patient unable to be seen at this time secondary to: pt refused treatment this AM - had a bad night

## 2023-12-12 NOTE — PLAN OF CARE
Problem: Adult Inpatient Plan of Care  Goal: Plan of Care Review  Outcome: Ongoing, Progressing  Flowsheets (Taken 12/11/2023 1821)  Plan of Care Reviewed With: patient  Goal: Patient-Specific Goal (Individualized)  Outcome: Ongoing, Progressing  Flowsheets (Taken 12/11/2023 1821)  Anxieties, Fears or Concerns: None voiced  Individualized Care Needs: Pain management, promote safety with mobility, encourage getting OOB, placement on discharge  Goal: Absence of Hospital-Acquired Illness or Injury  Outcome: Ongoing, Progressing  Intervention: Identify and Manage Fall Risk  Flowsheets (Taken 12/11/2023 1821)  Safety Promotion/Fall Prevention:   assistive device/personal item within reach   bed alarm set   nonskid shoes/socks when out of bed   instructed to call staff for mobility  Intervention: Prevent Skin Injury  Flowsheets (Taken 12/11/2023 1821)  Body Position: sitting up in bed  Skin Protection:   adhesive use limited   incontinence pads utilized   tubing/devices free from skin contact   skin sealant/moisture barrier applied  Intervention: Prevent and Manage VTE (Venous Thromboembolism) Risk  Flowsheets (Taken 12/11/2023 1821)  Activity Management: Rolling - L1  VTE Prevention/Management:   ambulation promoted   bleeding precations maintained   bleeding risk assessed  Range of Motion: active ROM (range of motion) encouraged  Intervention: Prevent Infection  Flowsheets (Taken 12/11/2023 1821)  Infection Prevention:   cohorting utilized   environmental surveillance performed   hand hygiene promoted   personal protective equipment utilized   rest/sleep promoted   equipment surfaces disinfected   single patient room provided  Goal: Optimal Comfort and Wellbeing  Outcome: Ongoing, Progressing  Goal: Readiness for Transition of Care  Outcome: Ongoing, Progressing     Problem: Diabetes Comorbidity  Goal: Blood Glucose Level Within Targeted Range  Outcome: Ongoing, Progressing     Problem: Fall Injury Risk  Goal: Absence  of Fall and Fall-Related Injury  Outcome: Ongoing, Progressing

## 2023-12-12 NOTE — PLAN OF CARE
Problem: Adult Inpatient Plan of Care  Goal: Plan of Care Review  Outcome: Ongoing, Progressing  Flowsheets (Taken 12/12/2023 0612)  Plan of Care Reviewed With: patient  Goal: Patient-Specific Goal (Individualized)  Outcome: Ongoing, Progressing  Flowsheets (Taken 12/12/2023 0612)  Anxieties, Fears or Concerns: none at this time  Individualized Care Needs: Pain management, promote safety with mobility, encourage getting OOB, placement for discharge  Goal: Absence of Hospital-Acquired Illness or Injury  Outcome: Ongoing, Progressing  Intervention: Prevent and Manage VTE (Venous Thromboembolism) Risk  Flowsheets (Taken 12/11/2023 2000)  VTE Prevention/Management:   ambulation promoted   bleeding precations maintained   bleeding risk assessed   fluids promoted   ROM (active) performed  Intervention: Prevent Infection  Flowsheets (Taken 12/11/2023 2000)  Infection Prevention:   hand hygiene promoted   personal protective equipment utilized   rest/sleep promoted   single patient room provided  Goal: Optimal Comfort and Wellbeing  Outcome: Ongoing, Progressing  Intervention: Monitor Pain and Promote Comfort  Flowsheets (Taken 12/11/2023 2000)  Pain Management Interventions:   pain management plan reviewed with patient/caregiver   care clustered   diversional activity provided   pillow support provided   position adjusted   quiet environment facilitated   relaxation techniques promoted     Problem: Fall Injury Risk  Goal: Absence of Fall and Fall-Related Injury  Outcome: Ongoing, Progressing

## 2023-12-12 NOTE — PROGRESS NOTES
Name: Priscila Randhawa    : 1933 (89 y.o.)  MRN: [id number]           Patient Unavailable      Patient unable to be seen at this time secondary to: Pt refused AM session d/t fatigue. Reports did not sleep well last night and requesting to remain in bed at this time. Will attempt PM session as able.

## 2023-12-12 NOTE — PLAN OF CARE
Problem: Adult Inpatient Plan of Care  Goal: Plan of Care Review  Outcome: Ongoing, Progressing  Flowsheets (Taken 12/12/2023 1431)  Plan of Care Reviewed With: patient  Goal: Patient-Specific Goal (Individualized)  Outcome: Ongoing, Progressing  Flowsheets (Taken 12/12/2023 1431)  Anxieties, Fears or Concerns: None at this time  Individualized Care Needs: Pain management, promote safety with mobility, encourage getting OOB, placement for discharge  Goal: Absence of Hospital-Acquired Illness or Injury  Outcome: Ongoing, Progressing  Intervention: Identify and Manage Fall Risk  Flowsheets (Taken 12/12/2023 1431)  Safety Promotion/Fall Prevention:   assistive device/personal item within reach   chair alarm set   instructed to call staff for mobility   room near unit station  Intervention: Prevent Skin Injury  Flowsheets (Taken 12/12/2023 1431)  Body Position: position changed independently  Skin Protection:   adhesive use limited   incontinence pads utilized   tubing/devices free from skin contact   transparent dressing maintained  Intervention: Prevent and Manage VTE (Venous Thromboembolism) Risk  Flowsheets (Taken 12/12/2023 1431)  Activity Management: Up in chair - L3  VTE Prevention/Management:   ambulation promoted   bleeding precations maintained   bleeding risk assessed  Range of Motion: active ROM (range of motion) encouraged  Intervention: Prevent Infection  Flowsheets (Taken 12/12/2023 1431)  Infection Prevention:   cohorting utilized   environmental surveillance performed   equipment surfaces disinfected   hand hygiene promoted   personal protective equipment utilized   rest/sleep promoted   single patient room provided  Goal: Optimal Comfort and Wellbeing  Outcome: Ongoing, Progressing  Goal: Readiness for Transition of Care  Outcome: Ongoing, Progressing     Problem: Diabetes Comorbidity  Goal: Blood Glucose Level Within Targeted Range  Outcome: Ongoing, Progressing     Problem: Fall Injury Risk  Goal:  Absence of Fall and Fall-Related Injury  Outcome: Ongoing, Progressing

## 2023-12-12 NOTE — PLAN OF CARE
Received notification from [facility] that they cannot accept patient. Received notification from Trish from [facility] stating that they are reviewing referral [phone]

## 2023-12-12 NOTE — PT/OT/SLP PROGRESS
Physical Therapy Treatment Note           Name: Priscila Randhawa    : 1933 (89 y.o.)  MRN: [id number]           TREATMENT SUMMARY AND RECOMMENDATIONS:    PT Received On: 23  PT Start Time: 1430     PT Stop Time: 1445  PT Total Time (min): 15 min     Subjective Assessment:   No complaints  Lethargic    Awake, alert, cooperative  Uncooperative    Agitated  c/o pain    Appropriate  c/o fatigue   x Confused  Treated at bedside     Emotionally labile  Treated in gym/dept.    Impulsive  Other:    Flat affect       Therapy Precautions:    Cognitive deficits  Spinal precautions    Collar - hard  Sternal precautions    Collar - soft   TLSO    Fall risk  LSO    Hip precautions - posterior  Knee immobilizer    Hip precautions - anterior  WBAT    Impaired communication  Partial weightbearing    Oxygen  TTWB    PEG tube  NWB    Visual deficits  Other:    Hearing deficits          Treatment Objectives:     Mobility Training:   Assist level Comments    Bed mobility     Transfer     Gait CGA Amb on firm surface with  ft    Sit to stand transitions     Sitting balance     Standing balance      Wheelchair mobility     Car transfer     Other:          Therapeutic Exercise:   Exercise Sets Reps Comments                               Additional Comments:  Pt confused this PM    Assessment: Patient tolerated session fair.    PT Plan: continue  Revisions made to plan of care: No    GOALS:   Multidisciplinary Problems       Physical Therapy Goals          Problem: Physical Therapy    Goal Priority Disciplines Outcome Goal Variances Interventions   Physical Therapy Goal     PT, PT/OT Ongoing, Not Progressing     Description: Description: Goals to be met by: Discharge      Patient will increase functional independence with mobility by performin. Sit to stand transfer with Modified [place]  2. Bed to chair transfer with Modified [place] using Single-point Cane   3. Gait  x 50 feet with Modified  [place] using Single-point Cane .                          Skilled PT Minutes Provided: 15   Communication with Treatment Team:     Equipment recommendations:       At end of treatment, patient remained:   Up in chair     Up in wheelchair in room   x In bed   x With alarm activated   x Bed rails up   x Call bell in reach     Family/friends present    Restraints secured properly    In bathroom with CNA/RN notified    Nurse aware    In gym with therapist/tech    Other:

## 2023-12-12 NOTE — PT/OT/SLP PROGRESS
Occupational Therapy  Treatment    Name: Priscila Randhawa    : 1933 (89 y.o.)  MRN: [id number]           TREATMENT SUMMARY AND RECOMMENDATIONS:      OT Date of Treatment: 23  OT Start Time: 1400  OT Stop Time: 1420  OT Total Time (min): 20 min      Subjective Assessment:   No complaints  Lethargic    Awake, alert, cooperative  Impulsive    Uncooperative   Flat affect    Agitated  c/o pain    Appropriate  c/o fatigue   x Confused x Treated at bedside     Emotionally labile  Treated in gym/dept.     x Other: family in room reports confusion for the past 20 min       Therapy Precautions:   x Cognitive deficits  Spinal precautions    Collar - hard  Sternal precautions    Collar - soft   TLSO   x Fall risk  LSO    Hip precautions - posterior  Knee immobilizer    Hip precautions - anterior  WBAT    Impaired communication  Partial weightbearing    Oxygen  TTWB    PEG tube  NWB    Visual deficits      Hearing deficits   Other:        Treatment Objectives:     Mobility Training:    Mobility task Assist level Comments    Bed mobility     Transfer CGA Stand/pivot t/f with RW to w/c    Sit to stands transitions     Functional mobility CGA X60 feet with RW; increased confusion with Pt making frequent turns, dancing, and sitting on rolling chair requiring CGA for safety/balance.    Sitting balance     Standing balance      Other:          Additional Comments:  Max encouragement to participate d/t confusion. Pt family states she sun downs in the afternoons but typically does not last too long.     Assessment: Patient tolerated session fair.    GOALS:   Multidisciplinary Problems       Occupational Therapy Goals          Problem: Occupational Therapy    Goal Priority Disciplines Outcome Interventions   Occupational Therapy Goal     OT, PT/OT Ongoing, Progressing    Description: Goals to be met by: 23     Patient will increase functional independence with ADLs by performing:    UE Dressing with Set-up  Assistance.  LE Dressing with Minimal Assistance.  Toileting from toilet with Contact Guard Assistance for hygiene and clothing management.   Bathing from  shower chair/bench with Minimal Assistance.  Toilet transfer to toilet with Stand-by Assistance.  Increased functional strength to 4/5 for BUEs.                         Recommendations:     Discharge Equipment Recommendations:  none     Plan:     Patient to be seen 6 x/week (QD/BID as appropriate) to address the above listed problems via self-care/home management, therapeutic activities, therapeutic exercises  Plan of Care Expires: 12/22/23  Plan of Care Reviewed with: patient  Revisions made to plan of care: No      Skilled OT Minutes Provided: 20  Communication with Treatment Team:     Equipment recommendations:       At end of treatment, patient remained:   Up in chair     Up in wheelchair in room    In bed    With alarm activated    Bed rails up    Call bell in reach     Family/friends present    Restraints secured properly    In bathroom with CNA/RN notified   x In gym with PT/PTA/tech    Nurse aware    Other:

## 2023-12-12 NOTE — PROGRESS NOTES
"Inpatient Nutrition Evaluation    Admit Date: 12/7/2023   Total duration of encounter: 5 days   Patient Age: 89 y.o.    Nutrition Recommendation/Prescription     Continue cardiac diet.     Nutrition Assessment     Chart Review    Reason Seen: continuous nutrition monitoring, length of stay, and follow-up    Malnutrition Screening Tool Results   Have you recently lost weight without trying?: No  Have you been eating poorly because of a decreased appetite?: No   MST Score: 0   Diagnosis:  E coli UTI  Essential HTN  Weakness and frequent falls  Allergic dermatitis    Relevant Medical History: Background diabetic retinopathy  Date Unknown  Carotid artery stenosis  Date Unknown  Cataract extraction status  Date Unknown  Combined hyperlipidemia  Date Unknown  HTN (hypertension)  Date Unknown  Polycythemia  Date Unknown  Type 2 diabetes mellitus without complications  Date Unknown  Unspecified osteoarthritis, unspecified site  Date Unknown  Vitamin D deficiency    Scheduled Medications:  enoxparin, 40 mg, Daily  losartan, 50 mg, Daily  pravastatin, 80 mg, Daily    Continuous Infusions:   PRN Medications: sodium chloride 0.9%, acetaminophen, diphenhydrAMINE-zinc acetate 1-0.1%, hydrALAZINE, sodium chloride 0.9%, traMADoL    Recent Labs   Lab 12/08/23  0310      K 4.7   CALCIUM 8.6   CHLORIDE 106   CO2 25   BUN 21.8*   CREATININE 0.84   EGFRNORACEVR >60   GLUCOSE 94   WBC 8.09   HGB 12.0   HCT 38.2     Nutrition Orders:  Diet Cardiac      Appetite/Oral Intake: good/% of meals  Factors Affecting Nutritional Intake: none identified  Food/Mu-ism/Cultural Preferences: none reported  Food Allergies: none reported  Last Bowel Movement: 12/10/23  Wound(s):      Comments    Pt intake is good. Discussed food preferences. Marked menus. Family present. Pt glucose 94 mg/dL. Controlled. Adjust diet as needed.     Anthropometrics    Height: 4' 11" (149.9 cm),    Last Weight: 55.6 kg (122 lb 9.2 oz) (12/11/23 0500), Weight " Method: Bed Scale  BMI (Calculated): 24.7  BMI Classification: normal (BMI 18.5-24.9)     Ideal Body Weight (IBW), Female: 95 lb     % Ideal Body Weight, Female (lb): 125.55 %                             Usual Weight Provided By: unable to obtain usual weight    Wt Readings from Last 5 Encounters:   12/11/23 55.6 kg (122 lb 9.2 oz)   12/04/23 54.1 kg (119 lb 4.3 oz)   05/08/23 55.3 kg (122 lb)   05/04/22 55.3 kg (122 lb)   05/04/21 54 kg (118 lb 15.7 oz)     Weight Change(s) Since Admission: No significant wt change.  Wt Readings from Last 1 Encounters:   12/11/23 0500 55.6 kg (122 lb 9.2 oz)   12/07/23 1540 54.1 kg (119 lb 4.3 oz)   Admit Weight: 54.1 kg (119 lb 4.3 oz) (12/07/23 1540), Weight Method: Bed Scale    Patient Education     Not applicable.    Nutrition Goals & Monitoring     Dietitian will monitor: food and beverage intake, weight, weight change, electrolyte/renal panel, glucose/endocrine profile, and gastrointestinal profile    Nutrition Risk/Follow-Up: low (follow-up in 5-7 days)  Patients assigned 'low nutrition risk' status do not qualify for a full nutritional assessment but will be monitored and re-evaluated in a 5-7 day time period. Please consult if re-evaluation needed sooner.

## 2023-12-12 NOTE — PROGRESS NOTES
[facility] - Medical Surgical Unit  [facility] MEDICINE - Progress Note    Patient Name: Priscila Randhawa  MRN: [id number]  Patient Class: IP- Swing   Admission Date: 12/7/2023   Admitting Physician: HM Service   Attending Physician: Thairy G Reyes, DO  Primary Care Provider: Selena Hou MD  Face-to-Face encounter date: 12/12/2023      CHIEF COMPLAINT   Frequent falls  HISTORY OF PRESENTING ILLNESS   89 year-old white female with a history of dementia, hypertension, diabetes who presented to the ER after a fall at home. She lives alone and has her niece at the bedside while she was in the ER however today she is alone. She does have frequent falls. Her PCP did recommend her to be placed in assisted living versus nursing home.  Patient was has been reluctant. Admitted for management of UTI, PT OT for the weakness and possible nursing home placement if patient and family are amenable.    Today  Right-sided low back erythema, and chin irritation.  One dose of Benadryl today.  PAST MEDICAL HISTORY     Past Medical History:   Diagnosis Date    Background diabetic retinopathy     Carotid artery stenosis     Cataract extraction status     Combined hyperlipidemia     HTN (hypertension)     Polycythemia     Type 2 diabetes mellitus without complications     Unspecified osteoarthritis, unspecified site     Vitamin D deficiency        PAST SURGICAL HISTORY     Past Surgical History:   Procedure Laterality Date    EPIDURAL STEROID INJECTION INTO LUMBAR SPINE      EXPLORATORY LAPAROTOMY      hx total knee repla         FAMILY HISTORY   Reviewed and noncontributory to this case    SOCIAL HISTORY     Social History     Socioeconomic History    Marital status: Single   Occupational History    Occupation: caregiver   Tobacco Use    Smoking status: Never    Smokeless tobacco: Never     Social Determinants of Health     Financial Resource Strain: Low Risk  (12/7/2023)    Overall Financial Resource Strain (CARDIA)      Difficulty of Paying Living Expenses: Not hard at all   Food Insecurity: No Food Insecurity (12/7/2023)    Hunger Vital Sign     Worried About Running Out of Food in the Last Year: Never true     Ran Out of Food in the Last Year: Never true   Transportation Needs: No Transportation Needs (12/7/2023)    PRAPARE - Transportation     Lack of Transportation (Medical): No     Lack of Transportation (Non-Medical): No   Physical Activity: Inactive (12/7/2023)    Exercise Vital Sign     Days of Exercise per Week: 0 days     Minutes of Exercise per Session: 0 min   Stress: No Stress Concern Present (12/7/2023)    [place] - Occupational Stress Questionnaire     Feeling of Stress : Only a little   Social Connections: Moderately Integrated (12/7/2023)    Social Connection and Isolation Panel [NHANES]     Frequency of Communication with Friends and Family: More than three times a week     Frequency of Social Gatherings with Friends and Family: More than three times a week     Attends Church Services: 1 to 4 times per year     Active Member of Clubs or Organizations: No     Attends Club or Organization Meetings: 1 to 4 times per year     Marital Status:    Recent Concern: Social Connections - Moderately Isolated (12/4/2023)    Social Connection and Isolation Panel [NHANES]     Frequency of Communication with Friends and Family: More than three times a week     Frequency of Social Gatherings with Friends and Family: More than three times a week     Attends Church Services: 1 to 4 times per year     Active Member of Clubs or Organizations: No     Attends Club or Organization Meetings: Never     Marital Status:    Housing Stability: Low Risk  (12/7/2023)    Housing Stability Vital Sign     Unable to Pay for Housing in the Last Year: No     Number of Places Lived in the Last Year: 1     Unstable Housing in the Last Year: No       HOME MEDICATIONS     Prior to Admission medications   "  Medication Sig Start Date End Date Taking? Authorizing Provider   losartan (COZAAR) 50 MG tablet Take 1 tablet (50 mg total) by mouth once daily. 5/8/23 5/7/24 Yes Selena Hou MD   pravastatin (PRAVACHOL) 80 MG tablet Take 1 tablet (80 mg total) by mouth once daily. 5/8/23 5/7/24 Yes Selena Hou MD       ALLERGIES   Codeine    REVIEW OF SYSTEMS   Except as documented above, all other systems reviewed and negative    PHYSICAL EXAM     Vitals:    12/12/23 1123   BP: (!) 144/67   Pulse: 69   Resp:    Temp: 98.3 °F (36.8 °C)      General:  In no acute distress, resting comfortably  Head and neck:  Atraumatic, normocephalic, moist mucous membranes, supple neck  Chest:  Clear to auscultation bilaterally  Heart:  S1, S2, grade 3/6 systolic murmur  Abdomen:  Soft, nontender, BS +  MSK:  Warm, no lower extremity edema, no clubbing or cyanosis  Neuro:  weak  Integumentary:  No obvious skin rash  Psychiatry:  Appropriate mood and affect    ASSESSMENT AND PLAN   E. Coli UTI  -Rocephin 12/4-12/8   -Cultures show sensitivity     Essential HTN  -losartan, well controlled     Left shoulder pain  -xray left shoulder showed no acute abnormality     Weakness and frequent falls  -PT/OT  -possible placement     Allergic dermatitis   -Benadryl x1 on 12/12    DVT prophylaxis:  Lovenox  Disposition:  Pending placement  __________________________________________________________________  LABS/MICRO/MEDS/DIAGNOSTICS       LABS  No results for input(s): "NA", "K", "CHLORIDE", "CO2", "BUN", "CREATININE", "GLUCOSE", "CALCIUM", "ALKPHOS", "AST", "ALT", "ALBUMIN" in the last 72 hours.    No results for input(s): "WBC", "RBC", "HCT", "MCV", "PLT" in the last 72 hours.    Invalid input(s): "HG"      MICROBIOLOGY  Microbiology Results (last 7 days)       ** No results found for the last 168 hours. **            MEDICATIONS   diphenhydrAMINE  25 mg Oral Once    enoxparin  40 mg Subcutaneous Daily    losartan  50 mg Oral Daily    " pravastatin  80 mg Oral Daily      INFUSIONS      DIAGNOSTIC TESTS  No orders to display          Patient information was obtained from patient, patient's family, past medical records and ER records.   All diagnosis and differential diagnosis have been reviewed; assessment and plan has been documented. I have personally reviewed the labs and test results that are presently available; I have reviewed the patients medication list. I have reviewed the consulting providers response and recommendations. I have reviewed or attempted to review medical records based upon their availability.  All of the patient's questions have been addressed and answered. Patient's is agreeable to the above stated plan. I will continue to monitor closely and make adjustments to medical management as needed.  This note was created using [facility] voice recognition software that occasionally misinterpreted phrases or words.  Please contact me if any questions may rise regarding documentation to clarify verbiage.        Thairy G Reyes, DO   Internal Medicine  Department of [facility] Medicine  [facility] - [facility] Surgical Unit

## 2023-12-13 LAB
ANION GAP SERPL CALC-SCNC: 7 MEQ/L
BASOPHILS # BLD AUTO: 0.02 X10(3)/MCL
BASOPHILS NFR BLD AUTO: 0.4 %
BUN SERPL-MCNC: 17.7 MG/DL (ref 9.8–20.1)
CALCIUM SERPL-MCNC: 9.3 MG/DL (ref 8.4–10.2)
CHLORIDE SERPL-SCNC: 105 MMOL/L (ref 98–111)
CO2 SERPL-SCNC: 29 MMOL/L (ref 23–31)
CREAT SERPL-MCNC: 0.79 MG/DL (ref 0.55–1.02)
CREAT/UREA NIT SERPL: 22
EOSINOPHIL # BLD AUTO: 0.44 X10(3)/MCL (ref 0–0.9)
EOSINOPHIL NFR BLD AUTO: 8.8 %
ERYTHROCYTE [DISTWIDTH] IN BLOOD BY AUTOMATED COUNT: 12.8 % (ref 11.5–17)
GFR SERPLBLD CREATININE-BSD FMLA CKD-EPI: >60 MLS/MIN/1.73/M2
GLUCOSE SERPL-MCNC: 100 MG/DL (ref 75–121)
HCT VFR BLD AUTO: 40.1 % (ref 37–47)
HGB BLD-MCNC: 12.3 G/DL (ref 12–16)
IMM GRANULOCYTES # BLD AUTO: 0.01 X10(3)/MCL (ref 0–0.04)
IMM GRANULOCYTES NFR BLD AUTO: 0.2 %
LYMPHOCYTES # BLD AUTO: 1.39 X10(3)/MCL (ref 0.6–4.6)
LYMPHOCYTES NFR BLD AUTO: 27.9 %
MCH RBC QN AUTO: 30.8 PG (ref 27–31)
MCHC RBC AUTO-ENTMCNC: 30.7 G/DL (ref 33–36)
MCV RBC AUTO: 100.5 FL (ref 80–94)
MONOCYTES # BLD AUTO: 0.64 X10(3)/MCL (ref 0.1–1.3)
MONOCYTES NFR BLD AUTO: 12.8 %
NEUTROPHILS # BLD AUTO: 2.49 X10(3)/MCL (ref 2.1–9.2)
NEUTROPHILS NFR BLD AUTO: 49.9 %
PLATELET # BLD AUTO: 286 X10(3)/MCL (ref 130–400)
PMV BLD AUTO: 9.8 FL (ref 7.4–10.4)
POTASSIUM SERPL-SCNC: 5 MMOL/L (ref 3.5–5.1)
RBC # BLD AUTO: 3.99 X10(6)/MCL (ref 4.2–5.4)
SODIUM SERPL-SCNC: 141 MMOL/L (ref 132–146)
WBC # SPEC AUTO: 4.99 X10(3)/MCL (ref 4.5–11.5)

## 2023-12-13 PROCEDURE — 25000003 PHARM REV CODE 250: Performed by: STUDENT IN AN ORGANIZED HEALTH CARE EDUCATION/TRAINING PROGRAM

## 2023-12-13 PROCEDURE — 97530 THERAPEUTIC ACTIVITIES: CPT

## 2023-12-13 PROCEDURE — 97116 GAIT TRAINING THERAPY: CPT

## 2023-12-13 PROCEDURE — 97535 SELF CARE MNGMENT TRAINING: CPT

## 2023-12-13 PROCEDURE — 97110 THERAPEUTIC EXERCISES: CPT

## 2023-12-13 PROCEDURE — 63600175 PHARM REV CODE 636 W HCPCS: Performed by: STUDENT IN AN ORGANIZED HEALTH CARE EDUCATION/TRAINING PROGRAM

## 2023-12-13 PROCEDURE — 11000004 HC SNF PRIVATE

## 2023-12-13 RX ORDER — PREDNISONE 20 MG/1
20 TABLET ORAL DAILY
Status: COMPLETED | OUTPATIENT
Start: 2023-12-13 | End: 2023-12-19

## 2023-12-13 RX ADMIN — ENOXAPARIN SODIUM 40 MG: 40 INJECTION SUBCUTANEOUS at 08:12

## 2023-12-13 RX ADMIN — PREDNISONE 20 MG: 20 TABLET ORAL at 03:12

## 2023-12-13 RX ADMIN — Medication: at 01:12

## 2023-12-13 RX ADMIN — PRAVASTATIN SODIUM 80 MG: 40 TABLET ORAL at 08:12

## 2023-12-13 RX ADMIN — LOSARTAN POTASSIUM 50 MG: 50 TABLET, FILM COATED ORAL at 08:12

## 2023-12-13 NOTE — PATIENT CARE CONFERENCE
Name: Priscila Randhawa    : 1933 (89 y.o.)  MRN: [id number]            Interdisciplinary Team Conference     Case conference held with patient/family and care team to discuss progress, plan of care, barriers to be addressed for safe return home, equipment recommendations, and discharge planning. Communicated therapy progress with MD, RN, therapy clinicians and case management. All questions/concerns answered.

## 2023-12-13 NOTE — PT/OT/SLP PROGRESS
Physical Therapy Treatment Note           Name: Priscila Randhawa    : 1933 (89 y.o.)  MRN: [id number]           TREATMENT SUMMARY AND RECOMMENDATIONS:    PT Received On: 23  PT Start Time: 1035     PT Stop Time: 1100  PT Total Time (min): 25 min     Subjective Assessment:   No complaints  Lethargic   x Awake, alert, cooperative  Uncooperative    Agitated  c/o pain    Appropriate  c/o fatigue    Confused  Treated at bedside     Emotionally labile x Treated in gym/dept.    Impulsive  Other:    Flat affect       Therapy Precautions:    Cognitive deficits  Spinal precautions    Collar - hard  Sternal precautions    Collar - soft   TLSO   x Fall risk  LSO    Hip precautions - posterior  Knee immobilizer    Hip precautions - anterior  WBAT    Impaired communication  Partial weightbearing    Oxygen  TTWB    PEG tube  NWB    Visual deficits  Other:    Hearing deficits          Treatment Objectives:     Mobility Training:   Assist level Comments    Bed mobility SBA Supine>Sit   Transfer SBA Stand pivot using RW   Gait CGA/SBA X 150 feet using RW with WC following   Sit to stand transitions CGA/SBA From EOB and WC   Sitting balance     Standing balance      Wheelchair mobility     Car transfer     Other:          Therapeutic Exercise:   Exercise Sets Reps Comments   B LE bike using UBE  10' 5' Forward/5' Backwards                         Additional Comments:  No changes or complications.     Assessment: Patient tolerated session well. Recommending pt not live alone. Working on NH placement     PT Plan: continue per POC  Revisions made to plan of care: No    GOALS:   Multidisciplinary Problems       Physical Therapy Goals          Problem: Physical Therapy    Goal Priority Disciplines Outcome Goal Variances Interventions   Physical Therapy Goal     PT, PT/OT Ongoing, Not Progressing     Description: Description: Goals to be met by: Discharge      Patient will increase functional independence with  mobility by performin. Sit to stand transfer with Modified [place]  2. Bed to chair transfer with Modified [place] using Single-point Cane   3. Gait  x 50 feet with Modified [place] using Single-point Cane .                          Skilled PT Minutes Provided: 23   Communication with Treatment Team:     Equipment recommendations:       At end of treatment, patient remained:  x Up in chair     Up in wheelchair in room    In bed    With alarm activated    Bed rails up    Call bell in reach     Family/friends present    Restraints secured properly    In bathroom with CNA/RN notified    Nurse aware   x In gym with therapist/tech    Other:

## 2023-12-13 NOTE — PLAN OF CARE
Problem: Adult Inpatient Plan of Care  Goal: Plan of Care Review  Outcome: Ongoing, Progressing  Flowsheets (Taken 12/13/2023 1011)  Plan of Care Reviewed With: patient     Problem: Diabetes Comorbidity  Goal: Blood Glucose Level Within Targeted Range  Outcome: Ongoing, Progressing  Intervention: Monitor and Manage Glycemia  Flowsheets (Taken 12/13/2023 1011)  Glycemic Management: blood glucose monitored     Problem: Fall Injury Risk  Goal: Absence of Fall and Fall-Related Injury  Outcome: Ongoing, Progressing  Intervention: Identify and Manage Contributors  Flowsheets (Taken 12/13/2023 1011)  Self-Care Promotion:   independence encouraged   BADL personal objects within reach  Medication Review/Management: medications reviewed  Intervention: Promote Injury-Free Environment  Flowsheets (Taken 12/13/2023 1011)  Safety Promotion/Fall Prevention:   assistive device/personal item within reach   instructed to call staff for mobility   side rails raised x 2   bed alarm set

## 2023-12-13 NOTE — PT/OT/SLP PROGRESS
Occupational Therapy  Treatment    Name: Priscila Randhawa    : 1933 (89 y.o.)  MRN: [id number]           TREATMENT SUMMARY AND RECOMMENDATIONS:      OT Date of Treatment: 23  OT Start Time: 1100  OT Stop Time: 1125  OT Total Time (min): 25 min      Subjective Assessment:   No complaints  Lethargic   x Awake, alert, cooperative  Impulsive    Uncooperative   Flat affect    Agitated  c/o pain    Appropriate  c/o fatigue    Confused  Treated at bedside     Emotionally labile x Treated in gym/dept.      Other:        Therapy Precautions:    Cognitive deficits  Spinal precautions    Collar - hard  Sternal precautions    Collar - soft   TLSO   x Fall risk  LSO    Hip precautions - posterior  Knee immobilizer    Hip precautions - anterior  WBAT    Impaired communication  Partial weightbearing    Oxygen  TTWB    PEG tube  NWB    Visual deficits      Hearing deficits   Other:        Treatment Objectives:     Mobility Training:    Mobility task Assist level Comments    Bed mobility     Transfer CGA Stand/pivot t/f with RW to BSChair   Sit to stands transitions CGA From w/c level    Functional mobility CGA X120 feet with RW   Sitting balance     Standing balance  CGA Pt stood with RW anterior; performed reach to low stool to grasp bean bags and then toss into bucket anterior. Pt able to tolerate standing for 2-3 min without rest break.    Other:          Therapeutic Exercise:   Exercise Sets Reps Comments   1# dowel 2 10 Chest press, straight arm raises                          Additional Comments:      Assessment: Patient tolerated session well.    GOALS:   Multidisciplinary Problems       Occupational Therapy Goals          Problem: Occupational Therapy    Goal Priority Disciplines Outcome Interventions   Occupational Therapy Goal     OT, PT/OT Ongoing, Progressing    Description: Goals to be met by: 23     Patient will increase functional independence with ADLs by performing:    UE Dressing with Set-up  Assistance.  LE Dressing with Minimal Assistance.  Toileting from toilet with Contact Guard Assistance for hygiene and clothing management.   Bathing from  shower chair/bench with Minimal Assistance.  Toilet transfer to toilet with Stand-by Assistance.  Increased functional strength to 4/5 for BUEs.                         Recommendations:     Discharge Equipment Recommendations:  none     Plan:     Patient to be seen 6 x/week (QD/BID as appropriate) to address the above listed problems via self-care/home management, therapeutic activities, therapeutic exercises  Plan of Care Expires: 12/22/23  Plan of Care Reviewed with: patient  Revisions made to plan of care: No      Skilled OT Minutes Provided: 25  Communication with Treatment Team:     Equipment recommendations:       At end of treatment, patient remained:  x Up in chair     Up in wheelchair in room    In bed   x With alarm activated    Bed rails up   x Call bell in reach    x Family/friends present    Restraints secured properly    In bathroom with CNA/RN notified    In gym with PT/PTA/tech    Nurse aware    Other:

## 2023-12-13 NOTE — PROGRESS NOTES
[facility] - Medical Surgical Unit  [facility] MEDICINE - Progress Note    Patient Name: Priscila Randhawa  MRN: [id number]  Patient Class: IP- Swing   Admission Date: 12/7/2023   Admitting Physician: HM Service   Attending Physician: Thairy G Reyes, DO  Primary Care Provider: Selena Hou MD  Face-to-Face encounter date: 12/13/2023      CHIEF COMPLAINT   Frequent falls  HISTORY OF PRESENTING ILLNESS   89 year-old white female with a history of dementia, hypertension, diabetes who presented to the ER after a fall at home. She lives alone and has her niece at the bedside while she was in the ER however today she is alone. She does have frequent falls. Her PCP did recommend her to be placed in assisted living versus nursing home.  Patient was has been reluctant. Admitted for management of UTI, PT OT for the weakness and possible nursing home placement if patient and family are amenable.    Today   Chin irritation improved today however patient does have persistent rash along diaper, suspect irritation from her diaper, use topical Benadryl as needed  PAST MEDICAL HISTORY     Past Medical History:   Diagnosis Date    Background diabetic retinopathy     Carotid artery stenosis     Cataract extraction status     Combined hyperlipidemia     HTN (hypertension)     Polycythemia     Type 2 diabetes mellitus without complications     Unspecified osteoarthritis, unspecified site     Vitamin D deficiency        PAST SURGICAL HISTORY     Past Surgical History:   Procedure Laterality Date    EPIDURAL STEROID INJECTION INTO LUMBAR SPINE      EXPLORATORY LAPAROTOMY      hx total knee repla         FAMILY HISTORY   Reviewed and noncontributory to this case    SOCIAL HISTORY     Social History     Socioeconomic History    Marital status: Single   Occupational History    Occupation: caregiver   Tobacco Use    Smoking status: Never    Smokeless tobacco: Never     Social Determinants of Health     Financial Resource Strain: Low  Risk  (12/7/2023)    Overall Financial Resource Strain (CARDIA)     Difficulty of Paying Living Expenses: Not hard at all   Food Insecurity: No Food Insecurity (12/7/2023)    Hunger Vital Sign     Worried About Running Out of Food in the Last Year: Never true     Ran Out of Food in the Last Year: Never true   Transportation Needs: No Transportation Needs (12/7/2023)    PRAPARE - Transportation     Lack of Transportation (Medical): No     Lack of Transportation (Non-Medical): No   Physical Activity: Inactive (12/7/2023)    Exercise Vital Sign     Days of Exercise per Week: 0 days     Minutes of Exercise per Session: 0 min   Stress: No Stress Concern Present (12/7/2023)    [employer] [place] [employer] - Occupational Stress Questionnaire     Feeling of Stress : Only a little   Social Connections: Moderately Integrated (12/7/2023)    Social Connection and Isolation Panel [NHANES]     Frequency of Communication with Friends and Family: More than three times a week     Frequency of Social Gatherings with Friends and Family: More than three times a week     Attends Sikhism Services: 1 to 4 times per year     Active Member of Clubs or Organizations: No     Attends Club or Organization Meetings: 1 to 4 times per year     Marital Status:    Recent Concern: Social Connections - Moderately Isolated (12/4/2023)    Social Connection and Isolation Panel [NHANES]     Frequency of Communication with Friends and Family: More than three times a week     Frequency of Social Gatherings with Friends and Family: More than three times a week     Attends Sikhism Services: 1 to 4 times per year     Active Member of Clubs or Organizations: No     Attends Club or Organization Meetings: Never     Marital Status:    Housing Stability: Low Risk  (12/7/2023)    Housing Stability Vital Sign     Unable to Pay for Housing in the Last Year: No     Number of Places Lived in the Last Year: 1     Unstable Housing in the Last  Year: No       HOME MEDICATIONS     Prior to Admission medications    Medication Sig Start Date End Date Taking? Authorizing Provider   losartan (COZAAR) 50 MG tablet Take 1 tablet (50 mg total) by mouth once daily. 5/8/23 5/7/24 Yes Selena Hou MD   pravastatin (PRAVACHOL) 80 MG tablet Take 1 tablet (80 mg total) by mouth once daily. 5/8/23 5/7/24 Yes Selena Hou MD       ALLERGIES   Codeine    REVIEW OF SYSTEMS   Except as documented above, all other systems reviewed and negative    PHYSICAL EXAM     Vitals:    12/13/23 0723   BP: (!) 153/65   Pulse: 72   Resp:    Temp: 97.5 °F (36.4 °C)      General:  In no acute distress, resting comfortably  Head and neck:  Atraumatic, normocephalic, moist mucous membranes, supple neck  Chest:  Clear to auscultation bilaterally  Heart:  S1, S2, grade 3/6 systolic murmur  Abdomen:  Soft, nontender, BS +  MSK:  Warm, no lower extremity edema, no clubbing or cyanosis  Neuro:  weak  Integumentary:  No obvious skin rash  Psychiatry:  Appropriate mood and affect    ASSESSMENT AND PLAN   E. Coli UTI  -Rocephin 12/4-12/8   -Cultures show sensitivity     Essential HTN  -losartan, well controlled     Left shoulder pain  -xray left shoulder showed no acute abnormality     Weakness and frequent falls  -PT/OT  -possible placement     Allergic dermatitis   -Benadryl x1 on 12/12  - continue to utilize topical Benadryl as needed    DVT prophylaxis:  Lovenox  Disposition:  Pending placement  __________________________________________________________________  LABS/MICRO/MEDS/DIAGNOSTICS       LABS  Recent Labs     12/13/23  0310      K 5.0   CHLORIDE 105   CO2 29   BUN 17.7   CREATININE 0.79   GLUCOSE 100   CALCIUM 9.3       Recent Labs     12/13/23  0310   WBC 4.99   RBC 3.99*   HCT 40.1   .5*            MICROBIOLOGY  Microbiology Results (last 7 days)       ** No results found for the last 168 hours. **            MEDICATIONS   enoxparin  40 mg Subcutaneous Daily     losartan  50 mg Oral Daily    pravastatin  80 mg Oral Daily      INFUSIONS      DIAGNOSTIC TESTS  No orders to display          Patient information was obtained from patient, patient's family, past medical records and ER records.   All diagnosis and differential diagnosis have been reviewed; assessment and plan has been documented. I have personally reviewed the labs and test results that are presently available; I have reviewed the patients medication list. I have reviewed the consulting providers response and recommendations. I have reviewed or attempted to review medical records based upon their availability.  All of the patient's questions have been addressed and answered. Patient's is agreeable to the above stated plan. I will continue to monitor closely and make adjustments to medical management as needed.  This note was created using Brid[employer] voice recognition software that occasionally misinterpreted phrases or words.  Please contact me if any questions may rise regarding documentation to clarify verbiage.        Thairy G Reyes,    Internal Medicine  Department of Hospital Medicine  [facility] - [facility] Surgical Unit

## 2023-12-13 NOTE — PT/OT/SLP PROGRESS
Physical Therapy Treatment Note           Name: Priscila Randhawa    : 1933 (89 y.o.)  MRN: [id number]           TREATMENT SUMMARY AND RECOMMENDATIONS:    PT Received On: 23  PT Start Time: 1355     PT Stop Time: 1423  PT Total Time (min): 28 min     Subjective Assessment:   No complaints  Lethargic    Awake, alert, cooperative  Uncooperative    Agitated  c/o pain    Appropriate  c/o fatigue    Confused  Treated at bedside     Emotionally labile x Treated in gym/dept.    Impulsive  Other:    Flat affect       Therapy Precautions:    Cognitive deficits  Spinal precautions    Collar - hard  Sternal precautions    Collar - soft   TLSO    Fall risk  LSO    Hip precautions - posterior  Knee immobilizer    Hip precautions - anterior  WBAT    Impaired communication  Partial weightbearing    Oxygen  TTWB    PEG tube  NWB    Visual deficits  Other:    Hearing deficits          Treatment Objectives:     Mobility Training:   Assist level Comments    Bed mobility     Transfer SBA Stand pivot using RW   Gait CGA Amb on firm surface with RW 2 x 150 feet    Sit to stand transitions SBA From recliner and WC using RW   Sitting balance     Standing balance      Wheelchair mobility     Car transfer     Other:          Therapeutic Exercise:   Exercise Sets Reps Comments                               Additional Comments:  Pt willing to walk, but not stay in therapy gym. PT progressing as able.     Assessment: Patient tolerated session fair.Fatigue     PT Plan: continue  Revisions made to plan of care: No    GOALS:   Multidisciplinary Problems       Physical Therapy Goals          Problem: Physical Therapy    Goal Priority Disciplines Outcome Goal Variances Interventions   Physical Therapy Goal     PT, PT/OT Ongoing, Not Progressing     Description: Description: Goals to be met by: Discharge      Patient will increase functional independence with mobility by performin. Sit to stand transfer with Modified  [place]  2. Bed to chair transfer with Modified [place] using Single-point Cane   3. Gait  x 50 feet with Modified [place] using Single-point Cane .                          Skilled PT Minutes Provided: 23   Communication with Treatment Team:     Equipment recommendations:       At end of treatment, patient remained:  x Up in chair     Up in wheelchair in room    In bed   x With alarm activated    Bed rails up   x Call bell in reach    x Family/friends present    Restraints secured properly    In bathroom with CNA/RN notified    Nurse aware    In gym with therapist/tech    Other:

## 2023-12-13 NOTE — PLAN OF CARE
Problem: Adult Inpatient Plan of Care  Goal: Plan of Care Review  Outcome: Ongoing, Progressing  Flowsheets (Taken 12/12/2023 2125)  Plan of Care Reviewed With: patient  Goal: Patient-Specific Goal (Individualized)  Outcome: Ongoing, Progressing  Flowsheets (Taken 12/12/2023 2125)  Anxieties, Fears or Concerns: none  Individualized Care Needs: pain control safety maintained     Problem: Diabetes Comorbidity  Goal: Blood Glucose Level Within Targeted Range  Outcome: Ongoing, Progressing  Intervention: Monitor and Manage Glycemia  Flowsheets (Taken 12/12/2023 2125)  Glycemic Management: blood glucose monitored     Problem: Fall Injury Risk  Goal: Absence of Fall and Fall-Related Injury  Outcome: Ongoing, Progressing  Intervention: Identify and Manage Contributors  Flowsheets (Taken 12/12/2023 2125)  Medication Review/Management: medications reviewed

## 2023-12-13 NOTE — PROGRESS NOTES
Name: Priscila Randhawa    : 1933 (89 y.o.)  MRN: [id number]           Patient Unavailable      Patient unable to be seen at this time secondary to: Pt refused PM session d/t fatigue despite encouragement.

## 2023-12-13 NOTE — PATIENT CARE CONFERENCE
Name: Priscila Randhawa    : 1933 (89 y.o.)  MRN: [id number]            Interdisciplinary Team Conference     Case conference held with patient/family and care team to discuss progress, plan of care, barriers to be addressed for safe return home, equipment recommendations, and discharge planning. Communicated therapy progress with MD, RN, therapy clinicians and case management. All questions/concerns answered.

## 2023-12-14 PROCEDURE — 11000004 HC SNF PRIVATE

## 2023-12-14 PROCEDURE — 97110 THERAPEUTIC EXERCISES: CPT

## 2023-12-14 PROCEDURE — 25000003 PHARM REV CODE 250: Performed by: STUDENT IN AN ORGANIZED HEALTH CARE EDUCATION/TRAINING PROGRAM

## 2023-12-14 PROCEDURE — 63600175 PHARM REV CODE 636 W HCPCS: Performed by: STUDENT IN AN ORGANIZED HEALTH CARE EDUCATION/TRAINING PROGRAM

## 2023-12-14 PROCEDURE — 97116 GAIT TRAINING THERAPY: CPT

## 2023-12-14 PROCEDURE — 97530 THERAPEUTIC ACTIVITIES: CPT

## 2023-12-14 RX ADMIN — PRAVASTATIN SODIUM 80 MG: 40 TABLET ORAL at 08:12

## 2023-12-14 RX ADMIN — LOSARTAN POTASSIUM 50 MG: 50 TABLET, FILM COATED ORAL at 08:12

## 2023-12-14 RX ADMIN — Medication: at 08:12

## 2023-12-14 RX ADMIN — ENOXAPARIN SODIUM 40 MG: 40 INJECTION SUBCUTANEOUS at 08:12

## 2023-12-14 RX ADMIN — PREDNISONE 20 MG: 20 TABLET ORAL at 08:12

## 2023-12-14 NOTE — PT/OT/SLP PROGRESS
Physical Therapy Treatment Note           Name: Priscila Randhawa    : 1933 (89 y.o.)  MRN: [id number]           TREATMENT SUMMARY AND RECOMMENDATIONS:    PT Received On: 23  PT Start Time: 1435     PT Stop Time: 1500  PT Total Time (min): 25 min     Subjective Assessment:   No complaints  Lethargic   x Awake, alert, cooperative  Uncooperative    Agitated  c/o pain    Appropriate  c/o fatigue    Confused  Treated at bedside     Emotionally labile x Treated in gym/dept.    Impulsive  Other:    Flat affect       Therapy Precautions:    Cognitive deficits  Spinal precautions    Collar - hard  Sternal precautions    Collar - soft   TLSO   x Fall risk  LSO    Hip precautions - posterior  Knee immobilizer    Hip precautions - anterior  WBAT    Impaired communication  Partial weightbearing    Oxygen  TTWB    PEG tube  NWB    Visual deficits  Other:    Hearing deficits          Treatment Objectives:     Mobility Training:   Assist level Comments    Bed mobility     Transfer CGA/HHA Stand pivot without AD   Gait CGA X 150 feet using RW with WC following   Sit to stand transitions SBA From standard chair and WC   Sitting balance     Standing balance      Wheelchair mobility     Car transfer     Other:          Therapeutic Exercise:   Exercise Sets Reps Comments   B LE bike using UBE  10' 5' Forward/5' Backward   Seated B LE AROM  20 All functional planes                   Additional Comments:  No changes or complications.     Assessment: Patient tolerated session well. Recommending pt not live alone    PT Plan: continue per POC  Revisions made to plan of care: No    GOALS:   Multidisciplinary Problems       Physical Therapy Goals          Problem: Physical Therapy    Goal Priority Disciplines Outcome Goal Variances Interventions   Physical Therapy Goal     PT, PT/OT Ongoing, Not Progressing     Description: Description: Goals to be met by: Discharge      Patient will increase functional independence with  mobility by performin. Sit to stand transfer with Modified [place]  2. Bed to chair transfer with Modified [place] using Single-point Cane   3. Gait  x 50 feet with Modified [place] using Single-point Cane .                          Skilled PT Minutes Provided: 25   Communication with Treatment Team:     Equipment recommendations:       At end of treatment, patient remained:   Up in chair     Up in wheelchair in room   x In bed    With alarm activated   x Bed rails up   x Call bell in reach    x Family/friends present    Restraints secured properly    In bathroom with CNA/RN notified    Nurse aware    In gym with therapist/tech    Other:

## 2023-12-14 NOTE — PROGRESS NOTES
Name: Priscila Randhawa    : 1933 (89 y.o.)  MRN: [id number]           Patient Unavailable      Patient unable to be seen at this time secondary to: Pt refused AM session d/t fatigue following PT session. Will attempt PM session as able.

## 2023-12-14 NOTE — PROGRESS NOTES
[facility] - Medical Surgical Unit  [facility] MEDICINE - Progress Note    Patient Name: Priscila Randhawa  MRN: [id number]  Patient Class: IP- Swing   Admission Date: 12/7/2023   Admitting Physician:  Service   Attending Physician: Hoa Zamora MD  Primary Care Provider: Selena Hou MD  Face-to-Face encounter date: 12/14/2023      CHIEF COMPLAINT   Frequent falls  HISTORY OF PRESENTING ILLNESS   89 year-old white female with a history of dementia, hypertension, diabetes who presented to the ER after a fall at home. She lives alone and has her niece at the bedside while she was in the ER however today she is alone. She does have frequent falls. Her PCP did recommend her to be placed in assisted living versus nursing home.  Patient was has been reluctant. Admitted for management of UTI, PT OT for the weakness and possible nursing home placement if patient and family are amenable.    Today   Rash improving.   PAST MEDICAL HISTORY     Past Medical History:   Diagnosis Date    Background diabetic retinopathy     Carotid artery stenosis     Cataract extraction status     Combined hyperlipidemia     HTN (hypertension)     Polycythemia     Type 2 diabetes mellitus without complications     Unspecified osteoarthritis, unspecified site     Vitamin D deficiency        PAST SURGICAL HISTORY     Past Surgical History:   Procedure Laterality Date    EPIDURAL STEROID INJECTION INTO LUMBAR SPINE      EXPLORATORY LAPAROTOMY      hx total knee repla         FAMILY HISTORY   Reviewed and noncontributory to this case    SOCIAL HISTORY     Social History     Socioeconomic History    Marital status: Single   Occupational History    Occupation: caregiver   Tobacco Use    Smoking status: Never    Smokeless tobacco: Never     Social Determinants of Health     Financial Resource Strain: Low Risk  (12/7/2023)    Overall Financial Resource Strain (CARDIA)     Difficulty of Paying Living Expenses: Not hard at all   Food  Insecurity: No Food Insecurity (12/7/2023)    Hunger Vital Sign     Worried About Running Out of Food in the Last Year: Never true     Ran Out of Food in the Last Year: Never true   Transportation Needs: No Transportation Needs (12/7/2023)    PRAPARE - Transportation     Lack of Transportation (Medical): No     Lack of Transportation (Non-Medical): No   Physical Activity: Inactive (12/7/2023)    Exercise Vital Sign     Days of Exercise per Week: 0 days     Minutes of Exercise per Session: 0 min   Stress: No Stress Concern Present (12/7/2023)    [facility] - Occupational Stress Questionnaire     Feeling of Stress : Only a little   Social Connections: Moderately Integrated (12/7/2023)    Social Connection and Isolation Panel [NHANES]     Frequency of Communication with Friends and Family: More than three times a week     Frequency of Social Gatherings with Friends and Family: More than three times a week     Attends Worship Services: 1 to 4 times per year     Active Member of Clubs or Organizations: No     Attends Club or Organization Meetings: 1 to 4 times per year     Marital Status:    Recent Concern: Social Connections - Moderately Isolated (12/4/2023)    Social Connection and Isolation Panel [NHANES]     Frequency of Communication with Friends and Family: More than three times a week     Frequency of Social Gatherings with Friends and Family: More than three times a week     Attends Worship Services: 1 to 4 times per year     Active Member of Clubs or Organizations: No     Attends Club or Organization Meetings: Never     Marital Status:    Housing Stability: Low Risk  (12/7/2023)    Housing Stability Vital Sign     Unable to Pay for Housing in the Last Year: No     Number of Places Lived in the Last Year: 1     Unstable Housing in the Last Year: No       HOME MEDICATIONS     Prior to Admission medications    Medication Sig Start Date End Date Taking? Authorizing  Provider   losartan (COZAAR) 50 MG tablet Take 1 tablet (50 mg total) by mouth once daily. 5/8/23 5/7/24 Yes Selena Hou MD   pravastatin (PRAVACHOL) 80 MG tablet Take 1 tablet (80 mg total) by mouth once daily. 5/8/23 5/7/24 Yes Selena Hou MD       ALLERGIES   Codeine    REVIEW OF SYSTEMS   Except as documented above, all other systems reviewed and negative    PHYSICAL EXAM     Vitals:    12/14/23 0722   BP: 134/72   Pulse: (!) 59   Resp:    Temp: 98.1 °F (36.7 °C)      General:  In no acute distress, resting comfortably  Head and neck:  Atraumatic, normocephalic, moist mucous membranes, supple neck  Chest:  Clear to auscultation bilaterally  Heart:  S1, S2, grade 3/6 systolic murmur  Abdomen:  Soft, nontender, BS +  MSK:  Warm, no lower extremity edema, no clubbing or cyanosis  Neuro:  weak  Integumentary:  No obvious skin rash  Psychiatry:  Appropriate mood and affect    ASSESSMENT AND PLAN   E. Coli UTI  -Rocephin 12/4-12/8   -Cultures show sensitivity     Essential HTN  -losartan, well controlled     Left shoulder pain  -xray left shoulder showed no acute abnormality     Weakness and frequent falls  -PT/OT  -possible placement     Allergic dermatitis   -Benadryl x1 on 12/12  - continue to utilize topical Benadryl as needed    DVT prophylaxis:  Lovenox  Disposition:  Pending placement  __________________________________________________________________  LABS/MICRO/MEDS/DIAGNOSTICS       LABS  Recent Labs     12/13/23  0310      K 5.0   CHLORIDE 105   CO2 29   BUN 17.7   CREATININE 0.79   GLUCOSE 100   CALCIUM 9.3       Recent Labs     12/13/23  0310   WBC 4.99   RBC 3.99*   HCT 40.1   .5*            MICROBIOLOGY  Microbiology Results (last 7 days)       ** No results found for the last 168 hours. **            MEDICATIONS   enoxparin  40 mg Subcutaneous Daily    losartan  50 mg Oral Daily    pravastatin  80 mg Oral Daily    predniSONE  20 mg Oral Daily      INFUSIONS      DIAGNOSTIC  TESTS  No orders to display          Patient information was obtained from patient, patient's family, past medical records and ER records.   All diagnosis and differential diagnosis have been reviewed; assessment and plan has been documented. I have personally reviewed the labs and test results that are presently available; I have reviewed the patients medication list. I have reviewed the consulting providers response and recommendations. I have reviewed or attempted to review medical records based upon their availability.  All of the patient's questions have been addressed and answered. Patient's is agreeable to the above stated plan. I will continue to monitor closely and make adjustments to medical management as needed.  This note was created using [email or web address] voice recognition software that occasionally misinterpreted phrases or words.  Please contact me if any questions may rise regarding documentation to clarify verbiage.        Hoa Zamora MD   Internal Medicine  Department of Hospital Medicine [facility] - [facility] Surgical Unit

## 2023-12-14 NOTE — PLAN OF CARE
Problem: Adult Inpatient Plan of Care  Goal: Plan of Care Review  Outcome: Ongoing, Progressing  Flowsheets (Taken 12/14/2023 0845)  Plan of Care Reviewed With: patient     Problem: Diabetes Comorbidity  Goal: Blood Glucose Level Within Targeted Range  Outcome: Ongoing, Progressing  Intervention: Monitor and Manage Glycemia  Flowsheets (Taken 12/14/2023 0845)  Glycemic Management: blood glucose monitored     Problem: Fall Injury Risk  Goal: Absence of Fall and Fall-Related Injury  Outcome: Ongoing, Progressing  Intervention: Identify and Manage Contributors  Flowsheets (Taken 12/14/2023 0845)  Self-Care Promotion:   BADL personal objects within reach   independence encouraged  Medication Review/Management: medications reviewed  Intervention: Promote Injury-Free Environment  Flowsheets (Taken 12/14/2023 0845)  Safety Promotion/Fall Prevention:   assistive device/personal item within reach   bed alarm set   instructed to call staff for mobility   side rails raised x 2

## 2023-12-14 NOTE — PT/OT/SLP PROGRESS
Occupational Therapy  Treatment    Name: Priscila Randhawa    : 1933 (89 y.o.)  MRN: [id number]           TREATMENT SUMMARY AND RECOMMENDATIONS:      OT Date of Treatment: 23  OT Start Time:   OT Stop Time: 1430  OT Total Time (min): 25 min      Subjective Assessment:   No complaints  Lethargic   x Awake, alert, cooperative  Impulsive    Uncooperative   Flat affect    Agitated  c/o pain    Appropriate  c/o fatigue    Confused  Treated at bedside     Emotionally labile x Treated in gym/dept.      Other:        Therapy Precautions:    Cognitive deficits  Spinal precautions    Collar - hard  Sternal precautions    Collar - soft   TLSO   x Fall risk  LSO    Hip precautions - posterior  Knee immobilizer    Hip precautions - anterior  WBAT    Impaired communication  Partial weightbearing    Oxygen  TTWB    PEG tube  NWB    Visual deficits      Hearing deficits   Other:        Treatment Objectives:     Mobility Training:    Mobility task Assist level Comments    Bed mobility     Transfer CGA/SBA Stand/pivot t/f with RW to chair   Sit to stands transitions     Functional mobility CGA X80 feet with RW    Sitting balance     Standing balance      Other:          Therapeutic Exercise:   Exercise Sets Reps Comments   Arm bike 1 5 min Level 1; forwards                          Additional Comments:      Assessment: Patient tolerated session well.    GOALS:   Multidisciplinary Problems       Occupational Therapy Goals          Problem: Occupational Therapy    Goal Priority Disciplines Outcome Interventions   Occupational Therapy Goal     OT, PT/OT Ongoing, Progressing    Description: Goals to be met by: 23     Patient will increase functional independence with ADLs by performing:    UE Dressing with Set-up Assistance.  LE Dressing with Minimal Assistance.  Toileting from toilet with Contact Guard Assistance for hygiene and clothing management.   Bathing from  shower chair/bench with Minimal  Assistance.  Toilet transfer to toilet with Stand-by Assistance.  Increased functional strength to 4/5 for BUEs.                         Recommendations:     Discharge Equipment Recommendations:  none     Plan:     Patient to be seen 6 x/week (QD/BID as appropriate) to address the above listed problems via self-care/home management, therapeutic activities, therapeutic exercises  Plan of Care Expires: 12/22/23  Plan of Care Reviewed with: patient  Revisions made to plan of care: No      Skilled OT Minutes Provided: 25  Communication with Treatment Team:     Equipment recommendations:       At end of treatment, patient remained:   Up in chair     Up in wheelchair in room    In bed    With alarm activated    Bed rails up    Call bell in reach     Family/friends present    Restraints secured properly    In bathroom with CNA/RN notified   x In gym with PT/PTA/tech    Nurse aware    Other:

## 2023-12-14 NOTE — PLAN OF CARE
[facility] - [facility] Surgical [facility]  Discharge Reassessment    Primary Care Provider: Selena Hou MD    Expected Discharge Date:     Reassessment (most recent)       Discharge Reassessment - 12/14/23 1229          Discharge Reassessment    Assessment Type Discharge Planning Reassessment     Did the patient's condition or plan change since previous assessment? No     Discharge Plan discussed with: Adult children     Name(s) and Number(s) Michel colorado [phone]     Communicated HALI with patient/caregiver Date not available/Unable to determine     Discharge Plan A New Nursing Home placement - [facility] care facility     DME Needed Upon Discharge  none     Transition of Care Barriers None     Why the patient remains in the hospital Requires continued medical care        Post-Acute Status    Post-Acute Authorization Placement     Post-Acute Placement Status Referrals Sent     Hospital Resources/Appts/Education Provided Provided patient/caregiver with written discharge plan information     Patient choice form signed by patient/caregiver List with quality metrics by geographic area provided     Discharge Delays None known at this time

## 2023-12-14 NOTE — PLAN OF CARE
Problem: Adult Inpatient Plan of Care  Goal: Plan of Care Review  Outcome: Ongoing, Progressing  Flowsheets (Taken 12/13/2023 2002)  Plan of Care Reviewed With: patient  Goal: Patient-Specific Goal (Individualized)  Outcome: Ongoing, Progressing  Flowsheets (Taken 12/13/2023 2002)  Anxieties, Fears or Concerns: none  Individualized Care Needs: safety maintained manage skin rash     Problem: Fall Injury Risk  Goal: Absence of Fall and Fall-Related Injury  Outcome: Ongoing, Progressing  Intervention: Identify and Manage Contributors  Flowsheets (Taken 12/13/2023 2002)  Medication Review/Management: medications reviewed

## 2023-12-14 NOTE — PLAN OF CARE
Weekly Staffing Report      Date Admitted: 12/7/2023 :   Staffing Date: 12/14/2023     Patient Active Problem List   Diagnosis    Well controlled type 2 diabetes mellitus    Benign polycythemia    Generalized osteoarthritis    Essential hypertension    Mixed hyperlipidemia    Nonproliferative diabetic retinopathy    Polycythemia vera    Acute cystitis without hematuria          Team Members Present:       Nursing Present     Yes [x]    No []    Physical Therapy Present    Yes [x]    No []    Occupational Therapy Present     Yes [x]    No []    Speech Therapy Present    Yes []    No []    NA [x]    Dietary Present    Yes [x]    No []        Physician Present     [x] Thairy Reyes    [] Hoa Zamora    [x] TIARA Ruiz    []       Family Present    [] Adult Children    [] Spouse    [] POA    [] Friend/ Caregiver    [] Other       Interdisciplinary Meeting Notes:    Patient son informed that patient is doing well with therapy and is medically stable Marcie Wilcox is working on Nursing Home Placement               Please see Documented PT/OT/ST, Dietary, Nursing, and Physician notes for detailed treatment information.

## 2023-12-14 NOTE — PT/OT/SLP PROGRESS
Physical Therapy Treatment Note           Name: Priscila Randhawa    : 1933 (89 y.o.)  MRN: [id number]           TREATMENT SUMMARY AND RECOMMENDATIONS:    PT Received On: 23  PT Start Time: 1033     PT Stop Time: 1048  PT Total Time (min): 15 min     Subjective Assessment:   No complaints  Lethargic    Awake, alert, cooperative  Uncooperative    Agitated  c/o pain    Appropriate  c/o fatigue    Confused x Treated at bedside     Emotionally labile  Treated in gym/dept.    Impulsive  Other:    Flat affect       Therapy Precautions:    Cognitive deficits  Spinal precautions    Collar - hard  Sternal precautions    Collar - soft   TLSO    Fall risk  LSO    Hip precautions - posterior  Knee immobilizer    Hip precautions - anterior  WBAT    Impaired communication  Partial weightbearing    Oxygen  TTWB    PEG tube  NWB    Visual deficits  Other:    Hearing deficits          Treatment Objectives:     Mobility Training:   Assist level Comments    Bed mobility     Transfer SBA Stand pivot using RW   Gait CGA/SBA Amb on firm surface with  feet    Sit to stand transitions SBA From recliner and WC using RW   Sitting balance     Standing balance      Wheelchair mobility     Car transfer     Other:          Therapeutic Exercise:   Exercise Sets Reps Comments                               Additional Comments:  Pt willing to walk in lewis, stating she is not going to therapy room today.     Assessment: Patient tolerated session well. Plans to DC to NH once a room is open.     PT Plan: continue  Revisions made to plan of care: No    GOALS:   Multidisciplinary Problems       Physical Therapy Goals          Problem: Physical Therapy    Goal Priority Disciplines Outcome Goal Variances Interventions   Physical Therapy Goal     PT, PT/OT Ongoing, Not Progressing     Description: Description: Goals to be met by: Discharge      Patient will increase functional independence with mobility by performin. Sit  to stand transfer with Modified [place]  2. Bed to chair transfer with Modified [place] using Single-point Cane   3. Gait  x 50 feet with Modified [place] using Single-point Cane .                          Skilled PT Minutes Provided: 15   Communication with Treatment Team:     Equipment recommendations:       At end of treatment, patient remained:  x Up in chair     Up in wheelchair in room    In bed   x With alarm activated    Bed rails up   x Call bell in reach     Family/friends present    Restraints secured properly    In bathroom with CNA/RN notified    Nurse aware    In gym with therapist/tech    Other:

## 2023-12-15 PROCEDURE — 25000003 PHARM REV CODE 250: Performed by: STUDENT IN AN ORGANIZED HEALTH CARE EDUCATION/TRAINING PROGRAM

## 2023-12-15 PROCEDURE — 97110 THERAPEUTIC EXERCISES: CPT

## 2023-12-15 PROCEDURE — 97116 GAIT TRAINING THERAPY: CPT | Mod: CQ

## 2023-12-15 PROCEDURE — 63600175 PHARM REV CODE 636 W HCPCS: Performed by: STUDENT IN AN ORGANIZED HEALTH CARE EDUCATION/TRAINING PROGRAM

## 2023-12-15 PROCEDURE — 97530 THERAPEUTIC ACTIVITIES: CPT

## 2023-12-15 PROCEDURE — 11000004 HC SNF PRIVATE

## 2023-12-15 PROCEDURE — 97530 THERAPEUTIC ACTIVITIES: CPT | Mod: CQ

## 2023-12-15 RX ADMIN — PRAVASTATIN SODIUM 80 MG: 40 TABLET ORAL at 08:12

## 2023-12-15 RX ADMIN — ENOXAPARIN SODIUM 40 MG: 40 INJECTION SUBCUTANEOUS at 08:12

## 2023-12-15 RX ADMIN — PREDNISONE 20 MG: 20 TABLET ORAL at 08:12

## 2023-12-15 RX ADMIN — LOSARTAN POTASSIUM 50 MG: 50 TABLET, FILM COATED ORAL at 08:12

## 2023-12-15 RX ADMIN — ACETAMINOPHEN 1000 MG: 500 TABLET ORAL at 12:12

## 2023-12-15 NOTE — PLAN OF CARE
Problem: Adult Inpatient Plan of Care  Goal: Plan of Care Review  Outcome: Ongoing, Progressing  Flowsheets (Taken 12/14/2023 2338)  Plan of Care Reviewed With: patient  Goal: Patient-Specific Goal (Individualized)  Outcome: Ongoing, Progressing  Flowsheets (Taken 12/14/2023 2338)  Individualized Care Needs: continue PT, monitor safety  Goal: Absence of Hospital-Acquired Illness or Injury  Outcome: Ongoing, Progressing  Intervention: Identify and Manage Fall Risk  Flowsheets (Taken 12/14/2023 2338)  Safety Promotion/Fall Prevention:   assistive device/personal item within reach   chair alarm set   Fall Risk reviewed with patient/family   gait belt with ambulation   muscle strengthening facilitated   nonskid shoes/socks when out of bed   supervised activity   Supervised toileting - stay within arms reach   instructed to call staff for mobility  Intervention: Prevent Skin Injury  Flowsheets (Taken 12/14/2023 2338)  Body Position: position changed independently  Intervention: Prevent and Manage VTE (Venous Thromboembolism) Risk  Flowsheets (Taken 12/14/2023 2338)  Activity Management: Walk with assistive devise and /or staff member - L3  VTE Prevention/Management:   ambulation promoted   bleeding precations maintained  Intervention: Prevent Infection  Flowsheets (Taken 12/14/2023 2338)  Infection Prevention:   cohorting utilized   environmental surveillance performed   hand hygiene promoted   rest/sleep promoted   single patient room provided  Goal: Optimal Comfort and Wellbeing  Outcome: Ongoing, Progressing  Intervention: Monitor Pain and Promote Comfort  Flowsheets (Taken 12/14/2023 2338)  Pain Management Interventions:   care clustered   quiet environment facilitated   pillow support provided   position adjusted  Intervention: Provide Person-Centered Care  Flowsheets (Taken 12/14/2023 2338)  Trust Relationship/Rapport:   care explained   choices provided   emotional support provided   questions answered    questions encouraged   thoughts/feelings acknowledged     Problem: Fall Injury Risk  Goal: Absence of Fall and Fall-Related Injury  Outcome: Ongoing, Progressing  Intervention: Identify and Manage Contributors  Flowsheets (Taken 12/14/2023 2592)  Self-Care Promotion:   independence encouraged   BADL personal objects within reach   BADL personal routines maintained   safe use of adaptive equipment encouraged  Intervention: Promote Injury-Free Environment  Flowsheets (Taken 12/14/2023 2337)  Safety Promotion/Fall Prevention:   assistive device/personal item within reach   chair alarm set   Fall Risk reviewed with patient/family   gait belt with ambulation   muscle strengthening facilitated   nonskid shoes/socks when out of bed   supervised activity   Supervised toileting - stay within arms reach   instructed to call staff for mobility

## 2023-12-15 NOTE — PT/OT/SLP PROGRESS
Occupational Therapy  Treatment    Name: Priscila Randhawa    : 1933 (89 y.o.)  MRN: [id number]           TREATMENT SUMMARY AND RECOMMENDATIONS:      OT Date of Treatment: 12/15/23  OT Start Time: 1040  OT Stop Time: 1105  OT Total Time (min): 25 min      Subjective Assessment:   No complaints  Lethargic   x Awake, alert, cooperative  Impulsive    Uncooperative   Flat affect    Agitated  c/o pain    Appropriate  c/o fatigue    Confused x Treated at bedside     Emotionally labile x Treated in gym/dept.      Other:        Therapy Precautions:    Cognitive deficits  Spinal precautions    Collar - hard  Sternal precautions    Collar - soft   TLSO   x Fall risk  LSO    Hip precautions - posterior  Knee immobilizer    Hip precautions - anterior  WBAT    Impaired communication  Partial weightbearing    Oxygen  TTWB    PEG tube  NWB    Visual deficits      Hearing deficits   Other:        Treatment Objectives:     Mobility Training:    Mobility task Assist level Comments    Bed mobility     Transfer CGA Stand/pivot t/f with RW to BSchair   Sit to stands transitions SBA From w/c level    Functional mobility CGA/SBA X150 feet with RW   Sitting balance     Standing balance      Other:        ADL Training:    ADL Assist level Comments    Feeding     Grooming/hygiene     Bathing     Upper body dressing     Lower body dressing Max A OT assisted to doff/maryse B socks    Toileting     Toilet transfer     Adaptive equipment training     Other:           Therapeutic Exercise:   Exercise Sets Reps Comments   BUE strengthening   Pt performed functional reaching to grasp wrapped presents from shelf; performed unwrapping task using BUEs with focus on strengthening. Difficulty noted with digits d/t deformity from arthritis.                          Additional Comments:      Assessment: Patient tolerated session well.    GOALS:   Multidisciplinary Problems       Occupational Therapy Goals          Problem: Occupational Therapy     Goal Priority Disciplines Outcome Interventions   Occupational Therapy Goal     OT, PT/OT Ongoing, Progressing    Description: Goals to be met by: 12/22/23     Patient will increase functional independence with ADLs by performing:    UE Dressing with Set-up Assistance.  LE Dressing with Minimal Assistance.  Toileting from toilet with Contact Guard Assistance for hygiene and clothing management.   Bathing from  shower chair/bench with Minimal Assistance.  Toilet transfer to toilet with Stand-by Assistance.  Increased functional strength to 4/5 for BUEs.                         Recommendations:     Discharge Equipment Recommendations:  none     Plan:     Patient to be seen 6 x/week (QD/BID as appropriate) to address the above listed problems via self-care/home management, therapeutic activities, therapeutic exercises  Plan of Care Expires: 12/22/23  Plan of Care Reviewed with: patient  Revisions made to plan of care: No      Skilled OT Minutes Provided: 25  Communication with Treatment Team:     Equipment recommendations:       At end of treatment, patient remained:  x Up in chair     Up in wheelchair in room    In bed   x With alarm activated    Bed rails up   x Call bell in reach    x Family/friends present    Restraints secured properly    In bathroom with CNA/RN notified    In gym with PT/PTA/tech    Nurse aware    Other:

## 2023-12-15 NOTE — PROGRESS NOTES
[facility] - [facility] Surgical [facility]  [facility] [place] MEDICINE - Progress Note    Patient Name: Priscila Randhawa  MRN: [id number]  Patient Class: IP- Swing   Admission Date: 12/7/2023   Admitting Physician: VINCENT Service   Attending Physician: Hoa Zamora MD  Primary Care Provider: Selena Hou MD  Face-to-Face encounter date: 12/15/2023      CHIEF COMPLAINT   Frequent falls  HISTORY OF PRESENTING ILLNESS   89 year-old white female with a history of dementia, hypertension, diabetes who presented to the ER after a fall at home. She lives alone and has her niece at the bedside while she was in the ER however today she is alone. She does have frequent falls. Her PCP did recommend her to be placed in assisted living versus nursing home.  Patient was has been reluctant. Admitted for management of UTI, PT OT for the weakness and possible nursing home placement if patient and family are amenable.    Today   Rash improving.  Pleasant.   PAST MEDICAL HISTORY     Past Medical History:   Diagnosis Date    Background diabetic retinopathy     Carotid artery stenosis     Cataract extraction status     Combined hyperlipidemia     HTN (hypertension)     Polycythemia     Type 2 diabetes mellitus without complications     Unspecified osteoarthritis, unspecified site     Vitamin D deficiency        PAST SURGICAL HISTORY     Past Surgical History:   Procedure Laterality Date    EPIDURAL STEROID INJECTION INTO LUMBAR SPINE      EXPLORATORY LAPAROTOMY      hx total knee repla         FAMILY HISTORY   Reviewed and noncontributory to this case    SOCIAL HISTORY     Social History     Socioeconomic History    Marital status: Single   Occupational History    Occupation: caregiver   Tobacco Use    Smoking status: Never    Smokeless tobacco: Never     Social Determinants of Health     Financial Resource Strain: Low Risk  (12/7/2023)    Overall Financial Resource Strain (CARDIA)     Difficulty of Paying Living Expenses: Not hard at all    Food Insecurity: No Food Insecurity (12/7/2023)    Hunger Vital Sign     Worried About Running Out of Food in the Last Year: Never true     Ran Out of Food in the Last Year: Never true   Transportation Needs: No Transportation Needs (12/7/2023)    PRAPARE - Transportation     Lack of Transportation (Medical): No     Lack of Transportation (Non-Medical): No   Physical Activity: Inactive (12/7/2023)    Exercise Vital Sign     Days of Exercise per Week: 0 days     Minutes of Exercise per Session: 0 min   Stress: No Stress Concern Present (12/7/2023)    [facility] - Occupational Stress Questionnaire     Feeling of Stress : Only a little   Social Connections: Moderately Integrated (12/7/2023)    Social Connection and Isolation Panel [NHANES]     Frequency of Communication with Friends and Family: More than three times a week     Frequency of Social Gatherings with Friends and Family: More than three times a week     Attends Yazidism Services: 1 to 4 times per year     Active Member of Clubs or Organizations: No     Attends Club or Organization Meetings: 1 to 4 times per year     Marital Status:    Recent Concern: Social Connections - Moderately Isolated (12/4/2023)    Social Connection and Isolation Panel [NHANES]     Frequency of Communication with Friends and Family: More than three times a week     Frequency of Social Gatherings with Friends and Family: More than three times a week     Attends Yazidism Services: 1 to 4 times per year     Active Member of Clubs or Organizations: No     Attends Club or Organization Meetings: Never     Marital Status:    Housing Stability: Low Risk  (12/7/2023)    Housing Stability Vital Sign     Unable to Pay for Housing in the Last Year: No     Number of Places Lived in the Last Year: 1     Unstable Housing in the Last Year: No       HOME MEDICATIONS     Prior to Admission medications    Medication Sig Start Date End Date Taking?  Authorizing Provider   losartan (COZAAR) 50 MG tablet Take 1 tablet (50 mg total) by mouth once daily. 5/8/23 5/7/24 Yes Selena Hou MD   pravastatin (PRAVACHOL) 80 MG tablet Take 1 tablet (80 mg total) by mouth once daily. 5/8/23 5/7/24 Yes Selena Hou MD       ALLERGIES   Codeine    REVIEW OF SYSTEMS   Except as documented above, all other systems reviewed and negative    PHYSICAL EXAM     Vitals:    12/15/23 0853   BP: 137/70   Pulse:    Resp:    Temp:       General:  In no acute distress, resting comfortably  Head and neck:  Atraumatic, normocephalic, moist mucous membranes, supple neck  Chest:  Clear to auscultation bilaterally  Heart:  S1, S2, grade 3/6 systolic murmur  Abdomen:  Soft, nontender, BS +  MSK:  Warm, no lower extremity edema, no clubbing or cyanosis  Neuro:  weak  Integumentary:  No obvious skin rash  Psychiatry:  Appropriate mood and affect    ASSESSMENT AND PLAN   E. Coli UTI  -Rocephin 12/4-12/8   -Cultures show sensitivity     Essential HTN  -losartan, well controlled     Left shoulder pain  -xray left shoulder showed no acute abnormality     Weakness and frequent falls  -PT/OT  -possible placement     Allergic dermatitis   -Benadryl x1 on 12/12  - continue to utilize topical Benadryl as needed    DVT prophylaxis:  Lovenox  Disposition:  Pending placement  __________________________________________________________________  LABS/MICRO/MEDS/DIAGNOSTICS       LABS  Recent Labs     12/13/23  0310      K 5.0   CHLORIDE 105   CO2 29   BUN 17.7   CREATININE 0.79   GLUCOSE 100   CALCIUM 9.3       Recent Labs     12/13/23  0310   WBC 4.99   RBC 3.99*   HCT 40.1   .5*            MICROBIOLOGY  Microbiology Results (last 7 days)       ** No results found for the last 168 hours. **            MEDICATIONS   enoxparin  40 mg Subcutaneous Daily    losartan  50 mg Oral Daily    pravastatin  80 mg Oral Daily    predniSONE  20 mg Oral Daily      INFUSIONS      DIAGNOSTIC TESTS  No  orders to display          Patient information was obtained from patient, patient's family, past medical records and ER records.   All diagnosis and differential diagnosis have been reviewed; assessment and plan has been documented. I have personally reviewed the labs and test results that are presently available; I have reviewed the patients medication list. I have reviewed the consulting providers response and recommendations. I have reviewed or attempted to review medical records based upon their availability.  All of the patient's questions have been addressed and answered. Patient's is agreeable to the above stated plan. I will continue to monitor closely and make adjustments to medical management as needed.  This note was created using [employer] voice recognition software that occasionally misinterpreted phrases or words.  Please contact me if any questions may rise regarding documentation to clarify verbiage.        Hoa Zamora MD   Internal Medicine  Department of [facility] Medicine  [facility] - [facility] Surgical Unit

## 2023-12-15 NOTE — PLAN OF CARE
Problem: Adult Inpatient Plan of Care  Goal: Plan of Care Review  Outcome: Ongoing, Progressing  Flowsheets (Taken 12/15/2023 0948)  Plan of Care Reviewed With: patient  Goal: Patient-Specific Goal (Individualized)  Outcome: Ongoing, Progressing  Flowsheets (Taken 12/15/2023 0948)  Anxieties, Fears or Concerns: None  Individualized Care Needs: IV antibiotics, safety  Goal: Absence of Hospital-Acquired Illness or Injury  Outcome: Ongoing, Progressing  Intervention: Identify and Manage Fall Risk  Flowsheets (Taken 12/15/2023 0948)  Safety Promotion/Fall Prevention:   assistive device/personal item within reach   bed alarm set   instructed to call staff for mobility   side rails raised x 3   medications reviewed   muscle strengthening facilitated   room near unit station   Fall Risk reviewed with patient/family  Intervention: Prevent Skin Injury  Flowsheets (Taken 12/15/2023 0948)  Body Position:   position changed independently   supine   side-lying   sitting up in bed   weight shifting   log-rolled  Skin Protection:   adhesive use limited   incontinence pads utilized   transparent dressing maintained   tubing/devices free from skin contact  Intervention: Prevent and Manage VTE (Venous Thromboembolism) Risk  Flowsheets (Taken 12/15/2023 0948)  Activity Management:   Ambulated to bathroom - L4   Ambulated in room - L4   Rolling - L1  VTE Prevention/Management:   ambulation promoted   fluids promoted  Intervention: Prevent Infection  Flowsheets (Taken 12/15/2023 0948)  Infection Prevention:   cohorting utilized   environmental surveillance performed   equipment surfaces disinfected   hand hygiene promoted   personal protective equipment utilized   rest/sleep promoted   single patient room provided  Goal: Optimal Comfort and Wellbeing  Outcome: Ongoing, Progressing  Intervention: Monitor Pain and Promote Comfort  Flowsheets (Taken 12/15/2023 0948)  Pain Management Interventions:   care clustered   pillow support  provided  Intervention: Provide Person-Centered Care  Flowsheets (Taken 12/15/2023 0948)  Trust Relationship/Rapport:   care explained   questions answered   questions encouraged  Goal: Readiness for Transition of Care  Outcome: Ongoing, Progressing     Problem: Fall Injury Risk  Goal: Absence of Fall and Fall-Related Injury  Outcome: Ongoing, Progressing  Intervention: Identify and Manage Contributors  Flowsheets (Taken 12/4/2023 1026)  Self-Care Promotion:   independence encouraged   BADL personal objects within reach   safe use of adaptive equipment encouraged   meal set-up provided     Problem: UTI (Urinary Tract Infection)  Goal: Improved Infection Symptoms  Outcome: Ongoing, Progressing  Intervention: Prevent Infection Progression  Flowsheets (Taken 12/15/2023 0948)  Infection Management: aseptic technique maintained     Problem: Mobility Impairment  Goal: Optimal Mobility  Outcome: Ongoing, Progressing  Intervention: Optimize Mobility  Flowsheets (Taken 12/15/2023 0948)  Activity Management:   Ambulated to bathroom - L4   Ambulated in room - L4   Rolling - L1  Positioning/Transfer Devices:   in use   pillows     Problem: Infection  Goal: Absence of Infection Signs and Symptoms  Outcome: Ongoing, Progressing  Intervention: Prevent or Manage Infection  Flowsheets (Taken 12/15/2023 0948)  Infection Management: aseptic technique maintained

## 2023-12-15 NOTE — PT/OT/SLP PROGRESS
Physical Therapy Treatment Note           Name: Priscila Randhawa    : 1933 (89 y.o.)  MRN: [id number]           TREATMENT SUMMARY AND RECOMMENDATIONS:    PT Received On: 12/15/23  PT Start Time: 1400     PT Stop Time: 1425  PT Total Time (min): 25 min     Subjective Assessment:  x No complaints  Lethargic    Awake, alert, cooperative  Uncooperative    Agitated  c/o pain    Appropriate  c/o fatigue    Confused  Treated at bedside     Emotionally labile  Treated in gym/dept.    Impulsive  Other:    Flat affect       Therapy Precautions:    Cognitive deficits  Spinal precautions    Collar - hard  Sternal precautions    Collar - soft   TLSO    Fall risk  LSO    Hip precautions - posterior  Knee immobilizer    Hip precautions - anterior  WBAT    Impaired communication  Partial weightbearing    Oxygen  TTWB    PEG tube  NWB    Visual deficits  Other:    Hearing deficits          Treatment Objectives:     Mobility Training:   Assist level Comments    Bed mobility     Transfer     Gait CGA Amb on firm surface with  ft    Sit to stand transitions     Sitting balance  Balloon hitting and kicking while sitting to promote trunk control and strength    Standing balance      Wheelchair mobility     Car transfer     Other:          Therapeutic Exercise:   Exercise Sets Reps Comments   B LE exer sitting  1 20 In all planes to promote muscle strength and ROM                          Additional Comments:  No issues noted     Assessment: Patient tolerated session well.    PT Plan: continue  Revisions made to plan of care: No    GOALS:   Multidisciplinary Problems       Physical Therapy Goals          Problem: Physical Therapy    Goal Priority Disciplines Outcome Goal Variances Interventions   Physical Therapy Goal     PT, PT/OT Ongoing, Not Progressing     Description: Description: Goals to be met by: Discharge      Patient will increase functional independence with mobility by performin. Sit to stand  transfer with Modified [place]  2. Bed to chair transfer with Modified [place] using Single-point Cane   3. Gait  x 50 feet with Modified [place] using Single-point Cane .                          Skilled PT Minutes Provided: 25   Communication with Treatment Team:     Equipment recommendations:       At end of treatment, patient remained:  x Up in chair     Up in wheelchair in room    In bed   x With alarm activated    Bed rails up   x Call bell in reach     Family/friends present    Restraints secured properly    In bathroom with CNA/RN notified    Nurse aware    In gym with therapist/tech    Other:

## 2023-12-15 NOTE — PT/OT/SLP PROGRESS
Physical Therapy Treatment Note           Name: Priscila Randhawa    : 1933 (89 y.o.)  MRN: [id number]           TREATMENT SUMMARY AND RECOMMENDATIONS:    PT Received On: 12/15/23  PT Start Time: 1022     PT Stop Time: 1045  PT Total Time (min): 23 min     Subjective Assessment:   No complaints  Lethargic   x Awake, alert, cooperative  Uncooperative    Agitated  c/o pain    Appropriate  c/o fatigue    Confused  Treated at bedside     Emotionally labile x Treated in gym/dept.    Impulsive  Other:    Flat affect       Therapy Precautions:    Cognitive deficits  Spinal precautions    Collar - hard  Sternal precautions    Collar - soft   TLSO   x Fall risk  LSO    Hip precautions - posterior  Knee immobilizer    Hip precautions - anterior  WBAT    Impaired communication  Partial weightbearing    Oxygen  TTWB    PEG tube  NWB    Visual deficits  Other:    Hearing deficits          Treatment Objectives:     Mobility Training:   Assist level Comments    Bed mobility     Transfer SBA Stand pivot using RW   Gait CGA/SBA X 150 feet using RW with WC following   Sit to stand transitions CGA/SBA From EOB and WC   Sitting balance     Standing balance      Wheelchair mobility     Car transfer     Other:          Therapeutic Exercise:   Exercise Sets Reps Comments   B LE bike using UBE  10' 5' Forward/5' Backwards                         Additional Comments:  No changes or complications.     Assessment: Patient tolerated session well. Recommending pt not live alone. Working on NH placement     PT Plan: continue per POC  Revisions made to plan of care: No    GOALS:   Multidisciplinary Problems       Physical Therapy Goals          Problem: Physical Therapy    Goal Priority Disciplines Outcome Goal Variances Interventions   Physical Therapy Goal     PT, PT/OT Ongoing, Not Progressing     Description: Description: Goals to be met by: Discharge      Patient will increase functional independence with mobility by  performin. Sit to stand transfer with Modified [place]  2. Bed to chair transfer with Modified [place] using Single-point Cane   3. Gait  x 50 feet with Modified [place] using Single-point Cane .                          Skilled PT Minutes Provided: 23   Communication with Treatment Team:     Equipment recommendations:       At end of treatment, patient remained:  x Up in chair     Up in wheelchair in room    In bed    With alarm activated    Bed rails up    Call bell in reach     Family/friends present    Restraints secured properly    In bathroom with CNA/RN notified    Nurse aware   x In gym with therapist/tech    Other:

## 2023-12-15 NOTE — PLAN OF CARE
Received notification that [facility] could not accept patient. Notified caregiver Abdelrahman to request further options. She stated that she is going to [facility] to speak to them as she knows someone in Administration there and will call me back

## 2023-12-16 LAB
ALBUMIN SERPL-MCNC: 2.9 G/DL (ref 3.4–4.8)
ALBUMIN/GLOB SERPL: 0.8 RATIO (ref 1.1–2)
ALP SERPL-CCNC: 80 UNIT/L (ref 40–150)
ALT SERPL-CCNC: 64 UNIT/L (ref 0–55)
AST SERPL-CCNC: 44 UNIT/L (ref 5–34)
BASOPHILS # BLD AUTO: 0.02 X10(3)/MCL
BASOPHILS NFR BLD AUTO: 0.2 %
BILIRUB SERPL-MCNC: 0.3 MG/DL
BUN SERPL-MCNC: 18.4 MG/DL (ref 9.8–20.1)
CALCIUM SERPL-MCNC: 9.3 MG/DL (ref 8.4–10.2)
CHLORIDE SERPL-SCNC: 105 MMOL/L (ref 98–111)
CO2 SERPL-SCNC: 25 MMOL/L (ref 23–31)
CREAT SERPL-MCNC: 0.75 MG/DL (ref 0.55–1.02)
EOSINOPHIL # BLD AUTO: 0.1 X10(3)/MCL (ref 0–0.9)
EOSINOPHIL NFR BLD AUTO: 1.1 %
ERYTHROCYTE [DISTWIDTH] IN BLOOD BY AUTOMATED COUNT: 12.6 % (ref 11.5–17)
GFR SERPLBLD CREATININE-BSD FMLA CKD-EPI: >60 MLS/MIN/1.73/M2
GLOBULIN SER-MCNC: 3.5 GM/DL (ref 2.4–3.5)
GLUCOSE SERPL-MCNC: 84 MG/DL (ref 75–121)
HCT VFR BLD AUTO: 43.7 % (ref 37–47)
HGB BLD-MCNC: 13.3 G/DL (ref 12–16)
IMM GRANULOCYTES # BLD AUTO: 0.04 X10(3)/MCL (ref 0–0.04)
IMM GRANULOCYTES NFR BLD AUTO: 0.4 %
LYMPHOCYTES # BLD AUTO: 2.33 X10(3)/MCL (ref 0.6–4.6)
LYMPHOCYTES NFR BLD AUTO: 25.8 %
MAGNESIUM SERPL-MCNC: 2 MG/DL (ref 1.6–2.6)
MCH RBC QN AUTO: 30.6 PG (ref 27–31)
MCHC RBC AUTO-ENTMCNC: 30.4 G/DL (ref 33–36)
MCV RBC AUTO: 100.7 FL (ref 80–94)
MONOCYTES # BLD AUTO: 0.58 X10(3)/MCL (ref 0.1–1.3)
MONOCYTES NFR BLD AUTO: 6.4 %
NEUTROPHILS # BLD AUTO: 5.97 X10(3)/MCL (ref 2.1–9.2)
NEUTROPHILS NFR BLD AUTO: 66.1 %
PLATELET # BLD AUTO: 295 X10(3)/MCL (ref 130–400)
PMV BLD AUTO: 9.9 FL (ref 7.4–10.4)
POTASSIUM SERPL-SCNC: 4.1 MMOL/L (ref 3.5–5.1)
PROT SERPL-MCNC: 6.4 GM/DL (ref 5.8–7.6)
RBC # BLD AUTO: 4.34 X10(6)/MCL (ref 4.2–5.4)
SODIUM SERPL-SCNC: 140 MMOL/L (ref 132–146)
VIT B12 SERPL-MCNC: 354 PG/ML (ref 213–816)
WBC # SPEC AUTO: 9.04 X10(3)/MCL (ref 4.5–11.5)

## 2023-12-16 PROCEDURE — 11000004 HC SNF PRIVATE

## 2023-12-16 PROCEDURE — 63600175 PHARM REV CODE 636 W HCPCS: Performed by: STUDENT IN AN ORGANIZED HEALTH CARE EDUCATION/TRAINING PROGRAM

## 2023-12-16 PROCEDURE — 25000003 PHARM REV CODE 250: Performed by: STUDENT IN AN ORGANIZED HEALTH CARE EDUCATION/TRAINING PROGRAM

## 2023-12-16 PROCEDURE — 97530 THERAPEUTIC ACTIVITIES: CPT

## 2023-12-16 PROCEDURE — 25000003 PHARM REV CODE 250: Performed by: INTERNAL MEDICINE

## 2023-12-16 PROCEDURE — 97110 THERAPEUTIC EXERCISES: CPT

## 2023-12-16 RX ORDER — MAG HYDROX/ALUMINUM HYD/SIMETH 200-200-20
30 SUSPENSION, ORAL (FINAL DOSE FORM) ORAL EVERY 6 HOURS PRN
Status: DISCONTINUED | OUTPATIENT
Start: 2023-12-16 | End: 2023-12-21 | Stop reason: HOSPADM

## 2023-12-16 RX ADMIN — PREDNISONE 20 MG: 20 TABLET ORAL at 08:12

## 2023-12-16 RX ADMIN — ENOXAPARIN SODIUM 40 MG: 40 INJECTION SUBCUTANEOUS at 08:12

## 2023-12-16 RX ADMIN — ALUMINUM HYDROXIDE, MAGNESIUM HYDROXIDE, AND SIMETHICONE 30 ML: 1200; 120; 1200 SUSPENSION ORAL at 08:12

## 2023-12-16 RX ADMIN — LOSARTAN POTASSIUM 50 MG: 50 TABLET, FILM COATED ORAL at 08:12

## 2023-12-16 RX ADMIN — PRAVASTATIN SODIUM 80 MG: 40 TABLET ORAL at 08:12

## 2023-12-16 NOTE — PLAN OF CARE
Problem: Adult Inpatient Plan of Care  Goal: Plan of Care Review  Outcome: Ongoing, Progressing  Flowsheets (Taken 12/16/2023 0022)  Plan of Care Reviewed With: patient  Goal: Patient-Specific Goal (Individualized)  Outcome: Ongoing, Progressing  Flowsheets (Taken 12/16/2023 0022)  Individualized Care Needs: continue PT, monitor safety  Goal: Absence of Hospital-Acquired Illness or Injury  Outcome: Ongoing, Progressing  Intervention: Identify and Manage Fall Risk  Flowsheets (Taken 12/16/2023 0022)  Safety Promotion/Fall Prevention:   assistive device/personal item within reach   chair alarm set   Fall Risk reviewed with patient/family   gait belt with ambulation   in recliner, wheels locked   lighting adjusted   muscle strengthening facilitated   nonskid shoes/socks when out of bed   Supervised toileting - stay within arms reach   supervised activity   instructed to call staff for mobility  Intervention: Prevent Skin Injury  Flowsheets (Taken 12/16/2023 0022)  Body Position: position changed independently  Intervention: Prevent and Manage VTE (Venous Thromboembolism) Risk  Flowsheets (Taken 12/16/2023 0022)  Activity Management: Walk with assistive devise and /or staff member - L3  VTE Prevention/Management:   ambulation promoted   bleeding precations maintained  Intervention: Prevent Infection  Flowsheets (Taken 12/16/2023 0022)  Infection Prevention:   cohorting utilized   environmental surveillance performed   hand hygiene promoted   single patient room provided   rest/sleep promoted  Goal: Optimal Comfort and Wellbeing  Outcome: Ongoing, Progressing  Intervention: Monitor Pain and Promote Comfort  Flowsheets (Taken 12/16/2023 0022)  Pain Management Interventions:   care clustered   pain management plan reviewed with patient/caregiver   quiet environment facilitated   pillow support provided  Intervention: Provide Person-Centered Care  Flowsheets (Taken 12/16/2023 0022)  Trust Relationship/Rapport:   care  explained   questions encouraged   thoughts/feelings acknowledged   questions answered   empathic listening provided     Problem: Fall Injury Risk  Goal: Absence of Fall and Fall-Related Injury  Outcome: Ongoing, Progressing  Intervention: Identify and Manage Contributors  Flowsheets (Taken 12/16/2023 0022)  Self-Care Promotion:   independence encouraged   BADL personal objects within reach   BADL personal routines maintained   safe use of adaptive equipment encouraged  Intervention: Promote Injury-Free Environment  Flowsheets (Taken 12/16/2023 0022)  Safety Promotion/Fall Prevention:   assistive device/personal item within reach   chair alarm set   Fall Risk reviewed with patient/family   gait belt with ambulation   in recliner, wheels locked   lighting adjusted   muscle strengthening facilitated   nonskid shoes/socks when out of bed   Supervised toileting - stay within arms reach   supervised activity   instructed to call staff for mobility

## 2023-12-16 NOTE — PLAN OF CARE
Problem: Adult Inpatient Plan of Care  Goal: Plan of Care Review  Outcome: Ongoing, Progressing  Flowsheets (Taken 12/16/2023 0850)  Plan of Care Reviewed With: patient  Goal: Patient-Specific Goal (Individualized)  Outcome: Ongoing, Progressing  Flowsheets (Taken 12/16/2023 0850)  Anxieties, Fears or Concerns: None  Individualized Care Needs: IV antibiotics, safety  Goal: Absence of Hospital-Acquired Illness or Injury  Outcome: Ongoing, Progressing  Intervention: Identify and Manage Fall Risk  Flowsheets (Taken 12/16/2023 0850)  Safety Promotion/Fall Prevention:   assistive device/personal item within reach   bed alarm set   instructed to call staff for mobility   side rails raised x 3   medications reviewed   muscle strengthening facilitated   room near unit station   Fall Risk reviewed with patient/family  Intervention: Prevent Skin Injury  Flowsheets (Taken 12/16/2023 0850)  Body Position:   position changed independently   supine   side-lying   sitting up in bed   weight shifting   log-rolled  Skin Protection:   adhesive use limited   incontinence pads utilized   transparent dressing maintained   tubing/devices free from skin contact  Intervention: Prevent and Manage VTE (Venous Thromboembolism) Risk  Flowsheets (Taken 12/16/2023 0850)  Activity Management:   Ambulated to bathroom - L4   Ambulated in room - L4   Rolling - L1  VTE Prevention/Management:   ambulation promoted   fluids promoted  Intervention: Prevent Infection  Flowsheets (Taken 12/16/2023 0850)  Infection Prevention:   cohorting utilized   environmental surveillance performed   equipment surfaces disinfected   hand hygiene promoted   personal protective equipment utilized   rest/sleep promoted   single patient room provided  Goal: Optimal Comfort and Wellbeing  Outcome: Ongoing, Progressing  Intervention: Monitor Pain and Promote Comfort  Flowsheets (Taken 12/16/2023 0850)  Pain Management Interventions:   care clustered   pillow support  provided  Intervention: Provide Person-Centered Care  Flowsheets (Taken 12/16/2023 0850)  Trust Relationship/Rapport:   care explained   questions answered   questions encouraged  Goal: Readiness for Transition of Care  Outcome: Ongoing, Progressing     Problem: Fall Injury Risk  Goal: Absence of Fall and Fall-Related Injury  Outcome: Ongoing, Progressing  Intervention: Identify and Manage Contributors  Flowsheets (Taken 12/16/2023 0850)  Self-Care Promotion:   independence encouraged   BADL personal objects within reach   safe use of adaptive equipment encouraged   meal set-up provided     Problem: UTI (Urinary Tract Infection)  Goal: Improved Infection Symptoms  Outcome: Ongoing, Progressing  Intervention: Prevent Infection Progression  Flowsheets (Taken 12/16/2023 0850)  Infection Management: aseptic technique maintained     Problem: Mobility Impairment  Goal: Optimal Mobility  Outcome: Ongoing, Progressing  Intervention: Optimize Mobility  Flowsheets (Taken 12/16/2023 0850)  Activity Management:   Ambulated to bathroom - L4   Ambulated in room - L4   Rolling - L1  Positioning/Transfer Devices:   in use   pillows     Problem: Infection  Goal: Absence of Infection Signs and Symptoms  Outcome: Ongoing, Progressing  Intervention: Prevent or Manage Infection  Flowsheets (Taken 12/16/2023 0850)  Infection Management: aseptic technique maintained

## 2023-12-16 NOTE — PT/OT/SLP PROGRESS
Occupational Therapy  Treatment    Name: Priscila Randhawa    : 1933 (89 y.o.)  MRN: [id number]           TREATMENT SUMMARY AND RECOMMENDATIONS:      OT Date of Treatment: 23  OT Start Time: 1010  OT Stop Time: 1040  OT Total Time (min): 30 min      Subjective Assessment:   No complaints  Lethargic   X Awake, alert, cooperative  Impulsive    Uncooperative   Flat affect    Agitated  c/o pain    Appropriate  c/o fatigue    Confused  Treated at bedside     Emotionally labile X Treated in gym/dept.      Other:        Therapy Precautions:    Cognitive deficits  Spinal precautions    Collar - hard  Sternal precautions    Collar - soft   TLSO   X Fall risk  LSO    Hip precautions - posterior  Knee immobilizer    Hip precautions - anterior  WBAT    Impaired communication  Partial weightbearing    Oxygen  TTWB    PEG tube  NWB    Visual deficits      Hearing deficits   Other:        Treatment Objectives:     Mobility Training:    Mobility task Assist level Comments    Bed mobility     Transfer     Sit to stands transitions CGA From bedside chair and standard chair    Functional mobility SBA  Using Rolling Walker Patient ambulated ~120 ft from room to therapy gym    Sitting balance     Standing balance      Other:        Therapeutic Exercise:   Exercise Sets Reps Comments   Bilateral lower extremities strengthening  3 10 All planes completed with no added resistance    Bilateral upper extremities strengthening 3 10 1# dowel used to complete bicep curls, chest press, and straight arm raises                    Assessment: Patient tolerated session well. Despite Patient reporting she was fatigued from a rough night all exercises and ambulation completed with no difficulty.    GOALS:   Multidisciplinary Problems       Occupational Therapy Goals          Problem: Occupational Therapy    Goal Priority Disciplines Outcome Interventions   Occupational Therapy Goal     OT, PT/OT Ongoing, Progressing    Description: Goals  to be met by: 12/22/23     Patient will increase functional independence with ADLs by performing:    UE Dressing with Set-up Assistance.  LE Dressing with Minimal Assistance.  Toileting from toilet with Contact Guard Assistance for hygiene and clothing management.   Bathing from  shower chair/bench with Minimal Assistance.  Toilet transfer to toilet with Stand-by Assistance.  Increased functional strength to 4/5 for BUEs.                         Recommendations:     Discharge Equipment Recommendations:  none     Plan:     Patient to be seen 6 x/week (QD/BID as appropriate) to address the above listed problems via self-care/home management, therapeutic activities, therapeutic exercises  Plan of Care Expires: 12/22/23  Plan of Care Reviewed with: patient  Revisions made to plan of care: No      Skilled OT Minutes Provided: 30  Communication with Treatment Team:     Equipment recommendations:       At end of treatment, patient remained:  X Up in chair     Up in wheelchair in room    In bed   X With alarm activated    Bed rails up   X Call bell in reach     Family/friends present    Restraints secured properly    In bathroom with CNA/RN notified    In gym with PT/PTA/tech   X Nurse aware    Other:

## 2023-12-16 NOTE — PROGRESS NOTES
[facility]  [facility] MEDICINE - Progress Note    Patient Name: Priscila Randhawa  MRN: [id number]  Patient Class: IP- Swing   Admission Date: 12/7/2023   Admitting Physician:  Service   Attending Physician: Hoa Zamora MD  Primary Care Provider: Selena Hou MD  Face-to-Face encounter date: 12/16/2023      CHIEF COMPLAINT   Frequent falls  HISTORY OF PRESENTING ILLNESS   89 year-old white female with a history of dementia, hypertension, diabetes who presented to the ER after a fall at home. She lives alone and has her niece at the bedside while she was in the ER however today she is alone. She does have frequent falls. Her PCP did recommend her to be placed in assisted living versus nursing home.  Patient was has been reluctant. Admitted for management of UTI, PT OT for the weakness and possible nursing home placement if patient and family are amenable.    Today   Rash improving.  Pleasant. She states she was cold last night. Will get labs.   PAST MEDICAL HISTORY     Past Medical History:   Diagnosis Date    Background diabetic retinopathy     Carotid artery stenosis     Cataract extraction status     Combined hyperlipidemia     HTN (hypertension)     Polycythemia     Type 2 diabetes mellitus without complications     Unspecified osteoarthritis, unspecified site     Vitamin D deficiency        PAST SURGICAL HISTORY     Past Surgical History:   Procedure Laterality Date    EPIDURAL STEROID INJECTION INTO LUMBAR SPINE      EXPLORATORY LAPAROTOMY      hx total knee repla         FAMILY HISTORY   Reviewed and noncontributory to this case    SOCIAL HISTORY     Social History     Socioeconomic History    Marital status: Single   Occupational History    Occupation: caregiver   Tobacco Use    Smoking status: Never    Smokeless tobacco: Never     Social Determinants of Health     Financial Resource Strain: Low Risk  (12/7/2023)    Overall Financial Resource Strain (CARDIA)      Difficulty of Paying Living Expenses: Not hard at all   Food Insecurity: No Food Insecurity (12/7/2023)    Hunger Vital Sign     Worried About Running Out of Food in the Last Year: Never true     Ran Out of Food in the Last Year: Never true   Transportation Needs: No Transportation Needs (12/7/2023)    PRAPARE - Transportation     Lack of Transportation (Medical): No     Lack of Transportation (Non-Medical): No   Physical Activity: Inactive (12/7/2023)    Exercise Vital Sign     Days of Exercise per Week: 0 days     Minutes of Exercise per Session: 0 min   Stress: No Stress Concern Present (12/7/2023)    [employer] [place] [place] [employer] - Occupational Stress Questionnaire     Feeling of Stress : Only a little   Social Connections: Moderately Integrated (12/7/2023)    Social Connection and Isolation Panel [NHANES]     Frequency of Communication with Friends and Family: More than three times a week     Frequency of Social Gatherings with Friends and Family: More than three times a week     Attends Quaker Services: 1 to 4 times per year     Active Member of Clubs or Organizations: No     Attends Club or Organization Meetings: 1 to 4 times per year     Marital Status:    Recent Concern: Social Connections - Moderately Isolated (12/4/2023)    Social Connection and Isolation Panel [NHANES]     Frequency of Communication with Friends and Family: More than three times a week     Frequency of Social Gatherings with Friends and Family: More than three times a week     Attends Quaker Services: 1 to 4 times per year     Active Member of Clubs or Organizations: No     Attends Club or Organization Meetings: Never     Marital Status:    Housing Stability: Low Risk  (12/7/2023)    Housing Stability Vital Sign     Unable to Pay for Housing in the Last Year: No     Number of Places Lived in the Last Year: 1     Unstable Housing in the Last Year: No       HOME MEDICATIONS     Prior to Admission medications   "  Medication Sig Start Date End Date Taking? Authorizing Provider   losartan (COZAAR) 50 MG tablet Take 1 tablet (50 mg total) by mouth once daily. 5/8/23 5/7/24 Yes Selena Hou MD   pravastatin (PRAVACHOL) 80 MG tablet Take 1 tablet (80 mg total) by mouth once daily. 5/8/23 5/7/24 Yes Selena Hou MD       ALLERGIES   Codeine    REVIEW OF SYSTEMS   Except as documented above, all other systems reviewed and negative    PHYSICAL EXAM     Vitals:    12/16/23 0825   BP: 136/79   Pulse:    Resp:    Temp:       General:  In no acute distress, resting comfortably  Head and neck:  Atraumatic, normocephalic, moist mucous membranes, supple neck  Chest:  Clear to auscultation bilaterally  Heart:  S1, S2, grade 3/6 systolic murmur  Abdomen:  Soft, nontender, BS +  MSK:  Warm, no lower extremity edema, no clubbing or cyanosis  Neuro:  weak  Integumentary:  No obvious skin rash  Psychiatry:  Appropriate mood and affect    ASSESSMENT AND PLAN   E. Coli UTI  -Rocephin 12/4-12/8   -Cultures show sensitivity     Essential HTN  -losartan, well controlled     Left shoulder pain  -xray left shoulder showed no acute abnormality     Weakness and frequent falls  -PT/OT  -possible placement     Allergic dermatitis   -Benadryl x1 on 12/12  - continue to utilize topical Benadryl as needed    DVT prophylaxis:  Lovenox  Disposition:  Pending placement  __________________________________________________________________  LABS/MICRO/MEDS/DIAGNOSTICS       LABS  No results for input(s): "NA", "K", "CHLORIDE", "CO2", "BUN", "CREATININE", "GLUCOSE", "CALCIUM", "ALKPHOS", "AST", "ALT", "ALBUMIN" in the last 72 hours.      Recent Labs     12/16/23  0829   WBC 9.04   RBC 4.34   HCT 43.7   .7*            MICROBIOLOGY  Microbiology Results (last 7 days)       ** No results found for the last 168 hours. **            MEDICATIONS   enoxparin  40 mg Subcutaneous Daily    losartan  50 mg Oral Daily    pravastatin  80 mg Oral Daily    " predniSONE  20 mg Oral Daily      INFUSIONS      DIAGNOSTIC TESTS  X-Ray Chest 1 View    (Results Pending)          Patient information was obtained from patient, patient's family, past medical records and ER records.   All diagnosis and differential diagnosis have been reviewed; assessment and plan has been documented. I have personally reviewed the labs and test results that are presently available; I have reviewed the patients medication list. I have reviewed the consulting providers response and recommendations. I have reviewed or attempted to review medical records based upon their availability.  All of the patient's questions have been addressed and answered. Patient's is agreeable to the above stated plan. I will continue to monitor closely and make adjustments to medical management as needed.  This note was created using [employer] voice recognition software that occasionally misinterpreted phrases or words.  Please contact me if any questions may rise regarding documentation to clarify verbiage.        Hoa Zamora MD   Internal Medicine  Department of [facility] Medicine  [facility] - [facility] Surgical Unit

## 2023-12-17 PROCEDURE — 63600175 PHARM REV CODE 636 W HCPCS: Performed by: STUDENT IN AN ORGANIZED HEALTH CARE EDUCATION/TRAINING PROGRAM

## 2023-12-17 PROCEDURE — 11000004 HC SNF PRIVATE

## 2023-12-17 PROCEDURE — 63600175 PHARM REV CODE 636 W HCPCS: Performed by: INTERNAL MEDICINE

## 2023-12-17 PROCEDURE — 25000003 PHARM REV CODE 250: Performed by: STUDENT IN AN ORGANIZED HEALTH CARE EDUCATION/TRAINING PROGRAM

## 2023-12-17 RX ORDER — CYANOCOBALAMIN 1000 UG/ML
1000 INJECTION, SOLUTION INTRAMUSCULAR; SUBCUTANEOUS
Status: DISCONTINUED | OUTPATIENT
Start: 2023-12-17 | End: 2023-12-21 | Stop reason: HOSPADM

## 2023-12-17 RX ADMIN — PRAVASTATIN SODIUM 80 MG: 40 TABLET ORAL at 08:12

## 2023-12-17 RX ADMIN — ENOXAPARIN SODIUM 40 MG: 40 INJECTION SUBCUTANEOUS at 08:12

## 2023-12-17 RX ADMIN — PREDNISONE 20 MG: 20 TABLET ORAL at 08:12

## 2023-12-17 RX ADMIN — LOSARTAN POTASSIUM 50 MG: 50 TABLET, FILM COATED ORAL at 08:12

## 2023-12-17 RX ADMIN — CYANOCOBALAMIN 1000 MCG: 1000 INJECTION, SOLUTION INTRAMUSCULAR; SUBCUTANEOUS at 11:12

## 2023-12-17 NOTE — PLAN OF CARE
Problem: Adult Inpatient Plan of Care  Goal: Plan of Care Review  Outcome: Ongoing, Progressing  Flowsheets (Taken 12/17/2023 1014)  Plan of Care Reviewed With: patient  Goal: Patient-Specific Goal (Individualized)  Outcome: Ongoing, Progressing  Flowsheets (Taken 12/17/2023 1014)  Anxieties, Fears or Concerns: None  Individualized Care Needs: IV antibiotics, safety  Goal: Absence of Hospital-Acquired Illness or Injury  Outcome: Ongoing, Progressing  Intervention: Identify and Manage Fall Risk  Flowsheets (Taken 12/17/2023 1014)  Safety Promotion/Fall Prevention:   assistive device/personal item within reach   bed alarm set   instructed to call staff for mobility   side rails raised x 3   medications reviewed   muscle strengthening facilitated   room near unit station   Fall Risk reviewed with patient/family  Intervention: Prevent Skin Injury  Flowsheets (Taken 12/17/2023 1014)  Body Position:   position changed independently   supine   side-lying   sitting up in bed   weight shifting   log-rolled  Skin Protection:   adhesive use limited   incontinence pads utilized   transparent dressing maintained   tubing/devices free from skin contact  Intervention: Prevent and Manage VTE (Venous Thromboembolism) Risk  Flowsheets (Taken 12/17/2023 1014)  Activity Management:   Ambulated to bathroom - L4   Ambulated in room - L4   Rolling - L1  VTE Prevention/Management:   ambulation promoted   fluids promoted  Intervention: Prevent Infection  Flowsheets (Taken 12/17/2023 1014)  Infection Prevention:   cohorting utilized   environmental surveillance performed   equipment surfaces disinfected   hand hygiene promoted   personal protective equipment utilized   rest/sleep promoted   single patient room provided  Goal: Optimal Comfort and Wellbeing  Outcome: Ongoing, Progressing  Intervention: Monitor Pain and Promote Comfort  Flowsheets (Taken 12/17/2023 1014)  Pain Management Interventions:   care clustered   pillow support  provided  Intervention: Provide Person-Centered Care  Flowsheets (Taken 12/17/2023 1014)  Trust Relationship/Rapport:   care explained   questions answered   questions encouraged  Goal: Readiness for Transition of Care  Outcome: Ongoing, Progressing     Problem: Fall Injury Risk  Goal: Absence of Fall and Fall-Related Injury  Outcome: Ongoing, Progressing  Intervention: Identify and Manage Contributors  Flowsheets (Taken 12/17/2023 1014)  Self-Care Promotion:   independence encouraged   BADL personal objects within reach   safe use of adaptive equipment encouraged   meal set-up provided     Problem: UTI (Urinary Tract Infection)  Goal: Improved Infection Symptoms  Outcome: Ongoing, Progressing  Intervention: Prevent Infection Progression  Flowsheets (Taken 12/17/2023 1014)  Infection Management: aseptic technique maintained     Problem: Mobility Impairment  Goal: Optimal Mobility  Outcome: Ongoing, Progressing  Intervention: Optimize Mobility  Flowsheets (Taken 12/17/2023 1014)  Activity Management:   Ambulated to bathroom - L4   Ambulated in room - L4   Rolling - L1  Positioning/Transfer Devices:   in use   pillows     Problem: Infection  Goal: Absence of Infection Signs and Symptoms  Outcome: Ongoing, Progressing  Intervention: Prevent or Manage Infection  Flowsheets (Taken 12/17/2023 1014)  Infection Management: aseptic technique maintained

## 2023-12-17 NOTE — PLAN OF CARE
Problem: Adult Inpatient Plan of Care  Goal: Plan of Care Review  Outcome: Ongoing, Progressing  Flowsheets (Taken 12/17/2023 0111)  Plan of Care Reviewed With: patient  Goal: Patient-Specific Goal (Individualized)  Outcome: Ongoing, Progressing  Flowsheets (Taken 12/17/2023 0111)  Individualized Care Needs: continue therapy, monitor safety  Goal: Absence of Hospital-Acquired Illness or Injury  Outcome: Ongoing, Progressing  Intervention: Identify and Manage Fall Risk  Flowsheets (Taken 12/17/2023 0111)  Safety Promotion/Fall Prevention:   assistive device/personal item within reach   chair alarm set   Fall Risk reviewed with patient/family   muscle strengthening facilitated   gait belt with ambulation   nonskid shoes/socks when out of bed   supervised activity   Supervised toileting - stay within arms reach   instructed to call staff for mobility  Intervention: Prevent Skin Injury  Flowsheets (Taken 12/17/2023 0111)  Body Position: position changed independently  Intervention: Prevent and Manage VTE (Venous Thromboembolism) Risk  Flowsheets (Taken 12/17/2023 0111)  Activity Management: Walk with assistive devise and /or staff member - L3  VTE Prevention/Management:   ambulation promoted   bleeding precations maintained  Intervention: Prevent Infection  Flowsheets (Taken 12/17/2023 0111)  Infection Prevention:   cohorting utilized   environmental surveillance performed   hand hygiene promoted   single patient room provided   rest/sleep promoted  Goal: Optimal Comfort and Wellbeing  Outcome: Ongoing, Progressing  Intervention: Monitor Pain and Promote Comfort  Flowsheets (Taken 12/17/2023 0111)  Pain Management Interventions:   care clustered   pain management plan reviewed with patient/caregiver   pillow support provided   quiet environment facilitated  Intervention: Provide Person-Centered Care  Flowsheets (Taken 12/17/2023 0111)  Trust Relationship/Rapport:   questions encouraged   care explained   reassurance  provided   thoughts/feelings acknowledged   empathic listening provided   questions answered     Problem: Fall Injury Risk  Goal: Absence of Fall and Fall-Related Injury  Outcome: Ongoing, Progressing  Intervention: Identify and Manage Contributors  Flowsheets (Taken 12/17/2023 0111)  Self-Care Promotion:   independence encouraged   BADL personal objects within reach   BADL personal routines maintained   safe use of adaptive equipment encouraged  Intervention: Promote Injury-Free Environment  Flowsheets (Taken 12/17/2023 0111)  Safety Promotion/Fall Prevention:   assistive device/personal item within reach   chair alarm set   Fall Risk reviewed with patient/family   muscle strengthening facilitated   gait belt with ambulation   nonskid shoes/socks when out of bed   supervised activity   Supervised toileting - stay within arms reach   instructed to call staff for mobility

## 2023-12-17 NOTE — PROGRESS NOTES
[facility]  [facility] MEDICINE - Progress Note    Patient Name: Priscila Randhawa  MRN: [id number]  Patient Class: IP- Swing   Admission Date: 12/7/2023   Admitting Physician:  Service   Attending Physician: Hoa Zamora MD  Primary Care Provider: Selena Hou MD  Face-to-Face encounter date: 12/17/2023      CHIEF COMPLAINT   Frequent falls  HISTORY OF PRESENTING ILLNESS   89 year-old white female with a history of dementia, hypertension, diabetes who presented to the ER after a fall at home. She lives alone and has her niece at the bedside while she was in the ER however today she is alone. She does have frequent falls. Her PCP did recommend her to be placed in assisted living versus nursing home.  Patient was has been reluctant. Admitted for management of UTI, PT OT for the weakness and possible nursing home placement if patient and family are amenable.    Today   No acute events overnight.     PAST MEDICAL HISTORY     Past Medical History:   Diagnosis Date    Background diabetic retinopathy     Carotid artery stenosis     Cataract extraction status     Combined hyperlipidemia     HTN (hypertension)     Polycythemia     Type 2 diabetes mellitus without complications     Unspecified osteoarthritis, unspecified site     Vitamin D deficiency        PAST SURGICAL HISTORY     Past Surgical History:   Procedure Laterality Date    EPIDURAL STEROID INJECTION INTO LUMBAR SPINE      EXPLORATORY LAPAROTOMY      hx total knee repla         FAMILY HISTORY   Reviewed and noncontributory to this case    SOCIAL HISTORY     Social History     Socioeconomic History    Marital status: Single   Occupational History    Occupation: caregiver   Tobacco Use    Smoking status: Never    Smokeless tobacco: Never     Social Determinants of Health     Financial Resource Strain: Low Risk  (12/7/2023)    Overall Financial Resource Strain (CARDIA)     Difficulty of Paying Living Expenses: Not hard at all    Food Insecurity: No Food Insecurity (12/7/2023)    Hunger Vital Sign     Worried About Running Out of Food in the Last Year: Never true     Ran Out of Food in the Last Year: Never true   Transportation Needs: No Transportation Needs (12/7/2023)    PRAPARE - Transportation     Lack of Transportation (Medical): No     Lack of Transportation (Non-Medical): No   Physical Activity: Inactive (12/7/2023)    Exercise Vital Sign     Days of Exercise per Week: 0 days     Minutes of Exercise per Session: 0 min   Stress: No Stress Concern Present (12/7/2023)    [facility] [place] [employer]ational [employer] - Occupational Stress Questionnaire     Feeling of Stress : Only a little   Social Connections: Moderately Integrated (12/7/2023)    Social Connection and Isolation Panel [NHANES]     Frequency of Communication with Friends and Family: More than three times a week     Frequency of Social Gatherings with Friends and Family: More than three times a week     Attends Rastafari Services: 1 to 4 times per year     Active Member of Clubs or Organizations: No     Attends Club or Organization Meetings: 1 to 4 times per year     Marital Status:    Recent Concern: Social Connections - Moderately Isolated (12/4/2023)    Social Connection and Isolation Panel [NHANES]     Frequency of Communication with Friends and Family: More than three times a week     Frequency of Social Gatherings with Friends and Family: More than three times a week     Attends Rastafari Services: 1 to 4 times per year     Active Member of Clubs or Organizations: No     Attends Club or Organization Meetings: Never     Marital Status:    Housing Stability: Low Risk  (12/7/2023)    Housing Stability Vital Sign     Unable to Pay for Housing in the Last Year: No     Number of Places Lived in the Last Year: 1     Unstable Housing in the Last Year: No       HOME MEDICATIONS     Prior to Admission medications    Medication Sig Start Date End Date Taking?  Authorizing Provider   losartan (COZAAR) 50 MG tablet Take 1 tablet (50 mg total) by mouth once daily. 5/8/23 5/7/24 Yes Selena Hou MD   pravastatin (PRAVACHOL) 80 MG tablet Take 1 tablet (80 mg total) by mouth once daily. 5/8/23 5/7/24 Yes Selena Hou MD       ALLERGIES   Codeine    REVIEW OF SYSTEMS   Except as documented above, all other systems reviewed and negative    PHYSICAL EXAM     Vitals:    12/17/23 0815   BP: (!) 146/67   Pulse:    Resp:    Temp:       General:  In no acute distress, resting comfortably  Head and neck:  Atraumatic, normocephalic, moist mucous membranes, supple neck  Chest:  Clear to auscultation bilaterally  Heart:  S1, S2, grade 3/6 systolic murmur  Abdomen:  Soft, nontender, BS +  MSK:  Warm, no lower extremity edema, no clubbing or cyanosis  Neuro:  weak  Integumentary:  No obvious skin rash  Psychiatry:  Appropriate mood and affect    ASSESSMENT AND PLAN   E. Coli UTI  -Rocephin 12/4-12/8   -Cultures show sensitivity     Essential HTN  -losartan, well controlled     Left shoulder pain  -xray left shoulder showed no acute abnormality     Weakness and frequent falls  -PT/OT  -possible placement     Allergic dermatitis   -Benadryl x1 on 12/12  - continue to utilize topical Benadryl as needed    Low normal B12   -we will start supplementation monthly.  Start date 12/17    DVT prophylaxis:  Lovenox  Disposition:  Pending placement  __________________________________________________________________  LABS/MICRO/MEDS/DIAGNOSTICS       LABS  Recent Labs     12/16/23  0829      K 4.1   CHLORIDE 105   CO2 25   BUN 18.4   CREATININE 0.75   GLUCOSE 84   CALCIUM 9.3   ALKPHOS 80   AST 44*   ALT 64*   ALBUMIN 2.9*         Recent Labs     12/16/23  0829   WBC 9.04   RBC 4.34   HCT 43.7   .7*            MICROBIOLOGY  Microbiology Results (last 7 days)       ** No results found for the last 168 hours. **            MEDICATIONS   enoxparin  40 mg Subcutaneous Daily     losartan  50 mg Oral Daily    pravastatin  80 mg Oral Daily    predniSONE  20 mg Oral Daily      INFUSIONS      DIAGNOSTIC TESTS  X-Ray Chest 1 View   Final Result      No acute cardiopulmonary abnormality.         Electronically signed by: Simeon Hill MD   Date:    12/16/2023   Time:    09:14             Patient information was obtained from patient, patient's family, past medical records and ER records.   All diagnosis and differential diagnosis have been reviewed; assessment and plan has been documented. I have personally reviewed the labs and test results that are presently available; I have reviewed the patients medication list. I have reviewed the consulting providers response and recommendations. I have reviewed or attempted to review medical records based upon their availability.  All of the patient's questions have been addressed and answered. Patient's is agreeable to the above stated plan. I will continue to monitor closely and make adjustments to medical management as needed.  This note was created using [employer] voice recognition software that occasionally misinterpreted phrases or words.  Please contact me if any questions may rise regarding documentation to clarify verbiage.        Hoa Zamora MD   Internal Medicine  Department of Hospital Medicine [facility] - [facility]

## 2023-12-18 PROCEDURE — 25000003 PHARM REV CODE 250: Performed by: STUDENT IN AN ORGANIZED HEALTH CARE EDUCATION/TRAINING PROGRAM

## 2023-12-18 PROCEDURE — 97110 THERAPEUTIC EXERCISES: CPT

## 2023-12-18 PROCEDURE — 97110 THERAPEUTIC EXERCISES: CPT | Mod: CQ

## 2023-12-18 PROCEDURE — 94761 N-INVAS EAR/PLS OXIMETRY MLT: CPT

## 2023-12-18 PROCEDURE — 63600175 PHARM REV CODE 636 W HCPCS: Performed by: STUDENT IN AN ORGANIZED HEALTH CARE EDUCATION/TRAINING PROGRAM

## 2023-12-18 PROCEDURE — 97530 THERAPEUTIC ACTIVITIES: CPT

## 2023-12-18 PROCEDURE — 11000004 HC SNF PRIVATE

## 2023-12-18 PROCEDURE — 97116 GAIT TRAINING THERAPY: CPT

## 2023-12-18 RX ADMIN — PREDNISONE 20 MG: 20 TABLET ORAL at 08:12

## 2023-12-18 RX ADMIN — LOSARTAN POTASSIUM 50 MG: 50 TABLET, FILM COATED ORAL at 08:12

## 2023-12-18 RX ADMIN — ENOXAPARIN SODIUM 40 MG: 40 INJECTION SUBCUTANEOUS at 08:12

## 2023-12-18 RX ADMIN — PRAVASTATIN SODIUM 80 MG: 40 TABLET ORAL at 08:12

## 2023-12-18 NOTE — PROGRESS NOTES
[facility]  [facility] MEDICINE - Progress Note    Patient Name: Priscila Randhawa  MRN: [id number]  Patient Class: IP- Swing   Admission Date: 12/7/2023   Admitting Physician: HM Service   Attending Physician: Thairy G Reyes, DO  Primary Care Provider: Selena Hou MD  Face-to-Face encounter date: 12/18/2023      CHIEF COMPLAINT   Frequent falls  HISTORY OF PRESENTING ILLNESS   89 year-old white female with a history of dementia, hypertension, diabetes who presented to the ER after a fall at home. She lives alone and has her niece at the bedside while she was in the ER however today she is alone. She does have frequent falls. Her PCP did recommend her to be placed in assisted living versus nursing home.  Patient was has been reluctant. Admitted for management of UTI, PT OT for the weakness and possible nursing home placement if patient and family are amenable.    Today   No acute events overnight.     PAST MEDICAL HISTORY     Past Medical History:   Diagnosis Date    Background diabetic retinopathy     Carotid artery stenosis     Cataract extraction status     Combined hyperlipidemia     HTN (hypertension)     Polycythemia     Type 2 diabetes mellitus without complications     Unspecified osteoarthritis, unspecified site     Vitamin D deficiency        PAST SURGICAL HISTORY     Past Surgical History:   Procedure Laterality Date    EPIDURAL STEROID INJECTION INTO LUMBAR SPINE      EXPLORATORY LAPAROTOMY      hx total knee repla         FAMILY HISTORY   Reviewed and noncontributory to this case    SOCIAL HISTORY     Social History     Socioeconomic History    Marital status: Single   Occupational History    Occupation: caregiver   Tobacco Use    Smoking status: Never    Smokeless tobacco: Never     Social Determinants of Health     Financial Resource Strain: Low Risk  (12/7/2023)    Overall Financial Resource Strain (CARDIA)     Difficulty of Paying Living Expenses: Not hard at all    Food Insecurity: No Food Insecurity (12/7/2023)    Hunger Vital Sign     Worried About Running Out of Food in the Last Year: Never true     Ran Out of Food in the Last Year: Never true   Transportation Needs: No Transportation Needs (12/7/2023)    PRAPARE - Transportation     Lack of Transportation (Medical): No     Lack of Transportation (Non-Medical): No   Physical Activity: Inactive (12/7/2023)    Exercise Vital Sign     Days of Exercise per Week: 0 days     Minutes of Exercise per Session: 0 min   Stress: No Stress Concern Present (12/7/2023)    [facility] [employer] [facility] [employer] - Occupational Stress Questionnaire     Feeling of Stress : Only a little   Social Connections: Moderately Integrated (12/7/2023)    Social Connection and Isolation Panel [NHANES]     Frequency of Communication with Friends and Family: More than three times a week     Frequency of Social Gatherings with Friends and Family: More than three times a week     Attends Christian Services: 1 to 4 times per year     Active Member of Clubs or Organizations: No     Attends Club or Organization Meetings: 1 to 4 times per year     Marital Status:    Recent Concern: Social Connections - Moderately Isolated (12/4/2023)    Social Connection and Isolation Panel [NHANES]     Frequency of Communication with Friends and Family: More than three times a week     Frequency of Social Gatherings with Friends and Family: More than three times a week     Attends Christian Services: 1 to 4 times per year     Active Member of Clubs or Organizations: No     Attends Club or Organization Meetings: Never     Marital Status:    Housing Stability: Low Risk  (12/7/2023)    Housing Stability Vital Sign     Unable to Pay for Housing in the Last Year: No     Number of Places Lived in the Last Year: 1     Unstable Housing in the Last Year: No       HOME MEDICATIONS     Prior to Admission medications    Medication Sig Start Date End Date Taking?  Authorizing Provider   losartan (COZAAR) 50 MG tablet Take 1 tablet (50 mg total) by mouth once daily. 5/8/23 5/7/24 Yes Selena Hou MD   pravastatin (PRAVACHOL) 80 MG tablet Take 1 tablet (80 mg total) by mouth once daily. 5/8/23 5/7/24 Yes Selena Hou MD       ALLERGIES   Codeine    REVIEW OF SYSTEMS   Except as documented above, all other systems reviewed and negative    PHYSICAL EXAM     Vitals:    12/18/23 1100   BP: 130/66   Pulse: 84   Resp:    Temp: 97.6 °F (36.4 °C)      General:  In no acute distress, resting comfortably  Head and neck:  Atraumatic, normocephalic, moist mucous membranes, supple neck  Chest:  Clear to auscultation bilaterally  Heart:  S1, S2, grade 3/6 systolic murmur  Abdomen:  Soft, nontender, BS +  MSK:  Warm, no lower extremity edema, no clubbing or cyanosis  Neuro:  weak  Integumentary:  No obvious skin rash  Psychiatry:  Appropriate mood and affect    ASSESSMENT AND PLAN   E. Coli UTI  -Rocephin 12/4-12/8   -Cultures show sensitivity     Essential HTN  -losartan, well controlled     Left shoulder pain  -xray left shoulder showed no acute abnormality     Weakness and frequent falls  -PT/OT  -possible placement     Allergic dermatitis   -Benadryl x1 on 12/12  - continue to utilize topical Benadryl as needed    Low normal B12   -we will start supplementation monthly.  Start date 12/17    DVT prophylaxis:  Lovenox  Disposition:  Pending placement  __________________________________________________________________  LABS/MICRO/MEDS/DIAGNOSTICS       LABS  Recent Labs     12/16/23  0829      K 4.1   CHLORIDE 105   CO2 25   BUN 18.4   CREATININE 0.75   GLUCOSE 84   CALCIUM 9.3   ALKPHOS 80   AST 44*   ALT 64*   ALBUMIN 2.9*         Recent Labs     12/16/23  0829   WBC 9.04   RBC 4.34   HCT 43.7   .7*            MICROBIOLOGY  Microbiology Results (last 7 days)       ** No results found for the last 168 hours. **            MEDICATIONS   cyanocobalamin  1,000 mcg  Intramuscular Q30 Days    enoxparin  40 mg Subcutaneous Daily    losartan  50 mg Oral Daily    pravastatin  80 mg Oral Daily    predniSONE  20 mg Oral Daily      INFUSIONS      DIAGNOSTIC TESTS  X-Ray Chest 1 View   Final Result      No acute cardiopulmonary abnormality.         Electronically signed by: Simeon Hill MD   Date:    12/16/2023   Time:    09:14             Patient information was obtained from patient, patient's family, past medical records and ER records.   All diagnosis and differential diagnosis have been reviewed; assessment and plan has been documented. I have personally reviewed the labs and test results that are presently available; I have reviewed the patients medication list. I have reviewed the consulting providers response and recommendations. I have reviewed or attempted to review medical records based upon their availability.  All of the patient's questions have been addressed and answered. Patient's is agreeable to the above stated plan. I will continue to monitor closely and make adjustments to medical management as needed.  This note was created using [employer] voice recognition software that occasionally misinterpreted phrases or words.  Please contact me if any questions may rise regarding documentation to clarify verbiage.        Thairy G Reyes, DO   Internal Medicine  Department of Hospital Medicine [facility]

## 2023-12-18 NOTE — PLAN OF CARE
Problem: Adult Inpatient Plan of Care  Goal: Plan of Care Review  Outcome: Ongoing, Progressing  Flowsheets (Taken 12/18/2023 0028)  Plan of Care Reviewed With: patient  Goal: Patient-Specific Goal (Individualized)  Outcome: Ongoing, Progressing  Flowsheets (Taken 12/18/2023 0028)  Individualized Care Needs: continue therapy, monitor safety  Goal: Absence of Hospital-Acquired Illness or Injury  Outcome: Ongoing, Progressing  Intervention: Identify and Manage Fall Risk  Flowsheets (Taken 12/18/2023 0028)  Safety Promotion/Fall Prevention:   assistive device/personal item within reach   chair alarm set   Fall Risk reviewed with patient/family   gait belt with ambulation   muscle strengthening facilitated   nonskid shoes/socks when out of bed   supervised activity   Supervised toileting - stay within arms reach   instructed to call staff for mobility  Intervention: Prevent Skin Injury  Flowsheets (Taken 12/18/2023 0028)  Body Position: position changed independently  Intervention: Prevent and Manage VTE (Venous Thromboembolism) Risk  Flowsheets (Taken 12/18/2023 0028)  Activity Management: Walk with assistive devise and /or staff member - L3  VTE Prevention/Management:   ambulation promoted   bleeding precations maintained  Intervention: Prevent Infection  Flowsheets (Taken 12/18/2023 0028)  Infection Prevention:   cohorting utilized   environmental surveillance performed   hand hygiene promoted   single patient room provided   rest/sleep promoted  Goal: Optimal Comfort and Wellbeing  Outcome: Ongoing, Progressing  Intervention: Monitor Pain and Promote Comfort  Flowsheets (Taken 12/18/2023 0028)  Pain Management Interventions:   care clustered   pillow support provided   quiet environment facilitated  Intervention: Provide Person-Centered Care  Flowsheets (Taken 12/18/2023 0028)  Trust Relationship/Rapport:   care explained   questions encouraged   questions answered   empathic listening provided   reassurance  provided   thoughts/feelings acknowledged     Problem: Fall Injury Risk  Goal: Absence of Fall and Fall-Related Injury  Outcome: Ongoing, Progressing  Intervention: Identify and Manage Contributors  Flowsheets (Taken 12/18/2023 0028)  Self-Care Promotion:   independence encouraged   BADL personal objects within reach   BADL personal routines maintained   safe use of adaptive equipment encouraged  Intervention: Promote Injury-Free Environment  Flowsheets (Taken 12/18/2023 0028)  Safety Promotion/Fall Prevention:   assistive device/personal item within reach   chair alarm set   Fall Risk reviewed with patient/family   gait belt with ambulation   muscle strengthening facilitated   nonskid shoes/socks when out of bed   supervised activity   Supervised toileting - stay within arms reach   instructed to call staff for mobility

## 2023-12-18 NOTE — PROGRESS NOTES
Inpatient Nutrition Evaluation    Admit Date: 12/7/2023   Total duration of encounter: 11 days   Patient Age: 89 y.o.    Nutrition Recommendation/Prescription     Continue cardiac diet     Nutrition Assessment     Chart Review    Reason Seen: continuous nutrition monitoring, length of stay, and follow-up    Malnutrition Screening Tool Results   Have you recently lost weight without trying?: No  Have you been eating poorly because of a decreased appetite?: No   MST Score: 0   Diagnosis:       Noted    Acute cystitis without hematuria      Relevant Medical History: Background diabetic retinopathy  Date Unknown  Carotid artery stenosis  Date Unknown  Cataract extraction status  Date Unknown  Combined hyperlipidemia  Date Unknown  HTN (hypertension)  Date Unknown  Polycythemia  Date Unknown  Type 2 diabetes mellitus without complications  Date Unknown  Unspecified osteoarthritis, unspecified site  Date Unknown  Vitamin D deficiency    Scheduled Medications:  cyanocobalamin, 1,000 mcg, Q30 Days  enoxparin, 40 mg, Daily  losartan, 50 mg, Daily  pravastatin, 80 mg, Daily  predniSONE, 20 mg, Daily    Continuous Infusions:   PRN Medications: sodium chloride 0.9%, acetaminophen, aluminum-magnesium hydroxide-simethicone, diphenhydrAMINE-zinc acetate 1-0.1%, hydrALAZINE, sodium chloride 0.9%, traMADoL    Recent Labs   Lab 12/13/23  0310 12/16/23  0829    140   K 5.0 4.1   CALCIUM 9.3 9.3   MG  --  2.00   CHLORIDE 105 105   CO2 29 25   BUN 17.7 18.4   CREATININE 0.79 0.75   EGFRNORACEVR >60 >60   GLUCOSE 100 84   BILITOT  --  0.3   ALKPHOS  --  80   ALT  --  64*   AST  --  44*   ALBUMIN  --  2.9*   WBC 4.99 9.04   HGB 12.3 13.3   HCT 40.1 43.7     Nutrition Orders:  Diet Cardiac      Appetite/Oral Intake: good/% of meals  Factors Affecting Nutritional Intake: none identified  Food/Jehovah's witness/Cultural Preferences: none reported  Food Allergies: none reported  Last Bowel Movement: 12/17/23  Wound(s):      Comments    Pt  "intake is good. Discussed food preferences. Marked menus. No c/o of at this time. Glucose WNL. Adjust diet as needed.     Anthropometrics    Height: 4' 11" (149.9 cm),    Last Weight: 58.5 kg (128 lb 15.5 oz) (12/18/23 0500), Weight Method: Standard Scale  BMI (Calculated): 26  BMI Classification: overweight (BMI 25-29.9)     Ideal Body Weight (IBW), Female: 95 lb     % Ideal Body Weight, Female (lb): 125.55 %                             Usual Weight Provided By: unable to obtain usual weight    Wt Readings from Last 5 Encounters:   12/18/23 58.5 kg (128 lb 15.5 oz)   12/04/23 54.1 kg (119 lb 4.3 oz)   05/08/23 55.3 kg (122 lb)   05/04/22 55.3 kg (122 lb)   05/04/21 54 kg (118 lb 15.7 oz)     Weight Change(s) Since Admission: +4.4 kg wt gained.   Wt Readings from Last 1 Encounters:   12/18/23 0500 58.5 kg (128 lb 15.5 oz)   12/11/23 0500 55.6 kg (122 lb 9.2 oz)   12/07/23 1540 54.1 kg (119 lb 4.3 oz)   Admit Weight: 54.1 kg (119 lb 4.3 oz) (12/07/23 1540), Weight Method: Bed Scale    Patient Education     Not applicable.    Nutrition Goals & Monitoring     Dietitian will monitor: food and beverage intake, weight, weight change, electrolyte/renal panel, glucose/endocrine profile, and gastrointestinal profile    Nutrition Risk/Follow-Up: low (follow-up in 5-7 days)  Patients assigned 'low nutrition risk' status do not qualify for a full nutritional assessment but will be monitored and re-evaluated in a 5-7 day time period. Please consult if re-evaluation needed sooner.  "

## 2023-12-18 NOTE — PT/OT/SLP PROGRESS
Physical Therapy Treatment Note           Name: Priscila Randhawa    : 1933 (89 y.o.)  MRN: [id number]           TREATMENT SUMMARY AND RECOMMENDATIONS:    PT Received On: 23  PT Start Time: 1505     PT Stop Time: 1529  PT Total Time (min): 24 min     Subjective Assessment:   No complaints  Lethargic   x Awake, alert, cooperative  Uncooperative    Agitated  c/o pain    Appropriate  c/o fatigue    Confused x Treated at bedside     Emotionally labile  Treated in gym/dept.    Impulsive  Other:    Flat affect       Therapy Precautions:    Cognitive deficits  Spinal precautions    Collar - hard  Sternal precautions    Collar - soft   TLSO   x Fall risk  LSO    Hip precautions - posterior  Knee immobilizer    Hip precautions - anterior  WBAT    Impaired communication  Partial weightbearing    Oxygen  TTWB    PEG tube  NWB    Visual deficits  Other:    Hearing deficits          Treatment Objectives:     Mobility Training:   Assist level Comments    Bed mobility     Transfer SBA Stand pivot using RW   Gait SBA X 335 feet using RW with WC following   Sit to stand transitions SBA From EOB and WC   Sitting balance     Standing balance  SBA Dynamic challenges with and without use of AD, no losses in balance.    Wheelchair mobility     Car transfer     Other:          Therapeutic Exercise:   Exercise Sets Reps Comments                               Additional Comments:  No changes or complications.     Assessment: Patient tolerated session well. Recommending pt not live alone. Working on NH placement     PT Plan: continue per POC  Revisions made to plan of care: No    GOALS:   Multidisciplinary Problems       Physical Therapy Goals          Problem: Physical Therapy    Goal Priority Disciplines Outcome Goal Variances Interventions   Physical Therapy Goal     PT, PT/OT Ongoing, Not Progressing     Description: Description: Goals to be met by: Discharge      Patient will increase functional independence with  mobility by performin. Sit to stand transfer with Modified [place]  2. Bed to chair transfer with Modified [place] using Single-point Cane   3. Gait  x 50 feet with Modified [place] using Single-point Cane .                          Skilled PT Minutes Provided: 23   Communication with Treatment Team:     Equipment recommendations:       At end of treatment, patient remained:  x Up in chair     Up in wheelchair in room    In bed   x With alarm activated    Bed rails up   x Call bell in reach    x Family/friends present    Restraints secured properly    In bathroom with CNA/RN notified    Nurse aware    In gym with therapist/tech    Other:

## 2023-12-18 NOTE — PLAN OF CARE
Problem: Adult Inpatient Plan of Care  Goal: Plan of Care Review  Outcome: Ongoing, Progressing  Flowsheets (Taken 12/18/2023 0941)  Plan of Care Reviewed With: patient  Goal: Patient-Specific Goal (Individualized)  Outcome: Ongoing, Progressing  Flowsheets (Taken 12/18/2023 0941)  Anxieties, Fears or Concerns: None  Individualized Care Needs: IV antibiotics, safety  Goal: Absence of Hospital-Acquired Illness or Injury  Outcome: Ongoing, Progressing  Intervention: Identify and Manage Fall Risk  Flowsheets (Taken 12/18/2023 0941)  Safety Promotion/Fall Prevention:   assistive device/personal item within reach   bed alarm set   instructed to call staff for mobility   side rails raised x 3   medications reviewed   muscle strengthening facilitated   room near unit station   Fall Risk reviewed with patient/family  Intervention: Prevent Skin Injury  Flowsheets (Taken 12/18/2023 0941)  Body Position:   position changed independently   supine   side-lying   sitting up in bed   weight shifting   log-rolled  Skin Protection:   adhesive use limited   incontinence pads utilized   transparent dressing maintained   tubing/devices free from skin contact  Intervention: Prevent and Manage VTE (Venous Thromboembolism) Risk  Flowsheets (Taken 12/18/2023 0941)  Activity Management:   Ambulated to bathroom - L4   Ambulated in room - L4   Rolling - L1  VTE Prevention/Management:   ambulation promoted   fluids promoted  Intervention: Prevent Infection  Flowsheets (Taken 12/18/2023 0941)  Infection Prevention:   cohorting utilized   environmental surveillance performed   equipment surfaces disinfected   hand hygiene promoted   personal protective equipment utilized   rest/sleep promoted   single patient room provided  Goal: Optimal Comfort and Wellbeing  Outcome: Ongoing, Progressing  Intervention: Monitor Pain and Promote Comfort  Flowsheets (Taken 12/18/2023 0941)  Pain Management Interventions:   care clustered   pillow support  provided  Intervention: Provide Person-Centered Care  Flowsheets (Taken 12/18/2023 0941)  Trust Relationship/Rapport:   care explained   questions answered   questions encouraged  Goal: Readiness for Transition of Care  Outcome: Ongoing, Progressing     Problem: Fall Injury Risk  Goal: Absence of Fall and Fall-Related Injury  Outcome: Ongoing, Progressing  Intervention: Identify and Manage Contributors  Flowsheets (Taken 12/18/2023 0941)  Self-Care Promotion:   independence encouraged   BADL personal objects within reach   safe use of adaptive equipment encouraged   meal set-up provided     Problem: UTI (Urinary Tract Infection)  Goal: Improved Infection Symptoms  Outcome: Ongoing, Progressing  Intervention: Prevent Infection Progression  Flowsheets (Taken 12/18/2023 0941)  Infection Management: aseptic technique maintained     Problem: Mobility Impairment  Goal: Optimal Mobility  Outcome: Ongoing, Progressing  Intervention: Optimize Mobility  Flowsheets (Taken 12/18/2023 0941)  Activity Management:   Ambulated to bathroom - L4   Ambulated in room - L4   Rolling - L1  Positioning/Transfer Devices:   in use   pillows     Problem: Infection  Goal: Absence of Infection Signs and Symptoms  Outcome: Ongoing, Progressing  Intervention: Prevent or Manage Infection  Flowsheets (Taken 12/18/2023 0941)  Infection Management: aseptic technique maintained

## 2023-12-18 NOTE — PT/OT/SLP PROGRESS
"Occupational Therapy  Treatment    Name: Priscila Randhawa    : 1933 (89 y.o.)  MRN: [id number]           TREATMENT SUMMARY AND RECOMMENDATIONS:      OT Date of Treatment: 23  OT Start Time: 1100  OT Stop Time: 1125  OT Total Time (min): 25 min      Subjective Assessment:   No complaints  Lethargic   x Awake, alert, cooperative  Impulsive    Uncooperative   Flat affect    Agitated  c/o pain    Appropriate  c/o fatigue    Confused  Treated at bedside     Emotionally labile x Treated in gym/dept.      Other:        Therapy Precautions:   x Cognitive deficits  Spinal precautions    Collar - hard  Sternal precautions    Collar - soft   TLSO   x Fall risk  LSO    Hip precautions - posterior  Knee immobilizer    Hip precautions - anterior  WBAT    Impaired communication  Partial weightbearing    Oxygen  TTWB    PEG tube  NWB    Visual deficits      Hearing deficits   Other:        Treatment Objectives:     Mobility Training:    Mobility task Assist level Comments    Bed mobility     Transfer CGA/SBA Stand/pivot t/f with RW to w/c   Sit to stands transitions     Functional mobility SBA/CGA X150 feet with RW    Sitting balance     Standing balance      Other:          Therapeutic Exercise:   Exercise Sets Reps Comments   BUE strengthening 2 30  Balloon volleyball ax hitting back and forth with BUEs; rest breaks as needed                          Additional Comments:  Pt reports not having "any spunk" today - required encouragement to participate. Pt niece present and discussing DC plans.     Assessment: Patient tolerated session well.    GOALS:   Multidisciplinary Problems       Occupational Therapy Goals          Problem: Occupational Therapy    Goal Priority Disciplines Outcome Interventions   Occupational Therapy Goal     OT, PT/OT Ongoing, Progressing    Description: Goals to be met by: 23     Patient will increase functional independence with ADLs by performing:    UE Dressing with Set-up " Assistance.  LE Dressing with Minimal Assistance.  Toileting from toilet with Contact Guard Assistance for hygiene and clothing management.   Bathing from  shower chair/bench with Minimal Assistance.  Toilet transfer to toilet with Stand-by Assistance.  Increased functional strength to 4/5 for BUEs.                         Recommendations:     Discharge Equipment Recommendations:  none     Plan:     Patient to be seen 6 x/week (QD/BID as appropriate) to address the above listed problems via self-care/home management, therapeutic activities, therapeutic exercises  Plan of Care Expires: 12/22/23  Plan of Care Reviewed with: patient  Revisions made to plan of care: No      Skilled OT Minutes Provided: 25  Communication with Treatment Team:     Equipment recommendations:       At end of treatment, patient remained:   Up in chair     Up in wheelchair in room    In bed    With alarm activated    Bed rails up    Call bell in reach     Family/friends present    Restraints secured properly    In bathroom with CNA/RN notified   x In gym with PT/PTA/tech    Nurse aware    Other:

## 2023-12-18 NOTE — PT/OT/SLP PROGRESS
Physical Therapy Treatment Note           Name: Priscila Randhawa    : 1933 (89 y.o.)  MRN: [id number]           TREATMENT SUMMARY AND RECOMMENDATIONS:    PT Received On:    PT Start Time: 1125     PT Stop Time: 1140  PT Total Time (min): 15 min     Subjective Assessment:   No complaints  Lethargic   x Awake, alert, cooperative  Uncooperative    Agitated  c/o pain    Appropriate x c/o fatigue    Confused  Treated at bedside     Emotionally labile x Treated in gym/dept.    Impulsive  Other:    Flat affect       Therapy Precautions:    Cognitive deficits  Spinal precautions    Collar - hard  Sternal precautions    Collar - soft   TLSO    Fall risk  LSO    Hip precautions - posterior  Knee immobilizer    Hip precautions - anterior  WBAT    Impaired communication  Partial weightbearing    Oxygen  TTWB    PEG tube  NWB    Visual deficits  Other:    Hearing deficits          Treatment Objectives:     Mobility Training:   Assist level Comments    Bed mobility     Transfer     Gait CGA Amb x130ft using RW on firm surfaces   Sit to stand transitions CGA From wheelchair with RW   Sitting balance     Standing balance      Wheelchair mobility     Car transfer     Other:          Therapeutic Exercise:   Exercise Sets Reps Comments   BLE in all planes of motion  20 Performed while seated                         Additional Comments:  Pt reported feeling tired however became agreeable to participate with PT.     Assessment: Patient tolerated session well.    PT Plan: continue  Revisions made to plan of care: No    GOALS:   Multidisciplinary Problems       Physical Therapy Goals          Problem: Physical Therapy    Goal Priority Disciplines Outcome Goal Variances Interventions   Physical Therapy Goal     PT, PT/OT Ongoing, Not Progressing     Description: Description: Goals to be met by: Discharge      Patient will increase functional independence with mobility by performin. Sit to stand transfer with  Modified [place]  2. Bed to chair transfer with Modified [place] using Single-point Cane   3. Gait  x 50 feet with Modified [place] using Single-point Cane .                          Skilled PT Minutes Provided: 15   Communication with Treatment Team:     Equipment recommendations:       At end of treatment, patient remained:  x Up in chair     Up in wheelchair in room    In bed   x With alarm activated    Bed rails up   x Call bell in reach    x Family/friends present    Restraints secured properly    In bathroom with CNA/RN notified    Nurse aware    In gym with therapist/tech    Other:

## 2023-12-19 PROCEDURE — 25000003 PHARM REV CODE 250: Performed by: STUDENT IN AN ORGANIZED HEALTH CARE EDUCATION/TRAINING PROGRAM

## 2023-12-19 PROCEDURE — 97110 THERAPEUTIC EXERCISES: CPT

## 2023-12-19 PROCEDURE — 97530 THERAPEUTIC ACTIVITIES: CPT

## 2023-12-19 PROCEDURE — 97535 SELF CARE MNGMENT TRAINING: CPT

## 2023-12-19 PROCEDURE — 11000004 HC SNF PRIVATE

## 2023-12-19 PROCEDURE — 63600175 PHARM REV CODE 636 W HCPCS: Performed by: STUDENT IN AN ORGANIZED HEALTH CARE EDUCATION/TRAINING PROGRAM

## 2023-12-19 PROCEDURE — 94761 N-INVAS EAR/PLS OXIMETRY MLT: CPT

## 2023-12-19 PROCEDURE — 97116 GAIT TRAINING THERAPY: CPT | Mod: CQ

## 2023-12-19 RX ADMIN — ENOXAPARIN SODIUM 40 MG: 40 INJECTION SUBCUTANEOUS at 08:12

## 2023-12-19 RX ADMIN — LOSARTAN POTASSIUM 50 MG: 50 TABLET, FILM COATED ORAL at 08:12

## 2023-12-19 RX ADMIN — PRAVASTATIN SODIUM 80 MG: 40 TABLET ORAL at 08:12

## 2023-12-19 RX ADMIN — PREDNISONE 20 MG: 20 TABLET ORAL at 08:12

## 2023-12-19 NOTE — PT/OT/SLP PROGRESS
Occupational Therapy  Treatment    Name: Priscila Randhawa    : 1933 (89 y.o.)  MRN: [id number]           TREATMENT SUMMARY AND RECOMMENDATIONS:      OT Date of Treatment: 23  OT Start Time: 1430  OT Stop Time: 1500  OT Total Time (min): 30 min      Subjective Assessment:   No complaints  Lethargic   x Awake, alert, cooperative  Impulsive    Uncooperative   Flat affect    Agitated  c/o pain    Appropriate  c/o fatigue    Confused  Treated at bedside     Emotionally labile x Treated in gym/dept.      Other:        Therapy Precautions:    Cognitive deficits  Spinal precautions    Collar - hard  Sternal precautions    Collar - soft   TLSO   x Fall risk  LSO    Hip precautions - posterior  Knee immobilizer    Hip precautions - anterior  WBAT    Impaired communication  Partial weightbearing    Oxygen  TTWB    PEG tube  NWB    Visual deficits      Hearing deficits   Other:        Treatment Objectives:     Mobility Training:    Mobility task Assist level Comments    Bed mobility     Transfer CGA Stand/pivot t/f with RW to [place]   Sit to stands transitions SBA From w/c level and standard chair   Functional mobility CGA X15 feet with RW    Sitting balance     Standing balance  CGA Pt stood with RW anterior and performed reach to low stool to grasp bean bags and then toss into bucket anterior. Pt able to tolerate standing x3 min before rest break. No LOB noted.    Other:          Therapeutic Exercise:   Exercise Sets Reps Comments   1# dowel 2 10 chest press, straight arm raises, bicep curls, forward rows, backwards rows                         Additional Comments:      Assessment: Patient tolerated session well.    GOALS:   Multidisciplinary Problems       Occupational Therapy Goals          Problem: Occupational Therapy    Goal Priority Disciplines Outcome Interventions   Occupational Therapy Goal     OT, PT/OT Ongoing, Progressing    Description: Goals to be met by: 23     Patient will increase  functional independence with ADLs by performing:    UE Dressing with Set-up Assistance.  LE Dressing with Minimal Assistance.  Toileting from toilet with Contact Guard Assistance for hygiene and clothing management.   Bathing from  shower chair/bench with Minimal Assistance.  Toilet transfer to toilet with Stand-by Assistance.  Increased functional strength to 4/5 for BUEs.                         Recommendations:     Discharge Equipment Recommendations:  none     Plan:     Patient to be seen 6 x/week (QD/BID as appropriate) to address the above listed problems via self-care/home management, therapeutic activities, therapeutic exercises  Plan of Care Expires: 12/22/23  Plan of Care Reviewed with: patient  Revisions made to plan of care: No      Skilled OT Minutes Provided: 30  Communication with Treatment Team:     Equipment recommendations:       At end of treatment, patient remained:  x Up in chair     Up in wheelchair in room    In bed   x With alarm activated    Bed rails up   x Call bell in reach    x Family/friends present    Restraints secured properly    In bathroom with CNA/RN notified    In gym with PT/PTA/tech    Nurse aware    Other:

## 2023-12-19 NOTE — PT/OT/SLP PROGRESS
Name: Priscila Randhawa    : 1933 (89 y.o.)  MRN: [id number]            Interdisciplinary Team Conference     Case conference held with patient/family and care team to discuss progress, plan of care, barriers to be addressed for safe return home, equipment recommendations, and discharge planning. Communicated therapy progress with MD, RN, therapy clinicians and case management. All questions/concerns answered.

## 2023-12-19 NOTE — PROGRESS NOTES
Name: Priscila Randhawa    : 1933 (89 y.o.)  MRN: [id number]            Interdisciplinary Team Conference     Case conference held with patient/family and care team to discuss progress, plan of care, barriers to be addressed for safe return home, equipment recommendations, and discharge planning. Communicated therapy progress with MD, RN, therapy clinicians and case management. All questions/concerns answered.

## 2023-12-19 NOTE — PLAN OF CARE
Spoke with Lanette with [facility]. Faxed over requested documentation. Lanette stated that she will review information and will notify us if can accept and if so what time they can pick patient up on Thursday

## 2023-12-19 NOTE — PLAN OF CARE
Problem: Adult Inpatient Plan of Care  Goal: Plan of Care Review  Outcome: Ongoing, Progressing  Flowsheets (Taken 12/19/2023 0906)  Plan of Care Reviewed With: patient  Goal: Patient-Specific Goal (Individualized)  Outcome: Ongoing, Progressing  Flowsheets (Taken 12/19/2023 0906)  Anxieties, Fears or Concerns: None  Individualized Care Needs: IV antibiotics, safety  Goal: Absence of Hospital-Acquired Illness or Injury  Outcome: Ongoing, Progressing  Intervention: Identify and Manage Fall Risk  Flowsheets (Taken 12/19/2023 0906)  Safety Promotion/Fall Prevention:   assistive device/personal item within reach   bed alarm set   instructed to call staff for mobility   side rails raised x 3   medications reviewed   muscle strengthening facilitated   room near unit station   Fall Risk reviewed with patient/family  Intervention: Prevent Skin Injury  Flowsheets (Taken 12/19/2023 0906)  Body Position:   position changed independently   supine   side-lying   sitting up in bed   weight shifting   log-rolled  Skin Protection:   adhesive use limited   incontinence pads utilized   transparent dressing maintained   tubing/devices free from skin contact  Intervention: Prevent and Manage VTE (Venous Thromboembolism) Risk  Flowsheets (Taken 12/19/2023 0906)  Activity Management:   Ambulated to bathroom - L4   Ambulated in room - L4   Rolling - L1  VTE Prevention/Management:   ambulation promoted   fluids promoted  Intervention: Prevent Infection  Flowsheets (Taken 12/19/2023 0906)  Infection Prevention:   cohorting utilized   environmental surveillance performed   equipment surfaces disinfected   hand hygiene promoted   personal protective equipment utilized   rest/sleep promoted   single patient room provided  Goal: Optimal Comfort and Wellbeing  Outcome: Ongoing, Progressing  Intervention: Monitor Pain and Promote Comfort  Flowsheets (Taken 12/19/2023 0906)  Pain Management Interventions:   care clustered   pillow support  provided  Intervention: Provide Person-Centered Care  Flowsheets (Taken 12/19/2023 0906)  Trust Relationship/Rapport:   care explained   questions answered   questions encouraged  Goal: Readiness for Transition of Care  Outcome: Ongoing, Progressing     Problem: Fall Injury Risk  Goal: Absence of Fall and Fall-Related Injury  Outcome: Ongoing, Progressing  Intervention: Identify and Manage Contributors  Flowsheets (Taken 12/19/2023 0906)  Self-Care Promotion:   independence encouraged   BADL personal objects within reach   safe use of adaptive equipment encouraged   meal set-up provided     Problem: UTI (Urinary Tract Infection)  Goal: Improved Infection Symptoms  Outcome: Ongoing, Progressing  Intervention: Prevent Infection Progression  Flowsheets (Taken 12/19/2023 0906)  Infection Management: aseptic technique maintained     Problem: Mobility Impairment  Goal: Optimal Mobility  Outcome: Ongoing, Progressing  Intervention: Optimize Mobility  Flowsheets (Taken 12/19/2023 0906)  Activity Management:   Ambulated to bathroom - L4   Ambulated in room - L4   Rolling - L1  Positioning/Transfer Devices:   in use   pillows     Problem: Infection  Goal: Absence of Infection Signs and Symptoms  Outcome: Ongoing, Progressing  Intervention: Prevent or Manage Infection  Flowsheets (Taken 12/19/2023 0906)  Infection Management: aseptic technique maintained

## 2023-12-19 NOTE — PLAN OF CARE
Problem: Adult Inpatient Plan of Care  Goal: Plan of Care Review  Outcome: Ongoing, Progressing  Flowsheets (Taken 12/18/2023 2313)  Plan of Care Reviewed With: patient  Goal: Patient-Specific Goal (Individualized)  Outcome: Ongoing, Progressing  Flowsheets (Taken 12/18/2023 2313)  Individualized Care Needs: continue therapy, monitor safety  Goal: Absence of Hospital-Acquired Illness or Injury  Outcome: Ongoing, Progressing  Intervention: Identify and Manage Fall Risk  Flowsheets (Taken 12/18/2023 2313)  Safety Promotion/Fall Prevention:   assistive device/personal item within reach   chair alarm set   Fall Risk reviewed with patient/family   gait belt with ambulation   muscle strengthening facilitated   supervised activity   Supervised toileting - stay within arms reach   instructed to call staff for mobility  Intervention: Prevent Skin Injury  Flowsheets (Taken 12/18/2023 2313)  Body Position: position changed independently  Intervention: Prevent and Manage VTE (Venous Thromboembolism) Risk  Flowsheets (Taken 12/18/2023 2313)  Activity Management: Walk with assistive devise and /or staff member - L3  VTE Prevention/Management:   ambulation promoted   bleeding precations maintained  Intervention: Prevent Infection  Flowsheets (Taken 12/18/2023 2313)  Infection Prevention:   cohorting utilized   environmental surveillance performed   hand hygiene promoted   single patient room provided   rest/sleep promoted  Goal: Optimal Comfort and Wellbeing  Outcome: Ongoing, Progressing  Intervention: Monitor Pain and Promote Comfort  Flowsheets (Taken 12/18/2023 2313)  Pain Management Interventions:   care clustered   quiet environment facilitated   pillow support provided  Intervention: Provide Person-Centered Care  Flowsheets (Taken 12/18/2023 2313)  Trust Relationship/Rapport:   care explained   questions encouraged   thoughts/feelings acknowledged   questions answered     Problem: Fall Injury Risk  Goal: Absence of Fall  and Fall-Related Injury  Outcome: Ongoing, Progressing  Intervention: Identify and Manage Contributors  Flowsheets (Taken 12/18/2023 2313)  Self-Care Promotion:   independence encouraged   BADL personal objects within reach   BADL personal routines maintained   safe use of adaptive equipment encouraged  Intervention: Promote Injury-Free Environment  Flowsheets (Taken 12/18/2023 2313)  Safety Promotion/Fall Prevention:   assistive device/personal item within reach   chair alarm set   Fall Risk reviewed with patient/family   gait belt with ambulation   muscle strengthening facilitated   supervised activity   Supervised toileting - stay within arms reach   instructed to call staff for mobility

## 2023-12-19 NOTE — PLAN OF CARE
Problem: Occupational Therapy  Goal: Occupational Therapy Goal  Description: Goals to be met by: 12/29/23     Patient will increase functional independence with ADLs by performing:    UE Dressing with Set-up Assistance.  LE Dressing with Minimal Assistance.  Toileting from toilet with Contact Guard Assistance for hygiene and clothing management.   Bathing from  shower chair/bench with Minimal Assistance.  Toilet transfer to toilet with Stand-by Assistance.  Increased functional strength to 4/5 for BUEs.    Outcome: Ongoing, Progressing

## 2023-12-19 NOTE — PT/OT/SLP PROGRESS
Occupational Therapy  Treatment    Name: Priscila Randhawa    : 1933 (89 y.o.)  MRN: [id number]           TREATMENT SUMMARY AND RECOMMENDATIONS:      OT Date of Treatment: 23  OT Start Time: 1100  OT Stop Time: 1115  OT Total Time (min): 15 min      Subjective Assessment:   No complaints  Lethargic   x Awake, alert, cooperative  Impulsive    Uncooperative   Flat affect    Agitated  c/o pain    Appropriate x c/o fatigue    Confused  Treated at bedside     Emotionally labile x Treated in gym/dept.      Other:        Therapy Precautions:    Cognitive deficits  Spinal precautions    Collar - hard  Sternal precautions    Collar - soft   TLSO   x Fall risk  LSO    Hip precautions - posterior  Knee immobilizer    Hip precautions - anterior  WBAT    Impaired communication  Partial weightbearing    Oxygen  TTWB    PEG tube  NWB    Visual deficits      Hearing deficits   Other:        Treatment Objectives:     Mobility Training:    Mobility task Assist level Comments    Bed mobility     Transfer CGA Stand/pivot t/f with RW to w/c   Sit to stands transitions     Functional mobility CGA X150 feet with RW   Sitting balance     Standing balance      Other:          Therapeutic Exercise:   Exercise Sets Reps Comments   Arm bike 1 3 min Level 1; forwards   Leg bike 1 5 min Level 1; forwards                   Additional Comments:      Assessment: Patient tolerated session well.    GOALS:   Multidisciplinary Problems       Occupational Therapy Goals          Problem: Occupational Therapy    Goal Priority Disciplines Outcome Interventions   Occupational Therapy Goal     OT, PT/OT Ongoing, Progressing    Description: Goals to be met by: 23     Patient will increase functional independence with ADLs by performing:    UE Dressing with Set-up Assistance.  LE Dressing with Minimal Assistance.  Toileting from toilet with Contact Guard Assistance for hygiene and clothing management.   Bathing from  shower chair/bench with  Minimal Assistance.  Toilet transfer to toilet with Stand-by Assistance.  Increased functional strength to 4/5 for BUEs.                         Recommendations:     Discharge Equipment Recommendations:  none     Plan:     Patient to be seen 6 x/week (QD/BID as appropriate) to address the above listed problems via self-care/home management, therapeutic activities, therapeutic exercises  Plan of Care Expires: 12/22/23  Plan of Care Reviewed with: patient  Revisions made to plan of care: No      Skilled OT Minutes Provided: 15  Communication with Treatment Team:     Equipment recommendations:       At end of treatment, patient remained:   Up in chair     Up in wheelchair in room    In bed    With alarm activated    Bed rails up    Call bell in reach     Family/friends present    Restraints secured properly    In bathroom with CNA/RN notified   x In gym with PT/PTA/tech    Nurse aware    Other:

## 2023-12-19 NOTE — PT/OT/SLP PROGRESS
Physical Therapy Treatment Note           Name: Priscila Randhawa    : 1933 (89 y.o.)  MRN: [id number]           TREATMENT SUMMARY AND RECOMMENDATIONS:    PT Received On: 23  PT Start Time: 1400     PT Stop Time: 1415  PT Total Time (min): 15 min     Subjective Assessment:  x No complaints  Lethargic    Awake, alert, cooperative  Uncooperative    Agitated  c/o pain    Appropriate  c/o fatigue    Confused  Treated at bedside     Emotionally labile  Treated in gym/dept.    Impulsive  Other:    Flat affect       Therapy Precautions:    Cognitive deficits  Spinal precautions    Collar - hard  Sternal precautions    Collar - soft   TLSO    Fall risk  LSO    Hip precautions - posterior  Knee immobilizer    Hip precautions - anterior  WBAT    Impaired communication  Partial weightbearing    Oxygen  TTWB    PEG tube  NWB    Visual deficits  Other:    Hearing deficits          Treatment Objectives:     Mobility Training:   Assist level Comments    Bed mobility     Transfer     Gait CGA Amb on firm surface with  ft    Sit to stand transitions CGA Sit-stand 5 reps x 2 with B UE use   Sitting balance     Standing balance      Wheelchair mobility     Car transfer     Other:          Therapeutic Exercise:   Exercise Sets Reps Comments                               Additional Comments:  No issues noted     Assessment: Patient tolerated session well.    PT Plan: continue  Revisions made to plan of care: No    GOALS:   Multidisciplinary Problems       Physical Therapy Goals          Problem: Physical Therapy    Goal Priority Disciplines Outcome Goal Variances Interventions   Physical Therapy Goal     PT, PT/OT Ongoing, Not Progressing     Description: Description: Goals to be met by: Discharge      Patient will increase functional independence with mobility by performin. Sit to stand transfer with Modified [place]  2. Bed to chair transfer with Modified [place] using Single-point Cane    3. Gait  x 50 feet with Modified [place] using Single-point Cane .                          Skilled PT Minutes Provided: 15   Communication with Treatment Team:     Equipment recommendations:       At end of treatment, patient remained:   Up in chair     Up in wheelchair in room    In bed    With alarm activated    Bed rails up    Call bell in reach     Family/friends present    Restraints secured properly    In bathroom with CNA/RN notified    Nurse aware   x In gym with therapist/tech    Other:

## 2023-12-19 NOTE — PROGRESS NOTES
[facility]  [facility] MEDICINE - Progress Note    Patient Name: Priscila Randhawa  MRN: [id number]  Patient Class: IP- Swing   Admission Date: 12/7/2023   Admitting Physician: VINCENT Service   Attending Physician: Thairy G Reyes, DO  Primary Care Provider: Selena Hou MD  Face-to-Face encounter date: 12/19/2023      CHIEF COMPLAINT   Frequent falls  HISTORY OF PRESENTING ILLNESS   89 year-old white female with a history of dementia, hypertension, diabetes who presented to the ER after a fall at home. She lives alone and has her niece at the bedside while she was in the ER however today she is alone. She does have frequent falls. Her PCP did recommend her to be placed in assisted living versus nursing home.  Patient was has been reluctant. Admitted for management of UTI, PT OT for the weakness and possible nursing home placement if patient and family are amenable.    Today  Doing well, family at bedside today.  PAST MEDICAL HISTORY     Past Medical History:   Diagnosis Date    Background diabetic retinopathy     Carotid artery stenosis     Cataract extraction status     Combined hyperlipidemia     HTN (hypertension)     Polycythemia     Type 2 diabetes mellitus without complications     Unspecified osteoarthritis, unspecified site     Vitamin D deficiency        PAST SURGICAL HISTORY     Past Surgical History:   Procedure Laterality Date    EPIDURAL STEROID INJECTION INTO LUMBAR SPINE      EXPLORATORY LAPAROTOMY      hx total knee repla         FAMILY HISTORY   Reviewed and noncontributory to this case    SOCIAL HISTORY     Social History     Socioeconomic History    Marital status: Single   Occupational History    Occupation: caregiver   Tobacco Use    Smoking status: Never    Smokeless tobacco: Never     Social Determinants of Health     Financial Resource Strain: Low Risk  (12/7/2023)    Overall Financial Resource Strain (CARDIA)     Difficulty of Paying Living Expenses: Not hard at  all   Food Insecurity: No Food Insecurity (12/7/2023)    Hunger Vital Sign     Worried About Running Out of Food in the Last Year: Never true     Ran Out of Food in the Last Year: Never true   Transportation Needs: No Transportation Needs (12/7/2023)    PRAPARE - Transportation     Lack of Transportation (Medical): No     Lack of Transportation (Non-Medical): No   Physical Activity: Inactive (12/7/2023)    Exercise Vital Sign     Days of Exercise per Week: 0 days     Minutes of Exercise per Session: 0 min   Stress: No Stress Concern Present (12/7/2023)    [facility] of Occupational Health - Occupational Stress Questionnaire     Feeling of Stress : Only a little   Social Connections: Moderately Integrated (12/7/2023)    Social Connection and Isolation Panel [NHANES]     Frequency of Communication with Friends and Family: More than three times a week     Frequency of Social Gatherings with Friends and Family: More than three times a week     Attends Worship Services: 1 to 4 times per year     Active Member of Clubs or Organizations: No     Attends Club or Organization Meetings: 1 to 4 times per year     Marital Status:    Recent Concern: Social Connections - Moderately Isolated (12/4/2023)    Social Connection and Isolation Panel [NHANES]     Frequency of Communication with Friends and Family: More than three times a week     Frequency of Social Gatherings with Friends and Family: More than three times a week     Attends Worship Services: 1 to 4 times per year     Active Member of Clubs or Organizations: No     Attends Club or Organization Meetings: Never     Marital Status:    Housing Stability: Low Risk  (12/7/2023)    Housing Stability Vital Sign     Unable to Pay for Housing in the Last Year: No     Number of Places Lived in the Last Year: 1     Unstable Housing in the Last Year: No       HOME MEDICATIONS     Prior to Admission medications    Medication Sig Start Date End Date Taking?  "Authorizing Provider   losartan (COZAAR) 50 MG tablet Take 1 tablet (50 mg total) by mouth once daily. 5/8/23 5/7/24 Yes Selena Hou MD   pravastatin (PRAVACHOL) 80 MG tablet Take 1 tablet (80 mg total) by mouth once daily. 5/8/23 5/7/24 Yes Selena Hou MD       ALLERGIES   Codeine    REVIEW OF SYSTEMS   Except as documented above, all other systems reviewed and negative    PHYSICAL EXAM     Vitals:    12/19/23 0822   BP: (!) 164/83   Pulse:    Resp:    Temp:       General:  In no acute distress, resting comfortably  Head and neck:  Atraumatic, normocephalic, moist mucous membranes, supple neck  Chest:  Clear to auscultation bilaterally  Heart:  S1, S2, grade 3/6 systolic murmur  Abdomen:  Soft, nontender, BS +  MSK:  Warm, no lower extremity edema, no clubbing or cyanosis  Neuro:  weak  Integumentary:  No obvious skin rash  Psychiatry:  Appropriate mood and affect    ASSESSMENT AND PLAN   E. Coli UTI  -Rocephin 12/4-12/8   -Cultures show sensitivity     Essential HTN  -losartan, well controlled     Left shoulder pain  -xray left shoulder showed no acute abnormality     Weakness and frequent falls  -PT/OT  -possible placement     Allergic dermatitis   -Benadryl x1 on 12/12  - continue to utilize topical Benadryl as needed    Low normal B12   -we will start supplementation monthly.  Start date 12/17    DVT prophylaxis:  Lovenox  Disposition:  Pending placement  __________________________________________________________________  LABS/MICRO/MEDS/DIAGNOSTICS       LABS  No results for input(s): "NA", "K", "CHLORIDE", "CO2", "BUN", "CREATININE", "GLUCOSE", "CALCIUM", "ALKPHOS", "AST", "ALT", "ALBUMIN" in the last 72 hours.        No results for input(s): "WBC", "RBC", "HCT", "MCV", "PLT" in the last 72 hours.    Invalid input(s): "HG"        MICROBIOLOGY  Microbiology Results (last 7 days)       ** No results found for the last 168 hours. **            MEDICATIONS   cyanocobalamin  1,000 mcg Intramuscular Q30 " Days    enoxparin  40 mg Subcutaneous Daily    losartan  50 mg Oral Daily    pravastatin  80 mg Oral Daily      INFUSIONS      DIAGNOSTIC TESTS  X-Ray Chest 1 View   Final Result      No acute cardiopulmonary abnormality.         Electronically signed by: Simeon Hill MD   Date:    12/16/2023   Time:    09:14      X-Ray Chest 1 View    (Results Pending)          Patient information was obtained from patient, patient's family, past medical records and ER records.   All diagnosis and differential diagnosis have been reviewed; assessment and plan has been documented. I have personally reviewed the labs and test results that are presently available; I have reviewed the patients medication list. I have reviewed the consulting providers response and recommendations. I have reviewed or attempted to review medical records based upon their availability.  All of the patient's questions have been addressed and answered. Patient's is agreeable to the above stated plan. I will continue to monitor closely and make adjustments to medical management as needed.  This note was created using [employer] voice recognition software that occasionally misinterpreted phrases or words.  Please contact me if any questions may rise regarding documentation to clarify verbiage.        Thairy G Reyes, DO   Internal Medicine  Department of Hospital Medicine [facility] - [facility]

## 2023-12-19 NOTE — PT/OT/SLP PROGRESS
Physical Therapy Treatment Note           Name: Priscila Randhawa    : 1933 (89 y.o.)  MRN: [id number]           TREATMENT SUMMARY AND RECOMMENDATIONS:    PT Received On: 23  PT Start Time: 1130     PT Stop Time: 1145  PT Total Time (min): 15 min     Subjective Assessment:  x No complaints  Lethargic    Awake, alert, cooperative  Uncooperative    Agitated  c/o pain    Appropriate  c/o fatigue    Confused  Treated at bedside     Emotionally labile  Treated in gym/dept.    Impulsive  Other:    Flat affect       Therapy Precautions:    Cognitive deficits  Spinal precautions    Collar - hard  Sternal precautions    Collar - soft   TLSO    Fall risk  LSO    Hip precautions - posterior  Knee immobilizer    Hip precautions - anterior  WBAT    Impaired communication  Partial weightbearing    Oxygen  TTWB    PEG tube  NWB    Visual deficits  Other:    Hearing deficits          Treatment Objectives:     Mobility Training:   Assist level Comments    Bed mobility     Transfer     Gait CGA Amb on firm surface with  ft    Sit to stand transitions     Sitting balance     Standing balance      Wheelchair mobility     Car transfer     Other:          Therapeutic Exercise:   Exercise Sets Reps Comments                               Additional Comments:  Pt initially refused this AM but agreed to Amb when checked on later in AM     Assessment: Patient tolerated session well.    PT Plan: continue  Revisions made to plan of care: No    GOALS:   Multidisciplinary Problems       Physical Therapy Goals          Problem: Physical Therapy    Goal Priority Disciplines Outcome Goal Variances Interventions   Physical Therapy Goal     PT, PT/OT Ongoing, Not Progressing     Description: Description: Goals to be met by: Discharge      Patient will increase functional independence with mobility by performin. Sit to stand transfer with Modified [place]  2. Bed to chair transfer with Modified [place]  using Single-point Cane   3. Gait  x 50 feet with Modified [place] using Single-point Cane .                          Skilled PT Minutes Provided: 15   Communication with Treatment Team:     Equipment recommendations:       At end of treatment, patient remained:  x Up in chair     Up in wheelchair in room    In bed   x With alarm activated    Bed rails up   x Call bell in reach     Family/friends present    Restraints secured properly    In bathroom with CNA/RN notified    Nurse aware    In gym with therapist/tech    Other:

## 2023-12-19 NOTE — PLAN OF CARE
[facility]  Discharge Reassessment    Primary Care Provider: Selena Hou MD    Expected Discharge Date:     Reassessment (most recent)       Discharge Reassessment - 12/18/23 1855          Discharge Reassessment    Assessment Type Discharge Planning Reassessment     Did the patient's condition or plan change since previous assessment? No     Discharge Plan discussed with: Patient     Communicated HALI with patient/caregiver Date not available/Unable to determine     Discharge Plan A New Nursing Home placement - alf care facility     DME Needed Upon Discharge  none     Transition of Care Barriers None     Why the patient remains in the hospital Requires continued medical care        Post-Acute Status    Post-Acute Authorization Placement     Post-Acute Placement Status Referrals Sent     Hospital Resources/Appts/Education Provided Provided patient/caregiver with written discharge plan information     Discharge Delays None known at this time

## 2023-12-20 PROCEDURE — 97116 GAIT TRAINING THERAPY: CPT

## 2023-12-20 PROCEDURE — 97530 THERAPEUTIC ACTIVITIES: CPT

## 2023-12-20 PROCEDURE — 63600175 PHARM REV CODE 636 W HCPCS: Performed by: STUDENT IN AN ORGANIZED HEALTH CARE EDUCATION/TRAINING PROGRAM

## 2023-12-20 PROCEDURE — 25000003 PHARM REV CODE 250: Performed by: STUDENT IN AN ORGANIZED HEALTH CARE EDUCATION/TRAINING PROGRAM

## 2023-12-20 PROCEDURE — 11000004 HC SNF PRIVATE

## 2023-12-20 PROCEDURE — 97110 THERAPEUTIC EXERCISES: CPT

## 2023-12-20 RX ADMIN — ENOXAPARIN SODIUM 40 MG: 40 INJECTION SUBCUTANEOUS at 08:12

## 2023-12-20 RX ADMIN — PRAVASTATIN SODIUM 80 MG: 40 TABLET ORAL at 08:12

## 2023-12-20 RX ADMIN — LOSARTAN POTASSIUM 50 MG: 50 TABLET, FILM COATED ORAL at 08:12

## 2023-12-20 NOTE — PROGRESS NOTES
[facility]  [facility] MEDICINE - Progress Note    Patient Name: Priscila Randhawa  MRN: [id number]  Patient Class: IP- Swing   Admission Date: 12/7/2023   Admitting Physician:  Service   Attending Physician: Hoa Zamora MD  Primary Care Provider: Selena Hou MD  Face-to-Face encounter date: 12/20/2023      CHIEF COMPLAINT   Frequent falls  HISTORY OF PRESENTING ILLNESS   89 year-old white female with a history of dementia, hypertension, diabetes who presented to the ER after a fall at home. She lives alone and has her niece at the bedside while she was in the ER however today she is alone. She does have frequent falls. Her PCP did recommend her to be placed in assisted living versus nursing home.  Patient was has been reluctant. Admitted for management of UTI, PT OT for the weakness and possible nursing home placement if patient and family are amenable.    Today  Doing well.  PAST MEDICAL HISTORY     Past Medical History:   Diagnosis Date    Background diabetic retinopathy     Carotid artery stenosis     Cataract extraction status     Combined hyperlipidemia     HTN (hypertension)     Polycythemia     Type 2 diabetes mellitus without complications     Unspecified osteoarthritis, unspecified site     Vitamin D deficiency        PAST SURGICAL HISTORY     Past Surgical History:   Procedure Laterality Date    EPIDURAL STEROID INJECTION INTO LUMBAR SPINE      EXPLORATORY LAPAROTOMY      hx total knee repla         FAMILY HISTORY   Reviewed and noncontributory to this case    SOCIAL HISTORY     Social History     Socioeconomic History    Marital status: Single   Occupational History    Occupation: caregiver   Tobacco Use    Smoking status: Never    Smokeless tobacco: Never     Social Determinants of Health     Financial Resource Strain: Low Risk  (12/7/2023)    Overall Financial Resource Strain (CARDIA)     Difficulty of Paying Living Expenses: Not hard at all   Food Insecurity: No  Food Insecurity (12/7/2023)    Hunger Vital Sign     Worried About Running Out of Food in the Last Year: Never true     Ran Out of Food in the Last Year: Never true   Transportation Needs: No Transportation Needs (12/7/2023)    PRAPARE - Transportation     Lack of Transportation (Medical): No     Lack of Transportation (Non-Medical): No   Physical Activity: Inactive (12/7/2023)    Exercise Vital Sign     Days of Exercise per Week: 0 days     Minutes of Exercise per Session: 0 min   Stress: No Stress Concern Present (12/7/2023)    [facility] of Occupational Health - Occupational Stress Questionnaire     Feeling of Stress : Only a little   Social Connections: Moderately Integrated (12/7/2023)    Social Connection and Isolation Panel [NHANES]     Frequency of Communication with Friends and Family: More than three times a week     Frequency of Social Gatherings with Friends and Family: More than three times a week     Attends [employer] Services: 1 to 4 times per year     Active Member of Clubs or Organizations: No     Attends Club or Organization Meetings: 1 to 4 times per year     Marital Status:    Recent Concern: Social Connections - Moderately Isolated (12/4/2023)    Social Connection and Isolation Panel [NHANES]     Frequency of Communication with Friends and Family: More than three times a week     Frequency of Social Gatherings with Friends and Family: More than three times a week     Attends [employer] Services: 1 to 4 times per year     Active Member of Clubs or Organizations: No     Attends Club or Organization Meetings: Never     Marital Status:    Housing Stability: Low Risk  (12/7/2023)    Housing Stability Vital Sign     Unable to Pay for Housing in the Last Year: No     Number of Places Lived in the Last Year: 1     Unstable Housing in the Last Year: No       HOME MEDICATIONS     Prior to Admission medications    Medication Sig Start Date End Date Taking? Authorizing Provider  "  losartan (COZAAR) 50 MG tablet Take 1 tablet (50 mg total) by mouth once daily. 5/8/23 5/7/24 Yes Selena Hou MD   pravastatin (PRAVACHOL) 80 MG tablet Take 1 tablet (80 mg total) by mouth once daily. 5/8/23 5/7/24 Yes Selena Hou MD       ALLERGIES   Codeine    REVIEW OF SYSTEMS   Except as documented above, all other systems reviewed and negative    PHYSICAL EXAM     Vitals:    12/20/23 0718   BP: (!) 151/91   Pulse: 61   Resp:    Temp: 97.9 °F (36.6 °C)      General:  In no acute distress, resting comfortably  Head and neck:  Atraumatic, normocephalic, moist mucous membranes, supple neck  Chest:  Clear to auscultation bilaterally  Heart:  S1, S2, grade 3/6 systolic murmur  Abdomen:  Soft, nontender, BS +  MSK:  Warm, no lower extremity edema, no clubbing or cyanosis  Neuro:  weak  Integumentary:  No obvious skin rash  Psychiatry:  Appropriate mood and affect    ASSESSMENT AND PLAN   E. Coli UTI  -Rocephin 12/4-12/8   -Cultures show sensitivity     Essential HTN  -losartan, well controlled     Left shoulder pain  -xray left shoulder showed no acute abnormality     Weakness and frequent falls  -PT/OT  -possible placement     Allergic dermatitis   -Benadryl x1 on 12/12  - continue to utilize topical Benadryl as needed    Low normal B12   -we will start supplementation monthly.  Start date 12/17    DVT prophylaxis:  Lovenox  Disposition:  Pending placement  __________________________________________________________________  LABS/MICRO/MEDS/DIAGNOSTICS       LABS  No results for input(s): "NA", "K", "CHLORIDE", "CO2", "BUN", "CREATININE", "GLUCOSE", "CALCIUM", "ALKPHOS", "AST", "ALT", "ALBUMIN" in the last 72 hours.        No results for input(s): "WBC", "RBC", "HCT", "MCV", "PLT" in the last 72 hours.    Invalid input(s): "HG"        MICROBIOLOGY  Microbiology Results (last 7 days)       ** No results found for the last 168 hours. **            MEDICATIONS   cyanocobalamin  1,000 mcg Intramuscular Q30 " Days    enoxparin  40 mg Subcutaneous Daily    losartan  50 mg Oral Daily    pravastatin  80 mg Oral Daily      INFUSIONS      DIAGNOSTIC TESTS  X-Ray Chest 1 View   Final Result      No acute chest disease is identified.         Electronically signed by: Luis Tai   Date:    12/19/2023   Time:    14:12      X-ray Shoulder 2 or More Views Right   Final Result      Significant degenerative changes.         Electronically signed by: Luis Tai   Date:    12/19/2023   Time:    14:14      X-Ray Chest 1 View   Final Result      No acute cardiopulmonary abnormality.         Electronically signed by: Simeon Hill MD   Date:    12/16/2023   Time:    09:14             Patient information was obtained from patient, patient's family, past medical records and ER records.   All diagnosis and differential diagnosis have been reviewed; assessment and plan has been documented. I have personally reviewed the labs and test results that are presently available; I have reviewed the patients medication list. I have reviewed the consulting providers response and recommendations. I have reviewed or attempted to review medical records based upon their availability.  All of the patient's questions have been addressed and answered. Patient's is agreeable to the above stated plan. I will continue to monitor closely and make adjustments to medical management as needed.  This note was created using [email or web address] voice recognition software that occasionally misinterpreted phrases or words.  Please contact me if any questions may rise regarding documentation to clarify verbiage.        Hoa Zamora MD   Internal Medicine  Department of Hospital Medicine [facility] - [facility] Surgical [facility]

## 2023-12-20 NOTE — PLAN OF CARE
Problem: Diabetes Comorbidity  Goal: Blood Glucose Level Within Targeted Range  Outcome: Ongoing, Progressing  Intervention: Monitor and Manage Glycemia  Flowsheets (Taken 12/20/2023 1247)  Glycemic Management: blood glucose monitored     Problem: Fall Injury Risk  Goal: Absence of Fall and Fall-Related Injury  Outcome: Ongoing, Progressing  Intervention: Identify and Manage Contributors  Flowsheets (Taken 12/20/2023 1247)  Self-Care Promotion:   independence encouraged   BADL personal objects within reach   BADL personal routines maintained  Medication Review/Management: medications reviewed

## 2023-12-20 NOTE — PLAN OF CARE
Problem: Adult Inpatient Plan of Care  Goal: Plan of Care Review  Outcome: Ongoing, Progressing  Flowsheets (Taken 12/19/2023 2156)  Plan of Care Reviewed With: patient  Goal: Patient-Specific Goal (Individualized)  Outcome: Ongoing, Progressing  Flowsheets (Taken 12/19/2023 2156)  Individualized Care Needs: continue therapy, monitor pt safety  Goal: Absence of Hospital-Acquired Illness or Injury  Outcome: Ongoing, Progressing  Intervention: Identify and Manage Fall Risk  Flowsheets (Taken 12/19/2023 2156)  Safety Promotion/Fall Prevention:   assistive device/personal item within reach   chair alarm set   Fall Risk reviewed with patient/family   muscle strengthening facilitated   nonskid shoes/socks when out of bed   supervised activity   Supervised toileting - stay within arms reach   instructed to call staff for mobility   gait belt with ambulation  Intervention: Prevent Skin Injury  Flowsheets (Taken 12/19/2023 2156)  Body Position: position changed independently  Intervention: Prevent and Manage VTE (Venous Thromboembolism) Risk  Flowsheets (Taken 12/19/2023 2156)  Activity Management: Walk with assistive devise and /or staff member - L3  VTE Prevention/Management:   ambulation promoted   bleeding precations maintained  Intervention: Prevent Infection  Flowsheets (Taken 12/19/2023 2156)  Infection Prevention:   cohorting utilized   environmental surveillance performed   hand hygiene promoted   single patient room provided   rest/sleep promoted  Goal: Optimal Comfort and Wellbeing  Outcome: Ongoing, Progressing  Intervention: Monitor Pain and Promote Comfort  Flowsheets (Taken 12/19/2023 2156)  Pain Management Interventions:   care clustered   pain management plan reviewed with patient/caregiver   quiet environment facilitated   pillow support provided  Intervention: Provide Person-Centered Care  Flowsheets (Taken 12/19/2023 2156)  Trust Relationship/Rapport:   care explained   questions encouraged    thoughts/feelings acknowledged   questions answered     Problem: Fall Injury Risk  Goal: Absence of Fall and Fall-Related Injury  Outcome: Ongoing, Progressing  Intervention: Identify and Manage Contributors  Flowsheets (Taken 12/19/2023 2156)  Self-Care Promotion:   independence encouraged   BADL personal objects within reach   BADL personal routines maintained   safe use of adaptive equipment encouraged  Intervention: Promote Injury-Free Environment  Flowsheets (Taken 12/19/2023 2156)  Safety Promotion/Fall Prevention:   assistive device/personal item within reach   chair alarm set   Fall Risk reviewed with patient/family   muscle strengthening facilitated   nonskid shoes/socks when out of bed   supervised activity   Supervised toileting - stay within arms reach   instructed to call staff for mobility   gait belt with ambulation

## 2023-12-20 NOTE — PT/OT/SLP PROGRESS
Physical Therapy Treatment Note           Name: Priscila Randhawa    : 1933 (89 y.o.)  MRN: [id number]           TREATMENT SUMMARY AND RECOMMENDATIONS:    PT Received On: 23  PT Start Time: 850     PT Stop Time: 928  PT Total Time (min): 38 min     Subjective Assessment:  x No complaints  Lethargic   x Awake, alert, cooperative  Uncooperative    Agitated  c/o pain    Appropriate  c/o fatigue    Confused  Treated at bedside     Emotionally labile x Treated in gym/dept.    Impulsive  Other:    Flat affect       Therapy Precautions:    Cognitive deficits  Spinal precautions    Collar - hard  Sternal precautions    Collar - soft   TLSO    Fall risk  LSO    Hip precautions - posterior  Knee immobilizer    Hip precautions - anterior  WBAT    Impaired communication  Partial weightbearing    Oxygen  TTWB    PEG tube  NWB    Visual deficits  Other:    Hearing deficits          Treatment Objectives:     Mobility Training:   Assist level Comments    Bed mobility     Transfer SBA Stand pivot using RW   Gait SBA Amb on firm surface with  ft    Sit to stand transitions SBA    Sitting balance     Standing balance  CGA Unsupported challenges on foam pad and with puching   Wheelchair mobility     Car transfer     Other:          Therapeutic Exercise:   Exercise Sets Reps Comments   B LE exer sitting  2 20 In all planes to promote muscle strength and ROM    B LE bike using UBE in sitting  10' 5'F/5'B                   Additional Comments:  No issues noted. Good tolerance demonstrated. Waiting NH placement     Assessment: Patient tolerated session well.    PT Plan: continue  Revisions made to plan of care: No    GOALS:   Multidisciplinary Problems       Physical Therapy Goals          Problem: Physical Therapy    Goal Priority Disciplines Outcome Goal Variances Interventions   Physical Therapy Goal     PT, PT/OT Ongoing, Not Progressing     Description: Description: Goals to be met by: Discharge       Patient will increase functional independence with mobility by performin. Sit to stand transfer with Modified [place]  2. Bed to chair transfer with Modified [place] using Single-point Cane   3. Gait  x 50 feet with Modified [place] using Single-point Cane .                          Skilled PT Minutes Provided: 38   Communication with Treatment Team:     Equipment recommendations:       At end of treatment, patient remained:  x Up in chair     Up in wheelchair in room    In bed    With alarm activated    Bed rails up    Call bell in reach     Family/friends present    Restraints secured properly    In bathroom with CNA/RN notified    Nurse aware   x In gym with therapist/tech    Other:

## 2023-12-20 NOTE — PT/OT/SLP PROGRESS
Occupational Therapy  Treatment    Name: Priscila Randhawa    : 1933 (89 y.o.)  MRN: [id number]           TREATMENT SUMMARY AND RECOMMENDATIONS:      OT Date of Treatment: 23  OT Start Time: 930  OT Stop Time: 955  OT Total Time (min): 25 min      Subjective Assessment:   No complaints  Lethargic   x Awake, alert, cooperative  Impulsive    Uncooperative   Flat affect    Agitated x c/o pain - R shoulder     Appropriate  c/o fatigue    Confused  Treated at bedside     Emotionally labile x Treated in gym/dept.      Other:        Therapy Precautions:    Cognitive deficits  Spinal precautions    Collar - hard  Sternal precautions    Collar - soft   TLSO   x Fall risk  LSO    Hip precautions - posterior  Knee immobilizer    Hip precautions - anterior  WBAT    Impaired communication  Partial weightbearing    Oxygen  TTWB    PEG tube  NWB    Visual deficits      Hearing deficits   Other:        Treatment Objectives:     Mobility Training:    Mobility task Assist level Comments    Bed mobility     Transfer CGA/SBA Stand/pivot t/f with RW to BSChair   Sit to stands transitions SBA    Functional mobility SBA/CGA X150 feet with RW   Sitting balance     Standing balance  SBA/CGA Pt stood with RW anterior and performed shoulder ROM arch x4 times alternating UEs. No LOB noted.    Other:            Therapeutic Exercise:   Exercise Sets Reps Comments   1# dowel 2 10 chest press, straight arm raises, bicep curls   Yellow flexbar 2 10 Wrist flex/ext, forearm pro/sup                    Additional Comments: plan to DC to NH tomorrow      Assessment: Patient tolerated session well.    GOALS:   Multidisciplinary Problems       Occupational Therapy Goals          Problem: Occupational Therapy    Goal Priority Disciplines Outcome Interventions   Occupational Therapy Goal     OT, PT/OT Ongoing, Progressing    Description: Goals to be met by: 23     Patient will increase functional independence with ADLs by  performing:    UE Dressing with Set-up Assistance.  LE Dressing with Minimal Assistance.  Toileting from toilet with Contact Guard Assistance for hygiene and clothing management.   Bathing from  shower chair/bench with Minimal Assistance.  Toilet transfer to toilet with Stand-by Assistance.  Increased functional strength to 4/5 for BUEs.                         Recommendations:     Discharge Equipment Recommendations:  none     Plan:     Patient to be seen 6 x/week (QD/BID as appropriate) to address the above listed problems via self-care/home management, therapeutic activities, therapeutic exercises  Plan of Care Expires: 12/22/23  Plan of Care Reviewed with: patient  Revisions made to plan of care: No      Skilled OT Minutes Provided: 25  Communication with Treatment Team:     Equipment recommendations:       At end of treatment, patient remained:  x Up in chair     Up in wheelchair in room    In bed   x With alarm activated    Bed rails up   x Call bell in reach     Family/friends present    Restraints secured properly    In bathroom with CNA/RN notified    In gym with PT/PTA/tech    Nurse aware    Other:

## 2023-12-20 NOTE — PT/OT/SLP PROGRESS
Occupational Therapy  Treatment    Name: Priscila Randhawa    : 1933 (89 y.o.)  MRN: [id number]           TREATMENT SUMMARY AND RECOMMENDATIONS:      OT Date of Treatment: 23  OT Start Time: 1400  OT Stop Time: 1430  OT Total Time (min): 30 min      Subjective Assessment:   No complaints  Lethargic   x Awake, alert, cooperative  Impulsive    Uncooperative   Flat affect    Agitated x c/o pain -  R shoulder    Appropriate  c/o fatigue    Confused  Treated at bedside     Emotionally labile x Treated in gym/dept.      Other:        Therapy Precautions:    Cognitive deficits  Spinal precautions    Collar - hard  Sternal precautions    Collar - soft   TLSO   x Fall risk  LSO    Hip precautions - posterior  Knee immobilizer    Hip precautions - anterior  WBAT    Impaired communication  Partial weightbearing    Oxygen  TTWB    PEG tube  NWB    Visual deficits      Hearing deficits   Other:        Treatment Objectives:     Mobility Training:    Mobility task Assist level Comments    Bed mobility     Transfer CGA/SBA Stand/pivot t/f with RW to BSchair   Sit to stands transitions SBA    Functional mobility SBA/CGA 2x150 feet with RW    Sitting balance     Standing balance      Other:          Therapeutic Exercise:   Exercise Sets Reps Comments   Arm bike 1 3 min Level 1; forwards   Leg bike 1 5 min Level 1; forwards                   Additional Comments:      Assessment: Patient tolerated session well.    GOALS:   Multidisciplinary Problems       Occupational Therapy Goals          Problem: Occupational Therapy    Goal Priority Disciplines Outcome Interventions   Occupational Therapy Goal     OT, PT/OT Ongoing, Progressing    Description: Goals to be met by: 23     Patient will increase functional independence with ADLs by performing:    UE Dressing with Set-up Assistance.  LE Dressing with Minimal Assistance.  Toileting from toilet with Contact Guard Assistance for hygiene and clothing management.   Bathing  from  shower chair/bench with Minimal Assistance.  Toilet transfer to toilet with Stand-by Assistance.  Increased functional strength to 4/5 for BUEs.                         Recommendations:     Discharge Equipment Recommendations:  none     Plan:     Patient to be seen 6 x/week (QD/BID as appropriate) to address the above listed problems via self-care/home management, therapeutic activities, therapeutic exercises  Plan of Care Expires: 12/22/23  Plan of Care Reviewed with: patient  Revisions made to plan of care: No      Skilled OT Minutes Provided: 30  Communication with Treatment Team:     Equipment recommendations:       At end of treatment, patient remained:  x Up in chair     Up in wheelchair in room    In bed   x With alarm activated    Bed rails up   x Call bell in reach    x Family/friends present    Restraints secured properly    In bathroom with CNA/RN notified    In gym with PT/PTA/tech    Nurse aware    Other:

## 2023-12-21 ENCOUNTER — TELEPHONE (OUTPATIENT)
Dept: PRIMARY CARE CLINIC | Facility: CLINIC | Age: 88
End: 2023-12-21
Payer: MEDICARE

## 2023-12-21 VITALS
RESPIRATION RATE: 18 BRPM | RESPIRATION RATE: 18 BRPM | WEIGHT: 129 LBS | BODY MASS INDEX: 26 KG/M2 | HEIGHT: 59 IN | OXYGEN SATURATION: 98 % | SYSTOLIC BLOOD PRESSURE: 148 MMHG | OXYGEN SATURATION: 98 % | HEIGHT: 59 IN | WEIGHT: 129 LBS | DIASTOLIC BLOOD PRESSURE: 69 MMHG | DIASTOLIC BLOOD PRESSURE: 69 MMHG | HEART RATE: 82 BPM | SYSTOLIC BLOOD PRESSURE: 148 MMHG | TEMPERATURE: 98 F | BODY MASS INDEX: 26 KG/M2 | HEART RATE: 82 BPM | TEMPERATURE: 98 F

## 2023-12-21 LAB — SARS-COV-2 RNA RESP QL NAA+PROBE: NEGATIVE

## 2023-12-21 PROCEDURE — 25000003 PHARM REV CODE 250: Performed by: STUDENT IN AN ORGANIZED HEALTH CARE EDUCATION/TRAINING PROGRAM

## 2023-12-21 PROCEDURE — 63600175 PHARM REV CODE 636 W HCPCS: Performed by: STUDENT IN AN ORGANIZED HEALTH CARE EDUCATION/TRAINING PROGRAM

## 2023-12-21 RX ORDER — CYANOCOBALAMIN 1000 UG/ML
1000 INJECTION, SOLUTION INTRAMUSCULAR; SUBCUTANEOUS
Qty: 1 ML | Refills: 0 | Status: SHIPPED | OUTPATIENT
Start: 2024-01-16 | End: 2024-02-15

## 2023-12-21 RX ADMIN — PRAVASTATIN SODIUM 80 MG: 40 TABLET ORAL at 09:12

## 2023-12-21 RX ADMIN — ENOXAPARIN SODIUM 40 MG: 40 INJECTION SUBCUTANEOUS at 09:12

## 2023-12-21 RX ADMIN — LOSARTAN POTASSIUM 50 MG: 50 TABLET, FILM COATED ORAL at 09:12

## 2023-12-21 NOTE — PLAN OF CARE
Problem: Occupational Therapy  Goal: Occupational Therapy Goal  Description: Goals to be met by: 12/29/23     Patient will increase functional independence with ADLs by performing:    UE Dressing with Set-up Assistance.   LE Dressing with Minimal Assistance.  Toileting from toilet with Contact Guard Assistance for hygiene and clothing management.   Bathing from  shower chair/bench with Minimal Assistance.  Toilet transfer to toilet with Stand-by Assistance.  Increased functional strength to 4/5 for BUEs.    Outcome: Adequate for Care Transition

## 2023-12-21 NOTE — PLAN OF CARE
Problem: Adult Inpatient Plan of Care  Goal: Patient-Specific Goal (Individualized)  Outcome: Ongoing, Progressing  Flowsheets (Taken 12/20/2023 2058)  Anxieties, Fears or Concerns: none expressed  Individualized Care Needs: PT/OT, fall risk, alarms, frequent rounding  Goal: Absence of Hospital-Acquired Illness or Injury  Outcome: Ongoing, Progressing  Intervention: Prevent Skin Injury  Flowsheets (Taken 12/20/2023 2000)  Body Position: position maintained  Goal: Optimal Comfort and Wellbeing  Outcome: Ongoing, Progressing  Goal: Readiness for Transition of Care  Outcome: Ongoing, Progressing  Intervention: Mutually Develop Transition Plan  Flowsheets (Taken 12/20/2023 2058)  Equipment Currently Used at Home: walker, rolling  Transportation Anticipated: family or friend will provide  Communicated HALI with patient/caregiver: Date not available/Unable to determine  Do you expect to return to your current living situation?: Yes  Do you have help at home or someone to help you manage your care at home?: Yes  Readmission within 30 days?: No  Do you currently have service(s) that help you manage your care at home?: No  Is the pt/caregiver preference to resume services with current agency: No     Problem: Fall Injury Risk  Goal: Absence of Fall and Fall-Related Injury  Outcome: Ongoing, Progressing  Intervention: Identify and Manage Contributors  Flowsheets (Taken 12/20/2023 2000)  Self-Care Promotion:   independence encouraged   BADL personal objects within reach   BADL personal routines maintained   safe use of adaptive equipment encouraged

## 2023-12-21 NOTE — PT/OT/SLP DISCHARGE
Occupational Therapy Discharge Summary    Priscila Randhawa  MRN: [id number]   Principal Problem: Acute cystitis without hematuria      Patient Discharged from acute Occupational Therapy on 12/21/23.    Assessment:      Patient appropriate for care in another setting.    Objective:     GOALS:   Multidisciplinary Problems       Occupational Therapy Goals          Problem: Occupational Therapy    Goal Priority Disciplines Outcome Interventions   Occupational Therapy Goal     OT, PT/OT Adequate for Care Transition    Description: Goals to be met by: 12/29/23     Patient will increase functional independence with ADLs by performing:    UE Dressing with Set-up Assistance.   LE Dressing with Minimal Assistance.  Toileting from toilet with Contact Guard Assistance for hygiene and clothing management.   Bathing from  shower chair/bench with Minimal Assistance.  Toilet transfer to toilet with Stand-by Assistance.  Increased functional strength to 4/5 for BUEs.                         Reasons for Discontinuation of Therapy Services  Transfer to alternate level of care.      Plan:     Patient Discharged to: Skilled Nursing Facility    12/21/2023

## 2023-12-21 NOTE — NURSING
Nurses Note -- 4 Eyes      12/13/2023   7:15PM      Skin assessed during: Daily Assessment      [] No Altered Skin Integrity Present    [x]Prevention Measures Documented      [] Yes- Altered Skin Integrity Present or Discovered   [] LDA Added if Not in Epic (Describe Wound)   [] New Altered Skin Integrity was Present on Admit and Documented in LDA   [] Wound Image Taken    Wound Care Consulted? No    Attending Nurse:  sandra Dickey RN/Staff Member:   rahel         
Nurses Note -- 4 Eyes      12/20/2023   9:01 PM      Skin assessed during: Q Shift Change      [x] No Altered Skin Integrity Present    []Prevention Measures Documented      [] Yes- Altered Skin Integrity Present or Discovered   [] LDA Added if Not in Epic (Describe Wound)   [] New Altered Skin Integrity was Present on Admit and Documented in LDA   [] Wound Image Taken    Wound Care Consulted? No    Attending Nurse:  Faye Dickey RN/Staff Member:   Pamela CHAMPAGNE          
Patient off unit via  to [facility]. Belongings sent with radha.    
Report called to Dorothy @ [facility], [phone]. Transportation to arrive at 1300. Patient & niece notified of same.  
0 = swallows foods/liquids without difficulty

## 2023-12-21 NOTE — DISCHARGE SUMMARY
[facility]  HOSPITAL MEDICINE - DISCHARGE SUMMARY    Patient Name: Priscila Randhawa  MRN: [id number]  Admission Date: 12/7/2023  Discharge Date: 12/21/2023  Hospital Length of Stay: 14 days  Discharge Provider: Hoa Zamora MD  Primary Care Provider: Selena Hou MD      HOSPITAL COURSE     89 year-old white female with a history of dementia, hypertension, diabetes who presented to the ER after a fall at home. She lives alone and has her niece at the bedside while she was in the ER however today she is alone. She does have frequent falls. Her PCP did recommend her to be placed in assisted living versus nursing home.  Patient was has been reluctant. Admitted for management of UTI, PT OT for the weakness and possible nursing home placement if patient and family are amenable.     Completed PT/OT. Is at max goal.   DC to NH today.       PHYSICAL EXAM     Most Recent Vital Signs:  Temp: 98.1 °F (36.7 °C) (12/20/23 1931)  Pulse: 63 (12/21/23 0734)  Resp: 18 (12/20/23 1931)  BP: (!) 158/51 (12/21/23 0734)  SpO2: 97 % (12/21/23 0734)   GENERAL: In no acute distress, afebrile  HEENT:  CHEST: Clear to auscultation bilaterally  HEART: S1, S2, no appreciable murmur  ABDOMEN: Soft, nontender, BS +  MSK: Warm, no lower extremity edema, no clubbing or cyanosis  NEUROLOGIC: Alert and oriented x4, moving all extremities with good strength   INTEGUMENTARY:  PSYCHIATRY:          DISCHARGE DIAGNOSIS     Active Hospital Problems    Diagnosis  POA    *Acute cystitis without hematuria [N30.00]  Yes      Resolved Hospital Problems   No resolved problems to display.      E. Coli UTI  -Rocephin 12/4-12/8   -Cultures show sensitivity      Essential HTN  -losartan, well controlled      Left shoulder pain  -xray left shoulder showed no acute abnormality      Weakness and frequent falls  -PT/OT  -possible placement      Allergic dermatitis   -Benadryl x1 on 12/12  - continue to utilize topical Benadryl as  needed     Low normal B12   -we will start supplementation monthly.  Start date 12/17     DVT prophylaxis:  Lovenox      _____________________________________________________________________________      DISCHARGE MED REC     Current Discharge Medication List        START taking these medications    Details   cyanocobalamin 1,000 mcg/mL injection Inject 1 mL (1,000 mcg total) into the muscle every 30 days.  Qty: 1 mL, Refills: 0           CONTINUE these medications which have NOT CHANGED    Details   losartan (COZAAR) 50 MG tablet Take 1 tablet (50 mg total) by mouth once daily.  Qty: 90 tablet, Refills: 3    Comments: .Medication change, also D/C metformin      pravastatin (PRAVACHOL) 80 MG tablet Take 1 tablet (80 mg total) by mouth once daily.  Qty: 90 tablet, Refills: 3                CONSULTS     Consults (From admission, onward)          Status Ordering Provider     Inpatient consult to Social Work/Case Management  Once        Provider:  (Not yet assigned)    Acknowledged REYES, THAIRY G              FOLLOW UP      Follow-up Information       Selena Hou MD Follow up.    Specialty: Internal Medicine  Contact information:  [address] [place] [address] [id number] [phone]                                 DISCHARGE INSTRUCTIONS     Explained in detail to the patient about the discharge plan, medications, and follow-up visits. Pt understands and agrees with the treatment plan.  Discharged Condition: stable  Diet as tolerated  Activities as tolerated  Discharge to: Nursing Facility    TIME SPENT ON DISCHARGE   35 minutes        Hoa Zamora MD  Internal Medicine  Department of [facility] Medicine  [facility]      This document was created using electronic dictation services.  Please excuse any errors that may have been made.  Contact me if any questions regarding documentation to clarify verbiage.

## 2023-12-21 NOTE — PLAN OF CARE
Problem: Adult Inpatient Plan of Care  Goal: Patient-Specific Goal (Individualized)  12/21/2023 1326 by Wendy Gaspar RN  Outcome: Adequate for Care Transition  12/21/2023 0746 by Wendy Gaspar RN  Outcome: Ongoing, Progressing  Flowsheets (Taken 12/21/2023 0746)  Anxieties, Fears or Concerns: dneies any concerns at this time  Individualized Care Needs: PT/OT  12/21/2023 0744 by Wendy Gaspar RN  Outcome: Ongoing, Progressing  Goal: Absence of Hospital-Acquired Illness or Injury  12/21/2023 1326 by Wendy Gaspar RN  Outcome: Adequate for Care Transition  12/21/2023 0746 by Wendy Gaspar RN  Outcome: Ongoing, Progressing  12/21/2023 0744 by Wendy Gaspar RN  Outcome: Ongoing, Progressing  Intervention: Identify and Manage Fall Risk  Flowsheets (Taken 12/21/2023 0746)  Safety Promotion/Fall Prevention:   assistive device/personal item within reach   instructed to call staff for mobility  Intervention: Prevent Skin Injury  Flowsheets (Taken 12/21/2023 0746)  Body Position: position changed independently  Skin Protection:   adhesive use limited   tubing/devices free from skin contact  Intervention: Prevent and Manage VTE (Venous Thromboembolism) Risk  Flowsheets (Taken 12/21/2023 0746)  VTE Prevention/Management:   remove, assess skin, and reapply sequential compression device   ROM (passive) performed  Intervention: Prevent Infection  Flowsheets (Taken 12/21/2023 0746)  Infection Prevention:   cohorting utilized   personal protective equipment utilized   environmental surveillance performed   rest/sleep promoted   equipment surfaces disinfected   single patient room provided   hand hygiene promoted  Goal: Optimal Comfort and Wellbeing  12/21/2023 1326 by Wendy Gaspar RN  Outcome: Adequate for Care Transition  12/21/2023 0746 by Wendy Gaspar RN  Outcome: Ongoing, Progressing  12/21/2023 0744 by Wendy Gaspar RN  Outcome: Ongoing, Progressing  Intervention: Monitor Pain and Promote  Comfort  Flowsheets (Taken 12/21/2023 0746)  Pain Management Interventions: care clustered  Intervention: Provide Person-Centered Care  Flowsheets (Taken 12/21/2023 0746)  Trust Relationship/Rapport:   care explained   questions encouraged   choices provided   reassurance provided   emotional support provided   thoughts/feelings acknowledged   empathic listening provided   questions answered  Goal: Readiness for Transition of Care  12/21/2023 1326 by Wendy Gaspar RN  Outcome: Adequate for Care Transition  12/21/2023 0746 by Wendy Gaspar RN  Outcome: Ongoing, Progressing  12/21/2023 0744 by Wendy Gaspar RN  Outcome: Ongoing, Progressing     Problem: Diabetes Comorbidity  Goal: Blood Glucose Level Within Targeted Range  12/21/2023 1326 by Wendy Gaspar RN  Outcome: Adequate for Care Transition  12/21/2023 0746 by Wendy Gaspar RN  Outcome: Ongoing, Progressing  12/21/2023 0744 by Wendy Gaspar RN  Outcome: Ongoing, Progressing     Problem: Fall Injury Risk  Goal: Absence of Fall and Fall-Related Injury  12/21/2023 1326 by Wendy Gaspar RN  Outcome: Adequate for Care Transition  12/21/2023 0746 by Wendy Gaspar RN  Outcome: Ongoing, Progressing  12/21/2023 0744 by Wendy Gaspar RN  Outcome: Ongoing, Progressing  Intervention: Identify and Manage Contributors  Flowsheets (Taken 12/21/2023 0746)  Self-Care Promotion: independence encouraged  Medication Review/Management: medications reviewed  Intervention: Promote Injury-Free Environment  Flowsheets (Taken 12/21/2023 0746)  Safety Promotion/Fall Prevention:   assistive device/personal item within reach   instructed to call staff for mobility

## 2023-12-21 NOTE — PLAN OF CARE
Problem: Adult Inpatient Plan of Care  Goal: Patient-Specific Goal (Individualized)  12/21/2023 0746 by Wendy Gaspar RN  Outcome: Ongoing, Progressing  Flowsheets (Taken 12/21/2023 0746)  Anxieties, Fears or Concerns: dneies any concerns at this time  Individualized Care Needs: PT/OT  12/21/2023 0744 by Wendy Gaspar RN  Outcome: Ongoing, Progressing  Goal: Absence of Hospital-Acquired Illness or Injury  12/21/2023 0746 by Wendy Gaspar RN  Outcome: Ongoing, Progressing  12/21/2023 0744 by Wendy Gaspar RN  Outcome: Ongoing, Progressing  Intervention: Identify and Manage Fall Risk  Flowsheets (Taken 12/21/2023 0746)  Safety Promotion/Fall Prevention:   assistive device/personal item within reach   instructed to call staff for mobility  Intervention: Prevent Skin Injury  Flowsheets (Taken 12/21/2023 0746)  Body Position: position changed independently  Skin Protection:   adhesive use limited   tubing/devices free from skin contact  Intervention: Prevent and Manage VTE (Venous Thromboembolism) Risk  Flowsheets (Taken 12/21/2023 0746)  VTE Prevention/Management:   remove, assess skin, and reapply sequential compression device   ROM (passive) performed  Intervention: Prevent Infection  Flowsheets (Taken 12/21/2023 0746)  Infection Prevention:   cohorting utilized   personal protective equipment utilized   environmental surveillance performed   rest/sleep promoted   equipment surfaces disinfected   single patient room provided   hand hygiene promoted  Goal: Optimal Comfort and Wellbeing  12/21/2023 0746 by Wendy Gaspar RN  Outcome: Ongoing, Progressing  12/21/2023 0744 by Wendy Gaspar RN  Outcome: Ongoing, Progressing  Intervention: Monitor Pain and Promote Comfort  Flowsheets (Taken 12/21/2023 0746)  Pain Management Interventions: care clustered  Intervention: Provide Person-Centered Care  Flowsheets (Taken 12/21/2023 0746)  Trust Relationship/Rapport:   care explained   questions encouraged    choices provided   reassurance provided   emotional support provided   thoughts/feelings acknowledged   empathic listening provided   questions answered  Goal: Readiness for Transition of Care  12/21/2023 0746 by Wendy Gaspar RN  Outcome: Ongoing, Progressing  12/21/2023 0744 by Wendy Gaspar RN  Outcome: Ongoing, Progressing     Problem: Diabetes Comorbidity  Goal: Blood Glucose Level Within Targeted Range  12/21/2023 0746 by Wendy Gaspar RN  Outcome: Ongoing, Progressing  12/21/2023 0744 by Wendy Gaspar RN  Outcome: Ongoing, Progressing     Problem: Fall Injury Risk  Goal: Absence of Fall and Fall-Related Injury  12/21/2023 0746 by Wendy Gaspar RN  Outcome: Ongoing, Progressing  12/21/2023 0744 by Wendy Gaspar RN  Outcome: Ongoing, Progressing  Intervention: Identify and Manage Contributors  Flowsheets (Taken 12/21/2023 0746)  Self-Care Promotion: independence encouraged  Medication Review/Management: medications reviewed  Intervention: Promote Injury-Free Environment  Flowsheets (Taken 12/21/2023 0746)  Safety Promotion/Fall Prevention:   assistive device/personal item within reach   instructed to call staff for mobility

## 2023-12-21 NOTE — DISCHARGE INSTRUCTIONS
Please follow all discharge instructions as given. KEEP ALL FOLLOW UP APPOINTMENTS!           If you experience any worsening or NEW signs or symptoms please call your primary care provider or head to your nearest emergency department.               THANK YOU FOR CHOOSING [facility].  If you have any questions please call [phone]

## 2023-12-21 NOTE — PLAN OF CARE
Faxed AVS and DC Med list to [facility] talked with Lanette she stated they will  patient at 1 pm today

## 2023-12-22 NOTE — PLAN OF CARE
Weekly Staffing Report      Date Admitted: 12/7/2023 :   Staffing Date: 12/22/2023     Patient Active Problem List   Diagnosis    Well controlled type 2 diabetes mellitus    Benign polycythemia    Generalized osteoarthritis    Essential hypertension    Mixed hyperlipidemia    Nonproliferative diabetic retinopathy    Polycythemia vera    Acute cystitis without hematuria          Team Members Present:       Nursing Present     Yes [x]    No []    Physical Therapy Present    Yes [x]    No []    Occupational Therapy Present     Yes [x]    No []    Speech Therapy Present    Yes []    No []    NA []    Dietary Present    Yes [x]    No []        Physician Present     [x] Thairy Reyes    [] Hoa Zamora    [x] TIARA Ruiz    []       Family Present    [] Adult Children    [] Spouse    [] POA    [] Friend/ Caregiver    [x] Other nephew present       Interdisciplinary Meeting Notes:  Pt- doing great walking 335ft with RW SBA. Min A bed mobility. OT- doing well. Eating/grooming set up. Bathing Mod A. Upper body min A lower body mod a toileting min A. Appetite good medically- no acute issues. Working on nursing home placement. All questions answered at this time.                 Please see Documented PT/OT/ST, Dietary, Nursing, and Physician notes for detailed treatment information.                    rolling walker

## 2023-12-22 NOTE — TELEPHONE ENCOUNTER
Check with niece, the hospital is trying to schedule here but if she's admitted to the nursing home they wanted then she is out of town, [place] I think. The doctor at the nursing home will be following her medical conditions.

## 2023-12-22 NOTE — PT/OT/SLP DISCHARGE
Physical Therapy Discharge Summary    Name: Priscila Randhawa  MRN: [id number]   Principal Problem: Acute cystitis without hematuria     Patient Discharged from acute Physical Therapy on .  Please refer to prior PT noted date on  for functional status.     Assessment:     Patient appropriate for care in another setting.    Objective:     GOALS:   Multidisciplinary Problems       Physical Therapy Goals          Problem: Physical Therapy    Goal Priority Disciplines Outcome Goal Variances Interventions   Physical Therapy Goal     PT, PT/OT Ongoing, Not Progressing     Description: Description: Goals to be met by: Discharge      Patient will increase functional independence with mobility by performin. Sit to stand transfer with Modified Maries  2. Bed to chair transfer with Modified Maries using Single-point Cane   3. Gait  x 50 feet with Modified Maries using Single-point Cane .                          Reasons for Discontinuation of Therapy Services  Transfer to alternate level of care.      Plan:     Patient Discharged to: Skilled Nursing Facility.      2023

## 2023-12-27 ENCOUNTER — LAB REQUISITION (OUTPATIENT)
Dept: LAB | Facility: HOSPITAL | Age: 88
End: 2023-12-27
Payer: MEDICARE

## 2023-12-27 DIAGNOSIS — D64.9 ANEMIA, UNSPECIFIED: ICD-10-CM

## 2023-12-27 DIAGNOSIS — E03.9 HYPOTHYROIDISM, UNSPECIFIED: ICD-10-CM

## 2023-12-27 DIAGNOSIS — E11.9 TYPE 2 DIABETES MELLITUS WITHOUT COMPLICATIONS: ICD-10-CM

## 2023-12-28 LAB
ALBUMIN SERPL-MCNC: 3.2 G/DL (ref 3.4–5)
ALBUMIN/GLOB SERPL: 1.2 RATIO
ALP SERPL-CCNC: 81 UNIT/L (ref 50–144)
ALT SERPL-CCNC: 32 UNIT/L (ref 1–45)
ANION GAP SERPL CALC-SCNC: 3 MEQ/L (ref 2–13)
AST SERPL-CCNC: 33 UNIT/L (ref 14–36)
BILIRUB SERPL-MCNC: 0.4 MG/DL (ref 0–1)
BUN SERPL-MCNC: 22 MG/DL (ref 7–20)
CALCIUM SERPL-MCNC: 9.1 MG/DL (ref 8.4–10.2)
CHLORIDE SERPL-SCNC: 108 MMOL/L (ref 98–110)
CHOLEST SERPL-MCNC: 161 MG/DL (ref 0–200)
CO2 SERPL-SCNC: 26 MMOL/L (ref 21–32)
CREAT SERPL-MCNC: 0.63 MG/DL (ref 0.66–1.25)
CREAT/UREA NIT SERPL: 35 (ref 12–20)
ERYTHROCYTE [DISTWIDTH] IN BLOOD BY AUTOMATED COUNT: 13.4 % (ref 11–14.5)
GFR SERPLBLD CREATININE-BSD FMLA CKD-EPI: 84 MLS/MIN/1.73/M2
GLOBULIN SER-MCNC: 2.6 GM/DL (ref 2–3.9)
GLUCOSE SERPL-MCNC: 91 MG/DL (ref 70–115)
HCT VFR BLD AUTO: 40.4 % (ref 36–48)
HDLC SERPL-MCNC: 74 MG/DL (ref 40–60)
HGB BLD-MCNC: 13 G/DL (ref 11.8–16)
LDLC SERPL DIRECT ASSAY-SCNC: 79.1 MG/DL (ref 30–100)
MCH RBC QN AUTO: 31.6 PG (ref 27–34)
MCHC RBC AUTO-ENTMCNC: 32.2 G/DL (ref 31–37)
MCV RBC AUTO: 98.1 FL (ref 79–99)
NRBC BLD AUTO-RTO: 0 %
PLATELET # BLD AUTO: 209 X10(3)/MCL (ref 140–371)
PMV BLD AUTO: 10.2 FL (ref 9.4–12.4)
POTASSIUM SERPL-SCNC: 4.8 MMOL/L (ref 3.5–5.1)
PROT SERPL-MCNC: 5.8 GM/DL (ref 6.3–8.2)
RBC # BLD AUTO: 4.12 X10(6)/MCL (ref 4–5.1)
SODIUM SERPL-SCNC: 137 MMOL/L (ref 135–145)
T4 SERPL-MCNC: 7.83 UG/DL (ref 5.53–11)
TRIGL SERPL-MCNC: 52 MG/DL (ref 30–200)
TSH SERPL-ACNC: 2.39 UIU/ML (ref 0.36–3.74)
WBC # SPEC AUTO: 9.48 X10(3)/MCL (ref 4–11.5)

## 2023-12-28 PROCEDURE — 81003 URINALYSIS AUTO W/O SCOPE: CPT | Performed by: INTERNAL MEDICINE

## 2023-12-28 PROCEDURE — 80061 LIPID PANEL: CPT | Performed by: INTERNAL MEDICINE

## 2023-12-28 PROCEDURE — 84443 ASSAY THYROID STIM HORMONE: CPT | Performed by: INTERNAL MEDICINE

## 2023-12-28 PROCEDURE — 87086 URINE CULTURE/COLONY COUNT: CPT | Performed by: INTERNAL MEDICINE

## 2023-12-28 PROCEDURE — 85027 COMPLETE CBC AUTOMATED: CPT | Performed by: INTERNAL MEDICINE

## 2023-12-28 PROCEDURE — 84436 ASSAY OF TOTAL THYROXINE: CPT | Performed by: INTERNAL MEDICINE

## 2023-12-28 PROCEDURE — 80053 COMPREHEN METABOLIC PANEL: CPT | Performed by: INTERNAL MEDICINE

## 2023-12-29 LAB
APPEARANCE UR: CLEAR
BILIRUB UR QL STRIP.AUTO: NEGATIVE
COLOR UR AUTO: YELLOW
GLUCOSE UR QL STRIP.AUTO: NEGATIVE
KETONES UR QL STRIP.AUTO: NEGATIVE
LEUKOCYTE ESTERASE UR QL STRIP.AUTO: NEGATIVE
NITRITE UR QL STRIP.AUTO: NEGATIVE
PH UR STRIP.AUTO: 6 [PH]
PROT UR QL STRIP.AUTO: NEGATIVE
RBC UR QL AUTO: NEGATIVE
SP GR UR STRIP.AUTO: 1.02 (ref 1–1.03)
UROBILINOGEN UR STRIP-ACNC: 0.2

## 2023-12-31 LAB — BACTERIA UR CULT: NORMAL

## 2024-01-16 NOTE — PLAN OF CARE
[facility]  Discharge Final Note    Primary Care Provider: Selena Hou MD    Expected Discharge Date: 12/7/2023    Final Discharge Note (most recent)       Final Note - 01/15/24 1809          Final Note    Anticipated Discharge Disposition Swing Bed     What phone number can be called within the next 1-3 days to see how you are doing after discharge? [phone]     Hospital Resources/Appts/Education Provided Provided patient/caregiver with written discharge plan information        Post-Acute Status    Post-Acute Authorization Placement     Post-Acute Placement Status Set-up Complete/Auth obtained     Discharge Delays None known at this time                     Important Message from Medicare

## 2024-01-28 ENCOUNTER — LAB REQUISITION (OUTPATIENT)
Dept: LAB | Facility: HOSPITAL | Age: 89
End: 2024-01-28
Attending: INTERNAL MEDICINE
Payer: MEDICARE

## 2024-01-28 DIAGNOSIS — N39.0 URINARY TRACT INFECTION, SITE NOT SPECIFIED: ICD-10-CM

## 2024-01-28 LAB
APPEARANCE UR: CLEAR
BILIRUB UR QL STRIP.AUTO: NEGATIVE
COLOR UR AUTO: YELLOW
GLUCOSE UR QL STRIP.AUTO: NEGATIVE
KETONES UR QL STRIP.AUTO: NEGATIVE
LEUKOCYTE ESTERASE UR QL STRIP.AUTO: NEGATIVE
NITRITE UR QL STRIP.AUTO: NEGATIVE
PH UR STRIP.AUTO: 7 [PH]
PROT UR QL STRIP.AUTO: NEGATIVE
RBC UR QL AUTO: NEGATIVE
SP GR UR STRIP.AUTO: 1.01 (ref 1–1.03)
UROBILINOGEN UR STRIP-ACNC: 0.2

## 2024-01-28 PROCEDURE — 87086 URINE CULTURE/COLONY COUNT: CPT | Performed by: INTERNAL MEDICINE

## 2024-01-28 PROCEDURE — 81003 URINALYSIS AUTO W/O SCOPE: CPT | Performed by: INTERNAL MEDICINE

## 2024-01-31 LAB — BACTERIA UR CULT: NORMAL

## 2024-02-04 NOTE — PLAN OF CARE
[facility]  Discharge Final Note    Primary Care Provider: Delbert Bhandari MD    Expected Discharge Date: 12/21/2023    Final Discharge Note (most recent)       Final Note - 02/04/24 1413          Final Note    Assessment Type Final Discharge Note     Anticipated Discharge Disposition Intermediate Care Facility for [facility] or Supportive Care     What phone number can be called within the next 1-3 days to see how you are doing after discharge? [phone]     Hospital Resources/Appts/Education Provided Provided patient/caregiver with written discharge plan information        Post-Acute Status    Post-Acute Authorization Placement     Post-Acute Placement Status Set-up Complete/Auth obtained     Discharge Delays None known at this time                     Important Message from Medicare             Contact Info       Selena Hou MD   Specialty: Internal Medicine    [address] [address]   Phone: [phone]       Next Steps: Go on 12/22/2023    Instructions: 12/22 @ 3:20p.m.    Spoke to Amelia FONG [facility]   Specialty: Skilled Nursing Facility    [address] [place] [id number]   Phone: [phone]       Next Steps: Follow up

## 2024-02-14 ENCOUNTER — LAB REQUISITION (OUTPATIENT)
Dept: LAB | Facility: HOSPITAL | Age: 89
End: 2024-02-14
Attending: INTERNAL MEDICINE
Payer: MEDICARE

## 2024-02-14 DIAGNOSIS — D64.9 ANEMIA, UNSPECIFIED: ICD-10-CM

## 2024-02-14 DIAGNOSIS — E11.9 TYPE 2 DIABETES MELLITUS WITHOUT COMPLICATIONS: ICD-10-CM

## 2024-02-15 LAB
ALBUMIN SERPL-MCNC: 3 G/DL (ref 3.4–5)
ALBUMIN/GLOB SERPL: 1.1 RATIO
ALP SERPL-CCNC: 66 UNIT/L (ref 50–144)
ALT SERPL-CCNC: 14 UNIT/L (ref 1–45)
ANION GAP SERPL CALC-SCNC: 2 MEQ/L (ref 2–13)
AST SERPL-CCNC: 23 UNIT/L (ref 14–36)
BILIRUB SERPL-MCNC: 0.6 MG/DL (ref 0–1)
BUN SERPL-MCNC: 17 MG/DL (ref 7–20)
CALCIUM SERPL-MCNC: 8.6 MG/DL (ref 8.4–10.2)
CHLORIDE SERPL-SCNC: 105 MMOL/L (ref 98–110)
CO2 SERPL-SCNC: 32 MMOL/L (ref 21–32)
CREAT SERPL-MCNC: 0.51 MG/DL (ref 0.66–1.25)
CREAT/UREA NIT SERPL: 33 (ref 12–20)
ERYTHROCYTE [DISTWIDTH] IN BLOOD BY AUTOMATED COUNT: 12.9 % (ref 11–14.5)
GFR SERPLBLD CREATININE-BSD FMLA CKD-EPI: 89 MLS/MIN/1.73/M2
GLOBULIN SER-MCNC: 2.8 GM/DL (ref 2–3.9)
GLUCOSE SERPL-MCNC: 90 MG/DL (ref 70–115)
HCT VFR BLD AUTO: 36.7 % (ref 36–48)
HGB BLD-MCNC: 11.9 G/DL (ref 11.8–16)
MCH RBC QN AUTO: 31.2 PG (ref 27–34)
MCHC RBC AUTO-ENTMCNC: 32.4 G/DL (ref 31–37)
MCV RBC AUTO: 96.3 FL (ref 79–99)
NRBC BLD AUTO-RTO: 0 %
PLATELET # BLD AUTO: 209 X10(3)/MCL (ref 140–371)
PMV BLD AUTO: 9.9 FL (ref 9.4–12.4)
POTASSIUM SERPL-SCNC: 4.9 MMOL/L (ref 3.5–5.1)
PROT SERPL-MCNC: 5.8 GM/DL (ref 6.3–8.2)
RBC # BLD AUTO: 3.81 X10(6)/MCL (ref 4–5.1)
SODIUM SERPL-SCNC: 139 MMOL/L (ref 135–145)
WBC # SPEC AUTO: 9.66 X10(3)/MCL (ref 4–11.5)

## 2024-02-15 PROCEDURE — 80053 COMPREHEN METABOLIC PANEL: CPT | Performed by: INTERNAL MEDICINE

## 2024-02-15 PROCEDURE — 85027 COMPLETE CBC AUTOMATED: CPT | Performed by: INTERNAL MEDICINE

## 2024-02-22 ENCOUNTER — HOSPITAL ENCOUNTER (INPATIENT)
Facility: HOSPITAL | Age: 89
LOS: 2 days | Discharge: NURSING, REHAB OR LONG-TERM CARE | DRG: 280 | End: 2024-02-24
Attending: EMERGENCY MEDICINE | Admitting: INTERNAL MEDICINE
Payer: MEDICARE

## 2024-02-22 DIAGNOSIS — R06.00 DYSPNEA: ICD-10-CM

## 2024-02-22 DIAGNOSIS — I50.9 CHF (CONGESTIVE HEART FAILURE): ICD-10-CM

## 2024-02-22 DIAGNOSIS — I50.9 CONGESTIVE HEART FAILURE, UNSPECIFIED HF CHRONICITY, UNSPECIFIED HEART FAILURE TYPE: Primary | ICD-10-CM

## 2024-02-22 DIAGNOSIS — M79.89 LEG SWELLING: ICD-10-CM

## 2024-02-22 LAB
ALBUMIN SERPL-MCNC: 3.8 G/DL (ref 3.4–5)
ALBUMIN/GLOB SERPL: 1.2 RATIO
ALP SERPL-CCNC: 86 UNIT/L (ref 50–144)
ALT SERPL-CCNC: 17 UNIT/L (ref 1–45)
ANION GAP SERPL CALC-SCNC: 6 MEQ/L (ref 2–13)
APTT PPP: 27.2 SECONDS (ref 23–29.4)
AST SERPL-CCNC: 29 UNIT/L (ref 14–36)
BASOPHILS # BLD AUTO: 0.04 X10(3)/MCL (ref 0.01–0.08)
BASOPHILS # BLD AUTO: 0.04 X10(3)/MCL (ref 0.01–0.08)
BASOPHILS NFR BLD AUTO: 0.5 % (ref 0.1–1.2)
BASOPHILS NFR BLD AUTO: 0.6 % (ref 0.1–1.2)
BILIRUB SERPL-MCNC: 0.5 MG/DL (ref 0–1)
BNP BLD-MCNC: 3550 PG/ML (ref 0–124.9)
BUN SERPL-MCNC: 17 MG/DL (ref 7–20)
CALCIUM SERPL-MCNC: 9.1 MG/DL (ref 8.4–10.2)
CHLORIDE SERPL-SCNC: 99 MMOL/L (ref 98–110)
CO2 SERPL-SCNC: 29 MMOL/L (ref 21–32)
CREAT SERPL-MCNC: 0.72 MG/DL (ref 0.66–1.25)
CREAT/UREA NIT SERPL: 24 (ref 12–20)
EOSINOPHIL # BLD AUTO: 0.06 X10(3)/MCL (ref 0.04–0.36)
EOSINOPHIL # BLD AUTO: 0.2 X10(3)/MCL (ref 0.04–0.36)
EOSINOPHIL NFR BLD AUTO: 0.7 % (ref 0.7–7)
EOSINOPHIL NFR BLD AUTO: 2.8 % (ref 0.7–7)
ERYTHROCYTE [DISTWIDTH] IN BLOOD BY AUTOMATED COUNT: 12.8 % (ref 11–14.5)
ERYTHROCYTE [DISTWIDTH] IN BLOOD BY AUTOMATED COUNT: 12.8 % (ref 11–14.5)
GFR SERPLBLD CREATININE-BSD FMLA CKD-EPI: 80 MLS/MIN/1.73/M2
GLOBULIN SER-MCNC: 3.3 GM/DL (ref 2–3.9)
GLUCOSE SERPL-MCNC: 115 MG/DL (ref 70–115)
HCT VFR BLD AUTO: 38.1 % (ref 36–48)
HCT VFR BLD AUTO: 38.7 % (ref 36–48)
HGB BLD-MCNC: 13 G/DL (ref 11.8–16)
HGB BLD-MCNC: 13.1 G/DL (ref 11.8–16)
IMM GRANULOCYTES # BLD AUTO: 0.04 X10(3)/MCL (ref 0–0.03)
IMM GRANULOCYTES # BLD AUTO: 0.05 X10(3)/MCL (ref 0–0.03)
IMM GRANULOCYTES NFR BLD AUTO: 0.6 % (ref 0–0.5)
IMM GRANULOCYTES NFR BLD AUTO: 0.6 % (ref 0–0.5)
INR PPP: 1
LYMPHOCYTES # BLD AUTO: 0.48 X10(3)/MCL (ref 1.16–3.74)
LYMPHOCYTES # BLD AUTO: 0.64 X10(3)/MCL (ref 1.16–3.74)
LYMPHOCYTES NFR BLD AUTO: 5.5 % (ref 20–55)
LYMPHOCYTES NFR BLD AUTO: 9 % (ref 20–55)
MCH RBC QN AUTO: 31.3 PG (ref 27–34)
MCH RBC QN AUTO: 31.9 PG (ref 27–34)
MCHC RBC AUTO-ENTMCNC: 33.9 G/DL (ref 31–37)
MCHC RBC AUTO-ENTMCNC: 34.1 G/DL (ref 31–37)
MCV RBC AUTO: 92.4 FL (ref 79–99)
MCV RBC AUTO: 93.4 FL (ref 79–99)
MONOCYTES # BLD AUTO: 0.26 X10(3)/MCL (ref 0.24–0.36)
MONOCYTES # BLD AUTO: 0.84 X10(3)/MCL (ref 0.24–0.36)
MONOCYTES NFR BLD AUTO: 11.8 % (ref 4.7–12.5)
MONOCYTES NFR BLD AUTO: 3 % (ref 4.7–12.5)
NEUTROPHILS # BLD AUTO: 5.37 X10(3)/MCL (ref 1.56–6.13)
NEUTROPHILS # BLD AUTO: 7.83 X10(3)/MCL (ref 1.56–6.13)
NEUTROPHILS NFR BLD AUTO: 75.2 % (ref 37–73)
NEUTROPHILS NFR BLD AUTO: 89.7 % (ref 37–73)
NRBC BLD AUTO-RTO: 0 %
NRBC BLD AUTO-RTO: 0 %
PLATELET # BLD AUTO: 241 X10(3)/MCL (ref 140–371)
PLATELET # BLD AUTO: 241 X10(3)/MCL (ref 140–371)
PMV BLD AUTO: 9.3 FL (ref 9.4–12.4)
PMV BLD AUTO: 9.3 FL (ref 9.4–12.4)
POTASSIUM SERPL-SCNC: 4.2 MMOL/L (ref 3.5–5.1)
PROT SERPL-MCNC: 7.1 GM/DL (ref 6.3–8.2)
PROTHROMBIN TIME: 10.5 SECONDS (ref 9.3–11.9)
RBC # BLD AUTO: 4.08 X10(6)/MCL (ref 4–5.1)
RBC # BLD AUTO: 4.19 X10(6)/MCL (ref 4–5.1)
SARS-COV-2 RDRP RESP QL NAA+PROBE: NEGATIVE
SODIUM SERPL-SCNC: 134 MMOL/L (ref 135–145)
TROPONIN I SERPL-MCNC: 0.08 NG/ML (ref 0–0.03)
TROPONIN I SERPL-MCNC: 0.13 NG/ML (ref 0–0.03)
WBC # SPEC AUTO: 7.13 X10(3)/MCL (ref 4–11.5)
WBC # SPEC AUTO: 8.72 X10(3)/MCL (ref 4–11.5)

## 2024-02-22 PROCEDURE — 11000001 HC ACUTE MED/SURG PRIVATE ROOM

## 2024-02-22 PROCEDURE — 83880 ASSAY OF NATRIURETIC PEPTIDE: CPT | Performed by: EMERGENCY MEDICINE

## 2024-02-22 PROCEDURE — 94760 N-INVAS EAR/PLS OXIMETRY 1: CPT

## 2024-02-22 PROCEDURE — 96375 TX/PRO/DX INJ NEW DRUG ADDON: CPT

## 2024-02-22 PROCEDURE — 63600175 PHARM REV CODE 636 W HCPCS: Performed by: EMERGENCY MEDICINE

## 2024-02-22 PROCEDURE — 84484 ASSAY OF TROPONIN QUANT: CPT | Performed by: EMERGENCY MEDICINE

## 2024-02-22 PROCEDURE — 87635 SARS-COV-2 COVID-19 AMP PRB: CPT | Performed by: EMERGENCY MEDICINE

## 2024-02-22 PROCEDURE — 96374 THER/PROPH/DIAG INJ IV PUSH: CPT

## 2024-02-22 PROCEDURE — 63600175 PHARM REV CODE 636 W HCPCS: Performed by: FAMILY MEDICINE

## 2024-02-22 PROCEDURE — 93005 ELECTROCARDIOGRAM TRACING: CPT

## 2024-02-22 PROCEDURE — 85730 THROMBOPLASTIN TIME PARTIAL: CPT | Performed by: FAMILY MEDICINE

## 2024-02-22 PROCEDURE — 93010 ELECTROCARDIOGRAM REPORT: CPT | Mod: ,,, | Performed by: INTERNAL MEDICINE

## 2024-02-22 PROCEDURE — 25000242 PHARM REV CODE 250 ALT 637 W/ HCPCS: Performed by: FAMILY MEDICINE

## 2024-02-22 PROCEDURE — 27000221 HC OXYGEN, UP TO 24 HOURS

## 2024-02-22 PROCEDURE — 80053 COMPREHEN METABOLIC PANEL: CPT | Performed by: EMERGENCY MEDICINE

## 2024-02-22 PROCEDURE — 94640 AIRWAY INHALATION TREATMENT: CPT

## 2024-02-22 PROCEDURE — 85025 COMPLETE CBC W/AUTO DIFF WBC: CPT | Performed by: FAMILY MEDICINE

## 2024-02-22 PROCEDURE — 99285 EMERGENCY DEPT VISIT HI MDM: CPT | Mod: 25

## 2024-02-22 PROCEDURE — 85610 PROTHROMBIN TIME: CPT | Performed by: FAMILY MEDICINE

## 2024-02-22 PROCEDURE — 25000003 PHARM REV CODE 250: Performed by: FAMILY MEDICINE

## 2024-02-22 PROCEDURE — 84484 ASSAY OF TROPONIN QUANT: CPT | Performed by: FAMILY MEDICINE

## 2024-02-22 PROCEDURE — 85025 COMPLETE CBC W/AUTO DIFF WBC: CPT | Performed by: EMERGENCY MEDICINE

## 2024-02-22 PROCEDURE — 21400001 HC TELEMETRY ROOM

## 2024-02-22 RX ORDER — IPRATROPIUM BROMIDE AND ALBUTEROL SULFATE 2.5; .5 MG/3ML; MG/3ML
3 SOLUTION RESPIRATORY (INHALATION) EVERY 6 HOURS PRN
Status: DISCONTINUED | OUTPATIENT
Start: 2024-02-22 | End: 2024-02-24 | Stop reason: HOSPADM

## 2024-02-22 RX ORDER — PRAVASTATIN SODIUM 20 MG/1
80 TABLET ORAL DAILY
Status: DISCONTINUED | OUTPATIENT
Start: 2024-02-23 | End: 2024-02-24 | Stop reason: HOSPADM

## 2024-02-22 RX ORDER — INSULIN ASPART 100 [IU]/ML
0-5 INJECTION, SOLUTION INTRAVENOUS; SUBCUTANEOUS
Status: DISCONTINUED | OUTPATIENT
Start: 2024-02-22 | End: 2024-02-24 | Stop reason: HOSPADM

## 2024-02-22 RX ORDER — ASPIRIN 325 MG
325 TABLET ORAL
Status: COMPLETED | OUTPATIENT
Start: 2024-02-22 | End: 2024-02-22

## 2024-02-22 RX ORDER — METOPROLOL TARTRATE 25 MG/1
25 TABLET, FILM COATED ORAL 2 TIMES DAILY
Status: DISCONTINUED | OUTPATIENT
Start: 2024-02-22 | End: 2024-02-22

## 2024-02-22 RX ORDER — HYDRALAZINE HYDROCHLORIDE 20 MG/ML
10 INJECTION INTRAMUSCULAR; INTRAVENOUS
Status: COMPLETED | OUTPATIENT
Start: 2024-02-22 | End: 2024-02-22

## 2024-02-22 RX ORDER — IPRATROPIUM BROMIDE AND ALBUTEROL SULFATE 2.5; .5 MG/3ML; MG/3ML
3 SOLUTION RESPIRATORY (INHALATION) ONCE
Status: COMPLETED | OUTPATIENT
Start: 2024-02-22 | End: 2024-02-22

## 2024-02-22 RX ORDER — GLUCAGON 1 MG
1 KIT INJECTION
Status: DISCONTINUED | OUTPATIENT
Start: 2024-02-22 | End: 2024-02-24 | Stop reason: HOSPADM

## 2024-02-22 RX ORDER — ALBUTEROL SULFATE 90 UG/1
2 AEROSOL, METERED RESPIRATORY (INHALATION) EVERY 20 MIN
Status: DISCONTINUED | OUTPATIENT
Start: 2024-02-22 | End: 2024-02-22

## 2024-02-22 RX ORDER — NAPROXEN SODIUM 220 MG/1
81 TABLET, FILM COATED ORAL DAILY
Status: DISCONTINUED | OUTPATIENT
Start: 2024-02-23 | End: 2024-02-24 | Stop reason: HOSPADM

## 2024-02-22 RX ORDER — ALBUTEROL SULFATE 5 MG/ML
2.5 SOLUTION RESPIRATORY (INHALATION) EVERY 4 HOURS PRN
COMMUNITY

## 2024-02-22 RX ORDER — IBUPROFEN 200 MG
24 TABLET ORAL
Status: DISCONTINUED | OUTPATIENT
Start: 2024-02-22 | End: 2024-02-24 | Stop reason: HOSPADM

## 2024-02-22 RX ORDER — FUROSEMIDE 10 MG/ML
40 INJECTION INTRAMUSCULAR; INTRAVENOUS EVERY 12 HOURS
Status: DISCONTINUED | OUTPATIENT
Start: 2024-02-22 | End: 2024-02-24 | Stop reason: HOSPADM

## 2024-02-22 RX ORDER — ACETAMINOPHEN 325 MG/1
650 TABLET ORAL EVERY 6 HOURS PRN
Status: DISCONTINUED | OUTPATIENT
Start: 2024-02-22 | End: 2024-02-24 | Stop reason: HOSPADM

## 2024-02-22 RX ORDER — IPRATROPIUM BROMIDE AND ALBUTEROL SULFATE 2.5; .5 MG/3ML; MG/3ML
SOLUTION RESPIRATORY (INHALATION)
Status: COMPLETED
Start: 2024-02-22 | End: 2024-02-23

## 2024-02-22 RX ORDER — GUAIFENESIN 100 MG/5ML
200 SOLUTION ORAL EVERY 4 HOURS PRN
Status: DISCONTINUED | OUTPATIENT
Start: 2024-02-22 | End: 2024-02-24 | Stop reason: HOSPADM

## 2024-02-22 RX ORDER — IBUPROFEN 200 MG
16 TABLET ORAL
Status: DISCONTINUED | OUTPATIENT
Start: 2024-02-22 | End: 2024-02-24 | Stop reason: HOSPADM

## 2024-02-22 RX ORDER — ALBUTEROL SULFATE 90 UG/1
2 AEROSOL, METERED RESPIRATORY (INHALATION) 4 TIMES DAILY
COMMUNITY

## 2024-02-22 RX ORDER — ONDANSETRON HYDROCHLORIDE 2 MG/ML
4 INJECTION, SOLUTION INTRAVENOUS EVERY 6 HOURS PRN
Status: DISCONTINUED | OUTPATIENT
Start: 2024-02-22 | End: 2024-02-24 | Stop reason: HOSPADM

## 2024-02-22 RX ORDER — FUROSEMIDE 10 MG/ML
40 INJECTION INTRAMUSCULAR; INTRAVENOUS
Status: COMPLETED | OUTPATIENT
Start: 2024-02-22 | End: 2024-02-22

## 2024-02-22 RX ORDER — HEPARIN SODIUM,PORCINE/D5W 25000/250
0-40 INTRAVENOUS SOLUTION INTRAVENOUS CONTINUOUS
Status: DISCONTINUED | OUTPATIENT
Start: 2024-02-22 | End: 2024-02-24

## 2024-02-22 RX ORDER — PANTOPRAZOLE SODIUM 40 MG/1
40 TABLET, DELAYED RELEASE ORAL DAILY
Status: DISCONTINUED | OUTPATIENT
Start: 2024-02-23 | End: 2024-02-24 | Stop reason: HOSPADM

## 2024-02-22 RX ORDER — LOSARTAN POTASSIUM 50 MG/1
50 TABLET ORAL DAILY
Status: DISCONTINUED | OUTPATIENT
Start: 2024-02-23 | End: 2024-02-24 | Stop reason: HOSPADM

## 2024-02-22 RX ADMIN — ASPIRIN 325 MG ORAL TABLET 325 MG: 325 PILL ORAL at 06:02

## 2024-02-22 RX ADMIN — FUROSEMIDE 40 MG: 10 INJECTION, SOLUTION INTRAMUSCULAR; INTRAVENOUS at 06:02

## 2024-02-22 RX ADMIN — HEPARIN SODIUM 12 UNITS/KG/HR: 10000 INJECTION, SOLUTION INTRAVENOUS at 08:02

## 2024-02-22 RX ADMIN — METHYLPREDNISOLONE SODIUM SUCCINATE 80 MG: 40 INJECTION, POWDER, FOR SOLUTION INTRAMUSCULAR; INTRAVENOUS at 05:02

## 2024-02-22 RX ADMIN — HYDRALAZINE HYDROCHLORIDE 10 MG: 20 INJECTION INTRAMUSCULAR; INTRAVENOUS at 05:02

## 2024-02-22 RX ADMIN — IPRATROPIUM BROMIDE AND ALBUTEROL SULFATE 3 ML: 2.5; .5 SOLUTION RESPIRATORY (INHALATION) at 06:02

## 2024-02-22 NOTE — ED PROVIDER NOTES
Encounter Date: 2/22/2024       History     Chief Complaint   Patient presents with    Shortness of Breath    Wheezing     Pt in per EMS from [facility] with c/o SOB and expiratory wheezing s/p COVID diagnosis approx. 2 weeks pta increasing in intensity to today.       Patient is a 89-year-old female who presents with reports of shortness of breath today.  Patient is a nursing home resident.  She reportedly tested positive for COVID a proximally 2 weeks ago.  Family at bedside says that she has been on oxygen for the past week at the nursing home.  Family also reports that she had a history of asthma when she was much younger.  Has not had any asthma exacerbations recently.  Does not take albuterol treatments regularly.  Patient arrived via ambulance.  No medications given EN route.  Patient denies any chest pain        Review of patient's allergies indicates:   Allergen Reactions    Codeine Itching     Past Medical History:   Diagnosis Date    Background diabetic retinopathy     Carotid artery stenosis     Cataract extraction status     Combined hyperlipidemia     HTN (hypertension)     Polycythemia     Type 2 diabetes mellitus without complications     Unspecified osteoarthritis, unspecified site     Vitamin D deficiency      Past Surgical History:   Procedure Laterality Date    EPIDURAL STEROID INJECTION INTO LUMBAR SPINE      EXPLORATORY LAPAROTOMY      hx total knee repla       Family History   Problem Relation Age of Onset    Heart attack Mother     Coronary artery disease Mother     Heart failure Sister     Coronary artery disease Sister     Alzheimer's disease Sister     Coronary artery disease Brother     Heart attack Brother      Social History     Tobacco Use    Smoking status: Never    Smokeless tobacco: Never     Review of Systems   Respiratory:  Positive for shortness of breath.    All other systems reviewed and are negative.      Physical Exam     Initial Vitals [02/22/24 1658]   BP Pulse Resp Temp SpO2    (!) 190/114 107 (!) 25 98.2 °F (36.8 °C) 98 %      MAP       --         Physical Exam    Nursing note and vitals reviewed.  Constitutional: She appears well-developed and well-nourished. No distress.   HENT:   Head: Normocephalic and atraumatic.   Eyes: Conjunctivae and EOM are normal. Pupils are equal, round, and reactive to light.   Neck: Neck supple. No tracheal deviation present.   Cardiovascular:  Regular rhythm, normal heart sounds and intact distal pulses.           tachycardic   Pulmonary/Chest: No respiratory distress. She has wheezes. She has. Rales: bilaterally.  Abdominal: Abdomen is soft. She exhibits no distension. There is no abdominal tenderness. There is no rebound and no guarding.   Musculoskeletal:         General: No tenderness or edema. Normal range of motion.      Cervical back: Neck supple.      Comments: No unilateral leg/calf width discrepancy     Neurological: She is alert and oriented to person, place, and time. GCS score is 15. GCS eye subscore is 4. GCS verbal subscore is 5. GCS motor subscore is 6.   No focal deficits   Skin: Skin is warm. No rash noted. No erythema.   Psychiatric: She has a normal mood and affect. Her behavior is normal.         ED Course   Procedures  Labs Reviewed   COMPREHENSIVE METABOLIC PANEL - Abnormal; Notable for the following components:       Result Value    Sodium Level 134 (*)     BUN/Creatinine Ratio 24 (*)     All other components within normal limits   TROPONIN I - Abnormal; Notable for the following components:    Troponin-I 0.081 (*)     All other components within normal limits   NT-PRO NATRIURETIC PEPTIDE - Abnormal; Notable for the following components:    ProBNP 3,550.0 (*)     All other components within normal limits   CBC WITH DIFFERENTIAL - Abnormal; Notable for the following components:    MPV 9.3 (*)     Neut % 75.2 (*)     Lymph % 9.0 (*)     Lymph # 0.64 (*)     Mono # 0.84 (*)     IG# 0.04 (*)     IG% 0.6 (*)     All other components  within normal limits   TROPONIN I - Abnormal; Notable for the following components:    Troponin-I 0.129 (*)     All other components within normal limits   CBC WITH DIFFERENTIAL - Abnormal; Notable for the following components:    MPV 9.3 (*)     Neut % 89.7 (*)     Lymph % 5.5 (*)     Mono % 3.0 (*)     Lymph # 0.48 (*)     Neut # 7.83 (*)     IG# 0.05 (*)     IG% 0.6 (*)     All other components within normal limits   SARS-COV-2 RNA AMPLIFICATION, QUAL - Normal   APTT - Normal   PROTIME-INR - Normal    Narrative:     Protimes are used to monitor anticoagulant agents such as warfarin. PT INR values are based on the current patient normal mean and the JEREMY value for the specific instrument reagent used.  **Routine theraputic target values for the INR are 2.0-3.0**   CBC W/ AUTO DIFFERENTIAL    Narrative:     The following orders were created for panel order CBC auto differential.  Procedure                               Abnormality         Status                     ---------                               -----------         ------                     CBC with Differential[[id number]]       Abnormal            Final result                 Please view results for these tests on the individual orders.   CBC W/ AUTO DIFFERENTIAL    Narrative:     The following orders were created for panel order CBC auto differential.  Procedure                               Abnormality         Status                     ---------                               -----------         ------                     CBC with Differential[[id number]]       Abnormal            Final result                 Please view results for these tests on the individual orders.          Imaging Results              X-Ray Chest AP Portable (Final result)  Result time 02/22/24 18:36:27      Final result by Ender Fermin MD (02/22/24 18:36:27)                   Impression:      Shallow depth of inspiration with hazy accentuation to the perihilar  bronchovesicular and interstitial markings.      Electronically signed by: Ender Fermin  Date:    02/22/2024  Time:    18:36               Narrative:    EXAMINATION:  XR CHEST AP PORTABLE    CLINICAL HISTORY:  Chest Pain;    TECHNIQUE:  Single frontal view of the chest was performed.    COMPARISON:  12/19/2023    FINDINGS:  The patient is imaged with a shallow depth of inspiration and there is hazy exaggeration of the perihilar bronchovesicular and interstitial markings.  A calcified granuloma is seen at the right apex and there may be some small calcifications in the right hilar region.    There is also is mild exaggeration to the size of the cardiac silhouette.  No evidence of a pleural effusion is identified.  Atherosclerotic calcification is present involving the aortic arch.    Bony demineralization with moderate degenerative findings involve the thoracic spine and prominent degenerative findings are noted involving the shoulders.                                       Medications   albuterol inhaler 2 puff (2 puffs Inhalation Not Given 2/22/24 1730)   albuterol-ipratropium (DUO-NEB) 2.5 mg-0.5 mg/3 mL nebulizer solution (  Not Given 2/22/24 1830)   heparin 25,000 units in dextrose 5% 250 mL (100 units/mL) infusion LOW INTENSITY nomogram - [facility] (12 Units/kg/hr × 53.3 kg (Adjusted) Intravenous New Bag 2/22/24 2003)   heparin 25,000 units in dextrose 5% (100 units/ml) IV bolus from bag LOW INTENSITY NOMOGRAM - [facility] (has no administration in time range)   heparin 25,000 units in dextrose 5% (100 units/ml) IV bolus from bag LOW INTENSITY NOMOGRAM - [facility] (has no administration in time range)   losartan tablet 50 mg (has no administration in time range)   pravastatin tablet 80 mg (has no administration in time range)   methylPREDNISolone sodium succinate injection 80 mg (80 mg Intravenous Given 2/22/24 1723)   hydrALAZINE injection 10 mg (10 mg Intravenous Given 2/22/24 1748)   albuterol-ipratropium 2.5 mg-0.5 mg/3  mL nebulizer solution 3 mL (3 mLs Nebulization Given 2/22/24 1814)   aspirin tablet 325 mg (325 mg Oral Given 2/22/24 1818)   furosemide injection 40 mg (40 mg Intravenous Given 2/22/24 1820)   heparin 25,000 units in dextrose 5% (100 units/ml) IV bolus from bag LOW INTENSITY NOMOGRAM - [facility] (3,200 Units Intravenous Bolus from Bag 2/22/24 2001)     Medical Decision Making  89-year-old female with a recent diagnosis of COVID presenting with reports of shortness of breath.  Bilateral wheezing on exam.  Labs, EKG, chest x-ray ordered.  Albuterol/ipratropium and steroids ordered.    Care handed off to Dr. Mckeon at 6:00 PM with workup and disposition pending    Amount and/or Complexity of Data Reviewed  External Data Reviewed: notes.     Details: Outside records from nursing home reviewed.  It appears she was treated for COVID with steroids, breathing treatments, and levofloxacin.  Treatment ended on February 3, 2024  Labs: ordered.  Radiology: ordered.    Risk  OTC drugs.  Prescription drug management.  Decision regarding hospitalization.                              Patient accepted for admission by Dr. Lindsey        Clinical Impression:  Final diagnoses:  [R06.00] Dyspnea  [I50.9] Congestive heart failure, unspecified HF chronicity, unspecified heart failure type (Primary)          ED Disposition Condition    Admit Stable                Alberto Mckeon MD  02/22/24 2059       Alberto Mckeon MD  02/22/24 4739

## 2024-02-23 PROBLEM — I21.4 NON-STEMI (NON-ST ELEVATED MYOCARDIAL INFARCTION): Status: ACTIVE | Noted: 2024-02-23

## 2024-02-23 LAB
ABS NEUT CALC (OHS): 3.85 X10(3)/MCL (ref 2.1–9.2)
ANION GAP SERPL CALC-SCNC: 8 MEQ/L (ref 2–13)
AORTIC VALVE CUSP SEPERATION: 0.68 CM
APPEARANCE UR: CLEAR
APTT PPP: 47.2 SECONDS
APTT PPP: 50 SECONDS
AV INDEX (PROSTH): 0.26
AV MEAN GRADIENT: 32 MMHG
AV PEAK GRADIENT: 50 MMHG
AV VALVE AREA BY VELOCITY RATIO: 0.52 CM²
AV VALVE AREA: 0.49 CM²
AV VELOCITY RATIO: 0.27
BILIRUB UR QL STRIP.AUTO: NEGATIVE
BSA FOR ECHO PROCEDURE: 1.59 M2
BUN SERPL-MCNC: 18 MG/DL (ref 7–20)
CALCIUM SERPL-MCNC: 9.3 MG/DL (ref 8.4–10.2)
CHLORIDE SERPL-SCNC: 98 MMOL/L (ref 98–110)
CO2 SERPL-SCNC: 30 MMOL/L (ref 21–32)
COLOR UR AUTO: YELLOW
CREAT SERPL-MCNC: 0.76 MG/DL (ref 0.66–1.25)
CREAT/UREA NIT SERPL: 24 (ref 12–20)
CV ECHO LV RWT: 0.67 CM
DOP CALC AO PEAK VEL: 3.54 M/S
DOP CALC AO VTI: 85.2 CM
DOP CALC LVOT AREA: 1.9 CM2
DOP CALC LVOT DIAMETER: 1.55 CM
DOP CALC LVOT PEAK VEL: 0.97 M/S
DOP CALC LVOT STROKE VOLUME: 42.06 CM3
DOP CALC MV VTI: 38.1 CM
DOP CALCLVOT PEAK VEL VTI: 22.3 CM
E WAVE DECELERATION TIME: 282 MSEC
E/A RATIO: 0.93
E/E' RATIO: 11.22 M/S
ECHO LV POSTERIOR WALL: 1.36 CM (ref 0.6–1.1)
ERYTHROCYTE [DISTWIDTH] IN BLOOD BY AUTOMATED COUNT: 12.7 % (ref 11–14.5)
FRACTIONAL SHORTENING: 31 % (ref 28–44)
GFR SERPLBLD CREATININE-BSD FMLA CKD-EPI: 75 MLS/MIN/1.73/M2
GLUCOSE SERPL-MCNC: 194 MG/DL (ref 70–115)
GLUCOSE UR QL STRIP.AUTO: NEGATIVE
HCT VFR BLD AUTO: 36.3 % (ref 36–48)
HGB BLD-MCNC: 12.3 G/DL (ref 11.8–16)
INFERIOR VENA CAVA SIZE SNIFF: 0.4 CM
INTERVENTRICULAR SEPTUM: 1.15 CM (ref 0.6–1.1)
IVC DIAMETER: 1.2 CM
KETONES UR QL STRIP.AUTO: NEGATIVE
LEFT ATRIUM SIZE: 4.03 CM
LEFT ATRIUM VOLUME INDEX MOD: 39.6 ML/M2
LEFT ATRIUM VOLUME MOD: 61.4 CM3
LEFT INTERNAL DIMENSION IN SYSTOLE: 2.81 CM (ref 2.1–4)
LEFT VENTRICLE DIASTOLIC VOLUME INDEX: 47.61 ML/M2
LEFT VENTRICLE DIASTOLIC VOLUME: 73.8 ML
LEFT VENTRICLE MASS INDEX: 118 G/M2
LEFT VENTRICLE SYSTOLIC VOLUME INDEX: 19.2 ML/M2
LEFT VENTRICLE SYSTOLIC VOLUME: 29.8 ML
LEFT VENTRICULAR INTERNAL DIMENSION IN DIASTOLE: 4.09 CM (ref 3.5–6)
LEFT VENTRICULAR MASS: 182.87 G
LEUKOCYTE ESTERASE UR QL STRIP.AUTO: NEGATIVE
LV LATERAL E/E' RATIO: 11.22 M/S
LV SEPTAL E/E' RATIO: 11.22 M/S
LVOT MG: 1.9 MMHG
LVOT MV: 0.62 CM/S
LYMPHOCYTES NFR BLD MANUAL: 0.25 X10(3)/MCL
LYMPHOCYTES NFR BLD MANUAL: 6 % (ref 20–55)
MCH RBC QN AUTO: 31.1 PG (ref 27–34)
MCHC RBC AUTO-ENTMCNC: 33.9 G/DL (ref 31–37)
MCV RBC AUTO: 91.7 FL (ref 79–99)
MONOCYTES NFR BLD MANUAL: 0.08 X10(3)/MCL (ref 0.1–1.3)
MONOCYTES NFR BLD MANUAL: 2 % (ref 0–10)
MV MEAN GRADIENT: 3 MMHG
MV PEAK A VEL: 1.09 M/S
MV PEAK E VEL: 1.01 M/S
MV PEAK GRADIENT: 7 MMHG
MV STENOSIS PRESSURE HALF TIME: 120 MS
MV VALVE AREA BY CONTINUITY EQUATION: 1.1 CM2
MV VALVE AREA P 1/2 METHOD: 1.83 CM2
NEUTROPHILS NFR BLD MANUAL: 92 % (ref 37–73)
NITRITE UR QL STRIP.AUTO: NEGATIVE
NRBC BLD AUTO-RTO: 0 %
OHS QRS DURATION: 68 MS
OHS QTC CALCULATION: 416 MS
PH UR STRIP.AUTO: 6 [PH]
PISA TR MAX VEL: 2.23 M/S
PLATELET # BLD AUTO: 247 X10(3)/MCL (ref 140–371)
PLATELET # BLD EST: NORMAL 10*3/UL
PMV BLD AUTO: 9.6 FL (ref 9.4–12.4)
POCT GLUCOSE: 130 MG/DL (ref 70–110)
POCT GLUCOSE: 150 MG/DL (ref 70–110)
POCT GLUCOSE: 173 MG/DL (ref 70–110)
POTASSIUM SERPL-SCNC: 3.9 MMOL/L (ref 3.5–5.1)
PROT UR QL STRIP.AUTO: NEGATIVE
RA MAJOR: 4.4 CM
RA PRESSURE ESTIMATED: 8 MMHG
RBC # BLD AUTO: 3.96 X10(6)/MCL (ref 4–5.1)
RBC MORPH BLD: NORMAL
RBC UR QL AUTO: NEGATIVE
RIGHT ATRIAL AREA: 12 CM2
RIGHT ATRIUM VOLUME AREA LENGTH APICAL 4 CHAMBER: 27.6 ML
RIGHT VENTRICULAR END-DIASTOLIC DIMENSION: 2.85 CM
RV TB RVSP: 10 MMHG
SODIUM SERPL-SCNC: 136 MMOL/L (ref 135–145)
SP GR UR STRIP.AUTO: 1.01 (ref 1–1.03)
TDI LATERAL: 0.09 M/S
TDI SEPTAL: 0.09 M/S
TDI: 0.09 M/S
TR MAX PG: 20 MMHG
TRICUSPID ANNULAR PLANE SYSTOLIC EXCURSION: 1.94 CM
TROPONIN I SERPL-MCNC: 0.06 NG/ML (ref 0–0.03)
TSH SERPL-ACNC: 0.59 UIU/ML (ref 0.36–3.74)
TV REST PULMONARY ARTERY PRESSURE: 28 MMHG
UROBILINOGEN UR STRIP-ACNC: 0.2
WBC # SPEC AUTO: 4.18 X10(3)/MCL (ref 4–11.5)
Z-SCORE OF LEFT VENTRICULAR DIMENSION IN END DIASTOLE: -0.93
Z-SCORE OF LEFT VENTRICULAR DIMENSION IN END SYSTOLE: 0.07

## 2024-02-23 PROCEDURE — 25000003 PHARM REV CODE 250: Performed by: FAMILY MEDICINE

## 2024-02-23 PROCEDURE — 85730 THROMBOPLASTIN TIME PARTIAL: CPT | Performed by: INTERNAL MEDICINE

## 2024-02-23 PROCEDURE — 25000242 PHARM REV CODE 250 ALT 637 W/ HCPCS: Performed by: INTERNAL MEDICINE

## 2024-02-23 PROCEDURE — 99900035 HC TECH TIME PER 15 MIN (STAT)

## 2024-02-23 PROCEDURE — 84484 ASSAY OF TROPONIN QUANT: CPT | Performed by: INTERNAL MEDICINE

## 2024-02-23 PROCEDURE — 25000242 PHARM REV CODE 250 ALT 637 W/ HCPCS

## 2024-02-23 PROCEDURE — 27000221 HC OXYGEN, UP TO 24 HOURS

## 2024-02-23 PROCEDURE — 81003 URINALYSIS AUTO W/O SCOPE: CPT | Performed by: INTERNAL MEDICINE

## 2024-02-23 PROCEDURE — 85027 COMPLETE CBC AUTOMATED: CPT | Performed by: FAMILY MEDICINE

## 2024-02-23 PROCEDURE — 80048 BASIC METABOLIC PNL TOTAL CA: CPT | Performed by: INTERNAL MEDICINE

## 2024-02-23 PROCEDURE — 36415 COLL VENOUS BLD VENIPUNCTURE: CPT | Performed by: INTERNAL MEDICINE

## 2024-02-23 PROCEDURE — 25000003 PHARM REV CODE 250: Performed by: INTERNAL MEDICINE

## 2024-02-23 PROCEDURE — 21400001 HC TELEMETRY ROOM

## 2024-02-23 PROCEDURE — 63600175 PHARM REV CODE 636 W HCPCS: Performed by: INTERNAL MEDICINE

## 2024-02-23 PROCEDURE — 94761 N-INVAS EAR/PLS OXIMETRY MLT: CPT

## 2024-02-23 PROCEDURE — 36415 COLL VENOUS BLD VENIPUNCTURE: CPT | Performed by: FAMILY MEDICINE

## 2024-02-23 PROCEDURE — 94640 AIRWAY INHALATION TREATMENT: CPT

## 2024-02-23 PROCEDURE — 84443 ASSAY THYROID STIM HORMONE: CPT | Performed by: INTERNAL MEDICINE

## 2024-02-23 PROCEDURE — 85007 BL SMEAR W/DIFF WBC COUNT: CPT | Performed by: FAMILY MEDICINE

## 2024-02-23 RX ORDER — CLOPIDOGREL BISULFATE 75 MG/1
75 TABLET ORAL DAILY
Status: DISCONTINUED | OUTPATIENT
Start: 2024-02-23 | End: 2024-02-24 | Stop reason: HOSPADM

## 2024-02-23 RX ADMIN — ASPIRIN 81 MG 81 MG: 81 TABLET ORAL at 08:02

## 2024-02-23 RX ADMIN — IPRATROPIUM BROMIDE AND ALBUTEROL SULFATE 3 ML: .5; 3 SOLUTION RESPIRATORY (INHALATION) at 11:02

## 2024-02-23 RX ADMIN — PANTOPRAZOLE SODIUM 40 MG: 40 TABLET, DELAYED RELEASE ORAL at 08:02

## 2024-02-23 RX ADMIN — IPRATROPIUM BROMIDE AND ALBUTEROL SULFATE 3 ML: .5; 3 SOLUTION RESPIRATORY (INHALATION) at 07:02

## 2024-02-23 RX ADMIN — PRAVASTATIN SODIUM 80 MG: 20 TABLET ORAL at 08:02

## 2024-02-23 RX ADMIN — IPRATROPIUM BROMIDE AND ALBUTEROL SULFATE 3 ML: .5; 3 SOLUTION RESPIRATORY (INHALATION) at 03:02

## 2024-02-23 RX ADMIN — FUROSEMIDE 40 MG: 10 INJECTION, SOLUTION INTRAMUSCULAR; INTRAVENOUS at 08:02

## 2024-02-23 RX ADMIN — LOSARTAN POTASSIUM 50 MG: 50 TABLET, FILM COATED ORAL at 08:02

## 2024-02-23 RX ADMIN — CLOPIDOGREL BISULFATE 75 MG: 75 TABLET ORAL at 01:02

## 2024-02-23 NOTE — PLAN OF CARE
Problem: Adult Inpatient Plan of Care  Goal: Plan of Care Review  Outcome: Ongoing, Progressing  Goal: Patient-Specific Goal (Individualized)  Outcome: Ongoing, Progressing  Goal: Absence of Hospital-Acquired Illness or Injury  Outcome: Ongoing, Progressing  Goal: Optimal Comfort and Wellbeing  Outcome: Ongoing, Progressing  Goal: Readiness for Transition of Care  Outcome: Ongoing, Progressing     Problem: Diabetes Comorbidity  Goal: Blood Glucose Level Within Targeted Range  Outcome: Ongoing, Progressing     Problem: Skin Injury Risk Increased  Goal: Skin Health and Integrity  Outcome: Ongoing, Progressing     Problem: Fall Injury Risk  Goal: Absence of Fall and Fall-Related Injury  Outcome: Ongoing, Progressing     Problem: Adjustment to Illness (Heart Failure)  Goal: Optimal Coping  Outcome: Ongoing, Progressing     Problem: Cardiac Output Decreased (Heart Failure)  Goal: Optimal Cardiac Output  Outcome: Ongoing, Progressing     Problem: Dysrhythmia (Heart Failure)  Goal: Stable Heart Rate and Rhythm  Outcome: Ongoing, Progressing     Problem: Fluid Imbalance (Heart Failure)  Goal: Fluid Balance  Outcome: Ongoing, Progressing     Problem: Functional Ability Impaired (Heart Failure)  Goal: Optimal Functional Ability  Outcome: Ongoing, Progressing     Problem: Oral Intake Inadequate (Heart Failure)  Goal: Optimal Nutrition Intake  Outcome: Ongoing, Progressing     Problem: Respiratory Compromise (Heart Failure)  Goal: Effective Oxygenation and Ventilation  Outcome: Ongoing, Progressing     Problem: Sleep Disordered Breathing (Heart Failure)  Goal: Effective Breathing Pattern During Sleep  Outcome: Ongoing, Progressing

## 2024-02-23 NOTE — PROGRESS NOTES
"Inpatient Nutrition Evaluation    Admit Date: 2/22/2024   Total duration of encounter: 1 day    Nutrition Recommendation/Prescription     Clean up Regular, Low Na, Diabetic Diet to Diabetic Diet, 1500 kcal, Low Na to prevent confusion.     Hold off on ONS due to reports that it "goes right through her"    Continue to encourage PO intake, assist with feeds, and honor patient preferences.     Dietitian will monitor Energy Intake, Food and Beverage Intake, Glucose/Endocrine Profile, Weight, and Weight Change and adjust MNT as needed.       Monitoring & Evaluation     Communication of Recommendations: reviewed with nurse and reviewed with patient    Reason Seen: continuous nutrition monitoring    Nutrition Risk/Follow-Up: Low (follow-up in 5-7 days)  Patients assigned 'Low nutrition risk' status do not qualify for a full nutritional assessment but will be monitored and re-evaluated in a 5-7 day time period. Please consult if re-evaluation needed sooner.    RD Follow-up?: Yes  Next Date to be Seen by RD: 02/28/24  Nutrition Assessment     Chart Review    Malnutrition Screening Tool Results   Have you recently lost weight without trying?: No  Have you been eating poorly because of a decreased appetite?: No   MST Score: 0     Diagnosis:  Acute on Chronic hypoxic respiratory failure  Acute exacerbation CHF  Elevated troponin  HTN  HLD  T2DM  Osteoporosis     Past Medical History:   Diagnosis Date    Background diabetic retinopathy     Carotid artery stenosis     Cataract extraction status     Combined hyperlipidemia     HTN (hypertension)     Non-STEMI (non-ST elevated myocardial infarction) 02/23/2024    Polycythemia     Type 2 diabetes mellitus without complications     Unspecified osteoarthritis, unspecified site     Vitamin D deficiency      Past Surgical History:   Procedure Laterality Date    EPIDURAL STEROID INJECTION INTO LUMBAR SPINE      EXPLORATORY LAPAROTOMY      hx total knee repla         Scheduled " Update History & Physical    The patient's History and Physical of January 19, the surgical plan  was reviewed with the patient and I examined the patient. There was no change. The surgical site was confirmed by the patient and me.     Plan: The risks, benefits, expected outcome, and alternative to the recommended procedure have been discussed with the patient. Patient understands and wants to proceed with the procedure.     Electronically signed by Violette Holman MD on 1/19/2024 at 7:30 AM       Medications:  aspirin, 81 mg, Daily  clopidogreL, 75 mg, Daily  furosemide (LASIX) injection, 40 mg, Q12H  losartan, 50 mg, Daily  pantoprazole, 40 mg, Daily  pravastatin, 80 mg, Daily    Continuous Infusions:  heparin (porcine) in D5W, Last Rate: 12 Units/kg/hr (24 024)    PRN Medications: acetaminophen, albuterol-ipratropium, dextrose 10%, dextrose 10%, glucagon (human recombinant), glucose, glucose, guaiFENesin 100 mg/5 ml, heparin (PORCINE), heparin (PORCINE), insulin aspart U-100, ondansetron    Calorie Containing IV Medications: no significant kcals from medications at this time    Nutrition-Related Labs:  Recent Labs   Lab 24  1725 24  1942 24  0149   *  --  136   K 4.2  --  3.9   CALCIUM 9.1  --  9.3   CHLORIDE 99  --  98   CO2 29  --  30   BUN 17.0  --  18.0   CREATININE 0.72  --  0.76   EGFRNORACEVR 80  --  75   GLUCOSE 115  --  194*   BILITOT 0.5  --   --    ALKPHOS 86  --   --    ALT 17  --   --    AST 29  --   --    ALBUMIN 3.8  --   --    WBC 7.13 8.72 4.18   HGB 13.0 13.1 12.3   HCT 38.1 38.7 36.3     CrCl:    Lab Value: 41.2    Date:     Nutrition Orders:  Diet Adult Regular (IDDSI Level 7) Low Sodium, Diabetic    Other Oral Supplement Order: None/Already listed above  Appetite/Oral Intake: fair/25-50% of meals  Factors Affecting Nutritional Intake: decreased appetite  Food/Buddhist/Cultural Preferences: none reported  Food Allergies:   Review of patient's allergies indicates:   Allergen Reactions    Codeine Itching       Skin Integrity: intact  Wound(s):       Overview/Hospital Course:    (): Patient repots good appetite prior to admit but not so good now due to frequency/time between meals. Patient reports eating what she wants when she wants without any unintentional weight loss as shown in EMR. Patient reports ONS goes right through her so will hold off for now. GI: INCONTINENT/LBM-, : CATHETER, FUNCTIONAL SCREEN:Eatin - assistive person,  "Nutrition: 3-->adequate,Parker Score: 18, 1+ BLE EDEMA NOTED, 24 HR I/O: 293/1150.      Anthropometrics    Height: 4' 10" (147.3 cm) Height Method: Stated  Last Weight: 61.9 kg (136 lb 7.4 oz) (02/22/24 9846) Weight Method: Bed Scale  BMI (Calculated): 28.5  BMI Classification: overweight (BMI 25-29.9)     Ideal Body Weight (IBW), Female: 90 lb     % Ideal Body Weight, Female (lb): 151.63 %                             Usual Weight Provided By: EMR weight history and patient denies unintentional weight loss    Wt Readings from Last 5 Encounters:   02/22/24 61.9 kg (136 lb 7.4 oz)   12/18/23 58.5 kg (128 lb 15.5 oz)   12/04/23 54.1 kg (119 lb 4.3 oz)   05/08/23 55.3 kg (122 lb)   05/04/22 55.3 kg (122 lb)     Weight Change(s) Since Admission:  Admit Weight: 62.5 kg (137 lb 12.8 oz) (02/22/24 1658)      Patient Education    Not applicable.          "

## 2024-02-23 NOTE — NURSING
Sent message to Dr. Breaux about patient's code status being dnr at nursing home & that she has a LAPOST stating that;asked him to put in order addressing this;awaiting order for dnr for this patient

## 2024-02-23 NOTE — CONSULTS
[facility]  [facility]    Patient Name: Priscila Randhawa  MRN: [id number]  Admission Date: 2/22/2024  Hospital Length of Stay: 0 days  Code Status: Prior   Attending Provider: Emma Breaux MD   Consulting Provider: Demarcus Obando MD  Primary Care Physician: Delbert Bhandari MD  Principal Problem:<principal problem not specified>    Patient information was obtained from patient, relative(s), and ER records.     Subjective:     Chief Complaint/Reason for Consult:   Shortness of breath, PVCs    HPI:  Patient is a 89-year-old female who has a nursing home resident was feeling short of breath since this morning.  Patient's family was called and told that patient is short of breath and was sent to hospital.  Patient reported was doing fine until 2 weeks ago when she developed COVID.  She did okay during her COVID course except some shortness of breath at that time.  This morning she developed significant shortness of breath and lower extremity edema and brought to hospital.  She was given diuretics and was put on oxygen, and is currently doing better.  She has a history of hypertension and diabetes.  Patient denied any chest pain, syncope, presyncopal symptoms.  She reportedly has never seen a cardiologist before.        Past Medical History:   Diagnosis Date    Background diabetic retinopathy     Carotid artery stenosis     Cataract extraction status     Combined hyperlipidemia     HTN (hypertension)     Polycythemia     Type 2 diabetes mellitus without complications     Unspecified osteoarthritis, unspecified site     Vitamin D deficiency      Past Surgical History:   Procedure Laterality Date    EPIDURAL STEROID INJECTION INTO LUMBAR SPINE      EXPLORATORY LAPAROTOMY      hx total knee repla       Review of patient's allergies indicates:   Allergen Reactions    Codeine Itching     No current facility-administered medications on file prior to encounter.     Current Outpatient  Medications on File Prior to Encounter   Medication Sig    albuterol (PROVENTIL) 5 mg/mL nebulizer solution Take 2.5 mg by nebulization every 4 (four) hours as needed for Wheezing. Rescue    cyanocobalamin, vitamin B-12, 1,000 mcg/mL Kit Inject 1,000 mcg as directed every 30 days.    losartan (COZAAR) 50 MG tablet Take 1 tablet (50 mg total) by mouth once daily.    pravastatin (PRAVACHOL) 80 MG tablet Take 1 tablet (80 mg total) by mouth once daily.     Family History       Problem Relation (Age of Onset)    Alzheimer's disease Sister    Coronary artery disease Mother, Sister, Brother    Heart attack Mother, Brother    Heart failure Sister          Tobacco Use    Smoking status: Never    Smokeless tobacco: Never   Substance and Sexual Activity    Alcohol use: Not on file    Drug use: Not on file    Sexual activity: Not on file       Review of Systems  Complete review of system was done and is negative except written above  Objective:     Vital Signs (Most Recent):  Temp: 98.2 °F (36.8 °C) (02/22/24 1658)  Pulse: 108 (02/22/24 1916)  Resp: 20 (02/22/24 1916)  BP: 122/85 (02/22/24 1916)  SpO2: 95 % (02/22/24 1916) Vital Signs (24h Range):  Temp:  [98.2 °F (36.8 °C)] 98.2 °F (36.8 °C)  Pulse:  [103-109] 108  Resp:  [20-25] 20  SpO2:  [95 %-98 %] 95 %  BP: (122-190)/() 122/85   Weight: 62.5 kg (137 lb 12.8 oz)  Body mass index is 28.8 kg/m².  SpO2: 95 %     No intake or output data in the 24 hours ending 02/22/24 2104  Lines/Drains/Airways       Peripheral Intravenous Line  Duration                  Peripheral IV - Single Lumen 02/22/24 1713 22 G Anterior;Left Wrist <1 day                  Significant Labs:  Recent Results (from the past 72 hour(s))   COVID-19 Rapid Screening    Collection Time: 02/22/24  5:20 PM   Result Value Ref Range    SARS COV-2 MOLECULAR Negative Negative   Comprehensive metabolic panel    Collection Time: 02/22/24  5:25 PM   Result Value Ref Range    Sodium Level 134 (L) 135 - 145 mmol/L     Potassium Level 4.2 3.5 - 5.1 mmol/L    Chloride 99 98 - 110 mmol/L    Carbon Dioxide 29 21 - 32 mmol/L    Glucose Level 115 70 - 115 mg/dL    Blood Urea Nitrogen 17.0 7.0 - 20.0 mg/dL    Creatinine 0.72 0.66 - 1.25 mg/dL    Calcium Level Total 9.1 8.4 - 10.2 mg/dL    Protein Total 7.1 6.3 - 8.2 gm/dL    Albumin Level 3.8 3.4 - 5.0 g/dL    Globulin 3.3 2.0 - 3.9 gm/dL    Albumin/Globulin Ratio 1.2 ratio    Bilirubin Total 0.5 0.0 - 1.0 mg/dL    Alkaline Phosphatase 86 50 - 144 unit/L    Alanine Aminotransferase 17 1 - 45 unit/L    Aspartate Aminotransferase 29 14 - 36 unit/L    eGFR 80 mls/min/1.73/m2    Anion Gap 6.0 2.0 - 13.0 mEq/L    BUN/Creatinine Ratio 24 (H) 12 - 20   Troponin I    Collection Time: 02/22/24  5:25 PM   Result Value Ref Range    Troponin-I 0.081 (H) 0.000 - 0.034 ng/mL   NT-Pro Natriuretic Peptide    Collection Time: 02/22/24  5:25 PM   Result Value Ref Range    ProBNP 3,550.0 (H) 0.0 - 124.9 PG/ML   CBC with Differential    Collection Time: 02/22/24  5:25 PM   Result Value Ref Range    WBC 7.13 4.00 - 11.50 x10(3)/mcL    RBC 4.08 4.00 - 5.10 x10(6)/mcL    Hgb 13.0 11.8 - 16.0 g/dL    Hct 38.1 36.0 - 48.0 %    MCV 93.4 79.0 - 99.0 fL    MCH 31.9 27.0 - 34.0 pg    MCHC 34.1 31.0 - 37.0 g/dL    RDW 12.8 11.0 - 14.5 %    Platelet 241 140 - 371 x10(3)/mcL    MPV 9.3 (L) 9.4 - 12.4 fL    Neut % 75.2 (H) 37 - 73 %    Lymph % 9.0 (L) 20 - 55 %    Mono % 11.8 4.7 - 12.5 %    Eos % 2.8 0.7 - 7 %    Basophil % 0.6 0.1 - 1.2 %    Lymph # 0.64 (L) 1.16 - 3.74 x10(3)/mcL    Neut # 5.37 1.56 - 6.13 x10(3)/mcL    Mono # 0.84 (H) 0.24 - 0.36 x10(3)/mcL    Eos # 0.20 0.04 - 0.36 x10(3)/mcL    Baso # 0.04 0.01 - 0.08 x10(3)/mcL    IG# 0.04 (H) 0.0001 - 0.031 x10(3)/mcL    IG% 0.6 (H) 0 - 0.5 %    NRBC% 0.0 <=1 %   Troponin I    Collection Time: 02/22/24  6:52 PM   Result Value Ref Range    Troponin-I 0.129 (HH) 0.000 - 0.034 ng/mL   APTT    Collection Time: 02/22/24  7:42 PM   Result Value Ref Range    PTT  27.2 23.0 - 29.4 seconds   Protime-INR    Collection Time: 02/22/24  7:42 PM   Result Value Ref Range    PT 10.5 9.3 - 11.9 seconds    INR 1.0 <5.0   CBC with Differential    Collection Time: 02/22/24  7:42 PM   Result Value Ref Range    WBC 8.72 4.00 - 11.50 x10(3)/mcL    RBC 4.19 4.00 - 5.10 x10(6)/mcL    Hgb 13.1 11.8 - 16.0 g/dL    Hct 38.7 36.0 - 48.0 %    MCV 92.4 79.0 - 99.0 fL    MCH 31.3 27.0 - 34.0 pg    MCHC 33.9 31.0 - 37.0 g/dL    RDW 12.8 11.0 - 14.5 %    Platelet 241 140 - 371 x10(3)/mcL    MPV 9.3 (L) 9.4 - 12.4 fL    Neut % 89.7 (H) 37 - 73 %    Lymph % 5.5 (L) 20 - 55 %    Mono % 3.0 (L) 4.7 - 12.5 %    Eos % 0.7 0.7 - 7 %    Basophil % 0.5 0.1 - 1.2 %    Lymph # 0.48 (L) 1.16 - 3.74 x10(3)/mcL    Neut # 7.83 (H) 1.56 - 6.13 x10(3)/mcL    Mono # 0.26 0.24 - 0.36 x10(3)/mcL    Eos # 0.06 0.04 - 0.36 x10(3)/mcL    Baso # 0.04 0.01 - 0.08 x10(3)/mcL    IG# 0.05 (H) 0.0001 - 0.031 x10(3)/mcL    IG% 0.6 (H) 0 - 0.5 %    NRBC% 0.0 <=1 %     Significant Imaging:  Imaging Results              X-Ray Chest AP Portable (Final result)  Result time 02/22/24 18:36:27      Final result by Ender Fermin MD (02/22/24 18:36:27)                   Impression:      Shallow depth of inspiration with hazy accentuation to the perihilar bronchovesicular and interstitial markings.      Electronically signed by: Ender Fermin  Date:    02/22/2024  Time:    18:36               Narrative:    EXAMINATION:  XR CHEST AP PORTABLE    CLINICAL HISTORY:  Chest Pain;    TECHNIQUE:  Single frontal view of the chest was performed.    COMPARISON:  12/19/2023    FINDINGS:  The patient is imaged with a shallow depth of inspiration and there is hazy exaggeration of the perihilar bronchovesicular and interstitial markings.  A calcified granuloma is seen at the right apex and there may be some small calcifications in the right hilar region.    There is also is mild exaggeration to the size of the cardiac silhouette.  No evidence of a  pleural effusion is identified.  Atherosclerotic calcification is present involving the aortic arch.    Bony demineralization with moderate degenerative findings involve the thoracic spine and prominent degenerative findings are noted involving the shoulders.                                    EKG:  Sinus tachycardia PVCs    Telemetry:  sinus tach with PVCs    Physical Exam    Alert and oriented.  Lungs auscultation confirmed crackles coarse  Cardiovascular examination:  S1-S2, possible S3 heard  1+ edema bilateral lower extremities    Home Medications:   No current facility-administered medications on file prior to encounter.     Current Outpatient Medications on File Prior to Encounter   Medication Sig Dispense Refill    albuterol (PROVENTIL) 5 mg/mL nebulizer solution Take 2.5 mg by nebulization every 4 (four) hours as needed for Wheezing. Rescue      cyanocobalamin, vitamin B-12, 1,000 mcg/mL Kit Inject 1,000 mcg as directed every 30 days.      losartan (COZAAR) 50 MG tablet Take 1 tablet (50 mg total) by mouth once daily. 90 tablet 3    pravastatin (PRAVACHOL) 80 MG tablet Take 1 tablet (80 mg total) by mouth once daily. 90 tablet 3     Current Inpatient Medications:    Current Facility-Administered Medications:     albuterol-ipratropium (DUO-NEB) 2.5 mg-0.5 mg/3 mL nebulizer solution, , , ,     heparin 25,000 units in dextrose 5% (100 units/ml) IV bolus from bag LOW INTENSITY NOMOGRAM - [facility], 60 Units/kg (Adjusted), Intravenous, PRN, Alberto Mckeon MD    heparin 25,000 units in dextrose 5% (100 units/ml) IV bolus from bag LOW INTENSITY NOMOGRAM - [facility], 30 Units/kg (Adjusted), Intravenous, PRN, Alberto Mckeon MD    heparin 25,000 units in dextrose 5% 250 mL (100 units/mL) infusion LOW INTENSITY nomogram - [facility], 0-40 Units/kg/hr (Adjusted), Intravenous, Continuous, Alberto Mckeon MD, Last Rate: 6.4 mL/hr at 02/22/24 2003, 12 Units/kg/hr at 02/22/24 2003    Current Outpatient Medications:      albuterol (PROVENTIL) 5 mg/mL nebulizer solution, Take 2.5 mg by nebulization every 4 (four) hours as needed for Wheezing. Rescue, Disp: , Rfl:     cyanocobalamin, vitamin B-12, 1,000 mcg/mL Kit, Inject 1,000 mcg as directed every 30 days., Disp: , Rfl:     losartan (COZAAR) 50 MG tablet, Take 1 tablet (50 mg total) by mouth once daily., Disp: 90 tablet, Rfl: 3    pravastatin (PRAVACHOL) 80 MG tablet, Take 1 tablet (80 mg total) by mouth once daily., Disp: 90 tablet, Rfl: 3  VTE Risk Mitigation (From admission, onward)           Ordered     heparin 25,000 units in dextrose 5% (100 units/ml) IV bolus from bag LOW INTENSITY NOMOGRAM - [facility]  As needed (PRN)        Question:  Heparin Infusion Adjustment (DO NOT MODIFY ANSWER)  Answer:  [email or web address]\epic\Images\Pharmacy\HeparinInfusions\heparin LOW INTENSITY nomogram for [facility] [id number]    02/22/24 1926     heparin 25,000 units in dextrose 5% (100 units/ml) IV bolus from bag LOW INTENSITY NOMOGRAM - [facility]  As needed (PRN)        Question:  Heparin Infusion Adjustment (DO NOT MODIFY ANSWER)  Answer:  \\[email or web address]\[email or web address]\Images\Pharmacy\HeparinInfusions\heparin LOW INTENSITY nomogram for [facility] [id number]    02/22/24 1926     heparin 25,000 units in dextrose 5% 250 mL (100 units/mL) infusion LOW INTENSITY nomogram - [facility]  Continuous        Question:  Begin at (units/kg/hr)  Answer:  12    02/22/24 1926                  Assessment:   Acute heart failure unknown type  Non ST elevation myocardial infarction type 2 in the setting of heart failure  Hypertension  Hyperlipidemia  Diabetes mellitus  Recent history of COVID infection      Plan:     Agree with IV Lasix 40 mg b.i.d. for now  Continue home medications  Echocardiogram in the morning  Follow up cardiac markers.  Non ST-elevation MI appears to be type 2 in the setting of acute heart failure.  Continue home dose statin  Continue losartan.  We will hold off beta-blocker at this time since patient appears to have heart  failure.  But before discharge patient should be put on beta-blockers we will have these PVCs.    Thank you for your consult.     Demarcus Obando MD  Cardiology  [facility]  02/22/2024

## 2024-02-23 NOTE — CONSULTS
[facility]  [facility]ult Note    Patient Name: Priscila Randhawa  MRN: [id number]  Admission Date: 2/22/2024  Hospital Length of Stay: 1 days  Code Status: Prior   Attending Provider: Emma Breaux MD   Consulting Provider: SINA Casey  Primary Care Physician: Delbert Bhandari MD  Principal Problem:<principal problem not specified>    Patient information was obtained from patient, relative(s), and ER records.     Subjective:     Chief Complaint/Reason for Consult:   Shortness of breath, PVCs    HPI:  Patient is a 89-year-old female who has a nursing home resident was feeling short of breath since this morning.  Patient's family was called and told that patient is short of breath and was sent to hospital.  Patient reported was doing fine until 2 weeks ago when she developed COVID.  She did okay during her COVID course except some shortness of breath at that time.  This morning she developed significant shortness of breath and lower extremity edema and brought to hospital.  She was given diuretics and was put on oxygen, and is currently doing better.  She has a history of hypertension and diabetes.  Patient denied any chest pain, syncope, presyncopal symptoms.  She reportedly has never seen a cardiologist before.    2/23: VSS, NAD. Very pleasant patient. Niece (POA) at bedside. Was seen by telecard last night. Echo ordered. Tn 0.081/0.129. Tn ordered. On heparin gtt. Net neg 856 OP. Denies CP, SOB improved        Past Medical History:   Diagnosis Date    Background diabetic retinopathy     Carotid artery stenosis     Cataract extraction status     Combined hyperlipidemia     HTN (hypertension)     Polycythemia     Type 2 diabetes mellitus without complications     Unspecified osteoarthritis, unspecified site     Vitamin D deficiency      Past Surgical History:   Procedure Laterality Date    EPIDURAL STEROID INJECTION INTO LUMBAR SPINE      EXPLORATORY LAPAROTOMY      hx  total knee repla       Review of patient's allergies indicates:   Allergen Reactions    Codeine Itching     No current facility-administered medications on file prior to encounter.     Current Outpatient Medications on File Prior to Encounter   Medication Sig    albuterol (PROVENTIL HFA) 90 mcg/actuation inhaler Inhale 2 puffs into the lungs 4 (four) times daily. Rescue    albuterol (PROVENTIL) 5 mg/mL nebulizer solution Take 2.5 mg by nebulization every 4 (four) hours as needed for Wheezing. Rescue    cyanocobalamin, vitamin B-12, 1,000 mcg/mL Kit Inject 1,000 mcg as directed every 30 days.    losartan (COZAAR) 50 MG tablet Take 1 tablet (50 mg total) by mouth once daily.    pravastatin (PRAVACHOL) 80 MG tablet Take 1 tablet (80 mg total) by mouth once daily.     Family History       Problem Relation (Age of Onset)    Alzheimer's disease Sister    Coronary artery disease Mother, Sister, Brother    Heart attack Mother, Brother    Heart failure Sister          Tobacco Use    Smoking status: Never    Smokeless tobacco: Never   Substance and Sexual Activity    Alcohol use: Not on file    Drug use: Not on file    Sexual activity: Not on file       Review of Systems   Constitutional:  Positive for fatigue.   HENT: Negative.     Respiratory:  Positive for shortness of breath.    Cardiovascular:  Positive for leg swelling. Negative for chest pain.   Genitourinary: Negative.    Musculoskeletal:  Positive for gait problem.   Hematological: Negative.    Psychiatric/Behavioral: Negative.       Complete review of system was done and is negative except written above  Objective:     Vital Signs (Most Recent):  Temp: 97.9 °F (36.6 °C) (02/23/24 0349)  Pulse: 87 (02/23/24 0357)  Resp: 18 (02/23/24 0357)  BP: (!) 116/58 (02/23/24 0349)  SpO2: 96 % (02/23/24 0357) Vital Signs (24h Range):  Temp:  [97.9 °F (36.6 °C)-98.2 °F (36.8 °C)] 97.9 °F (36.6 °C)  Pulse:  [] 87  Resp:  [18-27] 18  SpO2:  [95 %-98 %] 96 %  BP:  (116-190)/() 116/58   Weight: 61.9 kg (136 lb 7.4 oz)  Body mass index is 28.52 kg/m².  SpO2: 96 %       Intake/Output Summary (Last 24 hours) at 2/23/2024 0818  Last data filed at 2/23/2024 0558  Gross per 24 hour   Intake 293.3 ml   Output 1150 ml   Net -856.7 ml     Lines/Drains/Airways       Drain  Duration             Female External Urinary Catheter w/ Suction 02/22/24 2230 <1 day              Peripheral Intravenous Line  Duration                  Peripheral IV - Single Lumen 02/22/24 1713 22 G Anterior;Left Wrist <1 day                  Significant Labs:  Recent Results (from the past 72 hour(s))   COVID-19 Rapid Screening    Collection Time: 02/22/24  5:20 PM   Result Value Ref Range    SARS COV-2 MOLECULAR Negative Negative   Comprehensive metabolic panel    Collection Time: 02/22/24  5:25 PM   Result Value Ref Range    Sodium Level 134 (L) 135 - 145 mmol/L    Potassium Level 4.2 3.5 - 5.1 mmol/L    Chloride 99 98 - 110 mmol/L    Carbon Dioxide 29 21 - 32 mmol/L    Glucose Level 115 70 - 115 mg/dL    Blood Urea Nitrogen 17.0 7.0 - 20.0 mg/dL    Creatinine 0.72 0.66 - 1.25 mg/dL    Calcium Level Total 9.1 8.4 - 10.2 mg/dL    Protein Total 7.1 6.3 - 8.2 gm/dL    Albumin Level 3.8 3.4 - 5.0 g/dL    Globulin 3.3 2.0 - 3.9 gm/dL    Albumin/Globulin Ratio 1.2 ratio    Bilirubin Total 0.5 0.0 - 1.0 mg/dL    Alkaline Phosphatase 86 50 - 144 unit/L    Alanine Aminotransferase 17 1 - 45 unit/L    Aspartate Aminotransferase 29 14 - 36 unit/L    eGFR 80 mls/min/1.73/m2    Anion Gap 6.0 2.0 - 13.0 mEq/L    BUN/Creatinine Ratio 24 (H) 12 - 20   Troponin I    Collection Time: 02/22/24  5:25 PM   Result Value Ref Range    Troponin-I 0.081 (H) 0.000 - 0.034 ng/mL   NT-Pro Natriuretic Peptide    Collection Time: 02/22/24  5:25 PM   Result Value Ref Range    ProBNP 3,550.0 (H) 0.0 - 124.9 PG/ML   CBC with Differential    Collection Time: 02/22/24  5:25 PM   Result Value Ref Range    WBC 7.13 4.00 - 11.50 x10(3)/mcL     RBC 4.08 4.00 - 5.10 x10(6)/mcL    Hgb 13.0 11.8 - 16.0 g/dL    Hct 38.1 36.0 - 48.0 %    MCV 93.4 79.0 - 99.0 fL    MCH 31.9 27.0 - 34.0 pg    MCHC 34.1 31.0 - 37.0 g/dL    RDW 12.8 11.0 - 14.5 %    Platelet 241 140 - 371 x10(3)/mcL    MPV 9.3 (L) 9.4 - 12.4 fL    Neut % 75.2 (H) 37 - 73 %    Lymph % 9.0 (L) 20 - 55 %    Mono % 11.8 4.7 - 12.5 %    Eos % 2.8 0.7 - 7 %    Basophil % 0.6 0.1 - 1.2 %    Lymph # 0.64 (L) 1.16 - 3.74 x10(3)/mcL    Neut # 5.37 1.56 - 6.13 x10(3)/mcL    Mono # 0.84 (H) 0.24 - 0.36 x10(3)/mcL    Eos # 0.20 0.04 - 0.36 x10(3)/mcL    Baso # 0.04 0.01 - 0.08 x10(3)/mcL    IG# 0.04 (H) 0.0001 - 0.031 x10(3)/mcL    IG% 0.6 (H) 0 - 0.5 %    NRBC% 0.0 <=1 %   Troponin I    Collection Time: 02/22/24  6:52 PM   Result Value Ref Range    Troponin-I 0.129 (HH) 0.000 - 0.034 ng/mL   APTT    Collection Time: 02/22/24  7:42 PM   Result Value Ref Range    PTT 27.2 23.0 - 29.4 seconds   Protime-INR    Collection Time: 02/22/24  7:42 PM   Result Value Ref Range    PT 10.5 9.3 - 11.9 seconds    INR 1.0 <5.0   CBC with Differential    Collection Time: 02/22/24  7:42 PM   Result Value Ref Range    WBC 8.72 4.00 - 11.50 x10(3)/mcL    RBC 4.19 4.00 - 5.10 x10(6)/mcL    Hgb 13.1 11.8 - 16.0 g/dL    Hct 38.7 36.0 - 48.0 %    MCV 92.4 79.0 - 99.0 fL    MCH 31.3 27.0 - 34.0 pg    MCHC 33.9 31.0 - 37.0 g/dL    RDW 12.8 11.0 - 14.5 %    Platelet 241 140 - 371 x10(3)/mcL    MPV 9.3 (L) 9.4 - 12.4 fL    Neut % 89.7 (H) 37 - 73 %    Lymph % 5.5 (L) 20 - 55 %    Mono % 3.0 (L) 4.7 - 12.5 %    Eos % 0.7 0.7 - 7 %    Basophil % 0.5 0.1 - 1.2 %    Lymph # 0.48 (L) 1.16 - 3.74 x10(3)/mcL    Neut # 7.83 (H) 1.56 - 6.13 x10(3)/mcL    Mono # 0.26 0.24 - 0.36 x10(3)/mcL    Eos # 0.06 0.04 - 0.36 x10(3)/mcL    Baso # 0.04 0.01 - 0.08 x10(3)/mcL    IG# 0.05 (H) 0.0001 - 0.031 x10(3)/mcL    IG% 0.6 (H) 0 - 0.5 %    NRBC% 0.0 <=1 %   TSH    Collection Time: 02/23/24  1:49 AM   Result Value Ref Range    TSH 0.590 0.360 - 3.740 uIU/mL    Basic Metabolic Panel    Collection Time: 02/23/24  1:49 AM   Result Value Ref Range    Sodium Level 136 135 - 145 mmol/L    Potassium Level 3.9 3.5 - 5.1 mmol/L    Chloride 98 98 - 110 mmol/L    Carbon Dioxide 30 21 - 32 mmol/L    Glucose Level 194 (H) 70 - 115 mg/dL    Blood Urea Nitrogen 18.0 7.0 - 20.0 mg/dL    Creatinine 0.76 0.66 - 1.25 mg/dL    BUN/Creatinine Ratio 24 (H) 12 - 20    Calcium Level Total 9.3 8.4 - 10.2 mg/dL    Anion Gap 8.0 2.0 - 13.0 mEq/L    eGFR 75 mls/min/1.73/m2   PTT Heparin Monitoring    Collection Time: 02/23/24  1:49 AM   Result Value Ref Range    PTT Heparin Monitor 50.0 <=80.0 seconds   CBC with Differential    Collection Time: 02/23/24  1:49 AM   Result Value Ref Range    WBC 4.18 4.00 - 11.50 x10(3)/mcL    RBC 3.96 (L) 4.00 - 5.10 x10(6)/mcL    Hgb 12.3 11.8 - 16.0 g/dL    Hct 36.3 36.0 - 48.0 %    MCV 91.7 79.0 - 99.0 fL    MCH 31.1 27.0 - 34.0 pg    MCHC 33.9 31.0 - 37.0 g/dL    RDW 12.7 11.0 - 14.5 %    Platelet 247 140 - 371 x10(3)/mcL    MPV 9.6 9.4 - 12.4 fL    NRBC% 0.0 <=1 %   Manual Differential    Collection Time: 02/23/24  1:49 AM   Result Value Ref Range    Neutrophils % 92 (H) 37 - 73 %    Lymphs % 6 (L) 20 - 55 %    Monocytes % 2 0 - 10 %    Neutrophils Abs Calc 3.8456 2.1 - 9.2 x10(3)/mcL    Lymphs Abs 0.2508 (L) 0.6 - 4.6 x10(3)/mcL    Monocytes Abs 0.0836 (L) 0.1 - 1.3 x10(3)/mcL    Platelets Normal Normal, Adequate    RBC Morph Normal Normal   Urinalysis    Collection Time: 02/23/24  4:11 AM   Result Value Ref Range    Color, UA Yellow Yellow, Light-Yellow, Dark Yellow, Jennifer, Straw    Appearance, UA Clear Clear    Specific Gravity, UA 1.010 1.005 - 1.030    pH, UA 6.0 5.0 - 8.5    Protein, UA Negative Negative    Glucose, UA Negative Negative, Normal    Ketones, UA Negative Negative    Blood, UA Negative Negative    Bilirubin, UA Negative Negative    Urobilinogen, UA 0.2 0.2, 1.0, Normal    Nitrites, UA Negative Negative    Leukocyte Esterase, UA Negative  Negative   POCT glucose    Collection Time: 02/23/24  8:00 AM   Result Value Ref Range    POCT Glucose 150 (H) 70 - 110 mg/dL     Significant Imaging:  Imaging Results              X-Ray Chest AP Portable (Final result)  Result time 02/22/24 18:36:27      Final result by Ender Fermin MD (02/22/24 18:36:27)                   Impression:      Shallow depth of inspiration with hazy accentuation to the perihilar bronchovesicular and interstitial markings.      Electronically signed by: Ender Fermin  Date:    02/22/2024  Time:    18:36               Narrative:    EXAMINATION:  XR CHEST AP PORTABLE    CLINICAL HISTORY:  Chest Pain;    TECHNIQUE:  Single frontal view of the chest was performed.    COMPARISON:  12/19/2023    FINDINGS:  The patient is imaged with a shallow depth of inspiration and there is hazy exaggeration of the perihilar bronchovesicular and interstitial markings.  A calcified granuloma is seen at the right apex and there may be some small calcifications in the right hilar region.    There is also is mild exaggeration to the size of the cardiac silhouette.  No evidence of a pleural effusion is identified.  Atherosclerotic calcification is present involving the aortic arch.    Bony demineralization with moderate degenerative findings involve the thoracic spine and prominent degenerative findings are noted involving the shoulders.                                    EKG:     Telemetry:  SR with PVCs - 75    Physical Exam  Constitutional:       General: She is not in acute distress.  HENT:      Mouth/Throat:      Mouth: Mucous membranes are moist.   Eyes:      Extraocular Movements: Extraocular movements intact.   Cardiovascular:      Rate and Rhythm: Normal rate and regular rhythm.      Heart sounds: Murmur heard.   Pulmonary:      Effort: Pulmonary effort is normal.      Breath sounds: Wheezing and rales present.   Abdominal:      Palpations: Abdomen is soft.   Musculoskeletal:      Right lower leg:  Edema present.      Left lower leg: Edema present.   Skin:     General: Skin is warm and dry.   Neurological:      General: No focal deficit present.      Mental Status: She is alert and oriented to person, place, and time.             Home Medications:   No current facility-administered medications on file prior to encounter.     Current Outpatient Medications on File Prior to Encounter   Medication Sig Dispense Refill    albuterol (PROVENTIL HFA) 90 mcg/actuation inhaler Inhale 2 puffs into the lungs 4 (four) times daily. Rescue      albuterol (PROVENTIL) 5 mg/mL nebulizer solution Take 2.5 mg by nebulization every 4 (four) hours as needed for Wheezing. Rescue      cyanocobalamin, vitamin B-12, 1,000 mcg/mL Kit Inject 1,000 mcg as directed every 30 days.      losartan (COZAAR) 50 MG tablet Take 1 tablet (50 mg total) by mouth once daily. 90 tablet 3    pravastatin (PRAVACHOL) 80 MG tablet Take 1 tablet (80 mg total) by mouth once daily. 90 tablet 3     Current Inpatient Medications:    Current Facility-Administered Medications:     acetaminophen tablet 650 mg, 650 mg, Oral, Q6H PRN, Emma Breaux MD    albuterol-ipratropium 2.5 mg-0.5 mg/3 mL nebulizer solution 3 mL, 3 mL, Nebulization, Q6H PRN, Emma Breaux MD, 3 mL at 02/23/24 0357    aspirin chewable tablet 81 mg, 81 mg, Oral, Daily, Emma Breaux MD    dextrose 10% bolus 125 mL 125 mL, 12.5 g, Intravenous, PRNNamita Olayiwola, MD    dextrose 10% bolus 250 mL 250 mL, 25 g, Intravenous, PRN, mEma Breaux MD    furosemide injection 40 mg, 40 mg, Intravenous, Q12H, Emma Breaux MD    glucagon (human recombinant) injection 1 mg, 1 mg, Intramuscular, PRNNamita Olayiwola, MD    glucose chewable tablet 16 g, 16 g, Oral, PRNNamita Olayiwola, MD    glucose chewable tablet 24 g, 24 g, Oral, PRNNamita Olayiwola, MD    guaiFENesin 100 mg/5 ml syrup 200 mg, 200 mg, Oral, Q4H PRN, Emma Breaux MD    heparin 25,000 units in  dextrose 5% (100 units/ml) IV bolus from bag LOW INTENSITY NOMOGRAM - [facility], 60 Units/kg (Adjusted), Intravenous, PRN, Alberto Mckeon MD    heparin 25,000 units in dextrose 5% (100 units/ml) IV bolus from bag LOW INTENSITY NOMOGRAM - [facility], 30 Units/kg (Adjusted), Intravenous, PRN, Alberto Mckeon MD    heparin 25,000 units in dextrose 5% 250 mL (100 units/mL) infusion LOW INTENSITY nomogram - [facility], 0-40 Units/kg/hr (Adjusted), Intravenous, Continuous, Alberto Mckeon MD, Last Rate: 6.4 mL/hr at 02/23/24 0248, 12 Units/kg/hr at 02/23/24 0248    insulin aspart U-100 pen 0-5 Units, 0-5 Units, Subcutaneous, QID (AC + HS) PRN, Emma Breaux MD    losartan tablet 50 mg, 50 mg, Oral, Daily, Alberto Mckeon MD    ondansetron injection 4 mg, 4 mg, Intravenous, Q6H PRN, Emma Breaux MD    pantoprazole EC tablet 40 mg, 40 mg, Oral, Daily, Emma Breaux MD    pravastatin tablet 80 mg, 80 mg, Oral, Daily, Alberto Mckeon MD  VTE Risk Mitigation (From admission, onward)           Ordered     heparin 25,000 units in dextrose 5% (100 units/ml) IV bolus from bag LOW INTENSITY NOMOGRAM - [facility]  As needed (PRN)        Question:  Heparin Infusion Adjustment (DO NOT MODIFY ANSWER)  Answer:  [email or web address]\Images\Pharmacy\HeparinInfusions\heparin LOW INTENSITY nomogram for [facility] [id number]    02/22/24 1926     heparin 25,000 units in dextrose 5% (100 units/ml) IV bolus from bag LOW INTENSITY NOMOGRAM - [facility]  As needed (PRN)        Question:  Heparin Infusion Adjustment (DO NOT MODIFY ANSWER)  Answer:  [email or web address]\Images\Pharmacy\HeparinInfusions\heparin LOW INTENSITY nomogram for [facility] [id number]    02/22/24 1926     heparin 25,000 units in dextrose 5% 250 mL (100 units/mL) infusion LOW INTENSITY nomogram - [facility]  Continuous        Question:  Begin at (units/kg/hr)  Answer:  12 02/22/24 1926                  Assessment:   Acute heart failure unknown type    Echo pending    On ARB  Non ST elevation  myocardial infarction type 2 in the setting of heart failure    On Heparin    Repeat Tn  Aortic Murmur - echo pending  Hypertension  Hyperlipidemia  Diabetes mellitus  Recent history of COVID infection      Plan:   Continue  IV Lasix 40 mg b.i.d. until euvolemic  Continue Losartan  Echocardiogram  Trend Tn until peak  Continue statin and ASA  Add BB when HF better compensated (still with rales/wheezing)  Will need OP follow up        Thank you for your consult.     María Zayas, SINA  Cardiology  [facility]  02/23/2024

## 2024-02-23 NOTE — SUBJECTIVE & OBJECTIVE
Interval History:     Review of Systems   Constitutional:  Positive for appetite change. Negative for activity change, chills, diaphoresis, fatigue and fever.   HENT:  Negative for congestion, ear pain, rhinorrhea and sore throat.    Eyes:  Negative for pain, discharge and redness.   Respiratory:  Positive for shortness of breath. Negative for apnea, cough, choking, chest tightness and wheezing.    Cardiovascular:  Negative for chest pain, palpitations and leg swelling.   Gastrointestinal:  Negative for abdominal distention, abdominal pain, blood in stool, constipation, diarrhea, nausea and vomiting.   Endocrine: Negative for cold intolerance, heat intolerance and polyuria.   Genitourinary:  Negative for difficulty urinating, dysuria, frequency, hematuria and pelvic pain.   Musculoskeletal:  Negative for arthralgias, back pain, gait problem, joint swelling and myalgias.   Skin:  Negative for color change, pallor, rash and wound.   Neurological:  Negative for seizures, syncope, speech difficulty, numbness and headaches.   Psychiatric/Behavioral:  Negative for agitation, confusion, hallucinations and sleep disturbance. The patient is not nervous/anxious.      Objective:     Vital Signs (Most Recent):  Temp: 97.7 °F (36.5 °C) (02/23/24 1103)  Pulse: 70 (02/23/24 1116)  Resp: 20 (02/23/24 1116)  BP: (!) 73/48 (02/23/24 1103)  SpO2: 97 % (02/23/24 1116) Vital Signs (24h Range):  Temp:  [97 °F (36.1 °C)-98.2 °F (36.8 °C)] 97.7 °F (36.5 °C)  Pulse:  [] 70  Resp:  [18-27] 20  SpO2:  [95 %-98 %] 97 %  BP: ()/() 73/48     Weight: 61.9 kg (136 lb 7.4 oz)  Body mass index is 28.52 kg/m².    Intake/Output Summary (Last 24 hours) at 2/23/2024 1137  Last data filed at 2/23/2024 0844  Gross per 24 hour   Intake 503.3 ml   Output 1150 ml   Net -646.7 ml         Physical Exam  Constitutional:       General: She is not in acute distress.     Appearance: Normal appearance. She is ill-appearing. She is not  toxic-appearing or diaphoretic.   HENT:      Head: Normocephalic and atraumatic.      Right Ear: External ear normal.      Left Ear: External ear normal.      Nose: Nose normal. No congestion or rhinorrhea.      Mouth/Throat:      Mouth: Mucous membranes are moist.      Pharynx: Oropharynx is clear.   Eyes:      Extraocular Movements: Extraocular movements intact.      Conjunctiva/sclera: Conjunctivae normal.      Pupils: Pupils are equal, round, and reactive to light.   Neck:      Vascular: No carotid bruit.   Cardiovascular:      Rate and Rhythm: Normal rate and regular rhythm.      Pulses: Normal pulses.      Heart sounds: Normal heart sounds. No murmur heard.  Pulmonary:      Effort: Pulmonary effort is normal. No respiratory distress.      Breath sounds: Normal breath sounds. No wheezing, rhonchi or rales.   Abdominal:      General: Abdomen is flat. Bowel sounds are normal. There is no distension.      Palpations: Abdomen is soft.      Tenderness: There is no abdominal tenderness. There is no guarding.   Musculoskeletal:         General: No swelling or tenderness. Normal range of motion.      Cervical back: Normal range of motion and neck supple. No tenderness.      Right lower leg: No edema.      Left lower leg: No edema.   Lymphadenopathy:      Cervical: No cervical adenopathy.   Skin:     General: Skin is warm and dry.      Coloration: Skin is not jaundiced or pale.   Neurological:      General: No focal deficit present.      Mental Status: She is alert and oriented to person, place, and time. Mental status is at baseline.      Cranial Nerves: No cranial nerve deficit.      Motor: No weakness.      Coordination: Coordination normal.      Gait: Gait normal.   Psychiatric:         Mood and Affect: Mood normal.         Behavior: Behavior normal.         Thought Content: Thought content normal.         Judgment: Judgment normal.             Significant Labs: All pertinent labs within the past 24 hours have been  reviewed.    Significant Imaging: I have reviewed all pertinent imaging results/findings within the past 24 hours.

## 2024-02-23 NOTE — ASSESSMENT & PLAN NOTE
Continue heparin drip.  Trend troponins.  Continue her current medications repeat some labs tomorrow.  DNR status will be assigned as patient has a [place]

## 2024-02-23 NOTE — H&P
Chief Complaint; shortness of breaths over the past few days    HPI:   Patient is a 89 y.o. female with a medical history of hypertension, type 2 diabetes mellitus, hyperlipidemia, underlying CHF, unspecified this is systolic or diastolic dysfunction, COVID-19 infection about 2 weeks ago, chronic respiratory failure on presumed 2 L of nasal cannula oxygen therapy at the nursing home, osteoarthritis presents to the ER on account of shortness of breaths over the past few days.    Patient has been at her usual state of health until about few days ago when she developed progressive shortness of breath associated with easy fatigability, cough productive of whitish sputum.  Occasional wheezing.  No chest pain.  No nausea or vomiting.  No fever.  She was brought to the ER due to worsening symptoms.    At the ER, patient had a chest x-ray which shows evidence of pulmonary vascular congestion.  Patient is currently on 3 L of nasal cannula oxygen therapy.  Patient has been improving.  Patient was also found to have elevated troponin for which the patient has been evaluated by the cardiologist and recommended conservative management.  Patient has been placed on IV heparin drip  EKG shows sinus rhythm with nonspecific ST wave changes.    PCP Delbert Bhandari MD MD        Past Medical History:   Diagnosis Date    Background diabetic retinopathy     Carotid artery stenosis     Cataract extraction status     Combined hyperlipidemia     HTN (hypertension)     Polycythemia     Type 2 diabetes mellitus without complications     Unspecified osteoarthritis, unspecified site     Vitamin D deficiency         Past Surgical History:   Procedure Laterality Date    EPIDURAL STEROID INJECTION INTO LUMBAR SPINE      EXPLORATORY LAPAROTOMY      hx total knee repla          Medications Prior to Admission   Medication Sig Dispense Refill Last Dose    albuterol (PROVENTIL) 5 mg/mL nebulizer solution Take 2.5 mg by nebulization every 4 (four) hours  "as needed for Wheezing. Rescue   2/22/2024    cyanocobalamin, vitamin B-12, 1,000 mcg/mL Kit Inject 1,000 mcg as directed every 30 days.   02/01/2024    losartan (COZAAR) 50 MG tablet Take 1 tablet (50 mg total) by mouth once daily. 90 tablet 3 2/22/2024    pravastatin (PRAVACHOL) 80 MG tablet Take 1 tablet (80 mg total) by mouth once daily. 90 tablet 3 2/22/2024       Review of patient's allergies indicates:   Allergen Reactions    Codeine Itching        Social History     Tobacco Use    Smoking status: Never    Smokeless tobacco: Never   Substance Use Topics    Alcohol use: Not on file        Family History   Problem Relation Age of Onset    Heart attack Mother     Coronary artery disease Mother     Heart failure Sister     Coronary artery disease Sister     Alzheimer's disease Sister     Coronary artery disease Brother     Heart attack Brother        Review of Systems  Review of Systems   Constitutional: Negative for fever.   HEENT: Negative for drooling, ear pain, facial swelling and nosebleeds.    Eyes: Negative for discharge and visual disturbance.   Respiratory; cough, shortness of breaths.  No chest pain  Cardiovascular: negative for chest pain , positive for shortness of breaths  Gastrointestinal: Negative for anal bleeding and rectal pain. Negative Nausea or Vomiting.  Genitourinary: Negative for decreased urine volume and dysuria  Musculoskeletal: Negative for neck pain.   Skin: Negative for rash.   Neurological: negative for numbness, negative for weakness,negative for seizures and facial asymmetry.              Objective:     I/O this shift:  In: -   Out: 1000 [Urine:1000]    /85 (BP Location: Right arm, Patient Position: Sitting)   Pulse 100   Temp 98.2 °F (36.8 °C) (Oral)   Resp (!) 27   Ht 4' 10" (1.473 m)   Wt 62.5 kg (137 lb 12.8 oz)   SpO2 96%   Breastfeeding No   BMI 28.80 kg/m²     General Appearance:    Awake, alert, not in acute distress   HEENT:   atraumatic, PERRL, EOM intact, " "conjuctiva pink, sclera anicteric, oropharynx moist, no lesions noted   Neck:    Supple, no JVD, no carotid bruits, no lymphadenopathy or thyromegaly noted       Pulmonary:   Coarse to auscultation bilaterally, reduced breath sounds bileterally.  Bibasilar crackles,   Cardiovascular:    Regular rate and rhythm, S1 S2 normal   Abdomen:     Soft, non-tender,nondistended, bowel sounds active all four quadrants, no masses, no organomegaly   Extremities:  Bilateral lower extremities edema, no cyanosis or clubbing noted       Skin:   No bruises noted.       Neurologic: Alert, awake, oriented x 3, moves all extremities.                 Data Review :   Labs:    CBC:   Lab Results   Component Value Date    WBC 8.72 02/22/2024    RBC 4.19 02/22/2024    HGB 13.1 02/22/2024    HCT 38.7 02/22/2024     02/22/2024     CMP:   Lab Results   Component Value Date     (L) 02/22/2024    K 4.2 02/22/2024    CO2 29 02/22/2024    BUN 17.0 02/22/2024    CREATININE 0.72 02/22/2024    CALCIUM 9.1 02/22/2024    ALKPHOS 86 02/22/2024    AST 29 02/22/2024    ALT 17 02/22/2024    ALBUMIN 3.8 02/22/2024    BILITOT 0.5 02/22/2024     Cardiac markers: No results found for: "BNP", "CKMB", "CKTOTAL", "TROPONIN", "MYOGLOBIN"    Radiology:  Micro:  No components found for: "BLOODCX", "SPUTUMCX", "URINECX"    [facility]:  [facility]        Assessment & Plan:   1. Acute on chronic hypoxic respiratory failure; likely related to CHF with acute exacerbation.  Chest x-ray shows evidence of pulmonary edema.  Currently on 3 L of nasal cannula oxygen therapy.  Continue IV Lasix and continue bronchodilators p.r.n..    2. CHF with acute exacerbation; probably diastolic versus systolic dysfunction, follow up 2D echocardiogram, start patient on IV Lasix 40 q.12h hourly.  To hold off on beta-blocker due to CHF exacerbation as recommended by the cardiologist.  Monitor input and output.    3. Elevated troponins; this may be related to type 2 MI in the " setting of CHF exacerbation, however, patient has been commenced on IV heparin drip, continue aspirin, statin.  We will start beta-blocker prior to discharge.  Patient denies any chest pain.    4. Hypertension; blood pressure fairly stable.  Continue losartan.    5. Type 2 diabetes mellitus; place patient on insulin therapy and monitor finger glucose serially.    6. Hyperlipidemia; continue statin.    7. Osteoarthritis; continue analgesics.      8. Maintain the patient on DVT prophylaxis; already on IV heparin drip    Disposition; follow up 2D echocardiogram and follow up with further recommendation by the cardiologist.    This note was completed using voice recognition software and transcription errors may occur.    This Encounter was via Telemedicine (Both audio and visual ) and from [place], [place].  Patient was located in [place].    Evaluation  of the patient was done alongside the patient's nursing staff     Patient admitted to inpatient status.    Emma Breaux  2/22/2024  10:19 PM

## 2024-02-23 NOTE — HPI
Patient is a 89 y.o. female with a medical history of hypertension, type 2 diabetes mellitus, hyperlipidemia, underlying CHF, unspecified this is systolic or diastolic dysfunction, COVID-19 infection about 2 weeks ago, chronic respiratory failure on presumed 2 L of nasal cannula oxygen therapy at the nursing home, osteoarthritis presents to the ER on account of shortness of breaths over the past few days.    Patient has been at her usual state of health until about few days ago when she developed progressive shortness of breath associated with easy fatigability, cough productive of whitish sputum.  Occasional wheezing.  No chest pain.  No nausea or vomiting.  No fever.  She was brought to the ER due to worsening symptoms.    At the ER, patient had a chest x-ray which shows evidence of pulmonary vascular congestion.  Patient is currently on 3 L of nasal cannula oxygen therapy.  Patient has been improving.  Patient was also found to have elevated troponin for which the patient has been evaluated by the cardiologist and recommended conservative management.  Patient has been placed on IV heparin drip  EKG shows sinus rhythm with nonspecific ST wave changes.     PCP Delbert Bhandari MD MD              Past Medical History:   Diagnosis Date    Background diabetic retinopathy      Carotid artery stenosis      Cataract extraction status      Combined hyperlipidemia      HTN (hypertension)      Polycythemia      Type 2 diabetes mellitus without complications      Unspecified osteoarthritis, unspecified site      Vitamin D deficiency

## 2024-02-23 NOTE — PLAN OF CARE
02/23/24 1046   Discharge Assessment   Assessment Type Discharge Planning Assessment   Confirmed/corrected address, phone number and insurance Yes   Confirmed Demographics Correct on Facesheet   Source of Information health record   When was your last doctors appointment?   (NH resident)   Communicated HALI with patient/caregiver Date not available/Unable to determine   Reason For Admission CHF   People in Home facility resident   Facility Arrived From: [facility]   Do you expect to return to your current living situation? Yes   Do you have help at home or someone to help you manage your care at home? Yes   Who are your caregiver(s) and their phone number(s)? NH staff   Prior to hospitilization cognitive status: Alert/Oriented   Current cognitive status: Alert/Oriented   Walking or Climbing Stairs Difficulty yes   Walking or Climbing Stairs ambulation difficulty, requires equipment;transferring difficulty, assistance 1 person   Mobility Management walker   Dressing/Bathing Difficulty yes   Dressing/Bathing bathing difficulty, assistance 1 person;dressing difficulty, assistance 1 person   Dressing/Bathing Management NH staff   Home Accessibility wheelchair accessible   Home Layout Able to live on 1st floor   Equipment Currently Used at Home hospital bed;oxygen;walker, rolling   Readmission within 30 days? No   Patient currently being followed by outpatient case management? No   Do you currently have service(s) that help you manage your care at home? Yes   Name and Contact number of agency [facility] [phone]   Is the pt/caregiver preference to resume services with current agency Yes   Do you take prescription medications? Yes   Do you have prescription coverage? Yes   Coverage MCR   Do you have any problems affording any of your prescribed medications? No   Is the patient taking medications as prescribed? yes   Who is going to help you get home at discharge? NH transportation   How do you get to doctors appointments?  other (see comments)  (NH transportation)   Are you on dialysis? No   Do you take coumadin? No   Discharge Plan A Return to nursing home   DME Needed Upon Discharge  none   Discharge Plan discussed with: Patient   Transition of Care Barriers None

## 2024-02-23 NOTE — PROGRESS NOTES
[facility]-Med/Surg  [facility] Medicine  Progress Note    Patient Name: Priscila Randhawa  MRN: [id number]  Patient Class: IP- Inpatient   Admission Date: 2/22/2024  Length of Stay: 1 days  Attending Physician: Emma Breaux MD  Primary Care Provider: Delbert Bhandari MD        Subjective:     Principal Problem:<principal problem not specified>        HPI:  Patient is a 89 y.o. female with a medical history of hypertension, type 2 diabetes mellitus, hyperlipidemia, underlying CHF, unspecified this is systolic or diastolic dysfunction, COVID-19 infection about 2 weeks ago, chronic respiratory failure on presumed 2 L of nasal cannula oxygen therapy at the nursing home, osteoarthritis presents to the ER on account of shortness of breaths over the past few days.    Patient has been at her usual state of health until about few days ago when she developed progressive shortness of breath associated with easy fatigability, cough productive of whitish sputum.  Occasional wheezing.  No chest pain.  No nausea or vomiting.  No fever.  She was brought to the ER due to worsening symptoms.    At the ER, patient had a chest x-ray which shows evidence of pulmonary vascular congestion.  Patient is currently on 3 L of nasal cannula oxygen therapy.  Patient has been improving.  Patient was also found to have elevated troponin for which the patient has been evaluated by the cardiologist and recommended conservative management.  Patient has been placed on IV heparin drip  EKG shows sinus rhythm with nonspecific ST wave changes.     PCP Delbert Bhandari MD MD              Past Medical History:   Diagnosis Date    Background diabetic retinopathy      Carotid artery stenosis      Cataract extraction status      Combined hyperlipidemia      HTN (hypertension)      Polycythemia      Type 2 diabetes mellitus without complications      Unspecified osteoarthritis, unspecified site      Vitamin D deficiency        Overview/Hospital  Course:  02/23/2024 patient with non STEMI on heparin drip.  Cardiology is following.  As she has a lot post will assigned DNR.  We will trend troponins continue the heparin drip repeat some labs tomorrow.  She has not currently having any pain whatsoever.    Interval History:     Review of Systems   Constitutional:  Positive for appetite change. Negative for activity change, chills, diaphoresis, fatigue and fever.   HENT:  Negative for congestion, ear pain, rhinorrhea and sore throat.    Eyes:  Negative for pain, discharge and redness.   Respiratory:  Positive for shortness of breath. Negative for apnea, cough, choking, chest tightness and wheezing.    Cardiovascular:  Negative for chest pain, palpitations and leg swelling.   Gastrointestinal:  Negative for abdominal distention, abdominal pain, blood in stool, constipation, diarrhea, nausea and vomiting.   Endocrine: Negative for cold intolerance, heat intolerance and polyuria.   Genitourinary:  Negative for difficulty urinating, dysuria, frequency, hematuria and pelvic pain.   Musculoskeletal:  Negative for arthralgias, back pain, gait problem, joint swelling and myalgias.   Skin:  Negative for color change, pallor, rash and wound.   Neurological:  Negative for seizures, syncope, speech difficulty, numbness and headaches.   Psychiatric/Behavioral:  Negative for agitation, confusion, hallucinations and sleep disturbance. The patient is not nervous/anxious.      Objective:     Vital Signs (Most Recent):  Temp: 97.7 °F (36.5 °C) (02/23/24 1103)  Pulse: 70 (02/23/24 1116)  Resp: 20 (02/23/24 1116)  BP: (!) 73/48 (02/23/24 1103)  SpO2: 97 % (02/23/24 1116) Vital Signs (24h Range):  Temp:  [97 °F (36.1 °C)-98.2 °F (36.8 °C)] 97.7 °F (36.5 °C)  Pulse:  [] 70  Resp:  [18-27] 20  SpO2:  [95 %-98 %] 97 %  BP: ()/() 73/48     Weight: 61.9 kg (136 lb 7.4 oz)  Body mass index is 28.52 kg/m².    Intake/Output Summary (Last 24 hours) at 2/23/2024 1137  Last  data filed at 2/23/2024 0844  Gross per 24 hour   Intake 503.3 ml   Output 1150 ml   Net -646.7 ml         Physical Exam  Constitutional:       General: She is not in acute distress.     Appearance: Normal appearance. She is ill-appearing. She is not toxic-appearing or diaphoretic.   HENT:      Head: Normocephalic and atraumatic.      Right Ear: External ear normal.      Left Ear: External ear normal.      Nose: Nose normal. No congestion or rhinorrhea.      Mouth/Throat:      Mouth: Mucous membranes are moist.      Pharynx: Oropharynx is clear.   Eyes:      Extraocular Movements: Extraocular movements intact.      Conjunctiva/sclera: Conjunctivae normal.      Pupils: Pupils are equal, round, and reactive to light.   Neck:      Vascular: No carotid bruit.   Cardiovascular:      Rate and Rhythm: Normal rate and regular rhythm.      Pulses: Normal pulses.      Heart sounds: Normal heart sounds. No murmur heard.  Pulmonary:      Effort: Pulmonary effort is normal. No respiratory distress.      Breath sounds: Normal breath sounds. No wheezing, rhonchi or rales.   Abdominal:      General: Abdomen is flat. Bowel sounds are normal. There is no distension.      Palpations: Abdomen is soft.      Tenderness: There is no abdominal tenderness. There is no guarding.   Musculoskeletal:         General: No swelling or tenderness. Normal range of motion.      Cervical back: Normal range of motion and neck supple. No tenderness.      Right lower leg: No edema.      Left lower leg: No edema.   Lymphadenopathy:      Cervical: No cervical adenopathy.   Skin:     General: Skin is warm and dry.      Coloration: Skin is not jaundiced or pale.   Neurological:      General: No focal deficit present.      Mental Status: She is alert and oriented to person, place, and time. Mental status is at baseline.      Cranial Nerves: No cranial nerve deficit.      Motor: No weakness.      Coordination: Coordination normal.      Gait: Gait normal.    Psychiatric:         Mood and Affect: Mood normal.         Behavior: Behavior normal.         Thought Content: Thought content normal.         Judgment: Judgment normal.             Significant Labs: All pertinent labs within the past 24 hours have been reviewed.    Significant Imaging: I have reviewed all pertinent imaging results/findings within the past 24 hours.    Assessment/Plan:      Non-STEMI (non-ST elevated myocardial infarction)  Continue heparin drip.  Trend troponins.  Continue her current medications repeat some labs tomorrow.  DNR status will be assigned as patient has a [place]      Mixed hyperlipidemia    Continue current medications    Essential hypertension  Chronic, controlled. Latest blood pressure and vitals reviewed-     Temp:  [97 °F (36.1 °C)-98.2 °F (36.8 °C)]   Pulse:  []   Resp:  [18-27]   BP: ()/()   SpO2:  [95 %-98 %] .   Home meds for hypertension were reviewed and noted below.   Hypertension Medications               losartan (COZAAR) 50 MG tablet Take 1 tablet (50 mg total) by mouth once daily.            While in the hospital, will manage blood pressure as follows; Continue home antihypertensive regimen    Will utilize p.r.n. blood pressure medication only if patient's blood pressure greater than 180/110 and she develops symptoms such as worsening chest pain or shortness of breath.    Well controlled type 2 diabetes mellitus  Patient's FSGs are controlled on current medication regimen.  Last A1c reviewed-   Lab Results   Component Value Date    HGBA1C 5.0 05/08/2023     Most recent fingerstick glucose reviewed-   Recent Labs   Lab 02/23/24  0800   POCTGLUCOSE 150*     Current correctional scale  High  Maintain anti-hyperglycemic dose as follows-   Antihyperglycemics (From admission, onward)      Start     Stop Route Frequency Ordered    02/22/24 2346  insulin aspart U-100 pen 0-5 Units         -- SubQ Before meals & nightly PRN 02/22/24 2246          Hold Oral  hypoglycemics while patient is in the hospital.      VTE Risk Mitigation (From admission, onward)           Ordered     heparin 25,000 units in dextrose 5% (100 units/ml) IV bolus from bag LOW INTENSITY NOMOGRAM - [facility]  As needed (PRN)        Question:  Heparin Infusion Adjustment (DO NOT MODIFY ANSWER)  Answer:  \\[email or web address]\epic\Images\Pharmacy\HeparinInfusions\heparin LOW INTENSITY nomogram for [facility] [id number]    02/22/24 1926     heparin 25,000 units in dextrose 5% (100 units/ml) IV bolus from bag LOW INTENSITY NOMOGRAM - [facility]  As needed (PRN)        Question:  Heparin Infusion Adjustment (DO NOT MODIFY ANSWER)  Answer:  \\[email or web address]\epic\Images\Pharmacy\HeparinInfusions\heparin LOW INTENSITY nomogram for [facility] [id number]    02/22/24 1926     heparin 25,000 units in dextrose 5% 250 mL (100 units/mL) infusion LOW INTENSITY nomogram - [facility]  Continuous        Question:  Begin at (units/kg/hr)  Answer:  12    02/22/24 1926                    Discharge Planning   HALI: 2/26/2024     Code Status: Prior   Is the patient medically ready for discharge?:     Reason for patient still in hospital (select all that apply): Patient unstable  Discharge Plan A: Return to nursing home                  Chris Delacruz MD  Department of Hospital Medicine   [facility]-Med/Surg

## 2024-02-23 NOTE — ED NOTES
Success  A new connect request was successfully created for:  Priscila Randhawa  : 1933  MRN: [id number]  ConnectID: [id number]  REASON:  ED Admit Discussion  ACUITY:  10 Minutes  SUBMITTED:  2024 20:53 CST  Hospitalist Tele consult requested  CLOSE

## 2024-02-23 NOTE — ASSESSMENT & PLAN NOTE
Chronic, controlled. Latest blood pressure and vitals reviewed-     Temp:  [97 °F (36.1 °C)-98.2 °F (36.8 °C)]   Pulse:  []   Resp:  [18-27]   BP: ()/()   SpO2:  [95 %-98 %] .   Home meds for hypertension were reviewed and noted below.   Hypertension Medications               losartan (COZAAR) 50 MG tablet Take 1 tablet (50 mg total) by mouth once daily.            While in the hospital, will manage blood pressure as follows; Continue home antihypertensive regimen    Will utilize p.r.n. blood pressure medication only if patient's blood pressure greater than 180/110 and she develops symptoms such as worsening chest pain or shortness of breath.

## 2024-02-23 NOTE — HOSPITAL COURSE
02/23/2024 patient with non STEMI on heparin drip.  Cardiology is following.  As she has a lot post will assigned DNR.  We will trend troponins continue the heparin drip repeat some labs tomorrow.  She has not currently having any pain whatsoever.  02/24/2024 DISCHARGE SUMMARY: pt pt admitted with CHF Echo showed EF 55% with Sever AS, [facility] reviewed case and pt sympoms are resolved, she was requiring 3L oxygen, now weaned back down to her normal of 2L.  She has h/o of moderate severe dementia and niece is her POA.  [facility] recommends plavix and ASA and outpt f/u for further testing to determine need for AS intervention and options.  This was discussed with pt POA.  Pt to be d/c back to nursing home and will schedule outpt f/u with [facility] and PCP.

## 2024-02-23 NOTE — ASSESSMENT & PLAN NOTE
Patient's FSGs are controlled on current medication regimen.  Last A1c reviewed-   Lab Results   Component Value Date    HGBA1C 5.0 05/08/2023     Most recent fingerstick glucose reviewed-   Recent Labs   Lab 02/23/24  0800   POCTGLUCOSE 150*     Current correctional scale  High  Maintain anti-hyperglycemic dose as follows-   Antihyperglycemics (From admission, onward)      Start     Stop Route Frequency Ordered    02/22/24 2346  insulin aspart U-100 pen 0-5 Units         -- SubQ Before meals & nightly PRN 02/22/24 2246          Hold Oral hypoglycemics while patient is in the hospital.

## 2024-02-24 VITALS
WEIGHT: 136.69 LBS | OXYGEN SATURATION: 99 % | BODY MASS INDEX: 28.69 KG/M2 | TEMPERATURE: 98 F | HEART RATE: 96 BPM | HEIGHT: 58 IN | DIASTOLIC BLOOD PRESSURE: 56 MMHG | RESPIRATION RATE: 20 BRPM | SYSTOLIC BLOOD PRESSURE: 130 MMHG

## 2024-02-24 PROBLEM — I50.9 ACUTE EXACERBATION OF CHF (CONGESTIVE HEART FAILURE): Status: ACTIVE | Noted: 2024-02-24

## 2024-02-24 PROBLEM — I35.0 SEVERE AORTIC STENOSIS: Status: ACTIVE | Noted: 2024-02-24

## 2024-02-24 LAB
ALBUMIN SERPL-MCNC: 3.6 G/DL (ref 3.4–5)
ALBUMIN/GLOB SERPL: 1.2 RATIO
ALP SERPL-CCNC: 61 UNIT/L (ref 50–144)
ALT SERPL-CCNC: 15 UNIT/L (ref 1–45)
ANION GAP SERPL CALC-SCNC: 7 MEQ/L (ref 2–13)
APTT PPP: 50.9 SECONDS
AST SERPL-CCNC: 30 UNIT/L (ref 14–36)
BASOPHILS # BLD AUTO: 0.03 X10(3)/MCL (ref 0.01–0.08)
BASOPHILS NFR BLD AUTO: 0.5 % (ref 0.1–1.2)
BILIRUB SERPL-MCNC: 0.5 MG/DL (ref 0–1)
BUN SERPL-MCNC: 32 MG/DL (ref 7–20)
CALCIUM SERPL-MCNC: 8.7 MG/DL (ref 8.4–10.2)
CHLORIDE SERPL-SCNC: 101 MMOL/L (ref 98–110)
CO2 SERPL-SCNC: 28 MMOL/L (ref 21–32)
CREAT SERPL-MCNC: 0.81 MG/DL (ref 0.66–1.25)
CREAT/UREA NIT SERPL: 40 (ref 12–20)
EOSINOPHIL # BLD AUTO: 0.13 X10(3)/MCL (ref 0.04–0.36)
EOSINOPHIL NFR BLD AUTO: 2.2 % (ref 0.7–7)
ERYTHROCYTE [DISTWIDTH] IN BLOOD BY AUTOMATED COUNT: 13.2 % (ref 11–14.5)
GFR SERPLBLD CREATININE-BSD FMLA CKD-EPI: 69 MLS/MIN/1.73/M2
GLOBULIN SER-MCNC: 3 GM/DL (ref 2–3.9)
GLUCOSE SERPL-MCNC: 92 MG/DL (ref 70–115)
HCT VFR BLD AUTO: 38.6 % (ref 36–48)
HGB BLD-MCNC: 12.6 G/DL (ref 11.8–16)
IMM GRANULOCYTES # BLD AUTO: 0.03 X10(3)/MCL (ref 0–0.03)
IMM GRANULOCYTES NFR BLD AUTO: 0.5 % (ref 0–0.5)
LYMPHOCYTES # BLD AUTO: 0.64 X10(3)/MCL (ref 1.16–3.74)
LYMPHOCYTES NFR BLD AUTO: 10.8 % (ref 20–55)
MCH RBC QN AUTO: 31.1 PG (ref 27–34)
MCHC RBC AUTO-ENTMCNC: 32.6 G/DL (ref 31–37)
MCV RBC AUTO: 95.3 FL (ref 79–99)
MONOCYTES # BLD AUTO: 0.72 X10(3)/MCL (ref 0.24–0.36)
MONOCYTES NFR BLD AUTO: 12.2 % (ref 4.7–12.5)
NEUTROPHILS # BLD AUTO: 4.36 X10(3)/MCL (ref 1.56–6.13)
NEUTROPHILS NFR BLD AUTO: 73.8 % (ref 37–73)
NRBC BLD AUTO-RTO: 0 %
PLATELET # BLD AUTO: 246 X10(3)/MCL (ref 140–371)
PMV BLD AUTO: 10.2 FL (ref 9.4–12.4)
POCT GLUCOSE: 106 MG/DL (ref 70–110)
POCT GLUCOSE: 169 MG/DL (ref 70–110)
POCT GLUCOSE: 84 MG/DL (ref 70–110)
POTASSIUM SERPL-SCNC: 4.1 MMOL/L (ref 3.5–5.1)
PROT SERPL-MCNC: 6.6 GM/DL (ref 6.3–8.2)
RBC # BLD AUTO: 4.05 X10(6)/MCL (ref 4–5.1)
SARS-COV-2 RDRP RESP QL NAA+PROBE: NEGATIVE
SODIUM SERPL-SCNC: 136 MMOL/L (ref 135–145)
TROPONIN I SERPL-MCNC: 0.07 NG/ML (ref 0–0.03)
WBC # SPEC AUTO: 5.91 X10(3)/MCL (ref 4–11.5)

## 2024-02-24 PROCEDURE — 27000221 HC OXYGEN, UP TO 24 HOURS

## 2024-02-24 PROCEDURE — 36415 COLL VENOUS BLD VENIPUNCTURE: CPT | Performed by: FAMILY MEDICINE

## 2024-02-24 PROCEDURE — 99900035 HC TECH TIME PER 15 MIN (STAT)

## 2024-02-24 PROCEDURE — 84484 ASSAY OF TROPONIN QUANT: CPT | Performed by: INTERNAL MEDICINE

## 2024-02-24 PROCEDURE — 63600175 PHARM REV CODE 636 W HCPCS: Performed by: FAMILY MEDICINE

## 2024-02-24 PROCEDURE — 25000003 PHARM REV CODE 250: Performed by: FAMILY MEDICINE

## 2024-02-24 PROCEDURE — 25000242 PHARM REV CODE 250 ALT 637 W/ HCPCS: Performed by: INTERNAL MEDICINE

## 2024-02-24 PROCEDURE — 94761 N-INVAS EAR/PLS OXIMETRY MLT: CPT

## 2024-02-24 PROCEDURE — 85025 COMPLETE CBC W/AUTO DIFF WBC: CPT | Performed by: FAMILY MEDICINE

## 2024-02-24 PROCEDURE — 25000003 PHARM REV CODE 250: Performed by: INTERNAL MEDICINE

## 2024-02-24 PROCEDURE — 85730 THROMBOPLASTIN TIME PARTIAL: CPT | Performed by: INTERNAL MEDICINE

## 2024-02-24 PROCEDURE — 63600175 PHARM REV CODE 636 W HCPCS: Performed by: INTERNAL MEDICINE

## 2024-02-24 PROCEDURE — 80053 COMPREHEN METABOLIC PANEL: CPT | Performed by: INTERNAL MEDICINE

## 2024-02-24 PROCEDURE — 87635 SARS-COV-2 COVID-19 AMP PRB: CPT | Performed by: FAMILY MEDICINE

## 2024-02-24 PROCEDURE — 94640 AIRWAY INHALATION TREATMENT: CPT

## 2024-02-24 RX ORDER — PANTOPRAZOLE SODIUM 40 MG/1
40 TABLET, DELAYED RELEASE ORAL DAILY
Qty: 30 TABLET | Refills: 11
Start: 2024-02-25 | End: 2025-02-24

## 2024-02-24 RX ORDER — NAPROXEN SODIUM 220 MG/1
81 TABLET, FILM COATED ORAL DAILY
Refills: 0
Start: 2024-02-25 | End: 2025-02-24

## 2024-02-24 RX ORDER — CLOPIDOGREL BISULFATE 75 MG/1
75 TABLET ORAL DAILY
Qty: 30 TABLET | Refills: 11
Start: 2024-02-25 | End: 2025-02-24

## 2024-02-24 RX ADMIN — PRAVASTATIN SODIUM 80 MG: 20 TABLET ORAL at 08:02

## 2024-02-24 RX ADMIN — IPRATROPIUM BROMIDE AND ALBUTEROL SULFATE 3 ML: .5; 3 SOLUTION RESPIRATORY (INHALATION) at 07:02

## 2024-02-24 RX ADMIN — LOSARTAN POTASSIUM 50 MG: 50 TABLET, FILM COATED ORAL at 08:02

## 2024-02-24 RX ADMIN — CLOPIDOGREL BISULFATE 75 MG: 75 TABLET ORAL at 08:02

## 2024-02-24 RX ADMIN — FUROSEMIDE 40 MG: 10 INJECTION, SOLUTION INTRAMUSCULAR; INTRAVENOUS at 08:02

## 2024-02-24 RX ADMIN — ASPIRIN 81 MG 81 MG: 81 TABLET ORAL at 08:02

## 2024-02-24 RX ADMIN — IPRATROPIUM BROMIDE AND ALBUTEROL SULFATE 3 ML: .5; 3 SOLUTION RESPIRATORY (INHALATION) at 12:02

## 2024-02-24 RX ADMIN — HEPARIN SODIUM 12 UNITS/KG/HR: 10000 INJECTION, SOLUTION INTRAVENOUS at 05:02

## 2024-02-24 RX ADMIN — GUAIFENESIN, DEXTROMETHORPHAN HBR 1 TABLET: 600; 30 TABLET ORAL at 11:02

## 2024-02-24 RX ADMIN — ONDANSETRON 4 MG: 2 INJECTION INTRAMUSCULAR; INTRAVENOUS at 08:02

## 2024-02-24 RX ADMIN — PANTOPRAZOLE SODIUM 40 MG: 40 TABLET, DELAYED RELEASE ORAL at 08:02

## 2024-02-24 NOTE — PLAN OF CARE
Problem: Adult Inpatient Plan of Care  Goal: Plan of Care Review  2/24/2024 1210 by Bianca Lopez RN  Outcome: Met  2/24/2024 1202 by Bianca Lopez RN  Outcome: Adequate for Care Transition  Goal: Patient-Specific Goal (Individualized)  2/24/2024 1210 by Bianca Lopez RN  Outcome: Met  2/24/2024 1202 by Bianca Lopez RN  Outcome: Adequate for Care Transition  Goal: Absence of Hospital-Acquired Illness or Injury  2/24/2024 1210 by Bianca Lopez RN  Outcome: Met  2/24/2024 1202 by Bianca Lopez RN  Outcome: Adequate for Care Transition  Goal: Optimal Comfort and Wellbeing  2/24/2024 1210 by Bianca Lopez RN  Outcome: Met  2/24/2024 1202 by Bianca Lopez RN  Outcome: Adequate for Care Transition  Goal: Readiness for Transition of Care  2/24/2024 1210 by Bianca Lopez RN  Outcome: Met  2/24/2024 1202 by Bianca Lopez RN  Outcome: Adequate for Care Transition     Problem: Diabetes Comorbidity  Goal: Blood Glucose Level Within Targeted Range  2/24/2024 1210 by Bianca Lopez RN  Outcome: Met  2/24/2024 1202 by Bianca Lopez RN  Outcome: Adequate for Care Transition     Problem: Skin Injury Risk Increased  Goal: Skin Health and Integrity  2/24/2024 1210 by Bianca Lopez RN  Outcome: Met  2/24/2024 1202 by Bianca Lopez RN  Outcome: Adequate for Care Transition     Problem: Fall Injury Risk  Goal: Absence of Fall and Fall-Related Injury  2/24/2024 1210 by Bianca Lopez RN  Outcome: Met  2/24/2024 1202 by Bianca Lopez RN  Outcome: Adequate for Care Transition     Problem: Adjustment to Illness (Heart Failure)  Goal: Optimal Coping  2/24/2024 1210 by Bianca Lopez RN  Outcome: Met  2/24/2024 1202 by Bianca Lopez RN  Outcome: Adequate for Care Transition     Problem: Cardiac Output Decreased (Heart Failure)  Goal: Optimal Cardiac Output  2/24/2024 1210 by Bianca Lopez, RN  Outcome: Met  2/24/2024 1202 by  Bianca Lopez RN  Outcome: Adequate for Care Transition     Problem: Dysrhythmia (Heart Failure)  Goal: Stable Heart Rate and Rhythm  2/24/2024 1210 by Bianca Lopez RN  Outcome: Met  2/24/2024 1202 by Bianca Lopez RN  Outcome: Adequate for Care Transition     Problem: Fluid Imbalance (Heart Failure)  Goal: Fluid Balance  2/24/2024 1210 by Bianca Lopez RN  Outcome: Met  2/24/2024 1202 by Bianca Lopez RN  Outcome: Adequate for Care Transition     Problem: Functional Ability Impaired (Heart Failure)  Goal: Optimal Functional Ability  2/24/2024 1210 by Bianca Lopez RN  Outcome: Met  2/24/2024 1202 by Bianca Lopez RN  Outcome: Adequate for Care Transition     Problem: Oral Intake Inadequate (Heart Failure)  Goal: Optimal Nutrition Intake  2/24/2024 1210 by Bianca Lopez RN  Outcome: Met  2/24/2024 1202 by Bianca Lopez RN  Outcome: Adequate for Care Transition     Problem: Respiratory Compromise (Heart Failure)  Goal: Effective Oxygenation and Ventilation  2/24/2024 1210 by Bianca Lopez RN  Outcome: Met  2/24/2024 1202 by Bianca Lopez RN  Outcome: Adequate for Care Transition     Problem: Sleep Disordered Breathing (Heart Failure)  Goal: Effective Breathing Pattern During Sleep  2/24/2024 1210 by Bianca Lopez RN  Outcome: Met  2/24/2024 1202 by Bianca Lopez RN  Outcome: Adequate for Care Transition

## 2024-02-24 NOTE — PLAN OF CARE
Problem: Adult Inpatient Plan of Care  Goal: Plan of Care Review  Outcome: Adequate for Care Transition  Goal: Patient-Specific Goal (Individualized)  Outcome: Adequate for Care Transition  Goal: Absence of Hospital-Acquired Illness or Injury  Outcome: Adequate for Care Transition  Goal: Optimal Comfort and Wellbeing  Outcome: Adequate for Care Transition  Goal: Readiness for Transition of Care  Outcome: Adequate for Care Transition     Problem: Diabetes Comorbidity  Goal: Blood Glucose Level Within Targeted Range  Outcome: Adequate for Care Transition     Problem: Skin Injury Risk Increased  Goal: Skin Health and Integrity  Outcome: Adequate for Care Transition     Problem: Fall Injury Risk  Goal: Absence of Fall and Fall-Related Injury  Outcome: Adequate for Care Transition     Problem: Adjustment to Illness (Heart Failure)  Goal: Optimal Coping  Outcome: Adequate for Care Transition     Problem: Cardiac Output Decreased (Heart Failure)  Goal: Optimal Cardiac Output  Outcome: Adequate for Care Transition     Problem: Dysrhythmia (Heart Failure)  Goal: Stable Heart Rate and Rhythm  Outcome: Adequate for Care Transition     Problem: Fluid Imbalance (Heart Failure)  Goal: Fluid Balance  Outcome: Adequate for Care Transition     Problem: Functional Ability Impaired (Heart Failure)  Goal: Optimal Functional Ability  Outcome: Adequate for Care Transition     Problem: Oral Intake Inadequate (Heart Failure)  Goal: Optimal Nutrition Intake  Outcome: Adequate for Care Transition     Problem: Respiratory Compromise (Heart Failure)  Goal: Effective Oxygenation and Ventilation  Outcome: Adequate for Care Transition     Problem: Sleep Disordered Breathing (Heart Failure)  Goal: Effective Breathing Pattern During Sleep  Outcome: Adequate for Care Transition

## 2024-02-24 NOTE — DISCHARGE SUMMARY
[facility]/Surg  Hospital Medicine  Discharge Summary      Patient Name: Priscila Randhawa  MRN: [id number]  LEILANI: [id number]  Patient Class: IP- Inpatient  Admission Date: 2/22/2024  Hospital Length of Stay: 2 days  Discharge Date and Time:  02/24/2024 10:46 AM  Attending Physician: Emma Breaux MD   Discharging Provider: Ana Tran MD  Primary Care Provider: Delbert Bhandari MD    Primary Care Team: Networked reference to record PCT     HPI:   Patient is a 89 y.o. female with a medical history of hypertension, type 2 diabetes mellitus, hyperlipidemia, underlying CHF, unspecified this is systolic or diastolic dysfunction, COVID-19 infection about 2 weeks ago, chronic respiratory failure on presumed 2 L of nasal cannula oxygen therapy at the nursing home, osteoarthritis presents to the ER on account of shortness of breaths over the past few days.    Patient has been at her usual state of health until about few days ago when she developed progressive shortness of breath associated with easy fatigability, cough productive of whitish sputum.  Occasional wheezing.  No chest pain.  No nausea or vomiting.  No fever.  She was brought to the ER due to worsening symptoms.    At the ER, patient had a chest x-ray which shows evidence of pulmonary vascular congestion.  Patient is currently on 3 L of nasal cannula oxygen therapy.  Patient has been improving.  Patient was also found to have elevated troponin for which the patient has been evaluated by the cardiologist and recommended conservative management.  Patient has been placed on IV heparin drip  EKG shows sinus rhythm with nonspecific ST wave changes.     PCP Delbert Bhandari MD MD              Past Medical History:   Diagnosis Date    Background diabetic retinopathy      Carotid artery stenosis      Cataract extraction status      Combined hyperlipidemia      HTN (hypertension)      Polycythemia      Type 2 diabetes mellitus without complications       Unspecified osteoarthritis, unspecified site      Vitamin D deficiency        * No surgery found *      Hospital Course:   02/23/2024 patient with non STEMI on heparin drip.  Cardiology is following.  As she has a lot post will assigned DNR.  We will trend troponins continue the heparin drip repeat some labs tomorrow.  She has not currently having any pain whatsoever.  02/24/2024 DISCHARGE SUMMARY: pt pt admitted with CHF Echo showed EF 55% with Sever AS, [facility] reviewed case and pt sympoms are resolved, she was requiring 3L oxygen, now weaned back down to her normal of 2L.  She has h/o of moderate severe dementia and niece is her POA.  [facility] recommends plavix and ASA and outpt f/u for further testing to determine need for AS intervention and options.  This was discussed with pt POA.  Pt to be d/c back to nursing home and will schedule outpt f/u with [facility] and PCP.     Goals of Care Treatment Preferences:  Code Status: DNR      Consults:   Consults (From admission, onward)          Status Ordering Provider     Inpatient consult to Hospitalist  Once        Provider:  Ana Tran MD    Acknowledged SUSHMA BECKHAM.     Inpatient consult to Telemedicine-Card  Once        Provider:  Jose Antonio Higgins MD    Completed SUSHMA BECKHAM.            No new Assessment & Plan notes have been filed under this hospital service since the last note was generated.  Service: Hospital Medicine    Final Active Diagnoses:    Diagnosis Date Noted POA    Non-STEMI (non-ST elevated myocardial infarction) [I21.4] 02/23/2024 Yes    Essential hypertension [I10] 05/04/2022 Yes    Well controlled type 2 diabetes mellitus [E11.9] 05/04/2022 Yes    Mixed hyperlipidemia [E78.2] 05/04/2022 Yes      Problems Resolved During this Admission:       Discharged Condition: good    Disposition: Home or Self Care    Follow Up:   Follow-up Information       Delbert Bhandari MD Follow up.    Specialty: Internal Medicine  Contact information:  [address]    [place] [id number] [phone]               Jose Antonio Higgins MD Follow up.    Specialty: Cardiology  Contact information:  [address]  [address]  [place] [address]  [phone]                           Patient Instructions:      Activity as tolerated       Significant Diagnostic Studies: Labs: BMP:   Recent Labs   Lab 02/22/24  1725 02/23/24  0149 02/24/24  0416   * 136 136   K 4.2 3.9 4.1   CO2 29 30 28   BUN 17.0 18.0 32.0*   CREATININE 0.72 0.76 0.81   CALCIUM 9.1 9.3 8.7    and CMP   Recent Labs   Lab 02/22/24  1725 02/23/24  0149 02/24/24 0416   * 136 136   K 4.2 3.9 4.1   CO2 29 30 28   BUN 17.0 18.0 32.0*   CREATININE 0.72 0.76 0.81   CALCIUM 9.1 9.3 8.7   ALBUMIN 3.8  --  3.6   BILITOT 0.5  --  0.5   ALKPHOS 86  --  61   AST 29  --  30   ALT 17  --  15       Pending Diagnostic Studies:       Procedure Component Value Units Date/Time    COVID-19 Rapid Screening [[id number]]     Order Status: Sent Lab Status: No result     Specimen: Nasal Swab            Medications:  Reconciled Home Medications:      Medication List        START taking these medications      aspirin 81 MG Chew  Take 1 tablet (81 mg total) by mouth once daily.  Start taking on: February 25, 2024     clopidogreL 75 mg tablet  Commonly known as: PLAVIX  Take 1 tablet (75 mg total) by mouth once daily.  Start taking on: February 25, 2024     dextromethorphan-guaiFENesin  mg  mg per 12 hr tablet  Commonly known as: MUCINEX DM  Take 1 tablet by mouth 2 (two) times daily. for 10 days     pantoprazole 40 MG tablet  Commonly known as: PROTONIX  Take 1 tablet (40 mg total) by mouth once daily.  Start taking on: February 25, 2024            CONTINUE taking these medications      * albuterol 5 mg/mL nebulizer solution  Commonly known as: PROVENTIL  Take 2.5 mg by nebulization every 4 (four) hours as needed for Wheezing. Rescue     * PROVENTIL HFA 90 mcg/actuation inhaler  Generic drug: albuterol  Inhale 2 puffs into  the lungs 4 (four) times daily. Rescue     cyanocobalamin (vitamin B-12) 1,000 mcg/mL Kit  Inject 1,000 mcg as directed every 30 days.     losartan 50 MG tablet  Commonly known as: COZAAR  Take 1 tablet (50 mg total) by mouth once daily.     pravastatin 80 MG tablet  Commonly known as: PRAVACHOL  Take 1 tablet (80 mg total) by mouth once daily.           * This list has 2 medication(s) that are the same as other medications prescribed for you. Read the directions carefully, and ask your doctor or other care provider to review them with you.                  Indwelling Lines/Drains at time of discharge:   Lines/Drains/Airways       Drain  Duration             Female External Urinary Catheter w/ Suction 02/22/24 2230 1 day                    Time spent on the discharge of patient: 36 minutes    Physical Exam  Constitutional:       General: She is not in acute distress.     Appearance: Normal appearance. She is normal weight. She is not ill-appearing.   Cardiovascular:      Rate and Rhythm: Normal rate and regular rhythm.      Pulses: Normal pulses.      Heart sounds: Murmur (3/6 MARLYS LUSB) heard.   Pulmonary:      Effort: Pulmonary effort is normal.      Breath sounds: Normal breath sounds.   Abdominal:      General: Abdomen is flat.      Palpations: Abdomen is soft.      Tenderness: There is no abdominal tenderness.   Skin:     General: Skin is warm and dry.      Findings: No erythema, lesion or rash.   Neurological:      General: No focal deficit present.      Mental Status: She is alert. Mental status is at baseline. She is disoriented.   Psychiatric:         Behavior: Behavior normal.      Comments: I had a face to face encounter with this patient prior to d/c            Ana Tran MD  Department of Hospital Medicine  [facility]/Surg

## 2024-02-24 NOTE — CARE UPDATE
Cardiology recommend:  -Echocardiogram- moderate/severe aortic stenosis, normal EF  -start on plavix  -will need L/RHC as outpatient

## 2024-02-24 NOTE — NURSING
[facility] transportation here. Patient discharged on 2 L O2. No signs or symptoms of distress noted.

## 2024-02-25 ENCOUNTER — HOSPITAL ENCOUNTER (INPATIENT)
Facility: HOSPITAL | Age: 89
LOS: 3 days | Discharge: HOSPICE | DRG: 291 | End: 2024-02-28
Attending: FAMILY MEDICINE | Admitting: FAMILY MEDICINE
Payer: MEDICARE

## 2024-02-25 DIAGNOSIS — J18.9 PNEUMONIA OF BOTH LUNGS DUE TO INFECTIOUS ORGANISM, UNSPECIFIED PART OF LUNG: Primary | ICD-10-CM

## 2024-02-25 DIAGNOSIS — R06.02 SOB (SHORTNESS OF BREATH): ICD-10-CM

## 2024-02-25 DIAGNOSIS — R06.02 SHORTNESS OF BREATH: ICD-10-CM

## 2024-02-25 LAB
ALBUMIN SERPL-MCNC: 3.8 G/DL (ref 3.4–5)
ALBUMIN/GLOB SERPL: 1.1 RATIO
ALP SERPL-CCNC: 88 UNIT/L (ref 50–144)
ALT SERPL-CCNC: 18 UNIT/L (ref 1–45)
ANION GAP SERPL CALC-SCNC: 2 MEQ/L (ref 2–13)
AST SERPL-CCNC: 31 UNIT/L (ref 14–36)
BASE EXCESS BLD CALC-SCNC: 6 MMOL/L (ref -2–2)
BASOPHILS # BLD AUTO: 0.03 X10(3)/MCL (ref 0.01–0.08)
BASOPHILS NFR BLD AUTO: 0.3 % (ref 0.1–1.2)
BILIRUB SERPL-MCNC: 0.5 MG/DL (ref 0–1)
BLOOD GAS SAMPLE TYPE (OHS): ABNORMAL
BNP BLD-MCNC: 965 PG/ML (ref 0–124.9)
BNP BLD-MCNC: 977 PG/ML (ref 0–124.9)
BUN SERPL-MCNC: 20 MG/DL (ref 7–20)
CALCIUM SERPL-MCNC: 8.7 MG/DL (ref 8.4–10.2)
CHLORIDE SERPL-SCNC: 97 MMOL/L (ref 98–110)
CO2 SERPL-SCNC: 35 MMOL/L (ref 21–32)
COHGB MFR BLDA: 1.3 % (ref 0–1.5)
CREAT SERPL-MCNC: 0.75 MG/DL (ref 0.66–1.25)
CREAT/UREA NIT SERPL: 27 (ref 12–20)
D DIMER PPP IA.FEU-MCNC: 1.58 MG/L (ref 0.19–0.5)
EOSINOPHIL # BLD AUTO: 0.16 X10(3)/MCL (ref 0.04–0.36)
EOSINOPHIL NFR BLD AUTO: 1.6 % (ref 0.7–7)
ERYTHROCYTE [DISTWIDTH] IN BLOOD BY AUTOMATED COUNT: 13.3 % (ref 11–14.5)
GFR SERPLBLD CREATININE-BSD FMLA CKD-EPI: 76 MLS/MIN/1.73/M2
GLOBULIN SER-MCNC: 3.4 GM/DL (ref 2–3.9)
GLUCOSE SERPL-MCNC: 173 MG/DL (ref 70–115)
HCO3 BLDA-SCNC: 29.9 MMOL/L (ref 22–26)
HCT VFR BLD AUTO: 39.7 % (ref 36–48)
HGB BLD-MCNC: 13.1 G/DL (ref 11.8–16)
IMM GRANULOCYTES # BLD AUTO: 0.06 X10(3)/MCL (ref 0–0.03)
IMM GRANULOCYTES NFR BLD AUTO: 0.6 % (ref 0–0.5)
INHALED O2 CONCENTRATION: 40 %
LACTATE SERPL-SCNC: 0.8 MMOL/L (ref 0.4–2)
LYMPHOCYTES # BLD AUTO: 1.37 X10(3)/MCL (ref 1.16–3.74)
LYMPHOCYTES NFR BLD AUTO: 13.9 % (ref 20–55)
MCH RBC QN AUTO: 31.5 PG (ref 27–34)
MCHC RBC AUTO-ENTMCNC: 33 G/DL (ref 31–37)
MCV RBC AUTO: 95.4 FL (ref 79–99)
METHGB MFR BLDA: 0.3 % (ref 0–1.5)
MONOCYTES # BLD AUTO: 0.89 X10(3)/MCL (ref 0.24–0.36)
MONOCYTES NFR BLD AUTO: 9 % (ref 4.7–12.5)
NEUTROPHILS # BLD AUTO: 7.35 X10(3)/MCL (ref 1.56–6.13)
NEUTROPHILS NFR BLD AUTO: 74.6 % (ref 37–73)
NRBC BLD AUTO-RTO: 0 %
PCO2 BLDA: 65.9 MMHG (ref 35–45)
PH BLDA: 7.32 [PH] (ref 7.35–7.45)
PLATELET # BLD AUTO: 249 X10(3)/MCL (ref 140–371)
PMV BLD AUTO: 9.6 FL (ref 9.4–12.4)
PO2 BLDA: 301 MMHG (ref 80–105)
POTASSIUM SERPL-SCNC: 3.9 MMOL/L (ref 3.5–5.1)
PROT SERPL-MCNC: 7.2 GM/DL (ref 6.3–8.2)
RBC # BLD AUTO: 4.16 X10(6)/MCL (ref 4–5.1)
SAO2 % BLDA: 100 % (ref 95–100)
SODIUM SERPL-SCNC: 134 MMOL/L (ref 135–145)
TROPONIN I SERPL-MCNC: 0.04 NG/ML (ref 0–0.03)
WBC # SPEC AUTO: 9.86 X10(3)/MCL (ref 4–11.5)

## 2024-02-25 PROCEDURE — 94761 N-INVAS EAR/PLS OXIMETRY MLT: CPT

## 2024-02-25 PROCEDURE — 93010 ELECTROCARDIOGRAM REPORT: CPT | Mod: ,,, | Performed by: INTERNAL MEDICINE

## 2024-02-25 PROCEDURE — 85025 COMPLETE CBC W/AUTO DIFF WBC: CPT | Performed by: FAMILY MEDICINE

## 2024-02-25 PROCEDURE — 83880 ASSAY OF NATRIURETIC PEPTIDE: CPT | Performed by: FAMILY MEDICINE

## 2024-02-25 PROCEDURE — 27100171 HC OXYGEN HIGH FLOW UP TO 24 HOURS

## 2024-02-25 PROCEDURE — 25500020 PHARM REV CODE 255: Performed by: FAMILY MEDICINE

## 2024-02-25 PROCEDURE — 99900031 HC PATIENT EDUCATION (STAT)

## 2024-02-25 PROCEDURE — 80053 COMPREHEN METABOLIC PANEL: CPT | Performed by: FAMILY MEDICINE

## 2024-02-25 PROCEDURE — 82803 BLOOD GASES ANY COMBINATION: CPT

## 2024-02-25 PROCEDURE — 99900035 HC TECH TIME PER 15 MIN (STAT)

## 2024-02-25 PROCEDURE — 96375 TX/PRO/DX INJ NEW DRUG ADDON: CPT

## 2024-02-25 PROCEDURE — 87040 BLOOD CULTURE FOR BACTERIA: CPT | Performed by: FAMILY MEDICINE

## 2024-02-25 PROCEDURE — 36600 WITHDRAWAL OF ARTERIAL BLOOD: CPT

## 2024-02-25 PROCEDURE — 63600175 PHARM REV CODE 636 W HCPCS: Performed by: FAMILY MEDICINE

## 2024-02-25 PROCEDURE — 27000190 HC CPAP FULL FACE MASK W/VALVE

## 2024-02-25 PROCEDURE — 21400001 HC TELEMETRY ROOM

## 2024-02-25 PROCEDURE — 85379 FIBRIN DEGRADATION QUANT: CPT | Performed by: FAMILY MEDICINE

## 2024-02-25 PROCEDURE — 83605 ASSAY OF LACTIC ACID: CPT | Performed by: FAMILY MEDICINE

## 2024-02-25 PROCEDURE — 5A09357 ASSISTANCE WITH RESPIRATORY VENTILATION, LESS THAN 24 CONSECUTIVE HOURS, CONTINUOUS POSITIVE AIRWAY PRESSURE: ICD-10-PCS | Performed by: FAMILY MEDICINE

## 2024-02-25 PROCEDURE — 11000001 HC ACUTE MED/SURG PRIVATE ROOM

## 2024-02-25 PROCEDURE — 84484 ASSAY OF TROPONIN QUANT: CPT | Performed by: FAMILY MEDICINE

## 2024-02-25 PROCEDURE — 25000003 PHARM REV CODE 250: Performed by: FAMILY MEDICINE

## 2024-02-25 PROCEDURE — 94660 CPAP INITIATION&MGMT: CPT

## 2024-02-25 PROCEDURE — 99285 EMERGENCY DEPT VISIT HI MDM: CPT | Mod: 25

## 2024-02-25 PROCEDURE — 96365 THER/PROPH/DIAG IV INF INIT: CPT

## 2024-02-25 PROCEDURE — 93005 ELECTROCARDIOGRAM TRACING: CPT

## 2024-02-25 RX ORDER — METHYLPREDNISOLONE SOD SUCC 125 MG
125 VIAL (EA) INJECTION
Status: COMPLETED | OUTPATIENT
Start: 2024-02-25 | End: 2024-02-25

## 2024-02-25 RX ORDER — METHYLPREDNISOLONE SOD SUCC 125 MG
125 VIAL (EA) INJECTION
Status: DISCONTINUED | OUTPATIENT
Start: 2024-02-25 | End: 2024-02-25

## 2024-02-25 RX ORDER — FUROSEMIDE 10 MG/ML
20 INJECTION INTRAMUSCULAR; INTRAVENOUS
Status: COMPLETED | OUTPATIENT
Start: 2024-02-25 | End: 2024-02-25

## 2024-02-25 RX ADMIN — METHYLPREDNISOLONE SODIUM SUCCINATE 125 MG: 125 INJECTION, POWDER, FOR SOLUTION INTRAMUSCULAR; INTRAVENOUS at 05:02

## 2024-02-25 RX ADMIN — IOHEXOL 60 ML: 350 INJECTION, SOLUTION INTRAVENOUS at 06:02

## 2024-02-25 RX ADMIN — CEFTRIAXONE SODIUM 1 G: 1 INJECTION, POWDER, FOR SOLUTION INTRAMUSCULAR; INTRAVENOUS at 10:02

## 2024-02-25 RX ADMIN — FUROSEMIDE 20 MG: 10 INJECTION, SOLUTION INTRAMUSCULAR; INTRAVENOUS at 10:02

## 2024-02-25 NOTE — ED PROVIDER NOTES
Encounter Date: 2/25/2024       History         Patient presents with  · Shortness of Breath  · Wheezing  · LOW O2 SATS    Pt presented to ED via [facility] sent from [facility] for increased shortness of breath, low oxygen sats and wheezing. Pt was just discharged from [facility] on yesterday. Pt presents with dyspnea, tachypnea, and wheezing.  Patient says that she has been having shortness of breath the patient is unable to communicate and the daughter says that the patient was admitted into the hospital and she was only discharged yesterday she has an established diagnosis of congestive heart failure her book bilateral legs were edematous now there is no edema but the patient is still having shortness of breath she recently has COVID infection as well the patient daughter made the patient DNR she does not want any CPR and she does not want any endotracheal intubation for mechanical ventilation her however she did agreed for BiPAP        Review of patient's allergies indicates:   Allergen Reactions    Codeine Itching     Past Medical History:   Diagnosis Date    Background diabetic retinopathy     Carotid artery stenosis     Cataract extraction status     Combined hyperlipidemia     HTN (hypertension)     Non-STEMI (non-ST elevated myocardial infarction) 02/23/2024    Polycythemia     Type 2 diabetes mellitus without complications     Unspecified osteoarthritis, unspecified site     Vitamin D deficiency      Past Surgical History:   Procedure Laterality Date    EPIDURAL STEROID INJECTION INTO LUMBAR SPINE      EXPLORATORY LAPAROTOMY      hx total knee repla       Family History   Problem Relation Age of Onset    Heart attack Mother     Coronary artery disease Mother     Heart failure Sister     Coronary artery disease Sister     Alzheimer's disease Sister     Coronary artery disease Brother     Heart attack Brother      Social History     Tobacco Use    Smoking status: Never    Smokeless tobacco: Never     Review of Systems    Constitutional:  Negative for activity change, appetite change, chills and fever.   HENT:  Negative for sore throat.    Eyes:  Negative for pain, discharge and itching.   Respiratory:  Positive for cough, shortness of breath and wheezing.    Cardiovascular:  Negative for chest pain.   Gastrointestinal:  Negative for nausea.   Endocrine: Negative for cold intolerance, heat intolerance, polydipsia and polyphagia.   Genitourinary:  Negative for dysuria.   Musculoskeletal:  Negative for back pain.   Skin:  Negative for rash.   Allergic/Immunologic: Negative for environmental allergies and food allergies.   Neurological:  Negative for dizziness, facial asymmetry, weakness, numbness and headaches.   Hematological:  Does not bruise/bleed easily.       Physical Exam     Initial Vitals   BP Pulse Resp Temp SpO2   02/25/24 1659 02/25/24 1659 02/25/24 1659 02/25/24 1906 02/25/24 1659   (!) 198/104 (!) 119 (!) 28 97.8 °F (36.6 °C) 97 %      MAP       --                Physical Exam    Nursing note and vitals reviewed.  Constitutional: She appears well-developed.   HENT:   Head: Normocephalic and atraumatic.   Eyes: Pupils are equal, round, and reactive to light.   Neck:   Normal range of motion.  Cardiovascular:  Normal rate, regular rhythm, normal heart sounds and intact distal pulses.           Pulmonary/Chest: She is in respiratory distress. She has wheezes. She has rhonchi.   Musculoskeletal:         General: Normal range of motion.      Cervical back: Normal range of motion.     Neurological:   Moderate to severe cognitive decline patient does not verbalize   Skin: Skin is dry.   Psychiatric: She has a normal mood and affect.         ED Course   Procedures  Labs Reviewed   COMPREHENSIVE METABOLIC PANEL - Abnormal; Notable for the following components:       Result Value    Sodium Level 134 (*)     Chloride 97 (*)     Carbon Dioxide 35 (*)     Glucose Level 173 (*)     BUN/Creatinine Ratio 27 (*)     All other components  within normal limits   TROPONIN I - Abnormal; Notable for the following components:    Troponin-I 0.041 (*)     All other components within normal limits   NT-PRO NATRIURETIC PEPTIDE - Abnormal; Notable for the following components:    ProBNP 977.0 (*)     All other components within normal limits   CBC WITH DIFFERENTIAL - Abnormal; Notable for the following components:    Neut % 74.6 (*)     Lymph % 13.9 (*)     Neut # 7.35 (*)     Mono # 0.89 (*)     IG# 0.06 (*)     IG% 0.6 (*)     All other components within normal limits   BLOOD GAS - Abnormal; Notable for the following components:    pH, Blood gas 7.321 (*)     pCO2, Blood gas 65.9 (*)     pO2, Blood gas 301.0 (*)     HCO3, Blood gas 29.9 (*)     Base Excess, Blood gas 6.00 (*)     All other components within normal limits   NT-PRO NATRIURETIC PEPTIDE - Abnormal; Notable for the following components:    ProBNP 965.0 (*)     All other components within normal limits   D DIMER, QUANTITATIVE - Abnormal; Notable for the following components:    D-Dimer 1.58 (*)     All other components within normal limits   BLOOD CULTURE [facility]   BLOOD CULTURE [facility]   CBC W/ AUTO DIFFERENTIAL    Narrative:     The following orders were created for panel order CBC auto differential.  Procedure                               Abnormality         Status                     ---------                               -----------         ------                     CBC with Differential[[id number]]       Abnormal            Final result                 Please view results for these tests on the individual orders.   LACTIC ACID, PLASMA          Imaging Results              CTA Chest Non-Coronary (PE Studies) (In process)                      X-Ray Chest AP (In process)                      Medications   cefTRIAXone (Rocephin) 1 g in dextrose 5 % in water (D5W) 100 mL IVPB (MB+) (has no administration in time range)   furosemide injection 20 mg (has no administration in time range)    methylPREDNISolone sodium succinate injection 125 mg (125 mg Intravenous Given 2/25/24 1705)   iohexoL (OMNIPAQUE 350) injection 100 mL (60 mLs Intravenous Given 2/25/24 1858)     Medical Decision Making  [place] of care note:    Pt is a 89 y.o. female complaining of difficulty breathing. Their evaluation was initiated by previous ER physician and turned over to my care at shift change. I have examined the patient and agree with previous documentation.    Patient was currently comfortable.  They gave a dose of steroids and place the patient on BiPAP.  Patient says she is feeling much relief these interventions.  BNP is in the 900s, chest x-ray with mild vascular congestion cardiac enzymes showing significant reduction, troponin 0.041 significantly reduced from her stay in the hospital / definitely trending down appropriately (was dx with NSTEMI here).   D-dimer elevated at 1.58, CTA of chest rule out pulmonary embolus as ordered.    Leo Merino MD  7:16 PM 02/25/2024 2115.  After an extended weight, got the results of the CTA of the chest showing no pulmonary embolus, but does have scattered pulmonary infiltrates consistent with pneumonia.  Patient's initial presentation is not very convincing for pneumonia, and she has got a good white count but based upon the images seen, we will start antibiotics and get lactic acid and cultures.  Delay in treatment primarily due to lack of clinical support of pneumonia as a diagnosis.    Amount and/or Complexity of Data Reviewed  Labs: ordered.  Radiology: ordered.    Risk  Prescription drug management.  Decision regarding hospitalization.                                      Clinical Impression:  Final diagnoses:  [R06.02] Shortness of breath  [R06.02] SOB (shortness of breath)  [J18.9] Pneumonia of both lungs due to infectious organism, unspecified part of lung (Primary)          ED Disposition Condition    Admit Stable                Leo Merino MD  02/25/24  [id number]

## 2024-02-26 PROBLEM — J18.9 PNEUMONIA OF BOTH LUNGS DUE TO INFECTIOUS ORGANISM: Status: ACTIVE | Noted: 2024-02-26

## 2024-02-26 PROBLEM — R06.02 SHORTNESS OF BREATH: Status: ACTIVE | Noted: 2024-02-26

## 2024-02-26 LAB
ALBUMIN SERPL-MCNC: 3.1 G/DL (ref 3.4–5)
ALBUMIN/GLOB SERPL: 1.1 RATIO
ALP SERPL-CCNC: 63 UNIT/L (ref 50–144)
ALT SERPL-CCNC: 21 UNIT/L (ref 1–45)
ANION GAP SERPL CALC-SCNC: 4 MEQ/L (ref 2–13)
AST SERPL-CCNC: 32 UNIT/L (ref 14–36)
BASOPHILS # BLD AUTO: 0.01 X10(3)/MCL (ref 0.01–0.08)
BASOPHILS NFR BLD AUTO: 0.3 % (ref 0.1–1.2)
BILIRUB SERPL-MCNC: 0.3 MG/DL (ref 0–1)
BNP BLD-MCNC: 4900 PG/ML (ref 0–124.9)
BUN SERPL-MCNC: 21 MG/DL (ref 7–20)
CALCIUM SERPL-MCNC: 8.7 MG/DL (ref 8.4–10.2)
CHLORIDE SERPL-SCNC: 97 MMOL/L (ref 98–110)
CO2 SERPL-SCNC: 33 MMOL/L (ref 21–32)
CREAT SERPL-MCNC: 0.83 MG/DL (ref 0.66–1.25)
CREAT/UREA NIT SERPL: 25 (ref 12–20)
EOSINOPHIL # BLD AUTO: 0 X10(3)/MCL (ref 0.04–0.36)
EOSINOPHIL NFR BLD AUTO: 0 % (ref 0.7–7)
ERYTHROCYTE [DISTWIDTH] IN BLOOD BY AUTOMATED COUNT: 13.2 % (ref 11–14.5)
GFR SERPLBLD CREATININE-BSD FMLA CKD-EPI: 67 MLS/MIN/1.73/M2
GLOBULIN SER-MCNC: 2.8 GM/DL (ref 2–3.9)
GLUCOSE SERPL-MCNC: 195 MG/DL (ref 70–115)
HCT VFR BLD AUTO: 36.7 % (ref 36–48)
HGB BLD-MCNC: 11.9 G/DL (ref 11.8–16)
IMM GRANULOCYTES # BLD AUTO: 0.02 X10(3)/MCL (ref 0–0.03)
IMM GRANULOCYTES NFR BLD AUTO: 0.7 % (ref 0–0.5)
LYMPHOCYTES # BLD AUTO: 0.39 X10(3)/MCL (ref 1.16–3.74)
LYMPHOCYTES NFR BLD AUTO: 12.9 % (ref 20–55)
MCH RBC QN AUTO: 30.8 PG (ref 27–34)
MCHC RBC AUTO-ENTMCNC: 32.4 G/DL (ref 31–37)
MCV RBC AUTO: 95.1 FL (ref 79–99)
MONOCYTES # BLD AUTO: 0.14 X10(3)/MCL (ref 0.24–0.36)
MONOCYTES NFR BLD AUTO: 4.6 % (ref 4.7–12.5)
NEUTROPHILS # BLD AUTO: 2.46 X10(3)/MCL (ref 1.56–6.13)
NEUTROPHILS NFR BLD AUTO: 81.5 % (ref 37–73)
NRBC BLD AUTO-RTO: 0 %
PLATELET # BLD AUTO: 223 X10(3)/MCL (ref 140–371)
PMV BLD AUTO: 9.9 FL (ref 9.4–12.4)
POCT GLUCOSE: 130 MG/DL (ref 70–110)
POCT GLUCOSE: 143 MG/DL (ref 70–110)
POCT GLUCOSE: 144 MG/DL (ref 70–110)
POCT GLUCOSE: 144 MG/DL (ref 70–110)
POTASSIUM SERPL-SCNC: 4.2 MMOL/L (ref 3.5–5.1)
PROT SERPL-MCNC: 5.9 GM/DL (ref 6.3–8.2)
RBC # BLD AUTO: 3.86 X10(6)/MCL (ref 4–5.1)
SODIUM SERPL-SCNC: 134 MMOL/L (ref 135–145)
WBC # SPEC AUTO: 3.02 X10(3)/MCL (ref 4–11.5)

## 2024-02-26 PROCEDURE — 27100171 HC OXYGEN HIGH FLOW UP TO 24 HOURS

## 2024-02-26 PROCEDURE — 25000003 PHARM REV CODE 250: Performed by: FAMILY MEDICINE

## 2024-02-26 PROCEDURE — 83880 ASSAY OF NATRIURETIC PEPTIDE: CPT | Performed by: FAMILY MEDICINE

## 2024-02-26 PROCEDURE — 94640 AIRWAY INHALATION TREATMENT: CPT

## 2024-02-26 PROCEDURE — 99900035 HC TECH TIME PER 15 MIN (STAT)

## 2024-02-26 PROCEDURE — 36415 COLL VENOUS BLD VENIPUNCTURE: CPT | Performed by: FAMILY MEDICINE

## 2024-02-26 PROCEDURE — 25000242 PHARM REV CODE 250 ALT 637 W/ HCPCS: Performed by: INTERNAL MEDICINE

## 2024-02-26 PROCEDURE — 63600175 PHARM REV CODE 636 W HCPCS: Performed by: INTERNAL MEDICINE

## 2024-02-26 PROCEDURE — 11000001 HC ACUTE MED/SURG PRIVATE ROOM

## 2024-02-26 PROCEDURE — 25000003 PHARM REV CODE 250: Performed by: INTERNAL MEDICINE

## 2024-02-26 PROCEDURE — 25000242 PHARM REV CODE 250 ALT 637 W/ HCPCS: Performed by: FAMILY MEDICINE

## 2024-02-26 PROCEDURE — 94660 CPAP INITIATION&MGMT: CPT

## 2024-02-26 PROCEDURE — 94761 N-INVAS EAR/PLS OXIMETRY MLT: CPT

## 2024-02-26 PROCEDURE — 85025 COMPLETE CBC W/AUTO DIFF WBC: CPT | Performed by: FAMILY MEDICINE

## 2024-02-26 PROCEDURE — 63600175 PHARM REV CODE 636 W HCPCS: Performed by: FAMILY MEDICINE

## 2024-02-26 PROCEDURE — 21400001 HC TELEMETRY ROOM

## 2024-02-26 PROCEDURE — 80053 COMPREHEN METABOLIC PANEL: CPT | Performed by: FAMILY MEDICINE

## 2024-02-26 PROCEDURE — 94799 UNLISTED PULMONARY SVC/PX: CPT

## 2024-02-26 RX ORDER — LEVALBUTEROL INHALATION SOLUTION 0.63 MG/3ML
0.31 SOLUTION RESPIRATORY (INHALATION) EVERY 8 HOURS
Status: DISCONTINUED | OUTPATIENT
Start: 2024-02-26 | End: 2024-02-28 | Stop reason: HOSPADM

## 2024-02-26 RX ORDER — ONDANSETRON HYDROCHLORIDE 2 MG/ML
4 INJECTION, SOLUTION INTRAVENOUS EVERY 6 HOURS PRN
Status: DISCONTINUED | OUTPATIENT
Start: 2024-02-26 | End: 2024-02-28 | Stop reason: HOSPADM

## 2024-02-26 RX ORDER — IBUPROFEN 200 MG
16 TABLET ORAL
Status: DISCONTINUED | OUTPATIENT
Start: 2024-02-26 | End: 2024-02-28 | Stop reason: HOSPADM

## 2024-02-26 RX ORDER — SODIUM CHLORIDE 0.9 % (FLUSH) 0.9 %
10 SYRINGE (ML) INJECTION EVERY 8 HOURS PRN
Status: DISCONTINUED | OUTPATIENT
Start: 2024-02-26 | End: 2024-02-28 | Stop reason: HOSPADM

## 2024-02-26 RX ORDER — LOSARTAN POTASSIUM 50 MG/1
50 TABLET ORAL DAILY
Status: DISCONTINUED | OUTPATIENT
Start: 2024-02-26 | End: 2024-02-26

## 2024-02-26 RX ORDER — INSULIN ASPART 100 [IU]/ML
0-10 INJECTION, SOLUTION INTRAVENOUS; SUBCUTANEOUS
Status: DISCONTINUED | OUTPATIENT
Start: 2024-02-26 | End: 2024-02-28 | Stop reason: HOSPADM

## 2024-02-26 RX ORDER — CLOPIDOGREL BISULFATE 75 MG/1
75 TABLET ORAL DAILY
Status: DISCONTINUED | OUTPATIENT
Start: 2024-02-26 | End: 2024-02-28 | Stop reason: HOSPADM

## 2024-02-26 RX ORDER — PANTOPRAZOLE SODIUM 40 MG/1
40 TABLET, DELAYED RELEASE ORAL DAILY
Status: DISCONTINUED | OUTPATIENT
Start: 2024-02-26 | End: 2024-02-28 | Stop reason: HOSPADM

## 2024-02-26 RX ORDER — IPRATROPIUM BROMIDE AND ALBUTEROL SULFATE 2.5; .5 MG/3ML; MG/3ML
3 SOLUTION RESPIRATORY (INHALATION)
Status: DISCONTINUED | OUTPATIENT
Start: 2024-02-26 | End: 2024-02-26

## 2024-02-26 RX ORDER — ENOXAPARIN SODIUM 100 MG/ML
40 INJECTION SUBCUTANEOUS EVERY 24 HOURS
Status: DISCONTINUED | OUTPATIENT
Start: 2024-02-26 | End: 2024-02-28 | Stop reason: HOSPADM

## 2024-02-26 RX ORDER — PRAVASTATIN SODIUM 20 MG/1
80 TABLET ORAL DAILY
Status: DISCONTINUED | OUTPATIENT
Start: 2024-02-26 | End: 2024-02-28 | Stop reason: HOSPADM

## 2024-02-26 RX ORDER — NAPROXEN SODIUM 220 MG/1
81 TABLET, FILM COATED ORAL DAILY
Status: DISCONTINUED | OUTPATIENT
Start: 2024-02-26 | End: 2024-02-28 | Stop reason: HOSPADM

## 2024-02-26 RX ORDER — FUROSEMIDE 10 MG/ML
40 INJECTION INTRAMUSCULAR; INTRAVENOUS DAILY
Status: DISCONTINUED | OUTPATIENT
Start: 2024-02-26 | End: 2024-02-26

## 2024-02-26 RX ORDER — GLUCAGON 1 MG
1 KIT INJECTION
Status: DISCONTINUED | OUTPATIENT
Start: 2024-02-26 | End: 2024-02-28 | Stop reason: HOSPADM

## 2024-02-26 RX ORDER — ACETAMINOPHEN 325 MG/1
650 TABLET ORAL EVERY 8 HOURS PRN
Status: DISCONTINUED | OUTPATIENT
Start: 2024-02-26 | End: 2024-02-28 | Stop reason: HOSPADM

## 2024-02-26 RX ORDER — IBUPROFEN 200 MG
24 TABLET ORAL
Status: DISCONTINUED | OUTPATIENT
Start: 2024-02-26 | End: 2024-02-28 | Stop reason: HOSPADM

## 2024-02-26 RX ADMIN — AZITHROMYCIN MONOHYDRATE 500 MG: 500 INJECTION, POWDER, LYOPHILIZED, FOR SOLUTION INTRAVENOUS at 09:02

## 2024-02-26 RX ADMIN — ASPIRIN 81 MG 81 MG: 81 TABLET ORAL at 08:02

## 2024-02-26 RX ADMIN — AZITHROMYCIN MONOHYDRATE 500 MG: 500 INJECTION, POWDER, LYOPHILIZED, FOR SOLUTION INTRAVENOUS at 01:02

## 2024-02-26 RX ADMIN — ENOXAPARIN SODIUM 40 MG: 40 INJECTION SUBCUTANEOUS at 04:02

## 2024-02-26 RX ADMIN — IPRATROPIUM BROMIDE AND ALBUTEROL SULFATE 3 ML: .5; 3 SOLUTION RESPIRATORY (INHALATION) at 07:02

## 2024-02-26 RX ADMIN — LEVALBUTEROL HYDROCHLORIDE 0.31 MG: 0.63 SOLUTION RESPIRATORY (INHALATION) at 11:02

## 2024-02-26 RX ADMIN — LOSARTAN POTASSIUM 50 MG: 50 TABLET, FILM COATED ORAL at 08:02

## 2024-02-26 RX ADMIN — CLOPIDOGREL BISULFATE 75 MG: 75 TABLET ORAL at 08:02

## 2024-02-26 RX ADMIN — CEFTRIAXONE SODIUM 1 G: 1 INJECTION, POWDER, FOR SOLUTION INTRAMUSCULAR; INTRAVENOUS at 09:02

## 2024-02-26 RX ADMIN — LEVALBUTEROL HYDROCHLORIDE 0.31 MG: 0.63 SOLUTION RESPIRATORY (INHALATION) at 12:02

## 2024-02-26 RX ADMIN — PRAVASTATIN SODIUM 80 MG: 20 TABLET ORAL at 08:02

## 2024-02-26 RX ADMIN — PANTOPRAZOLE SODIUM 40 MG: 40 TABLET, DELAYED RELEASE ORAL at 08:02

## 2024-02-26 NOTE — HOSPITAL COURSE
02/26/2024 pt was just here with CHF and noted to have severe AS, d/c home on 02/24 back to nursing home.  Her PCO2 was elevated on admit to 69 and she was placed on BIPAP.  Pt states she feels well this am.  BNP yesterday was 4900 was down to 900 at time of d/c and 2900 last hospital admit.  Feel likely more CHF related and could be due to her underlying chronic respiratory failure with hypoxia and hypercapnea. She is usually on oxygen at the nursing home.  02/27/2024 elderly patient who has a DNR in for CHF exacerbation with known severe aortic stenosis.  She is from the nursing home and was recently admitted treated then sent back and quickly she came back to the hospital.  Cardiology is suggesting hospice.  We will continue these discussions with the patient and the patient's family.  If they are agreeable send her back to the nursing home tomorrow with hospice care.  Appreciate cardiology's help.   02/28/2024 brief discharge summary elderly female patient with severe aortic stenosis presented with congestive heart failure and possible pneumonia.  Hospital course patient was admitted to the hospital placed on antibiotics given some diuretics.  We consulted Cardiology who at this point stated she was not a surgical candidate and that we should consider hospice.  We had a long discussion with the family she made some improvements with antibiotic and diuretics and they were agreeable to go back to the nursing home with hospice.  She will go at [facility] continue her previous nursing home meds.  Focus will be on comfort care.

## 2024-02-26 NOTE — H&P
[facility]    History & Physical      Patient Name: Priscila Randhawa  MRN: [id number]  Admission Date: 2024  Attending Physician: Ana Tran MD   Primary Care Provider: Delbert Bhandari MD       Tele-Nocturnist History and Physical  Telemedicine Service was provided using HIPPA compliant web platform using SOC for audio/visual equipment.   Patient Location: [facility], [place]  Provider Location: [place], [place]  Participants on call: bedside RN, patient    Patient information was obtained from patient and ER records.     Subjective:     Principal Problem:Pneumonia of both lungs due to infectious organism    Chief Complaint:   Chief Complaint   Patient presents with    Shortness of Breath    Wheezing    LOW O2 SATS     Pt presented to ED via AAS sent from [facility] for increased shortness of breath, low oxygen sats and wheezing. Pt was just discharged from [facility] on yesterday. Pt presents with dyspnea, tachypnea, and wheezing.        HPI: 89 y/o female with pmhx of moderate Dementia, hypertension, type 2 diabetes mellitus, hyperlipidemia, underlying HFpEF, COVID-19 infection about 2 weeks ago, chronic respiratory failure on presumed 2 L of nasal cannula oxygen therapy at the nursing home and osteoarthritis who is Nursing home resident was just discharged from this hospital yesterday after admission and treatment for NSTEMI and decompensated CHF and now sent back to the ED for shortness of breathe, wheezing and low oxygen saturation. She is a poor historian and was not able to give much history. She denies chest pain.  She is currently on 4 L O2 via NC with O2 Sat of 98%.  She is DNR but POA reportedly ok with BIPAP.       Her lab data shows Troponin 0.070 à 0.041 which is trending down from previous hospitalization.  ß 3,550. D-Dimer 1.58.  POC AB.32/65/301/29/100 on 40% FiO2.  CTA Chest suggestive of pneumonia but no PE.    ECHO (2024): EF 55 - 60% with  diastolic dysfunction and moderate/severe Aortic stenosis.     ED course: She was give Lasix 20 mg iv, Ceftriaxone, Azithromycin and placed on BIPAP. She says she feels better compare to when she came in.  I personally spoke with ED clinician regarding the case and reviewed his notes. Pertinent ED findings noted.    Past Medical History:   Diagnosis Date    Background diabetic retinopathy     Carotid artery stenosis     Cataract extraction status     Combined hyperlipidemia     HTN (hypertension)     Non-STEMI (non-ST elevated myocardial infarction) 02/23/2024    Polycythemia     Type 2 diabetes mellitus without complications     Unspecified osteoarthritis, unspecified site     Vitamin D deficiency        Past Surgical History:   Procedure Laterality Date    EPIDURAL STEROID INJECTION INTO LUMBAR SPINE      EXPLORATORY LAPAROTOMY      hx total knee repla         Review of patient's allergies indicates:   Allergen Reactions    Codeine Itching       No current facility-administered medications on file prior to encounter.     Current Outpatient Medications on File Prior to Encounter   Medication Sig    albuterol (PROVENTIL HFA) 90 mcg/actuation inhaler Inhale 2 puffs into the lungs 4 (four) times daily. Rescue    albuterol (PROVENTIL) 5 mg/mL nebulizer solution Take 2.5 mg by nebulization every 4 (four) hours as needed for Wheezing. Rescue    aspirin 81 MG Chew Take 1 tablet (81 mg total) by mouth once daily.    clopidogreL (PLAVIX) 75 mg tablet Take 1 tablet (75 mg total) by mouth once daily.    cyanocobalamin, vitamin B-12, 1,000 mcg/mL Kit Inject 1,000 mcg as directed every 30 days.    dextromethorphan-guaiFENesin  mg (MUCINEX DM)  mg per 12 hr tablet Take 1 tablet by mouth 2 (two) times daily. for 10 days    losartan (COZAAR) 50 MG tablet Take 1 tablet (50 mg total) by mouth once daily.    pantoprazole (PROTONIX) 40 MG tablet Take 1 tablet (40 mg total) by mouth once daily.    pravastatin (PRAVACHOL) 80  MG tablet Take 1 tablet (80 mg total) by mouth once daily.     Family History       Problem Relation (Age of Onset)    Alzheimer's disease Sister    Coronary artery disease Mother, Sister, Brother    Heart attack Mother, Brother    Heart failure Sister          Tobacco Use    Smoking status: Never    Smokeless tobacco: Never   Substance and Sexual Activity    Alcohol use: Not on file    Drug use: Not on file    Sexual activity: Not on file     Review of Systems  All systems reviewed and stated as negative per the patient except as stated in HPI.  GEN: denies fever or chills, denies fatigue  Eye: denies visual changes  HENT: denies throat pain  RESP: denies hemoptysis  CARDIAC: denies chest pain or palpitations  GI: denies nausea, vomiting, or diarrhea  SKIN: denies rash    Objective:     Vital Signs (Most Recent):  Temp: 97.8 °F (36.6 °C) (02/25/24 2348)  Pulse: 65 (02/25/24 2351)  Resp: 20 (02/25/24 2348)  BP: 124/67 (02/25/24 2348)  SpO2: 99 % (02/25/24 2351) Vital Signs (24h Range):  Temp:  [97.8 °F (36.6 °C)] 97.8 °F (36.6 °C)  Pulse:  [] 65  Resp:  [17-35] 20  SpO2:  [94 %-99 %] 99 %  BP: (103-198)/() 124/67     Weight: 61.1 kg (134 lb 12.8 oz)  Body mass index is 28.17 kg/m².    Physical Exam   GEN: Appears stated age, no acute distress, well nourished  EYES: no conjunctival injection, EOMI, normal hearing  HENT: no goiter, moist mucous membranes, no glossitis  RESP: clear to auscultation bilaterally, no conversational dyspnea, no accessory muscle use  CARDIAC: regular rate and rhythm, systolic murmurs+, edema+  GI: soft non tender, non-distended + bowel sounds  SKIN: no rashes noted  Neuro: CN II-XII intact, non focal exam  Psych: Alert and oriented x3, normal affect    Significant Labs: All pertinent labs within the past 24 hours have been reviewed.    Significant Imaging: I have reviewed all pertinent imaging results/findings within the past 24 hours.    Assessment/Plan:     Assessment/Plan  #.  Acute on chronic Respiratory Failure with hypoxia and hypercarbia  -Cont on BIPAP  -Duoneb  -Treat underlying conditions as below    #. Possible Pneumonia  -Cont Ceftriaxone and Azithromycin for now  -F/u official read of CTA Chest    #. HFpEF  -Lasix 40 mg iv daily  -Resume Lorsatan  -Strict I/Os    #. Elevated Troponin/NSTEMI  -Resume ASA and Plavix  -Outpatient f/u with [facility]    #. Severe Aortic Stenosis  -F/U with [facility] for additional work up    #. Diabetes mellitus type 2  -Sliding scale insulin      VTE: Heparin  Diet: Diabetic  Code status: DNR  Independently reviewed imaging, labs and vitals as pertinent to clinical condition  Discharge Disposition:     Time spent with patient, assessment, chart review, medication, patient education: > 75 minutes         Disposition:   - The patient is expected to have a LOS more than 2 midnights and will be admitted to inpatient status under my care      Active Diagnoses:    Diagnosis Date Noted POA    PRINCIPAL PROBLEM:  Pneumonia of both lungs due to infectious organism [J18.9] 02/26/2024 Unknown    Shortness of breath [R06.02] 02/26/2024 Unknown      Problems Resolved During this Admission:     VTE Risk Mitigation (From admission, onward)           Ordered     enoxaparin injection 40 mg  Daily         02/26/24 0059     IP VTE HIGH RISK PATIENT  Once         02/26/24 0059     Place sequential compression device  Until discontinued         02/26/24 0059                      Rachel Mcguire MD  Department of Hospital Medicine   [facility]

## 2024-02-26 NOTE — ASSESSMENT & PLAN NOTE
Chronic, controlled. Latest blood pressure and vitals reviewed-     Temp:  [97 °F (36.1 °C)-99.2 °F (37.3 °C)]   Pulse:  []   Resp:  [17-35]   BP: ()/()   SpO2:  [94 %-99 %] .   Home meds for hypertension were reviewed and noted below.   Hypertension Medications               losartan (COZAAR) 50 MG tablet Take 1 tablet (50 mg total) by mouth once daily.            While in the hospital, will manage blood pressure as follows; Continue home antihypertensive regimen    Will utilize p.r.n. blood pressure medication only if patient's blood pressure greater than 140/90 and she develops symptoms such as worsening chest pain or shortness of breath.

## 2024-02-26 NOTE — PLAN OF CARE
02/26/24 1123   Readmission   Why were you hospitalized in the last 30 days? CHF, NSTEMI   Why were you readmitted? Alarmed about signs/symptoms   When you left the hospital how did you feel? Back to Baseline   When you left the hospital where did you go? Nursing Home  ([facility])   Did patient/caregiver refused recommended DC plan? No   Tell me about what happened between when you left the hospital and the day you returned. Shortness of Breath, Low Oxygen Sats, Wheezing   When did you start not feeling well? Day of admission   Did you try to manage your symptoms your self? No   Did you try to see or did see a doctor or nurse before you came? Yes  ([facility] resident)   Were you seen? Yes  (Evaluated by NH staff)   Was this a planned readmission? No

## 2024-02-26 NOTE — PROGRESS NOTES
[facility]  [facility]  Progress Note    Patient Name: Priscila Randhawa  MRN: [id number]  Patient Class: IP- Inpatient   Admission Date: 2024  Length of Stay: 1 days  Attending Physician: Ana Tran MD  Primary Care Provider: Delbert Bhandari MD        Subjective:     Principal Problem:Pneumonia of left lower lobe due to infectious organism        HPI:    Shortness of Breath     Wheezing    LOW O2 SATS       Pt presented to ED via AAS sent from [facility] for increased shortness of breath, low oxygen sats and wheezing. Pt was just discharged from [facility] on yesterday. Pt presents with dyspnea, tachypnea, and wheezing.         HPI: 89 y/o female with pmhx of moderate Dementia, hypertension, type 2 diabetes mellitus, hyperlipidemia, underlying HFpEF, COVID-19 infection about 2 weeks ago, chronic respiratory failure on presumed 2 L of nasal cannula oxygen therapy at the nursing home and osteoarthritis who is Nursing home resident was just discharged from this hospital yesterday after admission and treatment for NSTEMI and decompensated CHF and now sent back to the ED for shortness of breathe, wheezing and low oxygen saturation. She is a poor historian and was not able to give much history. She denies chest pain.  She is currently on 4 L O2 via NC with O2 Sat of 98%.  She is DNR but POA reportedly ok with BIPAP.        Her lab data shows Troponin 0.070 à 0.041 which is trending down from previous hospitalization.  ß 3,550. D-Dimer 1.58.  POC AB.32/65/301/29/100 on 40% FiO2.  CTA Chest suggestive of pneumonia but no PE.     ECHO (2024): EF 55 - 60% with diastolic dysfunction and moderate/severe Aortic stenosis.     ED course: She was give Lasix 20 mg iv, Ceftriaxone, Azithromycin and placed on BIPAP. She says she feels better compare to when she came in.  I personally spoke with ED clinician regarding the case and reviewed his notes. Pertinent ED findings noted.        Overview/Hospital Course:  02/26/2024 pt was just here with CHF and noted to have severe AS, d/c home on 02/24 back to nursing home.  Her PCO2 was elevated on admit to 69 and she was placed on BIPAP.  Pt states she feels well this am.  BNP yesterday was 4900 was down to 900 at time of d/c and 2900 last hospital admit.  Feel likely more CHF related and could be due to her underlying chronic respiratory failure with hypoxia and hypercapnea. She is usually on oxygen at the nursing home.    Interval History:      Review of Systems   Unable to perform ROS: Dementia   Constitutional:  Negative for activity change, appetite change and fever.   Respiratory:  Negative for chest tightness, shortness of breath and wheezing.    Cardiovascular:  Negative for chest pain.   Gastrointestinal:  Negative for abdominal pain, constipation, diarrhea, nausea and vomiting.   Genitourinary:  Negative for dysuria.   Skin:  Negative for rash and wound.   Neurological:  Negative for tremors and headaches.     Objective:     Vital Signs (Most Recent):  Temp: 99.2 °F (37.3 °C) (02/26/24 1100)  Pulse: 80 (02/26/24 1100)  Resp: 18 (02/26/24 1100)  BP: (!) 86/55 (02/26/24 1100)  SpO2: 98 % (02/26/24 1100) Vital Signs (24h Range):  Temp:  [97 °F (36.1 °C)-99.2 °F (37.3 °C)] 99.2 °F (37.3 °C)  Pulse:  [] 80  Resp:  [17-35] 18  SpO2:  [94 %-99 %] 98 %  BP: ()/() 86/55     Weight: 61.1 kg (134 lb 12.8 oz)  Body mass index is 28.17 kg/m².    Intake/Output Summary (Last 24 hours) at 2/26/2024 1216  Last data filed at 2/26/2024 0224  Gross per 24 hour   Intake 720 ml   Output --   Net 720 ml         Physical Exam  Vitals and nursing note reviewed. Exam conducted with a chaperone present.   Constitutional:       General: She is not in acute distress.     Appearance: Normal appearance. She is obese. She is not ill-appearing, toxic-appearing or diaphoretic.   HENT:      Nose: No congestion or rhinorrhea.   Eyes:      Extraocular  Movements: Extraocular movements intact.      Conjunctiva/sclera: Conjunctivae normal.      Pupils: Pupils are equal, round, and reactive to light.   Neck:      Vascular: No carotid bruit.   Cardiovascular:      Rate and Rhythm: Normal rate and regular rhythm.      Pulses: Normal pulses.      Heart sounds: Murmur (3/6 MARLYS) heard.   Pulmonary:      Effort: Pulmonary effort is normal. No respiratory distress.      Breath sounds: Wheezing (bilateral few scattered) present. No rhonchi or rales.   Abdominal:      General: Bowel sounds are normal. There is no distension.      Palpations: Abdomen is soft.      Tenderness: There is no abdominal tenderness. There is no guarding.   Musculoskeletal:         General: No swelling or tenderness. Normal range of motion.      Cervical back: Normal range of motion and neck supple. No tenderness.      Right lower leg: No edema.      Left lower leg: No edema.   Lymphadenopathy:      Cervical: No cervical adenopathy.   Skin:     General: Skin is warm and dry.      Coloration: Skin is not jaundiced or pale.   Neurological:      General: No focal deficit present.      Mental Status: She is alert and oriented to person, place, and time. Mental status is at baseline.      Cranial Nerves: No cranial nerve deficit.      Motor: No weakness.      Coordination: Coordination normal.      Gait: Gait normal.   Psychiatric:         Mood and Affect: Mood normal.         Behavior: Behavior normal.         Thought Content: Thought content normal.         Judgment: Judgment normal.             Significant Labs: All pertinent labs within the past 24 hours have been reviewed.  CBC:   Recent Labs   Lab 02/25/24  1713 02/26/24  0418   WBC 9.86 3.02*   HGB 13.1 11.9   HCT 39.7 36.7    223     CMP:   Recent Labs   Lab 02/25/24  1713 02/26/24  0418   * 134*   K 3.9 4.2   CO2 35* 33*   BUN 20.0 21.0*   CREATININE 0.75 0.83   CALCIUM 8.7 8.7   ALBUMIN 3.8 3.1*   BILITOT 0.5 0.3   ALKPHOS 88 63  "  AST 31 32   ALT 18 21     Cardiac Markers: No results for input(s): "CKMB", "MYOGLOBIN", "BNP", "TROPISTAT" in the last 48 hours.    Significant Imaging: I have reviewed all pertinent imaging results/findings within the past 24 hours.    Assessment/Plan:      * Pneumonia of left lower lobe due to infectious organism  Possible early pneumonia vs. Pulmonary edema seen on CXR and ct  Will admit for iv abx  Consult cardiology due to difficult to diurese given severe AS  Clinically she does not appear volume overloaded      Acute exacerbation of CHF (congestive heart failure)  Patient is identified as having Combined Systolic and Diastolic heart failure that is Acute on chronic. CHF is currently uncontrolled due to Rales/crackles on pulmonary exam and Pulmonary edema/pleural effusion on CXR. Latest ECHO performed and demonstrates- Results for orders placed during the hospital encounter of 02/22/24    Echo    Interpretation Summary    Left Ventricle: There is normal systolic function with a visually estimated ejection fraction of 55 - 60%. There is diastolic dysfunction.    Right Ventricle: Normal right ventricular cavity size. Systolic function is normal.    Aortic Valve: There is moderate to severe stenosis. Aortic valve area by VTI is 0.53 cm². Aortic valve peak velocity is 3.62 m/s. Mean gradient is 33.7 mmHg. The dimensionless index is 0.24. Trace AI.    Mitral Valve: Moderate MAC. There is mild stenosis. MVA 1.83 cm2. MG is 3 mmHg at a heart rate of 86 bpm. There is mild regurgitation.    Tricuspid Valve: There is mild regurgitation.    Pulmonic Valve: There is mild regurgitation.    The estimated pulmonary artery systolic pressure is 28 mmHg.  . Continue Beta Blocker, ACE/ARB, and Furosemide and monitor clinical status closely. Monitor on telemetry. Patient is on CHF pathway.  Monitor strict Is&Os and daily weights.  Place on fluid restriction of 1.5 L. Cardiology has been consulted. Continue to stress to patient " "importance of self efficacy and  on diet for CHF. Last BNP reviewed- and noted below No results for input(s): "BNP", "BNPTRIAGEBLO" in the last 168 hours.    Severe aortic stenosis  Previous plan was for outpt [facility]      Well controlled type 2 diabetes mellitus  Patient's FSGs are controlled on current medication regimen.  Last A1c reviewed-   Lab Results   Component Value Date    HGBA1C 5.0 05/08/2023     Most recent fingerstick glucose reviewed-   Recent Labs   Lab 02/26/24  0720 02/26/24  1108   POCTGLUCOSE 130* 143*     Current correctional scale  Low  Maintain anti-hyperglycemic dose as follows-   Antihyperglycemics (From admission, onward)      Start     Stop Route Frequency Ordered    02/26/24 0158  insulin aspart U-100 pen 0-10 Units         -- SubQ Before meals & nightly PRN 02/26/24 0059          Hold Oral hypoglycemics while patient is in the hospital.    Essential hypertension  Chronic, controlled. Latest blood pressure and vitals reviewed-     Temp:  [97 °F (36.1 °C)-99.2 °F (37.3 °C)]   Pulse:  []   Resp:  [17-35]   BP: ()/()   SpO2:  [94 %-99 %] .   Home meds for hypertension were reviewed and noted below.   Hypertension Medications               losartan (COZAAR) 50 MG tablet Take 1 tablet (50 mg total) by mouth once daily.            While in the hospital, will manage blood pressure as follows; Continue home antihypertensive regimen    Will utilize p.r.n. blood pressure medication only if patient's blood pressure greater than 140/90 and she develops symptoms such as worsening chest pain or shortness of breath.    Mixed hyperlipidemia   Patient is chronically on statin.will continue for now. Monitor clinically. Last LDL was No results found for: "LDLCALC"           VTE Risk Mitigation (From admission, onward)           Ordered     enoxaparin injection 40 mg  Daily         02/26/24 0059     IP VTE HIGH RISK PATIENT  Once         02/26/24 0059                    Discharge " Planning   HALI: 2/28/2024     Code Status: DNR   Is the patient medically ready for discharge?:     Reason for patient still in hospital (select all that apply): Patient trending condition, Laboratory test, and Consult recommendations  Discharge Plan A: Return to nursing home                  Ana Tran MD  Department of Hospital Medicine   [facility]

## 2024-02-26 NOTE — SUBJECTIVE & OBJECTIVE
Interval History:      Review of Systems   Unable to perform ROS: Dementia   Constitutional:  Negative for activity change, appetite change and fever.   Respiratory:  Negative for chest tightness, shortness of breath and wheezing.    Cardiovascular:  Negative for chest pain.   Gastrointestinal:  Negative for abdominal pain, constipation, diarrhea, nausea and vomiting.   Genitourinary:  Negative for dysuria.   Skin:  Negative for rash and wound.   Neurological:  Negative for tremors and headaches.     Objective:     Vital Signs (Most Recent):  Temp: 99.2 °F (37.3 °C) (02/26/24 1100)  Pulse: 80 (02/26/24 1100)  Resp: 18 (02/26/24 1100)  BP: (!) 86/55 (02/26/24 1100)  SpO2: 98 % (02/26/24 1100) Vital Signs (24h Range):  Temp:  [97 °F (36.1 °C)-99.2 °F (37.3 °C)] 99.2 °F (37.3 °C)  Pulse:  [] 80  Resp:  [17-35] 18  SpO2:  [94 %-99 %] 98 %  BP: ()/() 86/55     Weight: 61.1 kg (134 lb 12.8 oz)  Body mass index is 28.17 kg/m².    Intake/Output Summary (Last 24 hours) at 2/26/2024 1216  Last data filed at 2/26/2024 0224  Gross per 24 hour   Intake 720 ml   Output --   Net 720 ml         Physical Exam  Vitals and nursing note reviewed. Exam conducted with a chaperone present.   Constitutional:       General: She is not in acute distress.     Appearance: Normal appearance. She is obese. She is not ill-appearing, toxic-appearing or diaphoretic.   HENT:      Nose: No congestion or rhinorrhea.   Eyes:      Extraocular Movements: Extraocular movements intact.      Conjunctiva/sclera: Conjunctivae normal.      Pupils: Pupils are equal, round, and reactive to light.   Neck:      Vascular: No carotid bruit.   Cardiovascular:      Rate and Rhythm: Normal rate and regular rhythm.      Pulses: Normal pulses.      Heart sounds: Murmur (3/6 MARLYS) heard.   Pulmonary:      Effort: Pulmonary effort is normal. No respiratory distress.      Breath sounds: Wheezing (bilateral few scattered) present. No rhonchi or rales.  "  Abdominal:      General: Bowel sounds are normal. There is no distension.      Palpations: Abdomen is soft.      Tenderness: There is no abdominal tenderness. There is no guarding.   Musculoskeletal:         General: No swelling or tenderness. Normal range of motion.      Cervical back: Normal range of motion and neck supple. No tenderness.      Right lower leg: No edema.      Left lower leg: No edema.   Lymphadenopathy:      Cervical: No cervical adenopathy.   Skin:     General: Skin is warm and dry.      Coloration: Skin is not jaundiced or pale.   Neurological:      General: No focal deficit present.      Mental Status: She is alert and oriented to person, place, and time. Mental status is at baseline.      Cranial Nerves: No cranial nerve deficit.      Motor: No weakness.      Coordination: Coordination normal.      Gait: Gait normal.   Psychiatric:         Mood and Affect: Mood normal.         Behavior: Behavior normal.         Thought Content: Thought content normal.         Judgment: Judgment normal.             Significant Labs: All pertinent labs within the past 24 hours have been reviewed.  CBC:   Recent Labs   Lab 02/25/24  1713 02/26/24  0418   WBC 9.86 3.02*   HGB 13.1 11.9   HCT 39.7 36.7    223     CMP:   Recent Labs   Lab 02/25/24  1713 02/26/24  0418   * 134*   K 3.9 4.2   CO2 35* 33*   BUN 20.0 21.0*   CREATININE 0.75 0.83   CALCIUM 8.7 8.7   ALBUMIN 3.8 3.1*   BILITOT 0.5 0.3   ALKPHOS 88 63   AST 31 32   ALT 18 21     Cardiac Markers: No results for input(s): "CKMB", "MYOGLOBIN", "BNP", "TROPISTAT" in the last 48 hours.    Significant Imaging: I have reviewed all pertinent imaging results/findings within the past 24 hours.  "

## 2024-02-26 NOTE — ED NOTES
Success  A new connect request was successfully created for:  Jenifferwerner Priscila  : 1933  MRN: [id number]  ConnectID: [id number]  REASON:  ED Admit Discussion  ACUITY:  10 Minutes  SUBMITTED:  2024 21:49 CST    TeleHositalist Consult requested

## 2024-02-26 NOTE — PROGRESS NOTES
"Inpatient Nutrition Assessment    Admit Date: 2/25/2024   Total duration of encounter: 1 day     Nutrition Recommendation/Prescription     Change Cardiac Diet to Diabetic Diet, 1500 kcal, Cardiac to prevent confusion and better reflect PO intake.     Recommend consider appetite stimulant on follow-up pending patients PO intake.     Hold off on ONS due to reports that it "goes right through her"    Continue to encourage PO intake, assist with feeds, and honor patient preferences    Dietitian will monitor Electrolytes, Energy Intake, Glucose/Endocrine Profile, Mental Status, Weight, and Weight Change and adjust MNT as needed.     Monitoring & Evaluation     Communication of Recommendations: reviewed with nurse and reviewed with patient    Reason Seen: continuous nutrition monitoring and malnutrition screening tool (MST)    RD Follow-up?: Yes  Nutrition Risk/Follow-Up: moderate (follow-up in 3-5 days)     Next Date to be Seen by RD: 02/29/24  Please consult if re-assessment needed sooner.      Nutrition Assessment     Malnutrition Assessment/Nutrition-Focused Physical Exam  NFPE not appropriate at this time.        Energy Intake (Malnutrition):  (Unable to determine due to not enough PO intakes recorded on last admit.) (02/26/24 1344)  Weight Loss (Malnutrition):  (Per EMR patient with possible 1.98# (1.4%) over past 3 days. Unsure of bedscale or PO intake related.) (02/26/24 1344)                                                  A minimum of two characteristics is recommended for diagnosis of either severe or non-severe malnutrition.    Chart Review    Malnutrition Screening Tool Results   Have you recently lost weight without trying?: Unsure  Have you been eating poorly because of a decreased appetite?: No   MST Score: 2     Diagnosis:  Pneumonia of left lower lobe  Acute exacerbation of CHF  Severe aortic stenosis  Well controlled T2DM  Essential HTN  Mixed HLD      Past Medical History:   Diagnosis Date    " Background diabetic retinopathy     Carotid artery stenosis     Cataract extraction status     Combined hyperlipidemia     HTN (hypertension)     Non-STEMI (non-ST elevated myocardial infarction) 02/23/2024    Polycythemia     Type 2 diabetes mellitus without complications     Unspecified osteoarthritis, unspecified site     Vitamin D deficiency      Past Surgical History:   Procedure Laterality Date    EPIDURAL STEROID INJECTION INTO LUMBAR SPINE      EXPLORATORY LAPAROTOMY      hx total knee repla         Scheduled Medications:  aspirin, 81 mg, Daily  azithromycin, 500 mg, Q24H  cefTRIAXone (Rocephin) IV (PEDS and ADULTS), 1 g, Q24H  clopidogreL, 75 mg, Daily  enoxparin, 40 mg, Daily  levalbuterol, 0.3108 mg, Q8H  losartan, 50 mg, Daily  pantoprazole, 40 mg, Daily  pravastatin, 80 mg, Daily    Continuous Infusions:   PRN Medications: acetaminophen, dextrose 10%, dextrose 10%, glucagon (human recombinant), glucose, glucose, insulin aspart U-100, ondansetron, sodium chloride 0.9%    Calorie Containing IV Medications: no significant kcals from medications at this time    Nutrition-Related Labs:   Recent Labs   Lab 02/22/24  1725 02/22/24  1942 02/23/24  0149 02/24/24  0416 02/25/24  1713 02/26/24  0418   *  --  136 136 134* 134*   K 4.2  --  3.9 4.1 3.9 4.2   CALCIUM 9.1  --  9.3 8.7 8.7 8.7   CHLORIDE 99  --  98 101 97* 97*   CO2 29  --  30 28 35* 33*   BUN 17.0  --  18.0 32.0* 20.0 21.0*   CREATININE 0.72  --  0.76 0.81 0.75 0.83   EGFRNORACEVR 80  --  75 69 76 67   GLUCOSE 115  --  194* 92 173* 195*   BILITOT 0.5  --   --  0.5 0.5 0.3   ALKPHOS 86  --   --  61 88 63   ALT 17  --   --  15 18 21   AST 29  --   --  30 31 32   ALBUMIN 3.8  --   --  3.6 3.8 3.1*   WBC 7.13 8.72 4.18 5.91 9.86 3.02*   HGB 13.0 13.1 12.3 12.6 13.1 11.9   HCT 38.1 38.7 36.3 38.6 39.7 36.7     CrCl:    Lab Value: 37.5    Date: 2/26    Nutrition Orders:  Diet Cardiac Diabetic, 1500 kcal    Other Oral Supplement Order: None/Already  "listed above  Factors Affecting Nutritional Intake: decreased appetite  Food/Temple/Cultural Preferences: none reported  Food Allergies:   Review of patient's allergies indicates:   Allergen Reactions    Codeine Itching       Skin Integrity: bruised (ecchymotic)  Wound(s):       Overview/Hospital Course:    (Previous admit- ): Patient repots good appetite prior to admit but not so good now due to frequency/time between meals. Patient reports eating what she wants when she wants without any unintentional weight loss as shown in EMR. Patient reports ONS goes right through her so will hold off for now. GI: INCONTINENT/LBM-, : CATHETER, FUNCTIONAL SCREEN:Eatin - assistive person, Nutrition: 3-->adequate,Parker Score: 18, 1+ BLE EDEMA NOTED, 24 HR I/O: 293/1150.     (): Patient reports eats well when she has an appetite but typically does not eat much which is not uncommon for patients around her age but due to possible weight loss RD to monitor and encourage PO intake. On last admit patient informed RD that ONS go right through her so RD to continue to hold off. Per EMR patient weighed 62 kg on , CBW- 61.1 kg showing a possible 1.98# (1.4%) over 3 days. GI: WDL and INCONTINENT/LBM-, : CATHETER, FUNCTIONAL SCREEN:Eatin - assistive person, Nutrition: 3-->adequate,Parker Score: 16, 1+ BLE EDEMA NOTED, 24 HR I/O: INCOMPLETE     Anthropometrics    Height: 4' 10" (147.3 cm) Height Method: Stated  Last Weight: 61.1 kg (134 lb 12.8 oz) (24 3218) Weight Method: Bed Scale  BMI (Calculated): 28.2  BMI Classification: overweight (BMI 25-29.9)     Ideal Body Weight (IBW), Female: 90 lb     % Ideal Body Weight, Female (lb): 149.78 %                             Usual Weight Provided By: EMR weight history    Wt Readings from Last 5 Encounters:   24 61.1 kg (134 lb 12.8 oz)   24 62 kg (136 lb 11.2 oz)   23 58.5 kg (128 lb 15.5 oz)   23 54.1 kg (119 lb 4.3 oz)   23 " 55.3 kg (122 lb)     Weight Change(s) Since Admission:  Admit Weight: 63.8 kg (140 lb 9.6 oz) (24 1659)      Estimated Needs    Weight Used For Calorie Calculations: 48.3 kg (106 lb 7.7 oz)  Energy Calorie Requirements (kcal): 6795-4368 KCAL (25-30 KCAL/KG ABW)  Energy Need Method: Kcal/kg  Weight Used For Protein Calculations: 48.3 kg (106 lb 7.7 oz) (ABW)  Protein Requirements: 48-58 GM PRO (1.0-1.2 GM/KG ABW)  Fluid Requirements (mL): 1210 ML H2O (1 ML/KCAL)  Temp (24hrs), Av.9 °F (36.6 °C), Min:97 °F (36.1 °C), Max:99.2 °F (37.3 °C)         Enteral Nutrition    Patient not receiving enteral nutrition at this time.    Parenteral Nutrition    Patient not receiving parenteral nutrition support at this time.    Evaluation of Received Nutrient Intake    Calories: not meeting estimated needs  Protein: not meeting estimated needs    Patient Education    Not applicable.         Nutrition Diagnosis     PES: Inadequate energy intake related to inability to consume sufficient nutrients as evidenced by estimated energy intake from diet less than estimated needs. (new)    Interventions/Goals     Intervention(s): general/healthful diet, modified composition of meals/snacks, and collaboration with other providers    Goal: Meet Greater than 75% of nutritional needs by discharge. (new)

## 2024-02-26 NOTE — PLAN OF CARE
02/26/24 1114   Discharge Assessment   Assessment Type Discharge Planning Assessment   Source of Information health record   Communicated HALI with patient/caregiver Date not available/Unable to determine   Reason For Admission Bilateral Pneumonia   People in Home facility resident   Facility Arrived From: [facility]   Do you expect to return to your current living situation? Yes   Prior to hospitilization cognitive status: Alert/Oriented   Current cognitive status: Alert/Oriented   Walking or Climbing Stairs Difficulty yes   Walking or Climbing Stairs ambulation difficulty, requires equipment;ambulation difficulty, assistance 1 person;transferring difficulty, assistance 1 person   Mobility Management Walker   Dressing/Bathing Difficulty yes   Dressing/Bathing bathing difficulty, dependent;dressing difficulty, dependent   Dressing/Bathing Management NH staff   Equipment Currently Used at Home walker, rolling;hospital bed;oxygen;nebulizer   Readmission within 30 days? Yes  ([facility]-CHF, NSTEMI  2/22-2/24)   Do you take prescription medications? Yes   Do you have prescription coverage? Yes   Coverage Medicare   Do you have any problems affording any of your prescribed medications? No   Is the patient taking medications as prescribed? yes   Who is going to help you get home at discharge? [facility]   How do you get to doctors appointments? other (see comments)  ([facility])   Are you on dialysis? No   Do you take coumadin? No   Discharge Plan A Return to nursing home   DME Needed Upon Discharge  none   Discharge Plan discussed with: Patient;Adult children   Transition of Care Barriers None

## 2024-02-26 NOTE — CONSULTS
[facility]/Surg    Cardiology  Consult Note    Patient Name: Priscila Randhawa  MRN: [id number]  Admission Date: 2/25/2024  Hospital Length of Stay: 1 days  Code Status: DNR   Attending Provider: Ana Tran MD   Consulting Provider: SINA Casey  Primary Care Physician: Delbert Bhandari MD  Principal Problem:Pneumonia of left lower lobe due to infectious organism    Patient information was obtained from past medical records, ER records, and primary team.     Subjective:     Chief Complaint/Reason for Consult: Shortness of breath/AS    HPI: 89 year old female nursing home patient previously unknown to [facility] with no past cardiac history that was hospitalized last week due to SOB and LE edema. She had a recent COVID infection. Echo revealed Severe Aortic stenosis and preserved LVF. She had elevated Tn in setting of CHF that was not felt to represent ACS. She was diuresed and started on DAPT with plans for OP follow up    She was Dcd on 2/24 and readmitted on 2/25 due to acute respiratory failure with hypoxia and hypercapnea. Cta chest neg for PE but with L lobe consolidation. Was placed on bipap and given Lasix 20 mg IVP    Today she is laying flat in bed. POA at bedside. On 2L NC. BP systolic 80s-90s. Lasix was held this am but did receive Losartan      PMH: Severe AS, Dementia, HTN, HLP, DM  PSH: knee replacement  Social History: lives in NH, POA/niece     Previous Cardiac Diagnostics:       Past Medical History:   Diagnosis Date    Background diabetic retinopathy     Carotid artery stenosis     Cataract extraction status     Combined hyperlipidemia     HTN (hypertension)     Non-STEMI (non-ST elevated myocardial infarction) 02/23/2024    Polycythemia     Type 2 diabetes mellitus without complications     Unspecified osteoarthritis, unspecified site     Vitamin D deficiency      Past Surgical History:   Procedure Laterality Date    EPIDURAL STEROID INJECTION INTO LUMBAR SPINE      EXPLORATORY  LAPAROTOMY      hx total knee repla       Review of patient's allergies indicates:   Allergen Reactions    Codeine Itching     No current facility-administered medications on file prior to encounter.     Current Outpatient Medications on File Prior to Encounter   Medication Sig    albuterol (PROVENTIL HFA) 90 mcg/actuation inhaler Inhale 2 puffs into the lungs 4 (four) times daily. Rescue    albuterol (PROVENTIL) 5 mg/mL nebulizer solution Take 2.5 mg by nebulization every 4 (four) hours as needed for Wheezing. Rescue    aspirin 81 MG Chew Take 1 tablet (81 mg total) by mouth once daily.    clopidogreL (PLAVIX) 75 mg tablet Take 1 tablet (75 mg total) by mouth once daily.    cyanocobalamin, vitamin B-12, 1,000 mcg/mL Kit Inject 1,000 mcg as directed every 30 days.    dextromethorphan-guaiFENesin  mg (MUCINEX DM)  mg per 12 hr tablet Take 1 tablet by mouth 2 (two) times daily. for 10 days    losartan (COZAAR) 50 MG tablet Take 1 tablet (50 mg total) by mouth once daily.    pantoprazole (PROTONIX) 40 MG tablet Take 1 tablet (40 mg total) by mouth once daily.    pravastatin (PRAVACHOL) 80 MG tablet Take 1 tablet (80 mg total) by mouth once daily.     Family History       Problem Relation (Age of Onset)    Alzheimer's disease Sister    Coronary artery disease Mother, Sister, Brother    Heart attack Mother, Brother    Heart failure Sister          Tobacco Use    Smoking status: Never    Smokeless tobacco: Never   Substance and Sexual Activity    Alcohol use: Not on file    Drug use: Not on file    Sexual activity: Not on file       Review of Systems   Respiratory:  Positive for cough and shortness of breath.    Cardiovascular:  Negative for chest pain.   Psychiatric/Behavioral:  Positive for confusion.      Objective:     Vital Signs (Most Recent):  Temp: 99.3 °F (37.4 °C) (02/26/24 1457)  Pulse: 86 (02/26/24 1457)  Resp: 20 (02/26/24 1457)  BP: 135/66 (02/26/24 1457)  SpO2: 98 % (02/26/24 1457) Vital Signs  (24h Range):  Temp:  [97 °F (36.1 °C)-99.3 °F (37.4 °C)] 99.3 °F (37.4 °C)  Pulse:  [] 86  Resp:  [17-35] 20  SpO2:  [94 %-99 %] 98 %  BP: ()/() 135/66   Weight: 61.1 kg (134 lb 12.8 oz)  Body mass index is 28.17 kg/m².  SpO2: 98 %       Intake/Output Summary (Last 24 hours) at 2/26/2024 1602  Last data filed at 2/26/2024 0224  Gross per 24 hour   Intake 720 ml   Output --   Net 720 ml     Lines/Drains/Airways       Peripheral Intravenous Line  Duration                  Peripheral IV - Single Lumen 02/25/24 1700 20 G Anterior;Left Forearm <1 day                  Significant Labs:  Recent Results (from the past 72 hour(s))   POCT glucose    Collection Time: 02/23/24  6:12 PM   Result Value Ref Range    POCT Glucose 169 (H) 70 - 110 mg/dL   POCT glucose    Collection Time: 02/23/24  8:36 PM   Result Value Ref Range    POCT Glucose 130 (H) 70 - 110 mg/dL   Comprehensive Metabolic Panel    Collection Time: 02/24/24  4:16 AM   Result Value Ref Range    Sodium Level 136 135 - 145 mmol/L    Potassium Level 4.1 3.5 - 5.1 mmol/L    Chloride 101 98 - 110 mmol/L    Carbon Dioxide 28 21 - 32 mmol/L    Glucose Level 92 70 - 115 mg/dL    Blood Urea Nitrogen 32.0 (H) 7.0 - 20.0 mg/dL    Creatinine 0.81 0.66 - 1.25 mg/dL    Calcium Level Total 8.7 8.4 - 10.2 mg/dL    Protein Total 6.6 6.3 - 8.2 gm/dL    Albumin Level 3.6 3.4 - 5.0 g/dL    Globulin 3.0 2.0 - 3.9 gm/dL    Albumin/Globulin Ratio 1.2 ratio    Bilirubin Total 0.5 0.0 - 1.0 mg/dL    Alkaline Phosphatase 61 50 - 144 unit/L    Alanine Aminotransferase 15 1 - 45 unit/L    Aspartate Aminotransferase 30 14 - 36 unit/L    eGFR 69 mls/min/1.73/m2    Anion Gap 7.0 2.0 - 13.0 mEq/L    BUN/Creatinine Ratio 40 (H) 12 - 20   Troponin I    Collection Time: 02/24/24  4:16 AM   Result Value Ref Range    Troponin-I 0.070 (H) 0.000 - 0.034 ng/mL   CBC with Differential    Collection Time: 02/24/24  4:16 AM   Result Value Ref Range    WBC 5.91 4.00 - 11.50 x10(3)/mcL     RBC 4.05 4.00 - 5.10 x10(6)/mcL    Hgb 12.6 11.8 - 16.0 g/dL    Hct 38.6 36.0 - 48.0 %    MCV 95.3 79.0 - 99.0 fL    MCH 31.1 27.0 - 34.0 pg    MCHC 32.6 31.0 - 37.0 g/dL    RDW 13.2 11.0 - 14.5 %    Platelet 246 140 - 371 x10(3)/mcL    MPV 10.2 9.4 - 12.4 fL    Neut % 73.8 (H) 37 - 73 %    Lymph % 10.8 (L) 20 - 55 %    Mono % 12.2 4.7 - 12.5 %    Eos % 2.2 0.7 - 7 %    Basophil % 0.5 0.1 - 1.2 %    Lymph # 0.64 (L) 1.16 - 3.74 x10(3)/mcL    Neut # 4.36 1.56 - 6.13 x10(3)/mcL    Mono # 0.72 (H) 0.24 - 0.36 x10(3)/mcL    Eos # 0.13 0.04 - 0.36 x10(3)/mcL    Baso # 0.03 0.01 - 0.08 x10(3)/mcL    IG# 0.03 0.0001 - 0.031 x10(3)/mcL    IG% 0.5 0 - 0.5 %    NRBC% 0.0 <=1 %   PTT Heparin Monitoring    Collection Time: 02/24/24  4:16 AM   Result Value Ref Range    PTT Heparin Monitor 50.9 <=80.0 seconds   POCT glucose    Collection Time: 02/24/24  7:19 AM   Result Value Ref Range    POCT Glucose 84 70 - 110 mg/dL   COVID-19 Rapid Screening    Collection Time: 02/24/24 11:05 AM   Result Value Ref Range    SARS COV-2 MOLECULAR Negative Negative   POCT glucose    Collection Time: 02/24/24 11:43 AM   Result Value Ref Range    POCT Glucose 106 70 - 110 mg/dL   Comprehensive metabolic panel    Collection Time: 02/25/24  5:13 PM   Result Value Ref Range    Sodium Level 134 (L) 135 - 145 mmol/L    Potassium Level 3.9 3.5 - 5.1 mmol/L    Chloride 97 (L) 98 - 110 mmol/L    Carbon Dioxide 35 (H) 21 - 32 mmol/L    Glucose Level 173 (H) 70 - 115 mg/dL    Blood Urea Nitrogen 20.0 7.0 - 20.0 mg/dL    Creatinine 0.75 0.66 - 1.25 mg/dL    Calcium Level Total 8.7 8.4 - 10.2 mg/dL    Protein Total 7.2 6.3 - 8.2 gm/dL    Albumin Level 3.8 3.4 - 5.0 g/dL    Globulin 3.4 2.0 - 3.9 gm/dL    Albumin/Globulin Ratio 1.1 ratio    Bilirubin Total 0.5 0.0 - 1.0 mg/dL    Alkaline Phosphatase 88 50 - 144 unit/L    Alanine Aminotransferase 18 1 - 45 unit/L    Aspartate Aminotransferase 31 14 - 36 unit/L    eGFR 76 mls/min/1.73/m2    Anion Gap 2.0 2.0 -  13.0 mEq/L    BUN/Creatinine Ratio 27 (H) 12 - 20   Troponin I    Collection Time: 02/25/24  5:13 PM   Result Value Ref Range    Troponin-I 0.041 (H) 0.000 - 0.034 ng/mL   NT-Pro Natriuretic Peptide    Collection Time: 02/25/24  5:13 PM   Result Value Ref Range    ProBNP 977.0 (H) 0.0 - 124.9 PG/ML   CBC with Differential    Collection Time: 02/25/24  5:13 PM   Result Value Ref Range    WBC 9.86 4.00 - 11.50 x10(3)/mcL    RBC 4.16 4.00 - 5.10 x10(6)/mcL    Hgb 13.1 11.8 - 16.0 g/dL    Hct 39.7 36.0 - 48.0 %    MCV 95.4 79.0 - 99.0 fL    MCH 31.5 27.0 - 34.0 pg    MCHC 33.0 31.0 - 37.0 g/dL    RDW 13.3 11.0 - 14.5 %    Platelet 249 140 - 371 x10(3)/mcL    MPV 9.6 9.4 - 12.4 fL    Neut % 74.6 (H) 37 - 73 %    Lymph % 13.9 (L) 20 - 55 %    Mono % 9.0 4.7 - 12.5 %    Eos % 1.6 0.7 - 7 %    Basophil % 0.3 0.1 - 1.2 %    Lymph # 1.37 1.16 - 3.74 x10(3)/mcL    Neut # 7.35 (H) 1.56 - 6.13 x10(3)/mcL    Mono # 0.89 (H) 0.24 - 0.36 x10(3)/mcL    Eos # 0.16 0.04 - 0.36 x10(3)/mcL    Baso # 0.03 0.01 - 0.08 x10(3)/mcL    IG# 0.06 (H) 0.0001 - 0.031 x10(3)/mcL    IG% 0.6 (H) 0 - 0.5 %    NRBC% 0.0 <=1 %   NT-Pro Natriuretic Peptide    Collection Time: 02/25/24  5:34 PM   Result Value Ref Range    ProBNP 965.0 (H) 0.0 - 124.9 PG/ML   D-Dimer, Quantitative    Collection Time: 02/25/24  5:34 PM   Result Value Ref Range    D-Dimer 1.58 (H) 0.19 - 0.50 mg/L   RT Blood Gas    Collection Time: 02/25/24  5:56 PM   Result Value Ref Range    Sample Type Arterial Blood     pH, Blood gas 7.321 (L) 7.350 - 7.450    pCO2, Blood gas 65.9 (H) 35.0 - 45.0 mmHg    pO2, Blood gas 301.0 (H) 80.0 - 105.0 mmHg    CO Hgb 1.3 0.0 - 1.5 %    sO2, Blood gas 100.0 95.0 - 100.0 %    HCO3, Blood gas 29.9 (H) 22.0 - 26.0 mmol/L    Base Excess, Blood gas 6.00 (H) -2.00 - 2.00 mmol/L    FIO2, Blood gas 40.0 %    Met Hgb 0.3 0 - 1.5 %   Lactic Acid, Plasma    Collection Time: 02/25/24  9:54 PM   Result Value Ref Range    Lactic Acid Level 0.8 0.4 - 2.0 mmol/L    Comprehensive metabolic panel    Collection Time: 02/26/24  4:18 AM   Result Value Ref Range    Sodium Level 134 (L) 135 - 145 mmol/L    Potassium Level 4.2 3.5 - 5.1 mmol/L    Chloride 97 (L) 98 - 110 mmol/L    Carbon Dioxide 33 (H) 21 - 32 mmol/L    Glucose Level 195 (H) 70 - 115 mg/dL    Blood Urea Nitrogen 21.0 (H) 7.0 - 20.0 mg/dL    Creatinine 0.83 0.66 - 1.25 mg/dL    Calcium Level Total 8.7 8.4 - 10.2 mg/dL    Protein Total 5.9 (L) 6.3 - 8.2 gm/dL    Albumin Level 3.1 (L) 3.4 - 5.0 g/dL    Globulin 2.8 2.0 - 3.9 gm/dL    Albumin/Globulin Ratio 1.1 ratio    Bilirubin Total 0.3 0.0 - 1.0 mg/dL    Alkaline Phosphatase 63 50 - 144 unit/L    Alanine Aminotransferase 21 1 - 45 unit/L    Aspartate Aminotransferase 32 14 - 36 unit/L    eGFR 67 mls/min/1.73/m2    Anion Gap 4.0 2.0 - 13.0 mEq/L    BUN/Creatinine Ratio 25 (H) 12 - 20   NT-Pro Natriuretic Peptide    Collection Time: 02/26/24  4:18 AM   Result Value Ref Range    ProBNP 4,900.0 (H) 0.0 - 124.9 PG/ML   CBC with Differential    Collection Time: 02/26/24  4:18 AM   Result Value Ref Range    WBC 3.02 (L) 4.00 - 11.50 x10(3)/mcL    RBC 3.86 (L) 4.00 - 5.10 x10(6)/mcL    Hgb 11.9 11.8 - 16.0 g/dL    Hct 36.7 36.0 - 48.0 %    MCV 95.1 79.0 - 99.0 fL    MCH 30.8 27.0 - 34.0 pg    MCHC 32.4 31.0 - 37.0 g/dL    RDW 13.2 11.0 - 14.5 %    Platelet 223 140 - 371 x10(3)/mcL    MPV 9.9 9.4 - 12.4 fL    Neut % 81.5 (H) 37 - 73 %    Lymph % 12.9 (L) 20 - 55 %    Mono % 4.6 (L) 4.7 - 12.5 %    Eos % 0.0 (L) 0.7 - 7 %    Basophil % 0.3 0.1 - 1.2 %    Lymph # 0.39 (L) 1.16 - 3.74 x10(3)/mcL    Neut # 2.46 1.56 - 6.13 x10(3)/mcL    Mono # 0.14 (L) 0.24 - 0.36 x10(3)/mcL    Eos # 0.00 (L) 0.04 - 0.36 x10(3)/mcL    Baso # 0.01 0.01 - 0.08 x10(3)/mcL    IG# 0.02 0.0001 - 0.031 x10(3)/mcL    IG% 0.7 (H) 0 - 0.5 %    NRBC% 0.0 <=1 %   POCT glucose    Collection Time: 02/26/24  7:20 AM   Result Value Ref Range    POCT Glucose 130 (H) 70 - 110 mg/dL   POCT glucose    Collection  Time: 02/26/24 11:08 AM   Result Value Ref Range    POCT Glucose 143 (H) 70 - 110 mg/dL     Significant Imaging:  Imaging Results              CTA Chest Non-Coronary (PE Studies) (Final result)  Result time 02/26/24 04:58:21      Final result by Marlys Deleon III, MD (02/26/24 04:58:21)                   Impression:      1. There is no evidence of pulmonary embolus or other significant abnormalities involving the pulmonary arterial vasculature to the level of the segmental arterial branches.  See above comments regarding limitations of this examination.  2. The pulmonary vasculature is congested with perihilar atelectasis and consolidation (most pronounced on the left) and atelectasis and consolidation within the left lung base with a small left-sided pleural effusion.  These changes are suspicious for a combination of interstitial edema and left-sided pneumonic infiltrate and clinical correlation is recommended.  3. Chronic changes are present as described above.  4. No wet read for prior interpretation of this examination is available for comparison.      Electronically signed by: Marlys Deleon  Date:    02/26/2024  Time:    04:58               Narrative:    EXAMINATION:  STUDY:CTA CHEST NON CORONARY (PE STUDIES)    CLINICAL HISTORY AND TECHNIQUE:  Shortness of breath, suspected pulmonary embolus    This patient has had 0 CT and nuclear medicine scans performed within the last 12 months.    The following DOSE REDUCTION TECHNIQUES are used for all CT scans at [facility]:    1. Automated exposure control.  2. Adjustment of the mA and/or kv according to patient size.  3. Use of iterative reconstruction technique.    COMPARISON:  Chest x-ray dated 02/25/2024    FINDINGS:  CT angiography of the pulmonary arterial vasculature with intravenous contrast was performed with post processing and multiplanar and 3 dimensional reconstruction of images. The quality of the study is less than optimal due to  breathing/motion artifact and perihilar atelectasis and consolidation obscuring fine detail of some of the segmental and subsegmental bronchi.  The pulmonary arterial vasculature appears unremarkable without evidence of significant thrombus/pulmonary embolus, stenotic segments, aneurysmal dilatation, vascular malformations or other significant abnormalities to the level of the segmental arterial branches. Changes of perihilar atelectasis and or consolidation are present and most pronounced on the left with atelectasis and patchy consolidation also noted within the left lower lobe and a small left-sided pleural effusion layering dependently.  Extensive atherosclerotic plaquing is noted throughout the thoracic aorta and its primary branches and within the region of the mitral annulus and aortic valve.  There is moderate demineralization of the skeletal structures with moderate degenerative changes noted involving the thoracic spine.                                       X-Ray Chest AP (Final result)  Result time 02/26/24 05:02:10      Final result by Marlys eDleon III, MD (02/26/24 05:02:10)                   Impression:      1. Chronic changes are present as described above including changes of old granulomatous disease..  2. The heart is mildly enlarged with vascular congestion and what appear to be at least mild changes of interstitial/pulmonary edema.  3. There is moderate left-sided perihilar haziness extending into the left lower lung field.  The changes may simply be related to pulmonary edema, however, the possibility of a superimposed pneumonic infiltrate should be excluded clinically.      Electronically signed by: Marlys Deleon  Date:    02/26/2024  Time:    05:02               Narrative:    EXAMINATION:  STUDY: XR CHEST AP PORTABLE    CLINICAL HISTORY AND TECHNIQUE:  Shortness of breath    COMPARISON:  None    FINDINGS:  A subcentimeter, calcified granuloma is noted within the right apical region as seen  previously.The heart is mildly enlarged with vascular congestion and what appear to be at least mild perihilar stranding with left-sided perihilar haziness extending into the left lower lung field.  Significant pleural effusions are noted.There is moderate demineralization of the skeletal structures with fairly extensive degenerative changes noted involving both glenohumeral joints and to a lesser extent the thoracic spine.  Extensive atherosclerotic calcifications are noted throughout the thoracic aorta.  There is calcification of the mitral annulus.                                    EKG:     Telemetry:  SR    Physical Exam  Constitutional:       General: She is not in acute distress.     Appearance: She is obese.   HENT:      Mouth/Throat:      Mouth: Mucous membranes are moist.   Eyes:      Extraocular Movements: Extraocular movements intact.   Cardiovascular:      Rate and Rhythm: Normal rate and regular rhythm.      Heart sounds: Murmur heard.   Pulmonary:      Effort: Pulmonary effort is normal.      Breath sounds: Wheezing present.   Abdominal:      Palpations: Abdomen is soft.   Musculoskeletal:      Right lower leg: No edema.      Left lower leg: No edema.   Skin:     General: Skin is warm and dry.   Neurological:      Mental Status: She is alert. She is disoriented.   Psychiatric:         Mood and Affect: Mood normal.       Home Medications:   No current facility-administered medications on file prior to encounter.     Current Outpatient Medications on File Prior to Encounter   Medication Sig Dispense Refill    albuterol (PROVENTIL HFA) 90 mcg/actuation inhaler Inhale 2 puffs into the lungs 4 (four) times daily. Rescue      albuterol (PROVENTIL) 5 mg/mL nebulizer solution Take 2.5 mg by nebulization every 4 (four) hours as needed for Wheezing. Rescue      aspirin 81 MG Chew Take 1 tablet (81 mg total) by mouth once daily.  0    clopidogreL (PLAVIX) 75 mg tablet Take 1 tablet (75 mg total) by mouth once  daily. 30 tablet 11    cyanocobalamin, vitamin B-12, 1,000 mcg/mL Kit Inject 1,000 mcg as directed every 30 days.      dextromethorphan-guaiFENesin  mg (MUCINEX DM)  mg per 12 hr tablet Take 1 tablet by mouth 2 (two) times daily. for 10 days 20 tablet 0    losartan (COZAAR) 50 MG tablet Take 1 tablet (50 mg total) by mouth once daily. 90 tablet 3    pantoprazole (PROTONIX) 40 MG tablet Take 1 tablet (40 mg total) by mouth once daily. 30 tablet 11    pravastatin (PRAVACHOL) 80 MG tablet Take 1 tablet (80 mg total) by mouth once daily. 90 tablet 3     Current Inpatient Medications:    Current Facility-Administered Medications:     acetaminophen tablet 650 mg, 650 mg, Oral, Q8H PRN, Leo Merino MD    aspirin chewable tablet 81 mg, 81 mg, Oral, Daily, Rachel Mcguire MD, 81 mg at 02/26/24 0832    azithromycin (ZITHROMAX) 500 mg in dextrose 5 % (D5W) 250 mL IVPB (Vial-Mate), 500 mg, Intravenous, Q24H, Rachel Mcguire MD    cefTRIAXone (Rocephin) 1 g in dextrose 5 % in water (D5W) 100 mL IVPB (MB+), 1 g, Intravenous, Q24H, Leo Merino MD    clopidogreL tablet 75 mg, 75 mg, Oral, Daily, Rachel Mcguire MD, 75 mg at 02/26/24 0832    dextrose 10% bolus 125 mL 125 mL, 12.5 g, Intravenous, PRN, Rachel Mcguire MD    dextrose 10% bolus 250 mL 250 mL, 25 g, Intravenous, PRN, Rachel Mcguire MD    enoxaparin injection 40 mg, 40 mg, Subcutaneous, Daily, Leo Merino MD    glucagon (human recombinant) injection 1 mg, 1 mg, Intramuscular, PRN, Rachel Mcguire MD    glucose chewable tablet 16 g, 16 g, Oral, PRN, Rachel Mcguire MD    glucose chewable tablet 24 g, 24 g, Oral, PRN, Rachel Mcguire MD    insulin aspart U-100 pen 0-10 Units, 0-10 Units, Subcutaneous, QID (AC + HS) PRN, Rachel Mcguire MD    levalbuterol nebulizer solution 0.3108 mg, 0.3108 mg, Nebulization, Q8H, Ana Tran MD, 0.3108 mg at 02/26/24 1226    ondansetron injection 4 mg, 4 mg, Intravenous, Q6H PRN, Leo Merino MD    pantoprazole EC tablet 40  mg, 40 mg, Oral, Daily, Rachel Mcguire MD, 40 mg at 02/26/24 0832    pravastatin tablet 80 mg, 80 mg, Oral, Daily, Rachel Mcguire MD, 80 mg at 02/26/24 0831    sodium chloride 0.9% flush 10 mL, 10 mL, Intravenous, Q8H PRN, Leo Merino MD  VTE Risk Mitigation (From admission, onward)           Ordered     enoxaparin injection 40 mg  Daily         02/26/24 0059     IP VTE HIGH RISK PATIENT  Once         02/26/24 0059                  Assessment:   Severe AS with preserved EF    She resides in NH and according to POA is not physically     active and limited due to Dementia  Shortness of breath - required bipap, now on 2 L NC    Was given Lasix 20 mg IVP x 1 last night     Likely due to underlying PNA in presence of severe AS    Preserved LVF on recent echo    CTA chest neg for PE  Elevated BNP in setting of PNA and severe AS  PNA  HTN - hypotensive  HLP  DM  Dementia        Plan:   Antibiotics per primary team  DC Losartan  Clinically she does not seem volume overloaded. Her LE edema has resolved from last week and review of labs showed prerenal state at time of DC  Hold diuretic today - will reassess in am  Could consider Dobutamine if she decompensates or becomes more hypotensive  Discussed with her POA - no plans for invasive work up for AS given underlying dementia and DNR status. Will consider palliative care. She would still like to discuss with Dr Higgins. He will not be available until Thursday of this week. If she is still hosptialized, will have him see her then, otherwise can see him in clinic.  Will follow up in am        Thank you for your consult.     Maraí Zayas, FNP  Cardiology  [facility]  02/26/2024

## 2024-02-26 NOTE — ASSESSMENT & PLAN NOTE
Possible early pneumonia vs. Pulmonary edema seen on CXR and ct  Will admit for iv abx  Consult cardiology due to difficult to diurese given severe AS  Clinically she does not appear volume overloaded

## 2024-02-26 NOTE — ASSESSMENT & PLAN NOTE
Patient's FSGs are controlled on current medication regimen.  Last A1c reviewed-   Lab Results   Component Value Date    HGBA1C 5.0 05/08/2023     Most recent fingerstick glucose reviewed-   Recent Labs   Lab 02/26/24  0720 02/26/24  1108   POCTGLUCOSE 130* 143*     Current correctional scale  Low  Maintain anti-hyperglycemic dose as follows-   Antihyperglycemics (From admission, onward)      Start     Stop Route Frequency Ordered    02/26/24 0158  insulin aspart U-100 pen 0-10 Units         -- SubQ Before meals & nightly PRN 02/26/24 0059          Hold Oral hypoglycemics while patient is in the hospital.   unknown

## 2024-02-26 NOTE — ASSESSMENT & PLAN NOTE
"Patient is identified as having Combined Systolic and Diastolic heart failure that is Acute on chronic. CHF is currently uncontrolled due to Rales/crackles on pulmonary exam and Pulmonary edema/pleural effusion on CXR. Latest ECHO performed and demonstrates- Results for orders placed during the hospital encounter of 02/22/24    Echo    Interpretation Summary    Left Ventricle: There is normal systolic function with a visually estimated ejection fraction of 55 - 60%. There is diastolic dysfunction.    Right Ventricle: Normal right ventricular cavity size. Systolic function is normal.    Aortic Valve: There is moderate to severe stenosis. Aortic valve area by VTI is 0.53 cm². Aortic valve peak velocity is 3.62 m/s. Mean gradient is 33.7 mmHg. The dimensionless index is 0.24. Trace AI.    Mitral Valve: Moderate MAC. There is mild stenosis. MVA 1.83 cm2. MG is 3 mmHg at a heart rate of 86 bpm. There is mild regurgitation.    Tricuspid Valve: There is mild regurgitation.    Pulmonic Valve: There is mild regurgitation.    The estimated pulmonary artery systolic pressure is 28 mmHg.  . Continue Beta Blocker, ACE/ARB, and Furosemide and monitor clinical status closely. Monitor on telemetry. Patient is on CHF pathway.  Monitor strict Is&Os and daily weights.  Place on fluid restriction of 1.5 L. Cardiology has been consulted. Continue to stress to patient importance of self efficacy and  on diet for CHF. Last BNP reviewed- and noted below No results for input(s): "BNP", "BNPTRIAGEBLO" in the last 168 hours.  "

## 2024-02-26 NOTE — PLAN OF CARE
Problem: Skin Injury Risk Increased  Goal: Skin Health and Integrity  Outcome: Ongoing, Progressing     Problem: Adult Inpatient Plan of Care  Goal: Plan of Care Review  Outcome: Ongoing, Progressing  Goal: Patient-Specific Goal (Individualized)  Outcome: Ongoing, Progressing  Goal: Absence of Hospital-Acquired Illness or Injury  Outcome: Ongoing, Progressing  Goal: Optimal Comfort and Wellbeing  Outcome: Ongoing, Progressing  Goal: Readiness for Transition of Care  Outcome: Ongoing, Progressing     Problem: Diabetes Comorbidity  Goal: Blood Glucose Level Within Targeted Range  Outcome: Ongoing, Progressing     Problem: Fall Injury Risk  Goal: Absence of Fall and Fall-Related Injury  Outcome: Ongoing, Progressing     Problem: Fluid Imbalance (Pneumonia)  Goal: Fluid Balance  Outcome: Ongoing, Progressing     Problem: Infection (Pneumonia)  Goal: Resolution of Infection Signs and Symptoms  Outcome: Ongoing, Progressing     Problem: Respiratory Compromise (Pneumonia)  Goal: Effective Oxygenation and Ventilation  Outcome: Ongoing, Progressing

## 2024-02-27 LAB
ANION GAP SERPL CALC-SCNC: 2 MEQ/L (ref 2–13)
BASOPHILS # BLD AUTO: 0.03 X10(3)/MCL (ref 0.01–0.08)
BASOPHILS NFR BLD AUTO: 0.4 % (ref 0.1–1.2)
BNP BLD-MCNC: 2800 PG/ML (ref 0–124.9)
BUN SERPL-MCNC: 24 MG/DL (ref 7–20)
CALCIUM SERPL-MCNC: 9 MG/DL (ref 8.4–10.2)
CHLORIDE SERPL-SCNC: 97 MMOL/L (ref 98–110)
CO2 SERPL-SCNC: 33 MMOL/L (ref 21–32)
CREAT SERPL-MCNC: 0.73 MG/DL (ref 0.66–1.25)
CREAT/UREA NIT SERPL: 33 (ref 12–20)
EOSINOPHIL # BLD AUTO: 0.04 X10(3)/MCL (ref 0.04–0.36)
EOSINOPHIL NFR BLD AUTO: 0.6 % (ref 0.7–7)
ERYTHROCYTE [DISTWIDTH] IN BLOOD BY AUTOMATED COUNT: 13 % (ref 11–14.5)
GFR SERPLBLD CREATININE-BSD FMLA CKD-EPI: 78 MLS/MIN/1.73/M2
GLUCOSE SERPL-MCNC: 103 MG/DL (ref 70–115)
HCT VFR BLD AUTO: 40.4 % (ref 36–48)
HGB BLD-MCNC: 13.5 G/DL (ref 11.8–16)
IMM GRANULOCYTES # BLD AUTO: 0.03 X10(3)/MCL (ref 0–0.03)
IMM GRANULOCYTES NFR BLD AUTO: 0.4 % (ref 0–0.5)
LYMPHOCYTES # BLD AUTO: 0.96 X10(3)/MCL (ref 1.16–3.74)
LYMPHOCYTES NFR BLD AUTO: 14 % (ref 20–55)
MCH RBC QN AUTO: 31.4 PG (ref 27–34)
MCHC RBC AUTO-ENTMCNC: 33.4 G/DL (ref 31–37)
MCV RBC AUTO: 94 FL (ref 79–99)
MONOCYTES # BLD AUTO: 0.93 X10(3)/MCL (ref 0.24–0.36)
MONOCYTES NFR BLD AUTO: 13.6 % (ref 4.7–12.5)
NEUTROPHILS # BLD AUTO: 4.85 X10(3)/MCL (ref 1.56–6.13)
NEUTROPHILS NFR BLD AUTO: 71 % (ref 37–73)
NRBC BLD AUTO-RTO: 0 %
PLATELET # BLD AUTO: 239 X10(3)/MCL (ref 140–371)
PMV BLD AUTO: 10.1 FL (ref 9.4–12.4)
POCT GLUCOSE: 130 MG/DL (ref 70–110)
POCT GLUCOSE: 155 MG/DL (ref 70–110)
POCT GLUCOSE: 88 MG/DL (ref 70–110)
POCT GLUCOSE: 94 MG/DL (ref 70–110)
POTASSIUM SERPL-SCNC: 4.3 MMOL/L (ref 3.5–5.1)
RBC # BLD AUTO: 4.3 X10(6)/MCL (ref 4–5.1)
SODIUM SERPL-SCNC: 132 MMOL/L (ref 135–145)
WBC # SPEC AUTO: 6.84 X10(3)/MCL (ref 4–11.5)

## 2024-02-27 PROCEDURE — 80048 BASIC METABOLIC PNL TOTAL CA: CPT | Performed by: FAMILY MEDICINE

## 2024-02-27 PROCEDURE — 21400001 HC TELEMETRY ROOM

## 2024-02-27 PROCEDURE — 94761 N-INVAS EAR/PLS OXIMETRY MLT: CPT

## 2024-02-27 PROCEDURE — 83880 ASSAY OF NATRIURETIC PEPTIDE: CPT | Performed by: NURSE PRACTITIONER

## 2024-02-27 PROCEDURE — 27000221 HC OXYGEN, UP TO 24 HOURS

## 2024-02-27 PROCEDURE — 99900035 HC TECH TIME PER 15 MIN (STAT)

## 2024-02-27 PROCEDURE — 63600175 PHARM REV CODE 636 W HCPCS: Performed by: INTERNAL MEDICINE

## 2024-02-27 PROCEDURE — 25000242 PHARM REV CODE 250 ALT 637 W/ HCPCS: Performed by: FAMILY MEDICINE

## 2024-02-27 PROCEDURE — 36415 COLL VENOUS BLD VENIPUNCTURE: CPT | Performed by: FAMILY MEDICINE

## 2024-02-27 PROCEDURE — 25000003 PHARM REV CODE 250: Performed by: INTERNAL MEDICINE

## 2024-02-27 PROCEDURE — 94640 AIRWAY INHALATION TREATMENT: CPT

## 2024-02-27 PROCEDURE — 25000003 PHARM REV CODE 250: Performed by: FAMILY MEDICINE

## 2024-02-27 PROCEDURE — 63600175 PHARM REV CODE 636 W HCPCS: Performed by: FAMILY MEDICINE

## 2024-02-27 PROCEDURE — 85025 COMPLETE CBC W/AUTO DIFF WBC: CPT | Performed by: FAMILY MEDICINE

## 2024-02-27 RX ADMIN — CLOPIDOGREL BISULFATE 75 MG: 75 TABLET ORAL at 08:02

## 2024-02-27 RX ADMIN — LEVALBUTEROL HYDROCHLORIDE 0.31 MG: 0.63 SOLUTION RESPIRATORY (INHALATION) at 07:02

## 2024-02-27 RX ADMIN — CEFTRIAXONE SODIUM 1 G: 1 INJECTION, POWDER, FOR SOLUTION INTRAMUSCULAR; INTRAVENOUS at 09:02

## 2024-02-27 RX ADMIN — ENOXAPARIN SODIUM 40 MG: 40 INJECTION SUBCUTANEOUS at 04:02

## 2024-02-27 RX ADMIN — LEVALBUTEROL HYDROCHLORIDE 0.31 MG: 0.63 SOLUTION RESPIRATORY (INHALATION) at 04:02

## 2024-02-27 RX ADMIN — PRAVASTATIN SODIUM 80 MG: 20 TABLET ORAL at 08:02

## 2024-02-27 RX ADMIN — AZITHROMYCIN MONOHYDRATE 500 MG: 500 INJECTION, POWDER, LYOPHILIZED, FOR SOLUTION INTRAVENOUS at 10:02

## 2024-02-27 RX ADMIN — PANTOPRAZOLE SODIUM 40 MG: 40 TABLET, DELAYED RELEASE ORAL at 08:02

## 2024-02-27 RX ADMIN — ASPIRIN 81 MG 81 MG: 81 TABLET ORAL at 08:02

## 2024-02-27 NOTE — PROGRESS NOTES
[facility]/Surg  [facility] Medicine  Progress Note    Patient Name: Priscila Randhawa  MRN: [id number]  Patient Class: IP- Inpatient   Admission Date: 2024  Length of Stay: 2 days  Attending Physician: Ana Tran MD  Primary Care Provider: Delbert Bhandari MD        Subjective:     Principal Problem:Pneumonia of left lower lobe due to infectious organism        HPI:    Shortness of Breath     Wheezing    LOW O2 SATS       Pt presented to ED via [facility] sent from [facility] for increased shortness of breath, low oxygen sats and wheezing. Pt was just discharged from [facility] on yesterday. Pt presents with dyspnea, tachypnea, and wheezing.         HPI: 89 y/o female with pmhx of moderate Dementia, hypertension, type 2 diabetes mellitus, hyperlipidemia, underlying HFpEF, COVID-19 infection about 2 weeks ago, chronic respiratory failure on presumed 2 L of nasal cannula oxygen therapy at the nursing home and osteoarthritis who is Nursing home resident was just discharged from this hospital yesterday after admission and treatment for NSTEMI and decompensated CHF and now sent back to the ED for shortness of breathe, wheezing and low oxygen saturation. She is a poor historian and was not able to give much history. She denies chest pain.  She is currently on 4 L O2 via NC with O2 Sat of 98%.  She is DNR but POA reportedly ok with BIPAP.        Her lab data shows Troponin 0.070 à 0.041 which is trending down from previous hospitalization.  ß 3,550. D-Dimer 1.58.  POC AB.32/65/301/29/100 on 40% FiO2.  CTA Chest suggestive of pneumonia but no PE.     ECHO (2024): EF 55 - 60% with diastolic dysfunction and moderate/severe Aortic stenosis.     ED course: She was give Lasix 20 mg iv, Ceftriaxone, Azithromycin and placed on BIPAP. She says she feels better compare to when she came in.  I personally spoke with ED clinician regarding the case and reviewed his notes. Pertinent ED findings noted.        Overview/Hospital Course:  02/26/2024 pt was just here with CHF and noted to have severe AS, d/c home on 02/24 back to nursing home.  Her PCO2 was elevated on admit to 69 and she was placed on BIPAP.  Pt states she feels well this am.  BNP yesterday was 4900 was down to 900 at time of d/c and 2900 last hospital admit.  Feel likely more CHF related and could be due to her underlying chronic respiratory failure with hypoxia and hypercapnea. She is usually on oxygen at the nursing home.  02/27/2024 elderly patient who has a DNR in for CHF exacerbation with known severe aortic stenosis.  She is from the nursing home and was recently admitted treated then sent back and quickly she came back to the hospital.  Cardiology is suggesting hospice.  We will continue these discussions with the patient and the patient's family.  If they are agreeable send her back to the nursing home tomorrow with hospice care.  Appreciate cardiology's help.    Interval History:      Review of Systems   Unable to perform ROS: Dementia   Constitutional:  Negative for activity change, appetite change and fever.   Respiratory:  Negative for chest tightness, shortness of breath and wheezing.    Cardiovascular:  Negative for chest pain.   Gastrointestinal:  Negative for abdominal pain, constipation, diarrhea, nausea and vomiting.   Genitourinary:  Negative for dysuria.   Skin:  Negative for rash and wound.   Neurological:  Negative for tremors and headaches.     Objective:     Vital Signs (Most Recent):  Temp: 97.9 °F (36.6 °C) (02/27/24 0720)  Pulse: 82 (02/27/24 0724)  Resp: 20 (02/27/24 0724)  BP: 112/72 (02/27/24 0720)  SpO2: 98 % (02/27/24 0724) Vital Signs (24h Range):  Temp:  [97.5 °F (36.4 °C)-99.3 °F (37.4 °C)] 97.9 °F (36.6 °C)  Pulse:  [] 82  Resp:  [18-30] 20  SpO2:  [94 %-99 %] 98 %  BP: ()/() 112/72     Weight: 62.5 kg (137 lb 12.8 oz)  Body mass index is 28.8 kg/m².    Intake/Output Summary (Last 24 hours) at  2/27/2024 0817  Last data filed at 2/27/2024 0629  Gross per 24 hour   Intake 590 ml   Output 1275 ml   Net -685 ml           Physical Exam  Vitals and nursing note reviewed. Exam conducted with a chaperone present.   Constitutional:       General: She is not in acute distress.     Appearance: Normal appearance. She is obese. She is not ill-appearing, toxic-appearing or diaphoretic.   HENT:      Nose: No congestion or rhinorrhea.   Eyes:      Extraocular Movements: Extraocular movements intact.      Conjunctiva/sclera: Conjunctivae normal.      Pupils: Pupils are equal, round, and reactive to light.   Neck:      Vascular: No carotid bruit.   Cardiovascular:      Rate and Rhythm: Normal rate and regular rhythm.      Pulses: Normal pulses.      Heart sounds: Murmur (3/6 MARLYS) heard.   Pulmonary:      Effort: Pulmonary effort is normal. No respiratory distress.      Breath sounds: Wheezing (bilateral few scattered) present. No rhonchi or rales.   Abdominal:      General: Bowel sounds are normal. There is no distension.      Palpations: Abdomen is soft.      Tenderness: There is no abdominal tenderness. There is no guarding.   Musculoskeletal:         General: No swelling or tenderness. Normal range of motion.      Cervical back: Normal range of motion and neck supple. No tenderness.      Right lower leg: No edema.      Left lower leg: No edema.   Lymphadenopathy:      Cervical: No cervical adenopathy.   Skin:     General: Skin is warm and dry.      Coloration: Skin is not jaundiced or pale.   Neurological:      General: No focal deficit present.      Mental Status: She is alert and oriented to person, place, and time. Mental status is at baseline.      Cranial Nerves: No cranial nerve deficit.      Motor: No weakness.      Coordination: Coordination normal.      Gait: Gait normal.   Psychiatric:         Mood and Affect: Mood normal.         Behavior: Behavior normal.         Thought Content: Thought content normal.     "     Judgment: Judgment normal.             Significant Labs: All pertinent labs within the past 24 hours have been reviewed.  CBC:   Recent Labs   Lab 02/25/24  1713 02/26/24  0418 02/27/24  0434   WBC 9.86 3.02* 6.84   HGB 13.1 11.9 13.5   HCT 39.7 36.7 40.4    223 239       CMP:   Recent Labs   Lab 02/25/24  1713 02/26/24  0418 02/27/24  0433   * 134* 132*   K 3.9 4.2 4.3   CO2 35* 33* 33*   BUN 20.0 21.0* 24.0*   CREATININE 0.75 0.83 0.73   CALCIUM 8.7 8.7 9.0   ALBUMIN 3.8 3.1*  --    BILITOT 0.5 0.3  --    ALKPHOS 88 63  --    AST 31 32  --    ALT 18 21  --        Cardiac Markers: No results for input(s): "CKMB", "MYOGLOBIN", "BNP", "TROPISTAT" in the last 48 hours.    Significant Imaging: I have reviewed all pertinent imaging results/findings within the past 24 hours.    Assessment/Plan:      * Pneumonia of left lower lobe due to infectious organism  Possible early pneumonia vs. Pulmonary edema seen on CXR and ct  Will admit for iv abx  Consult cardiology due to difficult to diurese given severe AS  Clinically she does not appear volume overloaded      Shortness of breath        Acute exacerbation of CHF (congestive heart failure)  Patient is identified as having Combined Systolic and Diastolic heart failure that is Acute on chronic. CHF is currently uncontrolled due to Rales/crackles on pulmonary exam and Pulmonary edema/pleural effusion on CXR. Latest ECHO performed and demonstrates- Results for orders placed during the hospital encounter of 02/22/24    Echo    Interpretation Summary    Left Ventricle: There is normal systolic function with a visually estimated ejection fraction of 55 - 60%. There is diastolic dysfunction.    Right Ventricle: Normal right ventricular cavity size. Systolic function is normal.    Aortic Valve: There is moderate to severe stenosis. Aortic valve area by VTI is 0.53 cm². Aortic valve peak velocity is 3.62 m/s. Mean gradient is 33.7 mmHg. The dimensionless index is " "0.24. Trace AI.    Mitral Valve: Moderate MAC. There is mild stenosis. MVA 1.83 cm2. MG is 3 mmHg at a heart rate of 86 bpm. There is mild regurgitation.    Tricuspid Valve: There is mild regurgitation.    Pulmonic Valve: There is mild regurgitation.    The estimated pulmonary artery systolic pressure is 28 mmHg.  . Continue Beta Blocker, ACE/ARB, and Furosemide and monitor clinical status closely. Monitor on telemetry. Patient is on CHF pathway.  Monitor strict Is&Os and daily weights.  Place on fluid restriction of 1.5 L. Cardiology has been consulted. Continue to stress to patient importance of self efficacy and  on diet for CHF. Last BNP reviewed- and noted below No results for input(s): "BNP", "BNPTRIAGEBLO" in the last 168 hours.    Severe aortic stenosis  Previous plan was for outpt [facility]      Mixed hyperlipidemia   Patient is chronically on statin.will continue for now. Monitor clinically. Last LDL was No results found for: "LDLCALC"         Essential hypertension  Chronic, controlled. Latest blood pressure and vitals reviewed-     Temp:  [97.5 °F (36.4 °C)-99.3 °F (37.4 °C)]   Pulse:  []   Resp:  [18-30]   BP: ()/()   SpO2:  [94 %-99 %] .   Home meds for hypertension were reviewed and noted below.   Hypertension Medications               losartan (COZAAR) 50 MG tablet Take 1 tablet (50 mg total) by mouth once daily.            While in the hospital, will manage blood pressure as follows; Continue home antihypertensive regimen    Will utilize p.r.n. blood pressure medication only if patient's blood pressure greater than 140/90 and she develops symptoms such as worsening chest pain or shortness of breath.    Well controlled type 2 diabetes mellitus  Patient's FSGs are controlled on current medication regimen.  Last A1c reviewed-   Lab Results   Component Value Date    HGBA1C 5.0 05/08/2023     Most recent fingerstick glucose reviewed-   Recent Labs   Lab 02/26/24  1108 02/26/24  1608 " 02/26/24 2154 02/27/24  0722   POCTGLUCOSE 143* 144* 144* 88       Current correctional scale  Low  Maintain anti-hyperglycemic dose as follows-   Antihyperglycemics (From admission, onward)      Start     Stop Route Frequency Ordered    02/26/24 0158  insulin aspart U-100 pen 0-10 Units         -- SubQ Before meals & nightly PRN 02/26/24 0059          Hold Oral hypoglycemics while patient is in the hospital.      VTE Risk Mitigation (From admission, onward)           Ordered     enoxaparin injection 40 mg  Daily         02/26/24 0059     IP VTE HIGH RISK PATIENT  Once         02/26/24 0059                    Discharge Planning   HALI: 2/28/2024     Code Status: DNR   Is the patient medically ready for discharge?:     Reason for patient still in hospital (select all that apply): Patient unstable  Discharge Plan A: Return to nursing home                  Chris Delacruz MD  Department of Hospital Medicine   [facility]/Surg

## 2024-02-27 NOTE — ASSESSMENT & PLAN NOTE
Patient's FSGs are controlled on current medication regimen.  Last A1c reviewed-   Lab Results   Component Value Date    HGBA1C 5.0 05/08/2023     Most recent fingerstick glucose reviewed-   Recent Labs   Lab 02/26/24  1108 02/26/24  1608 02/26/24  2154 02/27/24  0722   POCTGLUCOSE 143* 144* 144* 88       Current correctional scale  Low  Maintain anti-hyperglycemic dose as follows-   Antihyperglycemics (From admission, onward)    Start     Stop Route Frequency Ordered    02/26/24 0158  insulin aspart U-100 pen 0-10 Units         -- SubQ Before meals & nightly PRN 02/26/24 0059        Hold Oral hypoglycemics while patient is in the hospital.

## 2024-02-27 NOTE — ASSESSMENT & PLAN NOTE
Chronic, controlled. Latest blood pressure and vitals reviewed-     Temp:  [97.5 °F (36.4 °C)-99.3 °F (37.4 °C)]   Pulse:  []   Resp:  [18-30]   BP: ()/()   SpO2:  [94 %-99 %] .   Home meds for hypertension were reviewed and noted below.   Hypertension Medications               losartan (COZAAR) 50 MG tablet Take 1 tablet (50 mg total) by mouth once daily.            While in the hospital, will manage blood pressure as follows; Continue home antihypertensive regimen    Will utilize p.r.n. blood pressure medication only if patient's blood pressure greater than 140/90 and she develops symptoms such as worsening chest pain or shortness of breath.

## 2024-02-27 NOTE — SUBJECTIVE & OBJECTIVE
Interval History:      Review of Systems   Unable to perform ROS: Dementia   Constitutional:  Negative for activity change, appetite change and fever.   Respiratory:  Negative for chest tightness, shortness of breath and wheezing.    Cardiovascular:  Negative for chest pain.   Gastrointestinal:  Negative for abdominal pain, constipation, diarrhea, nausea and vomiting.   Genitourinary:  Negative for dysuria.   Skin:  Negative for rash and wound.   Neurological:  Negative for tremors and headaches.     Objective:     Vital Signs (Most Recent):  Temp: 97.9 °F (36.6 °C) (02/27/24 0720)  Pulse: 82 (02/27/24 0724)  Resp: 20 (02/27/24 0724)  BP: 112/72 (02/27/24 0720)  SpO2: 98 % (02/27/24 0724) Vital Signs (24h Range):  Temp:  [97.5 °F (36.4 °C)-99.3 °F (37.4 °C)] 97.9 °F (36.6 °C)  Pulse:  [] 82  Resp:  [18-30] 20  SpO2:  [94 %-99 %] 98 %  BP: ()/() 112/72     Weight: 62.5 kg (137 lb 12.8 oz)  Body mass index is 28.8 kg/m².    Intake/Output Summary (Last 24 hours) at 2/27/2024 0817  Last data filed at 2/27/2024 0629  Gross per 24 hour   Intake 590 ml   Output 1275 ml   Net -685 ml           Physical Exam  Vitals and nursing note reviewed. Exam conducted with a chaperone present.   Constitutional:       General: She is not in acute distress.     Appearance: Normal appearance. She is obese. She is not ill-appearing, toxic-appearing or diaphoretic.   HENT:      Nose: No congestion or rhinorrhea.   Eyes:      Extraocular Movements: Extraocular movements intact.      Conjunctiva/sclera: Conjunctivae normal.      Pupils: Pupils are equal, round, and reactive to light.   Neck:      Vascular: No carotid bruit.   Cardiovascular:      Rate and Rhythm: Normal rate and regular rhythm.      Pulses: Normal pulses.      Heart sounds: Murmur (3/6 MARLYS) heard.   Pulmonary:      Effort: Pulmonary effort is normal. No respiratory distress.      Breath sounds: Wheezing (bilateral few scattered) present. No rhonchi or rales.  "  Abdominal:      General: Bowel sounds are normal. There is no distension.      Palpations: Abdomen is soft.      Tenderness: There is no abdominal tenderness. There is no guarding.   Musculoskeletal:         General: No swelling or tenderness. Normal range of motion.      Cervical back: Normal range of motion and neck supple. No tenderness.      Right lower leg: No edema.      Left lower leg: No edema.   Lymphadenopathy:      Cervical: No cervical adenopathy.   Skin:     General: Skin is warm and dry.      Coloration: Skin is not jaundiced or pale.   Neurological:      General: No focal deficit present.      Mental Status: She is alert and oriented to person, place, and time. Mental status is at baseline.      Cranial Nerves: No cranial nerve deficit.      Motor: No weakness.      Coordination: Coordination normal.      Gait: Gait normal.   Psychiatric:         Mood and Affect: Mood normal.         Behavior: Behavior normal.         Thought Content: Thought content normal.         Judgment: Judgment normal.             Significant Labs: All pertinent labs within the past 24 hours have been reviewed.  CBC:   Recent Labs   Lab 02/25/24 1713 02/26/24 0418 02/27/24  0434   WBC 9.86 3.02* 6.84   HGB 13.1 11.9 13.5   HCT 39.7 36.7 40.4    223 239       CMP:   Recent Labs   Lab 02/25/24 1713 02/26/24 0418 02/27/24  0433   * 134* 132*   K 3.9 4.2 4.3   CO2 35* 33* 33*   BUN 20.0 21.0* 24.0*   CREATININE 0.75 0.83 0.73   CALCIUM 8.7 8.7 9.0   ALBUMIN 3.8 3.1*  --    BILITOT 0.5 0.3  --    ALKPHOS 88 63  --    AST 31 32  --    ALT 18 21  --        Cardiac Markers: No results for input(s): "CKMB", "MYOGLOBIN", "BNP", "TROPISTAT" in the last 48 hours.    Significant Imaging: I have reviewed all pertinent imaging results/findings within the past 24 hours.  "

## 2024-02-27 NOTE — PLAN OF CARE
Hospitalist ordered Hospice consult.  Family in agreement after discussing with María Reyes,NP-Cardiology.  Abdelrahman Mancia-Niece-POA given CCP for list of options, she chose [facility] and signed CCP.  Mireya from [facility] notified of consult.

## 2024-02-27 NOTE — PLAN OF CARE
Physician ordered to consult hospice to speak with pt/fly re' hospice services. Pt's preference : [facility]. Referral made to [facility] per phone/fax, spoke with Mireya Mckeon.

## 2024-02-27 NOTE — PROGRESS NOTES
[facility]    Cardiology  Progress Note    Patient Name: Priscila Randhawa  MRN: [id number]  Admission Date: 2/25/2024  Hospital Length of Stay: 2 days  Code Status: DNR   Attending Provider: Ana Tran MD   Consulting Provider: SINA Casey  Primary Care Physician: Delbert Bhandari MD  Principal Problem:Pneumonia of left lower lobe due to infectious organism    Patient information was obtained from past medical records, ER records, and primary team.     Subjective:     Chief Complaint/Reason for Consult: Shortness of breath/AS    HPI: 89 year old female nursing home patient previously unknown to [facility] with no past cardiac history that was hospitalized last week due to SOB and LE edema. She had a recent COVID infection. Echo revealed Severe Aortic stenosis and preserved LVF. She had elevated Tn in setting of CHF that was not felt to represent ACS. She was diuresed and started on DAPT with plans for OP follow up    She was Dcd on 2/24 and readmitted on 2/25 due to acute respiratory failure with hypoxia and hypercapnea. Cta chest neg for PE but with L lobe consolidation. Was placed on bipap and given Lasix 20 mg IVP    2/26: laying flat in bed. POA at bedside. On 2L NC. BP systolic 80s-90s. Lasix was held this am but did receive Losartan. She is DNR    2/27: BP better this am, Had some SOB last night requiring Bipap. Family at bedside. Discussed Aortic Stenosis and what is involved with work up. She is nonambulatory in NH and has Dementia          PMH: Severe AS, Dementia, HTN, HLP, DM  PSH: knee replacement  Social History: lives in NH, POA/niece     Previous Cardiac Diagnostics:       Past Medical History:   Diagnosis Date    Background diabetic retinopathy     Carotid artery stenosis     Cataract extraction status     Combined hyperlipidemia     HTN (hypertension)     Non-STEMI (non-ST elevated myocardial infarction) 02/23/2024    Polycythemia     Type 2 diabetes mellitus without  complications     Unspecified osteoarthritis, unspecified site     Vitamin D deficiency      Past Surgical History:   Procedure Laterality Date    EPIDURAL STEROID INJECTION INTO LUMBAR SPINE      EXPLORATORY LAPAROTOMY      hx total knee repla       Review of patient's allergies indicates:   Allergen Reactions    Codeine Itching     No current facility-administered medications on file prior to encounter.     Current Outpatient Medications on File Prior to Encounter   Medication Sig    albuterol (PROVENTIL HFA) 90 mcg/actuation inhaler Inhale 2 puffs into the lungs 4 (four) times daily. Rescue    albuterol (PROVENTIL) 5 mg/mL nebulizer solution Take 2.5 mg by nebulization every 4 (four) hours as needed for Wheezing. Rescue    aspirin 81 MG Chew Take 1 tablet (81 mg total) by mouth once daily.    clopidogreL (PLAVIX) 75 mg tablet Take 1 tablet (75 mg total) by mouth once daily.    cyanocobalamin, vitamin B-12, 1,000 mcg/mL Kit Inject 1,000 mcg as directed every 30 days.    dextromethorphan-guaiFENesin  mg (MUCINEX DM)  mg per 12 hr tablet Take 1 tablet by mouth 2 (two) times daily. for 10 days    losartan (COZAAR) 50 MG tablet Take 1 tablet (50 mg total) by mouth once daily.    pantoprazole (PROTONIX) 40 MG tablet Take 1 tablet (40 mg total) by mouth once daily.    pravastatin (PRAVACHOL) 80 MG tablet Take 1 tablet (80 mg total) by mouth once daily.     Family History       Problem Relation (Age of Onset)    Alzheimer's disease Sister    Coronary artery disease Mother, Sister, Brother    Heart attack Mother, Brother    Heart failure Sister          Tobacco Use    Smoking status: Never    Smokeless tobacco: Never   Substance and Sexual Activity    Alcohol use: Not on file    Drug use: Not on file    Sexual activity: Not on file       Review of Systems   Respiratory:  Positive for cough and shortness of breath.    Cardiovascular:  Negative for chest pain.   Psychiatric/Behavioral:  Positive for confusion.       Objective:     Vital Signs (Most Recent):  Temp: 97.9 °F (36.6 °C) (02/27/24 0720)  Pulse: 82 (02/27/24 0724)  Resp: 20 (02/27/24 0724)  BP: 112/72 (02/27/24 0720)  SpO2: 98 % (02/27/24 0724) Vital Signs (24h Range):  Temp:  [97.5 °F (36.4 °C)-99.3 °F (37.4 °C)] 97.9 °F (36.6 °C)  Pulse:  [] 82  Resp:  [18-30] 20  SpO2:  [94 %-99 %] 98 %  BP: ()/() 112/72   Weight: 62.5 kg (137 lb 12.8 oz)  Body mass index is 28.8 kg/m².  SpO2: 98 %       Intake/Output Summary (Last 24 hours) at 2/27/2024 0957  Last data filed at 2/27/2024 0629  Gross per 24 hour   Intake 590 ml   Output 1275 ml   Net -685 ml       Lines/Drains/Airways       Peripheral Intravenous Line  Duration                  Peripheral IV - Single Lumen 02/25/24 1700 20 G Anterior;Left Forearm 1 day                  Significant Labs:  Recent Results (from the past 72 hour(s))   COVID-19 Rapid Screening    Collection Time: 02/24/24 11:05 AM   Result Value Ref Range    SARS COV-2 MOLECULAR Negative Negative   POCT glucose    Collection Time: 02/24/24 11:43 AM   Result Value Ref Range    POCT Glucose 106 70 - 110 mg/dL   Comprehensive metabolic panel    Collection Time: 02/25/24  5:13 PM   Result Value Ref Range    Sodium Level 134 (L) 135 - 145 mmol/L    Potassium Level 3.9 3.5 - 5.1 mmol/L    Chloride 97 (L) 98 - 110 mmol/L    Carbon Dioxide 35 (H) 21 - 32 mmol/L    Glucose Level 173 (H) 70 - 115 mg/dL    Blood Urea Nitrogen 20.0 7.0 - 20.0 mg/dL    Creatinine 0.75 0.66 - 1.25 mg/dL    Calcium Level Total 8.7 8.4 - 10.2 mg/dL    Protein Total 7.2 6.3 - 8.2 gm/dL    Albumin Level 3.8 3.4 - 5.0 g/dL    Globulin 3.4 2.0 - 3.9 gm/dL    Albumin/Globulin Ratio 1.1 ratio    Bilirubin Total 0.5 0.0 - 1.0 mg/dL    Alkaline Phosphatase 88 50 - 144 unit/L    Alanine Aminotransferase 18 1 - 45 unit/L    Aspartate Aminotransferase 31 14 - 36 unit/L    eGFR 76 mls/min/1.73/m2    Anion Gap 2.0 2.0 - 13.0 mEq/L    BUN/Creatinine Ratio 27 (H) 12 - 20    Troponin I    Collection Time: 02/25/24  5:13 PM   Result Value Ref Range    Troponin-I 0.041 (H) 0.000 - 0.034 ng/mL   NT-Pro Natriuretic Peptide    Collection Time: 02/25/24  5:13 PM   Result Value Ref Range    ProBNP 977.0 (H) 0.0 - 124.9 PG/ML   CBC with Differential    Collection Time: 02/25/24  5:13 PM   Result Value Ref Range    WBC 9.86 4.00 - 11.50 x10(3)/mcL    RBC 4.16 4.00 - 5.10 x10(6)/mcL    Hgb 13.1 11.8 - 16.0 g/dL    Hct 39.7 36.0 - 48.0 %    MCV 95.4 79.0 - 99.0 fL    MCH 31.5 27.0 - 34.0 pg    MCHC 33.0 31.0 - 37.0 g/dL    RDW 13.3 11.0 - 14.5 %    Platelet 249 140 - 371 x10(3)/mcL    MPV 9.6 9.4 - 12.4 fL    Neut % 74.6 (H) 37 - 73 %    Lymph % 13.9 (L) 20 - 55 %    Mono % 9.0 4.7 - 12.5 %    Eos % 1.6 0.7 - 7 %    Basophil % 0.3 0.1 - 1.2 %    Lymph # 1.37 1.16 - 3.74 x10(3)/mcL    Neut # 7.35 (H) 1.56 - 6.13 x10(3)/mcL    Mono # 0.89 (H) 0.24 - 0.36 x10(3)/mcL    Eos # 0.16 0.04 - 0.36 x10(3)/mcL    Baso # 0.03 0.01 - 0.08 x10(3)/mcL    IG# 0.06 (H) 0.0001 - 0.031 x10(3)/mcL    IG% 0.6 (H) 0 - 0.5 %    NRBC% 0.0 <=1 %   NT-Pro Natriuretic Peptide    Collection Time: 02/25/24  5:34 PM   Result Value Ref Range    ProBNP 965.0 (H) 0.0 - 124.9 PG/ML   D-Dimer, Quantitative    Collection Time: 02/25/24  5:34 PM   Result Value Ref Range    D-Dimer 1.58 (H) 0.19 - 0.50 mg/L   RT Blood Gas    Collection Time: 02/25/24  5:56 PM   Result Value Ref Range    Sample Type Arterial Blood     pH, Blood gas 7.321 (L) 7.350 - 7.450    pCO2, Blood gas 65.9 (H) 35.0 - 45.0 mmHg    pO2, Blood gas 301.0 (H) 80.0 - 105.0 mmHg    CO Hgb 1.3 0.0 - 1.5 %    sO2, Blood gas 100.0 95.0 - 100.0 %    HCO3, Blood gas 29.9 (H) 22.0 - 26.0 mmol/L    Base Excess, Blood gas 6.00 (H) -2.00 - 2.00 mmol/L    FIO2, Blood gas 40.0 %    Met Hgb 0.3 0 - 1.5 %   Blood Culture    Collection Time: 02/25/24  9:44 PM    Specimen: Antecubital, Left; Blood   Result Value Ref Range    CULTURE, BLOOD ([facility]) No Growth At 24 Hours    Lactic Acid,  Plasma    Collection Time: 02/25/24  9:54 PM   Result Value Ref Range    Lactic Acid Level 0.8 0.4 - 2.0 mmol/L   Blood Culture    Collection Time: 02/25/24  9:54 PM    Specimen: Hand, Right; Blood   Result Value Ref Range    CULTURE, BLOOD ([facility]) No Growth At 24 Hours    Comprehensive metabolic panel    Collection Time: 02/26/24  4:18 AM   Result Value Ref Range    Sodium Level 134 (L) 135 - 145 mmol/L    Potassium Level 4.2 3.5 - 5.1 mmol/L    Chloride 97 (L) 98 - 110 mmol/L    Carbon Dioxide 33 (H) 21 - 32 mmol/L    Glucose Level 195 (H) 70 - 115 mg/dL    Blood Urea Nitrogen 21.0 (H) 7.0 - 20.0 mg/dL    Creatinine 0.83 0.66 - 1.25 mg/dL    Calcium Level Total 8.7 8.4 - 10.2 mg/dL    Protein Total 5.9 (L) 6.3 - 8.2 gm/dL    Albumin Level 3.1 (L) 3.4 - 5.0 g/dL    Globulin 2.8 2.0 - 3.9 gm/dL    Albumin/Globulin Ratio 1.1 ratio    Bilirubin Total 0.3 0.0 - 1.0 mg/dL    Alkaline Phosphatase 63 50 - 144 unit/L    Alanine Aminotransferase 21 1 - 45 unit/L    Aspartate Aminotransferase 32 14 - 36 unit/L    eGFR 67 mls/min/1.73/m2    Anion Gap 4.0 2.0 - 13.0 mEq/L    BUN/Creatinine Ratio 25 (H) 12 - 20   NT-Pro Natriuretic Peptide    Collection Time: 02/26/24  4:18 AM   Result Value Ref Range    ProBNP 4,900.0 (H) 0.0 - 124.9 PG/ML   CBC with Differential    Collection Time: 02/26/24  4:18 AM   Result Value Ref Range    WBC 3.02 (L) 4.00 - 11.50 x10(3)/mcL    RBC 3.86 (L) 4.00 - 5.10 x10(6)/mcL    Hgb 11.9 11.8 - 16.0 g/dL    Hct 36.7 36.0 - 48.0 %    MCV 95.1 79.0 - 99.0 fL    MCH 30.8 27.0 - 34.0 pg    MCHC 32.4 31.0 - 37.0 g/dL    RDW 13.2 11.0 - 14.5 %    Platelet 223 140 - 371 x10(3)/mcL    MPV 9.9 9.4 - 12.4 fL    Neut % 81.5 (H) 37 - 73 %    Lymph % 12.9 (L) 20 - 55 %    Mono % 4.6 (L) 4.7 - 12.5 %    Eos % 0.0 (L) 0.7 - 7 %    Basophil % 0.3 0.1 - 1.2 %    Lymph # 0.39 (L) 1.16 - 3.74 x10(3)/mcL    Neut # 2.46 1.56 - 6.13 x10(3)/mcL    Mono # 0.14 (L) 0.24 - 0.36 x10(3)/mcL    Eos # 0.00 (L) 0.04 - 0.36  x10(3)/mcL    Baso # 0.01 0.01 - 0.08 x10(3)/mcL    IG# 0.02 0.0001 - 0.031 x10(3)/mcL    IG% 0.7 (H) 0 - 0.5 %    NRBC% 0.0 <=1 %   POCT glucose    Collection Time: 02/26/24  7:20 AM   Result Value Ref Range    POCT Glucose 130 (H) 70 - 110 mg/dL   POCT glucose    Collection Time: 02/26/24 11:08 AM   Result Value Ref Range    POCT Glucose 143 (H) 70 - 110 mg/dL   POCT glucose    Collection Time: 02/26/24  4:08 PM   Result Value Ref Range    POCT Glucose 144 (H) 70 - 110 mg/dL   POCT glucose    Collection Time: 02/26/24  9:54 PM   Result Value Ref Range    POCT Glucose 144 (H) 70 - 110 mg/dL   Basic Metabolic Panel    Collection Time: 02/27/24  4:33 AM   Result Value Ref Range    Sodium Level 132 (L) 135 - 145 mmol/L    Potassium Level 4.3 3.5 - 5.1 mmol/L    Chloride 97 (L) 98 - 110 mmol/L    Carbon Dioxide 33 (H) 21 - 32 mmol/L    Glucose Level 103 70 - 115 mg/dL    Blood Urea Nitrogen 24.0 (H) 7.0 - 20.0 mg/dL    Creatinine 0.73 0.66 - 1.25 mg/dL    BUN/Creatinine Ratio 33 (H) 12 - 20    Calcium Level Total 9.0 8.4 - 10.2 mg/dL    Anion Gap 2.0 2.0 - 13.0 mEq/L    eGFR 78 mls/min/1.73/m2   NT-Pro Natriuretic Peptide    Collection Time: 02/27/24  4:33 AM   Result Value Ref Range    ProBNP 2,800.0 (H) 0.0 - 124.9 PG/ML   CBC with Differential    Collection Time: 02/27/24  4:34 AM   Result Value Ref Range    WBC 6.84 4.00 - 11.50 x10(3)/mcL    RBC 4.30 4.00 - 5.10 x10(6)/mcL    Hgb 13.5 11.8 - 16.0 g/dL    Hct 40.4 36.0 - 48.0 %    MCV 94.0 79.0 - 99.0 fL    MCH 31.4 27.0 - 34.0 pg    MCHC 33.4 31.0 - 37.0 g/dL    RDW 13.0 11.0 - 14.5 %    Platelet 239 140 - 371 x10(3)/mcL    MPV 10.1 9.4 - 12.4 fL    Neut % 71.0 37 - 73 %    Lymph % 14.0 (L) 20 - 55 %    Mono % 13.6 (H) 4.7 - 12.5 %    Eos % 0.6 (L) 0.7 - 7 %    Basophil % 0.4 0.1 - 1.2 %    Lymph # 0.96 (L) 1.16 - 3.74 x10(3)/mcL    Neut # 4.85 1.56 - 6.13 x10(3)/mcL    Mono # 0.93 (H) 0.24 - 0.36 x10(3)/mcL    Eos # 0.04 0.04 - 0.36 x10(3)/mcL    Baso # 0.03 0.01  - 0.08 x10(3)/mcL    IG# 0.03 0.0001 - 0.031 x10(3)/mcL    IG% 0.4 0 - 0.5 %    NRBC% 0.0 <=1 %   POCT glucose    Collection Time: 02/27/24  7:22 AM   Result Value Ref Range    POCT Glucose 88 70 - 110 mg/dL     Significant Imaging:  Imaging Results              CTA Chest Non-Coronary (PE Studies) (Final result)  Result time 02/26/24 04:58:21      Final result by Marlys Deleon III, MD (02/26/24 04:58:21)                   Impression:      1. There is no evidence of pulmonary embolus or other significant abnormalities involving the pulmonary arterial vasculature to the level of the segmental arterial branches.  See above comments regarding limitations of this examination.  2. The pulmonary vasculature is congested with perihilar atelectasis and consolidation (most pronounced on the left) and atelectasis and consolidation within the left lung base with a small left-sided pleural effusion.  These changes are suspicious for a combination of interstitial edema and left-sided pneumonic infiltrate and clinical correlation is recommended.  3. Chronic changes are present as described above.  4. No wet read for prior interpretation of this examination is available for comparison.      Electronically signed by: Marlys Deleon  Date:    02/26/2024  Time:    04:58               Narrative:    EXAMINATION:  STUDY:CTA CHEST NON CORONARY (PE STUDIES)    CLINICAL HISTORY AND TECHNIQUE:  Shortness of breath, suspected pulmonary embolus    This patient has had 0 CT and nuclear medicine scans performed within the last 12 months.    The following DOSE REDUCTION TECHNIQUES are used for all CT scans at [facility]:    1. Automated exposure control.  2. Adjustment of the mA and/or kv according to patient size.  3. Use of iterative reconstruction technique.    COMPARISON:  Chest x-ray dated 02/25/2024    FINDINGS:  CT angiography of the pulmonary arterial vasculature with intravenous contrast was performed with post  processing and multiplanar and 3 dimensional reconstruction of images. The quality of the study is less than optimal due to breathing/motion artifact and perihilar atelectasis and consolidation obscuring fine detail of some of the segmental and subsegmental bronchi.  The pulmonary arterial vasculature appears unremarkable without evidence of significant thrombus/pulmonary embolus, stenotic segments, aneurysmal dilatation, vascular malformations or other significant abnormalities to the level of the segmental arterial branches. Changes of perihilar atelectasis and or consolidation are present and most pronounced on the left with atelectasis and patchy consolidation also noted within the left lower lobe and a small left-sided pleural effusion layering dependently.  Extensive atherosclerotic plaquing is noted throughout the thoracic aorta and its primary branches and within the region of the mitral annulus and aortic valve.  There is moderate demineralization of the skeletal structures with moderate degenerative changes noted involving the thoracic spine.                                       X-Ray Chest AP (Final result)  Result time 02/26/24 05:02:10      Final result by Marlys Deleon III, MD (02/26/24 05:02:10)                   Impression:      1. Chronic changes are present as described above including changes of old granulomatous disease..  2. The heart is mildly enlarged with vascular congestion and what appear to be at least mild changes of interstitial/pulmonary edema.  3. There is moderate left-sided perihilar haziness extending into the left lower lung field.  The changes may simply be related to pulmonary edema, however, the possibility of a superimposed pneumonic infiltrate should be excluded clinically.      Electronically signed by: Marlys Deleon  Date:    02/26/2024  Time:    05:02               Narrative:    EXAMINATION:  STUDY: XR CHEST AP PORTABLE    CLINICAL HISTORY AND TECHNIQUE:  Shortness of  breath    COMPARISON:  None    FINDINGS:  A subcentimeter, calcified granuloma is noted within the right apical region as seen previously.The heart is mildly enlarged with vascular congestion and what appear to be at least mild perihilar stranding with left-sided perihilar haziness extending into the left lower lung field.  Significant pleural effusions are noted.There is moderate demineralization of the skeletal structures with fairly extensive degenerative changes noted involving both glenohumeral joints and to a lesser extent the thoracic spine.  Extensive atherosclerotic calcifications are noted throughout the thoracic aorta.  There is calcification of the mitral annulus.                                    EKG:     Telemetry:  SR    Physical Exam  Constitutional:       General: She is not in acute distress.     Appearance: She is obese.   HENT:      Mouth/Throat:      Mouth: Mucous membranes are moist.   Eyes:      Extraocular Movements: Extraocular movements intact.   Cardiovascular:      Rate and Rhythm: Normal rate and regular rhythm.      Heart sounds: Murmur heard.   Pulmonary:      Effort: Pulmonary effort is normal.      Breath sounds: Wheezing present.   Abdominal:      Palpations: Abdomen is soft.   Musculoskeletal:      Right lower leg: No edema.      Left lower leg: No edema.   Skin:     General: Skin is warm and dry.   Neurological:      Mental Status: She is alert. She is disoriented.   Psychiatric:         Mood and Affect: Mood normal.     Home Medications:   No current facility-administered medications on file prior to encounter.     Current Outpatient Medications on File Prior to Encounter   Medication Sig Dispense Refill    albuterol (PROVENTIL HFA) 90 mcg/actuation inhaler Inhale 2 puffs into the lungs 4 (four) times daily. Rescue      albuterol (PROVENTIL) 5 mg/mL nebulizer solution Take 2.5 mg by nebulization every 4 (four) hours as needed for Wheezing. Rescue      aspirin 81 MG Chew Take 1  tablet (81 mg total) by mouth once daily.  0    clopidogreL (PLAVIX) 75 mg tablet Take 1 tablet (75 mg total) by mouth once daily. 30 tablet 11    cyanocobalamin, vitamin B-12, 1,000 mcg/mL Kit Inject 1,000 mcg as directed every 30 days.      dextromethorphan-guaiFENesin  mg (MUCINEX DM)  mg per 12 hr tablet Take 1 tablet by mouth 2 (two) times daily. for 10 days 20 tablet 0    losartan (COZAAR) 50 MG tablet Take 1 tablet (50 mg total) by mouth once daily. 90 tablet 3    pantoprazole (PROTONIX) 40 MG tablet Take 1 tablet (40 mg total) by mouth once daily. 30 tablet 11    pravastatin (PRAVACHOL) 80 MG tablet Take 1 tablet (80 mg total) by mouth once daily. 90 tablet 3     Current Inpatient Medications:    Current Facility-Administered Medications:     acetaminophen tablet 650 mg, 650 mg, Oral, Q8H PRN, Leo Merino MD    aspirin chewable tablet 81 mg, 81 mg, Oral, Daily, Rachel Mcguire MD, 81 mg at 02/27/24 0821    azithromycin (ZITHROMAX) 500 mg in dextrose 5 % (D5W) 250 mL IVPB (Vial-Mate), 500 mg, Intravenous, Q24H, Rachel Mcguire MD, Stopped at 02/26/24 2252    cefTRIAXone (Rocephin) 1 g in dextrose 5 % in water (D5W) 100 mL IVPB (MB+), 1 g, Intravenous, Q24H, Leo Merino MD, Stopped at 02/26/24 2141    clopidogreL tablet 75 mg, 75 mg, Oral, Daily, Rachel Mcguire MD, 75 mg at 02/27/24 0821    dextrose 10% bolus 125 mL 125 mL, 12.5 g, Intravenous, PRN, Rachel Mcguire MD    dextrose 10% bolus 250 mL 250 mL, 25 g, Intravenous, PRN, Rachel Mcguire MD    enoxaparin injection 40 mg, 40 mg, Subcutaneous, Daily, Leo Merino MD, 40 mg at 02/26/24 1609    glucagon (human recombinant) injection 1 mg, 1 mg, Intramuscular, PRN, Rachel Mcguire MD    glucose chewable tablet 16 g, 16 g, Oral, PRN, Rachel Mcguire MD    glucose chewable tablet 24 g, 24 g, Oral, PRN, Rachel Mcguire MD    insulin aspart U-100 pen 0-10 Units, 0-10 Units, Subcutaneous, QID (AC + HS) PRN, Rachel Mcguire MD    levalbuterol nebulizer  solution 0.3108 mg, 0.3108 mg, Nebulization, Q8H, Ana Tran MD, 0.3108 mg at 02/27/24 0724    ondansetron injection 4 mg, 4 mg, Intravenous, Q6H PRN, Leo Merino MD    pantoprazole EC tablet 40 mg, 40 mg, Oral, Daily, Rachel Mcguire MD, 40 mg at 02/27/24 0821    pravastatin tablet 80 mg, 80 mg, Oral, Daily, Rachel Mcguire MD, 80 mg at 02/27/24 0821    sodium chloride 0.9% flush 10 mL, 10 mL, Intravenous, Q8H PRN, Leo Merino MD  VTE Risk Mitigation (From admission, onward)           Ordered     enoxaparin injection 40 mg  Daily         02/26/24 0059     IP VTE HIGH RISK PATIENT  Once         02/26/24 0059                  Assessment:   Severe AS with preserved EF    She resides in NH and according to POA is not physically     active and limited due to Dementia  Shortness of breath - required bipap, now on 2 L NC    Possible  PNA in presence of severe AS    Preserved LVF on recent echo    CTA chest neg for PE  Elevated BNP in setting of PNA and severe AS  PNA  HTN - hypotensive  HLP  DM  Dementia        Plan:   Antibiotics per primary team  Do not resume Losartan  Avoid hypotension  CXR - consider 20 mg IVP Lasix  Her LE edema has resolved from last week and review of labs showed prerenal state at time of DC  Could consider Dobutamine if she decompensates or becomes more hypotensive  Discussed with her POA - no plans for invasive work up for AS given underlying dementia and DNR status. Will consider palliative/hospice care. She would still like to discuss with Dr Higgins. He will not be available until Thursday of this week. If she is still hosptialized, will have him see her then, otherwise can see him in clinic.          Thank you for your consult.     María Zayas, RDP  Cardiology  [facility]  02/27/2024

## 2024-02-27 NOTE — ASSESSMENT & PLAN NOTE
"Patient is identified as having Combined Systolic and Diastolic heart failure that is Acute on chronic. CHF is currently uncontrolled due to Rales/crackles on pulmonary exam and Pulmonary edema/pleural effusion on CXR. Latest ECHO performed and demonstrates- Results for orders placed during the hospital encounter of 02/22/24    Echo    Interpretation Summary    Left Ventricle: There is normal systolic function with a visually estimated ejection fraction of 55 - 60%. There is diastolic dysfunction.    Right Ventricle: Normal right ventricular cavity size. Systolic function is normal.    Aortic Valve: There is moderate to severe stenosis. Aortic valve area by VTI is 0.53 cm². Aortic valve peak velocity is 3.62 m/s. Mean gradient is 33.7 mmHg. The dimensionless index is 0.24. Trace AI.    Mitral Valve: Moderate MAC. There is mild stenosis. MVA 1.83 cm2. MG is 3 mmHg at a heart rate of 86 bpm. There is mild regurgitation.    Tricuspid Valve: There is mild regurgitation.    Pulmonic Valve: There is mild regurgitation.    The estimated pulmonary artery systolic pressure is 28 mmHg.  . Continue Beta Blocker, ACE/ARB, and Furosemide and monitor clinical status closely. Monitor on telemetry. Patient is on CHF pathway.  Monitor strict Is&Os and daily weights.  Place on fluid restriction of 1.5 L. Cardiology has been consulted. Continue to stress to patient importance of self efficacy and  on diet for CHF. Last BNP reviewed- and noted below No results for input(s): "BNP", "BNPTRIAGEBLO" in the last 168 hours.  "

## 2024-02-28 VITALS
WEIGHT: 134.81 LBS | SYSTOLIC BLOOD PRESSURE: 125 MMHG | RESPIRATION RATE: 14 BRPM | HEIGHT: 58 IN | BODY MASS INDEX: 28.3 KG/M2 | DIASTOLIC BLOOD PRESSURE: 65 MMHG | OXYGEN SATURATION: 94 % | HEART RATE: 72 BPM | TEMPERATURE: 99 F

## 2024-02-28 LAB
ABS NEUT CALC (OHS): 2.55 X10(3)/MCL (ref 2.1–9.2)
ANION GAP SERPL CALC-SCNC: 3 MEQ/L (ref 2–13)
BUN SERPL-MCNC: 13 MG/DL (ref 7–20)
CALCIUM SERPL-MCNC: 8.8 MG/DL (ref 8.4–10.2)
CHLORIDE SERPL-SCNC: 99 MMOL/L (ref 98–110)
CO2 SERPL-SCNC: 35 MMOL/L (ref 21–32)
CREAT SERPL-MCNC: 0.64 MG/DL (ref 0.66–1.25)
CREAT/UREA NIT SERPL: 20 (ref 12–20)
ERYTHROCYTE [DISTWIDTH] IN BLOOD BY AUTOMATED COUNT: 12.9 % (ref 11–14.5)
GFR SERPLBLD CREATININE-BSD FMLA CKD-EPI: 84 MLS/MIN/1.73/M2
GLUCOSE SERPL-MCNC: 100 MG/DL (ref 70–115)
HCT VFR BLD AUTO: 39.9 % (ref 36–48)
HGB BLD-MCNC: 13.3 G/DL (ref 11.8–16)
LYMPHOCYTES NFR BLD MANUAL: 0.7 X10(3)/MCL
LYMPHOCYTES NFR BLD MANUAL: 19 % (ref 20–55)
MCH RBC QN AUTO: 30.9 PG (ref 27–34)
MCHC RBC AUTO-ENTMCNC: 33.3 G/DL (ref 31–37)
MCV RBC AUTO: 92.8 FL (ref 79–99)
MONOCYTES NFR BLD MANUAL: 0.44 X10(3)/MCL (ref 0.1–1.3)
MONOCYTES NFR BLD MANUAL: 12 % (ref 0–10)
NEUTROPHILS NFR BLD MANUAL: 69 % (ref 37–73)
NRBC BLD AUTO-RTO: 0 %
PLATELET # BLD AUTO: 275 X10(3)/MCL (ref 140–371)
PLATELET # BLD EST: NORMAL 10*3/UL
PMV BLD AUTO: 9.9 FL (ref 9.4–12.4)
POCT GLUCOSE: 122 MG/DL (ref 70–110)
POCT GLUCOSE: 87 MG/DL (ref 70–110)
POTASSIUM SERPL-SCNC: 3.9 MMOL/L (ref 3.5–5.1)
RBC # BLD AUTO: 4.3 X10(6)/MCL (ref 4–5.1)
RBC MORPH BLD: NORMAL
SODIUM SERPL-SCNC: 137 MMOL/L (ref 135–145)
WBC # SPEC AUTO: 3.69 X10(3)/MCL (ref 4–11.5)

## 2024-02-28 PROCEDURE — 94761 N-INVAS EAR/PLS OXIMETRY MLT: CPT

## 2024-02-28 PROCEDURE — 94799 UNLISTED PULMONARY SVC/PX: CPT

## 2024-02-28 PROCEDURE — 85007 BL SMEAR W/DIFF WBC COUNT: CPT | Performed by: FAMILY MEDICINE

## 2024-02-28 PROCEDURE — 25000003 PHARM REV CODE 250: Performed by: INTERNAL MEDICINE

## 2024-02-28 PROCEDURE — 80048 BASIC METABOLIC PNL TOTAL CA: CPT | Performed by: FAMILY MEDICINE

## 2024-02-28 PROCEDURE — 27000221 HC OXYGEN, UP TO 24 HOURS

## 2024-02-28 PROCEDURE — 99900035 HC TECH TIME PER 15 MIN (STAT)

## 2024-02-28 PROCEDURE — 25000242 PHARM REV CODE 250 ALT 637 W/ HCPCS: Performed by: FAMILY MEDICINE

## 2024-02-28 PROCEDURE — 36415 COLL VENOUS BLD VENIPUNCTURE: CPT | Performed by: FAMILY MEDICINE

## 2024-02-28 PROCEDURE — 94640 AIRWAY INHALATION TREATMENT: CPT

## 2024-02-28 RX ORDER — AMOXICILLIN AND CLAVULANATE POTASSIUM 875; 125 MG/1; MG/1
1 TABLET, FILM COATED ORAL 2 TIMES DAILY
Qty: 10 TABLET | Refills: 0 | Status: SHIPPED | OUTPATIENT
Start: 2024-02-28 | End: 2024-03-04

## 2024-02-28 RX ADMIN — LEVALBUTEROL HYDROCHLORIDE 0.31 MG: 0.63 SOLUTION RESPIRATORY (INHALATION) at 12:02

## 2024-02-28 RX ADMIN — LEVALBUTEROL HYDROCHLORIDE 0.31 MG: 0.63 SOLUTION RESPIRATORY (INHALATION) at 07:02

## 2024-02-28 RX ADMIN — CLOPIDOGREL BISULFATE 75 MG: 75 TABLET ORAL at 08:02

## 2024-02-28 RX ADMIN — ASPIRIN 81 MG 81 MG: 81 TABLET ORAL at 08:02

## 2024-02-28 RX ADMIN — PANTOPRAZOLE SODIUM 40 MG: 40 TABLET, DELAYED RELEASE ORAL at 08:02

## 2024-02-28 RX ADMIN — PRAVASTATIN SODIUM 80 MG: 20 TABLET ORAL at 08:02

## 2024-02-28 NOTE — PLAN OF CARE
Problem: Skin Injury Risk Increased  Goal: Skin Health and Integrity  Outcome: Ongoing, Progressing     Problem: Adult Inpatient Plan of Care  Goal: Plan of Care Review  Outcome: Ongoing, Progressing  Goal: Absence of Hospital-Acquired Illness or Injury  Outcome: Ongoing, Progressing  Goal: Optimal Comfort and Wellbeing  Outcome: Ongoing, Progressing  Goal: Readiness for Transition of Care  Outcome: Ongoing, Progressing     Problem: Diabetes Comorbidity  Goal: Blood Glucose Level Within Targeted Range  Outcome: Ongoing, Progressing     Problem: Fall Injury Risk  Goal: Absence of Fall and Fall-Related Injury  Outcome: Ongoing, Progressing     Problem: Fluid Imbalance (Pneumonia)  Goal: Fluid Balance  Outcome: Ongoing, Progressing     Problem: Infection (Pneumonia)  Goal: Resolution of Infection Signs and Symptoms  Outcome: Ongoing, Progressing     Problem: Respiratory Compromise (Pneumonia)  Goal: Effective Oxygenation and Ventilation  Outcome: Ongoing, Progressing

## 2024-02-28 NOTE — PLAN OF CARE
02/28/24 1247   Final Note   Assessment Type Final Discharge Note   Anticipated Discharge Disposition [facility]  ([facility] with [facility])   What phone number can be called within the next 1-3 days to see how you are doing after discharge? [phone]   Hospital Resources/Appts/Education Provided Post-Acute resouces added to AVS   Post-Acute Status   Post-Acute Authorization Placement;Hospice   Post-Acute Placement Status Set-up Complete/Auth obtained   Hospice Status Set-up Complete/Auth obtained   Coverage Medicare   Patient choice form signed by patient/caregiver List with quality metrics by geographic area provided;List from CMS Compare;List from System Post-Acute Care   Discharge Delays (!) Ambulance Transport/Facility Transport

## 2024-02-28 NOTE — DISCHARGE SUMMARY
[facility]urg  [facility] Medicine  Discharge Summary      Patient Name: Priscila Randhawa  MRN: [id number]  LEILANI: [id number]  Patient Class: IP- Inpatient  Admission Date: 2024  Hospital Length of Stay: 3 days  Discharge Date and Time:  2024 12:29 PM  Attending Physician: Ana Tran MD   Discharging Provider: Chris Delacruz MD  Primary Care Provider: Delbert Bhandrai MD    Primary Care Team: Networked reference to record PCT     HPI:     Shortness of Breath     Wheezing    LOW O2 SATS       Pt presented to ED via [facility]AS sent from [facility] for increased shortness of breath, low oxygen sats and wheezing. Pt was just discharged from [facility] on yesterday. Pt presents with dyspnea, tachypnea, and wheezing.         HPI: 89 y/o female with pmhx of moderate Dementia, hypertension, type 2 diabetes mellitus, hyperlipidemia, underlying HFpEF, COVID-19 infection about 2 weeks ago, chronic respiratory failure on presumed 2 L of nasal cannula oxygen therapy at the nursing home and osteoarthritis who is Nursing home resident was just discharged from this hospital yesterday after admission and treatment for NSTEMI and decompensated CHF and now sent back to the ED for shortness of breathe, wheezing and low oxygen saturation. She is a poor historian and was not able to give much history. She denies chest pain.  She is currently on 4 L O2 via NC with O2 Sat of 98%.  She is DNR but POA reportedly ok with BIPAP.        Her lab data shows Troponin 0.070 à 0.041 which is trending down from previous hospitalization.  ß 3,550. D-Dimer 1.58.  POC AB.32/65/301/29/100 on 40% FiO2.  CTA Chest suggestive of pneumonia but no PE.     ECHO (2024): EF 55 - 60% with diastolic dysfunction and moderate/severe Aortic stenosis.     ED course: She was give Lasix 20 mg iv, Ceftriaxone, Azithromycin and placed on BIPAP. She says she feels better compare to when she came in.  I personally spoke with ED clinician  regarding the case and reviewed his notes. Pertinent ED findings noted.       * No surgery found *      Hospital Course:   02/26/2024 pt was just here with CHF and noted to have severe AS, d/c home on 02/24 back to nursing home.  Her PCO2 was elevated on admit to 69 and she was placed on BIPAP.  Pt states she feels well this am.  BNP yesterday was 4900 was down to 900 at time of d/c and 2900 last hospital admit.  Feel likely more CHF related and could be due to her underlying chronic respiratory failure with hypoxia and hypercapnea. She is usually on oxygen at the nursing home.  02/27/2024 elderly patient who has a DNR in for CHF exacerbation with known severe aortic stenosis.  She is from the nursing home and was recently admitted treated then sent back and quickly she came back to the hospital.  Cardiology is suggesting hospice.  We will continue these discussions with the patient and the patient's family.  If they are agreeable send her back to the nursing home tomorrow with hospice care.  Appreciate cardiology's help.   02/28/2024 brief discharge summary elderly female patient with severe aortic stenosis presented with congestive heart failure and possible pneumonia.  Hospital course patient was admitted to the hospital placed on antibiotics given some diuretics.  We consulted Cardiology who at this point stated she was not a surgical candidate and that we should consider hospice.  We had a long discussion with the family she made some improvements with antibiotic and diuretics and they were agreeable to go back to the nursing home with hospice.  She will go at [facility] continue her previous nursing home meds.  Focus will be on comfort care.     Goals of Care Treatment Preferences:  Code Status: DNR      Consults:   Consults (From admission, onward)          Status Ordering Provider     Inpatient consult to Social Work/Case Management  Once        Provider:  (Not yet assigned)    Acknowledged RACHEL KENDRICK  C.     Inpatient consult to Cardiology  Once        Provider:  (Not yet assigned)    Acknowledged ANA TRAN     Inpatient consult to Cardiology  Once        Provider:  Jose Antonio Higgins MD    Completed ANA TRAN     Inpatient consult to Hospitalist  Once        Provider:  Ana Tran MD    Acknowledged ELIJAH PARKER            No new Assessment & Plan notes have been filed under this hospital service since the last note was generated.  Service: Hospital Medicine    Final Active Diagnoses:    Diagnosis Date Noted POA    PRINCIPAL PROBLEM:  Pneumonia of left lower lobe due to infectious organism [J18.9] 02/26/2024 Yes    Shortness of breath [R06.02] 02/26/2024 Yes    Acute exacerbation of CHF (congestive heart failure) [I50.9] 02/24/2024 Yes    Severe aortic stenosis [I35.0] 02/24/2024 Yes    Well controlled type 2 diabetes mellitus [E11.9] 05/04/2022 Yes    Essential hypertension [I10] 05/04/2022 Yes    Mixed hyperlipidemia [E78.2] 05/04/2022 Yes      Problems Resolved During this Admission:       Discharged Condition: good    Disposition: Hospice/Medical Facility    Follow Up:   Follow-up Information       [facility] Follow up.    Specialty: Skilled Nursing Facility  Contact information:  [address] [place] [id number] [phone]                         Patient Instructions:   No discharge procedures on file.    Significant Diagnostic Studies:     Pending Diagnostic Studies:       None           Medications:  Reconciled Home Medications:      Medication List        START taking these medications      amoxicillin-clavulanate 875-125mg 875-125 mg per tablet  Commonly known as: AUGMENTIN  Take 1 tablet by mouth 2 (two) times daily. for 5 days            CONTINUE taking these medications      * albuterol 5 mg/mL nebulizer solution  Commonly known as: PROVENTIL  Take 2.5 mg by nebulization every 4 (four) hours as needed for Wheezing. Rescue     * PROVENTIL HFA  90 mcg/actuation inhaler  Generic drug: albuterol  Inhale 2 puffs into the lungs 4 (four) times daily. Rescue     aspirin 81 MG Chew  Take 1 tablet (81 mg total) by mouth once daily.     clopidogreL 75 mg tablet  Commonly known as: PLAVIX  Take 1 tablet (75 mg total) by mouth once daily.     dextromethorphan-guaiFENesin  mg  mg per 12 hr tablet  Commonly known as: MUCINEX DM  Take 1 tablet by mouth 2 (two) times daily. for 10 days     losartan 50 MG tablet  Commonly known as: COZAAR  Take 1 tablet (50 mg total) by mouth once daily.     pantoprazole 40 MG tablet  Commonly known as: PROTONIX  Take 1 tablet (40 mg total) by mouth once daily.     pravastatin 80 MG tablet  Commonly known as: PRAVACHOL  Take 1 tablet (80 mg total) by mouth once daily.           * This list has 2 medication(s) that are the same as other medications prescribed for you. Read the directions carefully, and ask your doctor or other care provider to review them with you.                ASK your doctor about these medications      cyanocobalamin (vitamin B-12) 1,000 mcg/mL Kit  Inject 1,000 mcg as directed every 30 days.              Indwelling Lines/Drains at time of discharge:   Lines/Drains/Airways       Drain  Duration             Female External Urinary Catheter w/ Suction 02/22/24 0818 5 days                    Time spent on the discharge of patient: 36 minutes         Chris Delacruz MD  Department of Hospital Medicine  [facility]

## 2024-02-28 NOTE — PLAN OF CARE
Physician ordered to discharge patient back to [facility] with [facility]. [facility] was notified per phone/fax of Physician's discharge orders, spoke with Traci & Maeve. Nursing advised to call report to Dorothy @ [phone].

## 2024-02-28 NOTE — PLAN OF CARE
[facility] notified of pt's discharge back to [facility], spoke with Michelle. [facility] nurse to f/u with patient & family today @ [facility].

## 2024-02-28 NOTE — PLAN OF CARE
Problem: Skin Injury Risk Increased  Goal: Skin Health and Integrity  Outcome: Met     Problem: Adult Inpatient Plan of Care  Goal: Plan of Care Review  Outcome: Met  Goal: Patient-Specific Goal (Individualized)  Outcome: Met  Goal: Absence of Hospital-Acquired Illness or Injury  Outcome: Met  Goal: Optimal Comfort and Wellbeing  Outcome: Met  Goal: Readiness for Transition of Care  Outcome: Met     Problem: Diabetes Comorbidity  Goal: Blood Glucose Level Within Targeted Range  Outcome: Met     Problem: Fall Injury Risk  Goal: Absence of Fall and Fall-Related Injury  Outcome: Met     Problem: Fluid Imbalance (Pneumonia)  Goal: Fluid Balance  Outcome: Met     Problem: Infection (Pneumonia)  Goal: Resolution of Infection Signs and Symptoms  Outcome: Met     Problem: Respiratory Compromise (Pneumonia)  Goal: Effective Oxygenation and Ventilation  Outcome: Met

## 2024-02-29 ENCOUNTER — PATIENT OUTREACH (OUTPATIENT)
Dept: ADMINISTRATIVE | Facility: CLINIC | Age: 89
End: 2024-02-29
Payer: MEDICARE

## 2024-02-29 LAB
OHS QRS DURATION: 72 MS
OHS QTC CALCULATION: 432 MS

## 2024-03-01 LAB — BACTERIA BLD CULT: NORMAL

## 2024-03-01 NOTE — PHYSICIAN QUERY
PT Name: Priscila Randhawa  MR #: [id number]     DOCUMENTATION CLARIFICATION      CDS/: Tracey Peter RN              Contact information:[email or web address]   This form is a permanent document in the medical record.    Query Date: March 1, 2024    By submitting this query, we are merely seeking further clarification of documentation. Please utilize your independent clinical judgment when addressing the question(s) below.     The Medical Record contains the following:   Indicators   Supporting Clinical Findings   Location in Medical Record   x Chest Pain, Angina Patient denied any chest pain, syncope, presyncopal symptoms. Consult 2/22       Cardiology    Coronary Artery Disease     x EKG Sinus rhythm with Premature atrial complexes with Aberrant conduction  Otherwise normal ECG EKG 2/22   x Troponin  02/22/24 17:25 02/22/24 18:52 02/23/24 07:42 02/24/24 04:16 02/25/24 17:13   Troponin I 0.081 (H) 0.129 (HH) 0.061 (H) 0.070 (H) 0.041 (H)    Lab             x Echo Results Echocardiography Findings    Left Ventricle The left ventricle is normal in size. Normal wall motion. There is normal systolic function with a visually estimated ejection fraction of 55 - 60%. There is diastolic dysfunction.  Right Ventricle Normal right ventricular cavity size. Systolic function is normal.     Echo 2/23    Angiography     x Documentation of acute cardiac condition Acute heart failure unknown type   Echo pending  Non ST elevation myocardial infarction type 2 in the setting of heart failure    Final Active Diagnoses:    Non-STEMI (non-ST elevated myocardial infarction Consult 2/23       Cardiology        DC summary 2/24       Hosp Med     x Medication/Treatment Elevated troponins; this may be related to type 2 MI in the setting of CHF exacerbation, however, patient has been commenced on IV heparin drip, continue aspirin, statin.  We will start beta-blocker prior to discharge   H&P 2/22       Hosp Med   x Other  This morning  she developed significant shortness of breath and lower extremity edema and brought to hospital. Consult 2/22       Cardiology      Due to conflicting documentation, please clarify the NSTEMI diagnosis related to the above documentation:  [ x  ] NSTEMI Type 2 due to Congestive Heart Failure     [   ] NSTEMI     [   ] Other Cardiac Diagnosis (please specify): _______________         Please document in your progress notes daily for the duration of treatment until resolved, and include in your discharge summary.    Form No. [id number]

## 2024-03-01 NOTE — PHYSICIAN QUERY
PT Name: Priscila Randhawa  MR #: [id number]     DOCUMENTATION CLARIFICATION     CDS/: Tracey Peter RN             Contact information:[email or web address]   This form is a permanent document in the medical record.     Query Date: March 1, 2024    By submitting this query, we are merely seeking further clarification of documentation.  Please utilize your independent clinical judgment when addressing the question(s) below.    The Medical Record contains the following   Indicators Supporting Clinical Findings Location in Medical Record   x Heart Failure documented CHF with acute exacerbation; probably diastolic versus systolic dysfunction, follow up 2D echocardiogram, start patient on IV Lasix 40 q.12h hourly.    Acute heart failure unknown type    Echo pending         H&P 2/22       Hosp Med      Consult 2/23        Cardiology       x BNP ProBNP 3,550.0 (*) ED provider note 2/22     x EF/Echo   Left Ventricle: There is normal systolic function with a visually estimated ejection fraction of 55 - 60%. There is diastolic dysfunction.    Right Ventricle: Normal right ventricular cavity size. Systolic function is normal.     Echo 2/23   x Radiology findings At the ER, patient had a chest x-ray which shows evidence of pulmonary vascular congestion. H&P 2/22       Hosp Med   x Subjective/Objective Respiratory Conditions Pt in per EMS from [facility] with c/o SOB and expiratory wheezing s/p COVID diagnosis approx. 2 weeks pta increasing in intensity to today.    ED provider note 2/22   x Recent/Current MI Non ST elevation myocardial infarction type 2 in the setting of heart failure Consult 2/22        Cardiology    Heart Transplant, LVAD     x Edema, JVD 1+ edema bilateral lower extremities Consult 2/22        Cardiology    Ascites     x Diuretics/Meds  Agree with IV Lasix 40 mg b.i.d. for now        Current Outpatient Medications on File Prior to Encounter :  albuterol (PROVENTIL HFA) 90 mcg/actuation inhaler I    albuterol (PROVENTIL) 5 mg/mL nebulizer solution    cyanocobalamin, vitamin B-12, 1,000 mcg/mL Kit    losartan (COZAAR) 50 MG tablet    pravastatin (PRAVACHOL) 80 MG tablet    Consult 2/22        Cardiology      Consult 2/22        Cardiology    Other Treatment     x Other Past Medical History:   Background diabetic retinopathy     Carotid artery stenosis     Cataract extraction status     Combined hyperlipidemia     HTN (hypertension)     Polycythemia     Type 2 diabetes mellitus without complications     Unspecified osteoarthritis, unspecified site     Vitamin D deficiency ED provider note 2/22     Heart failure is a clinical diagnosis which includes symptomatic fluid retention, elevated intracardiac pressures, and/or the inability of the heart to deliver adequate blood flow.    Heart Failure with reduced Ejection Fraction (HFrEF) or Systolic Heart Failure (loses ability to contract normally, EF is <40%)    Heart Failure with preserved Ejection Fraction (HFpEF) or Diastolic Heart Failure (stiff ventricles, does not relax properly, EF is >50%)     Heart Failure with Combined Systolic and Diastolic Failure (stiff ventricles, does not relax properly and EF is <50%)    Mid-range or mildly reduced ejection fraction (HFmrEF) is classified as systolic heart failure.  Congestive heart failure with a recovered EF is classified as Diastolic Heart Failure.  Common clues to acute exacerbation:  Rapidly progressive symptoms (w/in 2 weeks of presentation), using IV diuretics, using supplemental O2, pulmonary edema on Xray, new or worsening pleural effusion, +JVD or other signs of volume overload, MI w/in 4 weeks, and/or BNP >500  The clinical guidelines noted are only system guidelines, and do not replace the providers clinical judgment.    Due to conflicting documentation, please clarify the Type and Acuity of the Congestive Heart Failure  diagnosis associated with the above clinical findings.    [   ]  Acute Diastolic  Heart Failure (HFpEF) - new diagnosis   [x   ]  Acute on Chronic Diastolic Heart Failure (HFpEF) - worsening of CHF signs/symptoms in preexisting CHF   [   ]  Other (please specify): ___________________________________         Please document in your progress notes daily for the duration of treatment until resolved and include in your discharge summary.    References:  American Heart Association editorial staff. (2017, May). Ejection Fraction Heart Failure Measurement. American Heart Association. https://www.heart.org/en/health-topics/heart-failure/diagnosing-heart-failure/ejection-fraction-heart-failure-measurement#:~:text=Ejection%20fraction%20(EF)%20is%20a,pushed%20out%20with%20each%20heartbeat  MAURIZIO Schaffer (2020, December 15). Heart failure with preserved ejection fraction: Clinical manifestations and diagnosis. [employer]. https://www.[employer].MonkeyFind/contents/heart-failure-with-preserved-ejection-fraction-clinical-manifestations-and-diagnosis.  ICD-10-CM/PCS Coding Clinic Third Quarter ICD-10, Effective with discharges: September 8, 2020 [facility] § Heart failure with mid-range or mildly reduced ejection fraction (2020).  ICD-10-CM/PCS Coding Clinic Third Quarter ICD-10, Effective with discharges: September 8, 2020 [facility] § Heart failure with recovered ejection fraction (2020).  Form No. [id number]

## 2024-03-03 LAB — BACTERIA BLD CULT: NORMAL

## 2024-05-27 PROBLEM — J18.9 PNEUMONIA OF LEFT LOWER LOBE DUE TO INFECTIOUS ORGANISM: Status: RESOLVED | Noted: 2024-02-26 | Resolved: 2024-05-27

## 2024-05-27 PROBLEM — I21.4 NON-STEMI (NON-ST ELEVATED MYOCARDIAL INFARCTION): Status: RESOLVED | Noted: 2024-02-23 | Resolved: 2024-05-27

## 2024-06-29 ENCOUNTER — LAB REQUISITION (OUTPATIENT)
Dept: LAB | Facility: HOSPITAL | Age: 89
End: 2024-06-29
Payer: MEDICARE

## 2024-06-29 DIAGNOSIS — N39.0 URINARY TRACT INFECTION, SITE NOT SPECIFIED: ICD-10-CM

## 2024-06-29 LAB
BILIRUB UR QL STRIP.AUTO: NEGATIVE
CLARITY UR: CLEAR
COLOR UR AUTO: YELLOW
GLUCOSE UR QL STRIP: NEGATIVE
HGB UR QL STRIP: NEGATIVE
KETONES UR QL STRIP: NEGATIVE
LEUKOCYTE ESTERASE UR QL STRIP: NEGATIVE
NITRITE UR QL STRIP: NEGATIVE
PH UR STRIP: 6 [PH]
PROT UR QL STRIP: NEGATIVE
SP GR UR STRIP.AUTO: 1.01 (ref 1–1.03)
UROBILINOGEN UR STRIP-ACNC: 0.2

## 2024-06-29 PROCEDURE — 87086 URINE CULTURE/COLONY COUNT: CPT | Performed by: INTERNAL MEDICINE

## 2024-06-29 PROCEDURE — 81003 URINALYSIS AUTO W/O SCOPE: CPT | Performed by: INTERNAL MEDICINE

## 2024-07-02 LAB — BACTERIA UR CULT: NO GROWTH
